# Patient Record
Sex: FEMALE | Race: BLACK OR AFRICAN AMERICAN | NOT HISPANIC OR LATINO | URBAN - METROPOLITAN AREA
[De-identification: names, ages, dates, MRNs, and addresses within clinical notes are randomized per-mention and may not be internally consistent; named-entity substitution may affect disease eponyms.]

---

## 2015-10-16 RX ORDER — AMLODIPINE BESYLATE 2.5 MG/1
1 TABLET ORAL
Qty: 0 | Refills: 0 | COMMUNITY
Start: 2015-10-16

## 2017-01-22 ENCOUNTER — EMERGENCY (EMERGENCY)
Facility: HOSPITAL | Age: 47
LOS: 1 days | Discharge: HOME | End: 2017-01-22
Attending: EMERGENCY MEDICINE
Payer: MEDICARE

## 2017-01-22 VITALS
SYSTOLIC BLOOD PRESSURE: 118 MMHG | DIASTOLIC BLOOD PRESSURE: 88 MMHG | WEIGHT: 179.9 LBS | HEART RATE: 89 BPM | HEIGHT: 69 IN | OXYGEN SATURATION: 98 % | RESPIRATION RATE: 18 BRPM | TEMPERATURE: 99 F

## 2017-01-22 DIAGNOSIS — E89.0 POSTPROCEDURAL HYPOTHYROIDISM: ICD-10-CM

## 2017-01-22 DIAGNOSIS — M32.9 SYSTEMIC LUPUS ERYTHEMATOSUS, UNSPECIFIED: ICD-10-CM

## 2017-01-22 DIAGNOSIS — K52.9 NONINFECTIVE GASTROENTERITIS AND COLITIS, UNSPECIFIED: ICD-10-CM

## 2017-01-22 DIAGNOSIS — Z91.040 LATEX ALLERGY STATUS: ICD-10-CM

## 2017-01-22 DIAGNOSIS — J06.9 ACUTE UPPER RESPIRATORY INFECTION, UNSPECIFIED: ICD-10-CM

## 2017-01-22 DIAGNOSIS — Z88.2 ALLERGY STATUS TO SULFONAMIDES: ICD-10-CM

## 2017-01-22 DIAGNOSIS — F17.210 NICOTINE DEPENDENCE, CIGARETTES, UNCOMPLICATED: ICD-10-CM

## 2017-01-22 DIAGNOSIS — Z90.710 ACQUIRED ABSENCE OF BOTH CERVIX AND UTERUS: ICD-10-CM

## 2017-01-22 DIAGNOSIS — I10 ESSENTIAL (PRIMARY) HYPERTENSION: ICD-10-CM

## 2017-01-22 DIAGNOSIS — E89.0 POSTPROCEDURAL HYPOTHYROIDISM: Chronic | ICD-10-CM

## 2017-01-22 DIAGNOSIS — Z79.01 LONG TERM (CURRENT) USE OF ANTICOAGULANTS: ICD-10-CM

## 2017-01-22 DIAGNOSIS — I48.91 UNSPECIFIED ATRIAL FIBRILLATION: ICD-10-CM

## 2017-01-22 LAB
ACETONE SERPL-MCNC: NEGATIVE — SIGNIFICANT CHANGE UP
ALBUMIN SERPL ELPH-MCNC: 4.2 G/DL — SIGNIFICANT CHANGE UP (ref 3.5–5)
ALBUMIN SERPL ELPH-MCNC: SIGNIFICANT CHANGE UP G/DL (ref 3.5–5)
ALP SERPL-CCNC: 131 U/L — HIGH (ref 40–120)
ALP SERPL-CCNC: 142 U/L — HIGH (ref 40–120)
ALT FLD-CCNC: 16 U/L DA — SIGNIFICANT CHANGE UP (ref 10–60)
ALT FLD-CCNC: SIGNIFICANT CHANGE UP U/L DA (ref 10–60)
ANION GAP SERPL CALC-SCNC: 9 MMOL/L — SIGNIFICANT CHANGE UP (ref 5–17)
AST SERPL-CCNC: 11 U/L — SIGNIFICANT CHANGE UP (ref 10–40)
AST SERPL-CCNC: SIGNIFICANT CHANGE UP U/L (ref 10–40)
BASOPHILS # BLD AUTO: 0.2 K/UL — SIGNIFICANT CHANGE UP (ref 0–0.2)
BASOPHILS NFR BLD AUTO: 1.4 % — SIGNIFICANT CHANGE UP (ref 0–2)
BILIRUB SERPL-MCNC: 0.9 MG/DL — SIGNIFICANT CHANGE UP (ref 0.2–1.2)
BILIRUB SERPL-MCNC: 1 MG/DL — SIGNIFICANT CHANGE UP (ref 0.2–1.2)
BUN SERPL-MCNC: 12 MG/DL — SIGNIFICANT CHANGE UP (ref 7–18)
BUN SERPL-MCNC: SIGNIFICANT CHANGE UP MG/DL (ref 7–18)
CALCIUM SERPL-MCNC: 9.3 MG/DL — SIGNIFICANT CHANGE UP (ref 8.4–10.5)
CALCIUM SERPL-MCNC: SIGNIFICANT CHANGE UP MG/DL (ref 8.4–10.5)
CHLORIDE SERPL-SCNC: 109 MMOL/L — HIGH (ref 96–108)
CHLORIDE SERPL-SCNC: SIGNIFICANT CHANGE UP MMOL/L (ref 96–108)
CO2 SERPL-SCNC: 22 MMOL/L — SIGNIFICANT CHANGE UP (ref 22–31)
CO2 SERPL-SCNC: SIGNIFICANT CHANGE UP MMOL/L (ref 22–31)
CREAT SERPL-MCNC: 1.16 MG/DL — SIGNIFICANT CHANGE UP (ref 0.5–1.3)
CREAT SERPL-MCNC: 1.28 MG/DL — SIGNIFICANT CHANGE UP (ref 0.5–1.3)
EOSINOPHIL # BLD AUTO: 0 K/UL — SIGNIFICANT CHANGE UP (ref 0–0.5)
EOSINOPHIL NFR BLD AUTO: 0.2 % — SIGNIFICANT CHANGE UP (ref 0–6)
GLUCOSE SERPL-MCNC: 88 MG/DL — SIGNIFICANT CHANGE UP (ref 70–99)
GLUCOSE SERPL-MCNC: 92 MG/DL — SIGNIFICANT CHANGE UP (ref 70–99)
HCT VFR BLD CALC: 51.2 % — HIGH (ref 34.5–45)
HGB BLD-MCNC: 16.4 G/DL — HIGH (ref 11.5–15.5)
LACTATE SERPL-SCNC: 0.9 MMOL/L — SIGNIFICANT CHANGE UP (ref 0.7–2)
LYMPHOCYTES # BLD AUTO: 2.3 K/UL — SIGNIFICANT CHANGE UP (ref 1–3.3)
LYMPHOCYTES # BLD AUTO: 21.3 % — SIGNIFICANT CHANGE UP (ref 13–44)
MAGNESIUM SERPL-MCNC: 1.9 MG/DL — SIGNIFICANT CHANGE UP (ref 1.8–2.4)
MCHC RBC-ENTMCNC: 28.8 PG — SIGNIFICANT CHANGE UP (ref 27–34)
MCHC RBC-ENTMCNC: 32 GM/DL — SIGNIFICANT CHANGE UP (ref 32–36)
MCV RBC AUTO: 90.1 FL — SIGNIFICANT CHANGE UP (ref 80–100)
MONOCYTES # BLD AUTO: 0.6 K/UL — SIGNIFICANT CHANGE UP (ref 0–0.9)
MONOCYTES NFR BLD AUTO: 5 % — SIGNIFICANT CHANGE UP (ref 2–14)
NEUTROPHILS # BLD AUTO: 7.9 K/UL — HIGH (ref 1.8–7.4)
NEUTROPHILS NFR BLD AUTO: 72.1 % — SIGNIFICANT CHANGE UP (ref 43–77)
PLATELET # BLD AUTO: 324 K/UL — SIGNIFICANT CHANGE UP (ref 150–400)
POTASSIUM SERPL-MCNC: 3.6 MMOL/L — SIGNIFICANT CHANGE UP (ref 3.5–5.3)
POTASSIUM SERPL-MCNC: SIGNIFICANT CHANGE UP MMOL/L (ref 3.5–5.3)
POTASSIUM SERPL-SCNC: 3.6 MMOL/L — SIGNIFICANT CHANGE UP (ref 3.5–5.3)
POTASSIUM SERPL-SCNC: SIGNIFICANT CHANGE UP MMOL/L (ref 3.5–5.3)
PROT SERPL-MCNC: 7.9 G/DL — SIGNIFICANT CHANGE UP (ref 6–8.3)
PROT SERPL-MCNC: 9.3 G/DL — HIGH (ref 6–8.3)
RBC # BLD: 5.68 M/UL — HIGH (ref 3.8–5.2)
RBC # FLD: 13.6 % — SIGNIFICANT CHANGE UP (ref 10.3–14.5)
SODIUM SERPL-SCNC: 140 MMOL/L — SIGNIFICANT CHANGE UP (ref 135–145)
SODIUM SERPL-SCNC: SIGNIFICANT CHANGE UP MMOL/L (ref 135–145)
WBC # BLD: 11 K/UL — HIGH (ref 3.8–10.5)
WBC # FLD AUTO: 11 K/UL — HIGH (ref 3.8–10.5)

## 2017-01-22 PROCEDURE — 87040 BLOOD CULTURE FOR BACTERIA: CPT

## 2017-01-22 PROCEDURE — 96374 THER/PROPH/DIAG INJ IV PUSH: CPT | Mod: 59

## 2017-01-22 PROCEDURE — 74176 CT ABD & PELVIS W/O CONTRAST: CPT

## 2017-01-22 PROCEDURE — 71045 X-RAY EXAM CHEST 1 VIEW: CPT

## 2017-01-22 PROCEDURE — 96376 TX/PRO/DX INJ SAME DRUG ADON: CPT

## 2017-01-22 PROCEDURE — 80053 COMPREHEN METABOLIC PANEL: CPT

## 2017-01-22 PROCEDURE — 74176 CT ABD & PELVIS W/O CONTRAST: CPT | Mod: 26

## 2017-01-22 PROCEDURE — 71010: CPT | Mod: 26

## 2017-01-22 PROCEDURE — 83735 ASSAY OF MAGNESIUM: CPT

## 2017-01-22 PROCEDURE — 96375 TX/PRO/DX INJ NEW DRUG ADDON: CPT

## 2017-01-22 PROCEDURE — 82009 KETONE BODYS QUAL: CPT

## 2017-01-22 PROCEDURE — 83605 ASSAY OF LACTIC ACID: CPT

## 2017-01-22 PROCEDURE — 99285 EMERGENCY DEPT VISIT HI MDM: CPT

## 2017-01-22 PROCEDURE — 99284 EMERGENCY DEPT VISIT MOD MDM: CPT | Mod: 25

## 2017-01-22 PROCEDURE — 85027 COMPLETE CBC AUTOMATED: CPT

## 2017-01-22 RX ORDER — SODIUM CHLORIDE 9 MG/ML
2000 INJECTION INTRAMUSCULAR; INTRAVENOUS; SUBCUTANEOUS ONCE
Qty: 0 | Refills: 0 | Status: COMPLETED | OUTPATIENT
Start: 2017-01-22 | End: 2017-01-22

## 2017-01-22 RX ORDER — METOCLOPRAMIDE HCL 10 MG
10 TABLET ORAL ONCE
Qty: 0 | Refills: 0 | Status: COMPLETED | OUTPATIENT
Start: 2017-01-22 | End: 2017-01-22

## 2017-01-22 RX ORDER — PANTOPRAZOLE SODIUM 20 MG/1
40 TABLET, DELAYED RELEASE ORAL ONCE
Qty: 0 | Refills: 0 | Status: COMPLETED | OUTPATIENT
Start: 2017-01-22 | End: 2017-01-22

## 2017-01-22 RX ORDER — SODIUM CHLORIDE 9 MG/ML
1000 INJECTION INTRAMUSCULAR; INTRAVENOUS; SUBCUTANEOUS
Qty: 0 | Refills: 0 | Status: DISCONTINUED | OUTPATIENT
Start: 2017-01-22 | End: 2017-01-26

## 2017-01-22 RX ORDER — ONDANSETRON 8 MG/1
1 TABLET, FILM COATED ORAL
Qty: 21 | Refills: 0 | OUTPATIENT
Start: 2017-01-22 | End: 2017-01-29

## 2017-01-22 RX ORDER — MORPHINE SULFATE 50 MG/1
4 CAPSULE, EXTENDED RELEASE ORAL ONCE
Qty: 0 | Refills: 0 | Status: DISCONTINUED | OUTPATIENT
Start: 2017-01-22 | End: 2017-01-22

## 2017-01-22 RX ADMIN — MORPHINE SULFATE 4 MILLIGRAM(S): 50 CAPSULE, EXTENDED RELEASE ORAL at 21:44

## 2017-01-22 RX ADMIN — MORPHINE SULFATE 4 MILLIGRAM(S): 50 CAPSULE, EXTENDED RELEASE ORAL at 20:47

## 2017-01-22 RX ADMIN — SODIUM CHLORIDE 2000 MILLILITER(S): 9 INJECTION INTRAMUSCULAR; INTRAVENOUS; SUBCUTANEOUS at 18:12

## 2017-01-22 RX ADMIN — PANTOPRAZOLE SODIUM 40 MILLIGRAM(S): 20 TABLET, DELAYED RELEASE ORAL at 18:10

## 2017-01-22 RX ADMIN — MORPHINE SULFATE 4 MILLIGRAM(S): 50 CAPSULE, EXTENDED RELEASE ORAL at 18:55

## 2017-01-22 RX ADMIN — Medication 104 MILLIGRAM(S): at 18:10

## 2017-01-22 NOTE — ED PROVIDER NOTE - NS ED MD SCRIBE ATTENDING SCRIBE SECTIONS
VITAL SIGNS( Pullset)/HISTORY OF PRESENT ILLNESS/REVIEW OF SYSTEMS/PHYSICAL EXAM/PAST MEDICAL/SURGICAL/SOCIAL HISTORY/DISPOSITION/HIV

## 2017-01-22 NOTE — ED PROVIDER NOTE - OBJECTIVE STATEMENT
47 y/o F pt, ambulator w/ walker, w/ PMHx of a-fib, HTN, SLE, PSHx of thyroidectomy, and partial hysterectomy, on Plavix, presents to the ED c/o cough w/ yellow phlegm x1 week. Pt reports clear nasal drainage, chills, nausea, vomiting x3-4 days, multiple episodes of diarrhea x3 days (non bloody), dysuria b/l LE pain and upper abd pain x3-4 days. Pt states she is unable to tolerate PO intake. Pt reports the abd pain is sharp. 8/10 and is constant. Pt did not take pain medication. Pt denies recent travel, spoil food consumption. Pt denies CP, SOB, hematuria, flank pain, bloody emesis, blood in stool or any other complaints. Allergies to ASA and Sulfa drugs. PMD Dr. Mathew

## 2017-01-22 NOTE — ED ADULT NURSE NOTE - OBJECTIVE STATEMENT
Patient came to the ED a/o x 3 ambulates with walker c/o cough and vomiting. Patient denies abdominal pain, abdomen is soft to touch non tender.

## 2017-01-22 NOTE — ED PROVIDER NOTE - CARE PLAN
Principal Discharge DX:	Abdominal pain  Secondary Diagnosis:	Gastroenteritis  Secondary Diagnosis:	URI (upper respiratory infection)

## 2017-01-22 NOTE — ED PROVIDER NOTE - MEDICAL DECISION MAKING DETAILS
47 y/o F pt w/ vomiting, diarrhea and coughing. Will get labs, CT abd, give IV fluids and admit. 47 y/o F pt w/ vomiting, diarrhea and coughing. Will get labs, CT abd, give IV fluids and poss admit.

## 2017-01-22 NOTE — ED PROVIDER NOTE - CHPI ED SYMPTOMS POS
NAUSEA/DIARRHEA/VOMITING/DECREASED EATING/DRINKING/chills, upper abd pain, b/l LE pain, cough w/ yellow phlegm,  nasal drainage, dysuria

## 2017-01-22 NOTE — ED PROVIDER NOTE - PMH
Atrial fibrillation    Cyclical vomiting    HTN (hypertension)    Reflex sympathetic dystrophy    SLE (systemic lupus erythematosus)

## 2017-01-23 VITALS — DIASTOLIC BLOOD PRESSURE: 93 MMHG | SYSTOLIC BLOOD PRESSURE: 125 MMHG

## 2017-01-23 RX ORDER — CIPROFLOXACIN LACTATE 400MG/40ML
1 VIAL (ML) INTRAVENOUS
Qty: 6 | Refills: 0 | OUTPATIENT
Start: 2017-01-23 | End: 2017-01-30

## 2017-01-27 LAB
CULTURE RESULTS: SIGNIFICANT CHANGE UP
CULTURE RESULTS: SIGNIFICANT CHANGE UP
SPECIMEN SOURCE: SIGNIFICANT CHANGE UP
SPECIMEN SOURCE: SIGNIFICANT CHANGE UP

## 2017-06-10 ENCOUNTER — INPATIENT (INPATIENT)
Facility: HOSPITAL | Age: 47
LOS: 3 days | Discharge: HOME | DRG: 103 | End: 2017-06-14
Attending: INTERNAL MEDICINE | Admitting: INTERNAL MEDICINE
Payer: MEDICARE

## 2017-06-10 VITALS
OXYGEN SATURATION: 100 % | SYSTOLIC BLOOD PRESSURE: 151 MMHG | TEMPERATURE: 99 F | HEART RATE: 84 BPM | DIASTOLIC BLOOD PRESSURE: 119 MMHG | HEIGHT: 69 IN | RESPIRATION RATE: 20 BRPM | WEIGHT: 179.9 LBS

## 2017-06-10 DIAGNOSIS — E89.0 POSTPROCEDURAL HYPOTHYROIDISM: Chronic | ICD-10-CM

## 2017-06-10 LAB
BASOPHILS # BLD AUTO: 0.1 K/UL — SIGNIFICANT CHANGE UP (ref 0–0.2)
BASOPHILS NFR BLD AUTO: 1.1 % — SIGNIFICANT CHANGE UP (ref 0–2)
EOSINOPHIL # BLD AUTO: 0 K/UL — SIGNIFICANT CHANGE UP (ref 0–0.5)
EOSINOPHIL NFR BLD AUTO: 0.1 % — SIGNIFICANT CHANGE UP (ref 0–6)
HCT VFR BLD CALC: 45.9 % — HIGH (ref 34.5–45)
HGB BLD-MCNC: 15.2 G/DL — SIGNIFICANT CHANGE UP (ref 11.5–15.5)
LYMPHOCYTES # BLD AUTO: 1.3 K/UL — SIGNIFICANT CHANGE UP (ref 1–3.3)
LYMPHOCYTES # BLD AUTO: 9.5 % — LOW (ref 13–44)
MCHC RBC-ENTMCNC: 29.4 PG — SIGNIFICANT CHANGE UP (ref 27–34)
MCHC RBC-ENTMCNC: 33.1 GM/DL — SIGNIFICANT CHANGE UP (ref 32–36)
MCV RBC AUTO: 88.7 FL — SIGNIFICANT CHANGE UP (ref 80–100)
MONOCYTES # BLD AUTO: 0.4 K/UL — SIGNIFICANT CHANGE UP (ref 0–0.9)
MONOCYTES NFR BLD AUTO: 2.7 % — SIGNIFICANT CHANGE UP (ref 2–14)
NEUTROPHILS # BLD AUTO: 12.2 K/UL — HIGH (ref 1.8–7.4)
NEUTROPHILS NFR BLD AUTO: 86.7 % — HIGH (ref 43–77)
PLATELET # BLD AUTO: 205 K/UL — SIGNIFICANT CHANGE UP (ref 150–400)
RBC # BLD: 5.18 M/UL — SIGNIFICANT CHANGE UP (ref 3.8–5.2)
RBC # FLD: 13.8 % — SIGNIFICANT CHANGE UP (ref 10.3–14.5)
WBC # BLD: 14 K/UL — HIGH (ref 3.8–10.5)
WBC # FLD AUTO: 14 K/UL — HIGH (ref 3.8–10.5)

## 2017-06-10 RX ORDER — SODIUM CHLORIDE 9 MG/ML
1000 INJECTION INTRAMUSCULAR; INTRAVENOUS; SUBCUTANEOUS ONCE
Qty: 0 | Refills: 0 | Status: COMPLETED | OUTPATIENT
Start: 2017-06-10 | End: 2017-06-10

## 2017-06-10 RX ORDER — ONDANSETRON 8 MG/1
4 TABLET, FILM COATED ORAL ONCE
Qty: 0 | Refills: 0 | Status: COMPLETED | OUTPATIENT
Start: 2017-06-10 | End: 2017-06-10

## 2017-06-10 RX ORDER — METOCLOPRAMIDE HCL 10 MG
10 TABLET ORAL ONCE
Qty: 0 | Refills: 0 | Status: COMPLETED | OUTPATIENT
Start: 2017-06-10 | End: 2017-06-10

## 2017-06-10 RX ORDER — HYDROMORPHONE HYDROCHLORIDE 2 MG/ML
1 INJECTION INTRAMUSCULAR; INTRAVENOUS; SUBCUTANEOUS ONCE
Qty: 0 | Refills: 0 | Status: DISCONTINUED | OUTPATIENT
Start: 2017-06-10 | End: 2017-06-10

## 2017-06-10 RX ORDER — FAMOTIDINE 10 MG/ML
20 INJECTION INTRAVENOUS ONCE
Qty: 0 | Refills: 0 | Status: COMPLETED | OUTPATIENT
Start: 2017-06-10 | End: 2017-06-10

## 2017-06-10 RX ADMIN — HYDROMORPHONE HYDROCHLORIDE 1 MILLIGRAM(S): 2 INJECTION INTRAMUSCULAR; INTRAVENOUS; SUBCUTANEOUS at 23:28

## 2017-06-10 RX ADMIN — SODIUM CHLORIDE 1000 MILLILITER(S): 9 INJECTION INTRAMUSCULAR; INTRAVENOUS; SUBCUTANEOUS at 23:28

## 2017-06-10 RX ADMIN — FAMOTIDINE 20 MILLIGRAM(S): 10 INJECTION INTRAVENOUS at 23:28

## 2017-06-10 RX ADMIN — ONDANSETRON 4 MILLIGRAM(S): 8 TABLET, FILM COATED ORAL at 23:28

## 2017-06-10 NOTE — ED PROVIDER NOTE - CONSTITUTIONAL, MLM
normal... Well appearing, well nourished, awake, alert, oriented to person, place, time/situation and in no apparent distress; pt is actively vomiting on exam.

## 2017-06-10 NOTE — ED PROVIDER NOTE - OBJECTIVE STATEMENT
47 y/o F pt with PMHx of a-fib, cyclical vomiting, HTN, presents to ED c/o cyclical vomiting and diarrhea x today. Pt reports she normally has these episodes at least once a year. Pt is actively vomiting in the ED. Pt denies fever, chills, dysuria, urinary frequency, hematuria, SOB, cough, CP, palpitations, or any other complaints. ALLERGIES: Aspirin, Sulfa drugs.

## 2017-06-10 NOTE — ED PROVIDER NOTE - NS ED MD SCRIBE ATTENDING SCRIBE SECTIONS
VITAL SIGNS( Pullset)/PAST MEDICAL/SURGICAL/SOCIAL HISTORY/HIV/HISTORY OF PRESENT ILLNESS/DISPOSITION/PHYSICAL EXAM/REVIEW OF SYSTEMS

## 2017-06-10 NOTE — ED PROVIDER NOTE - CHPI ED SYMPTOMS NEG
no chills/no fever/no dysuria, no urinary frequency, no hematuria, no SOB, no cough, no CP, no palpitations

## 2017-06-11 DIAGNOSIS — G43.A0 CYCLICAL VOMITING, IN MIGRAINE, NOT INTRACTABLE: ICD-10-CM

## 2017-06-11 DIAGNOSIS — I10 ESSENTIAL (PRIMARY) HYPERTENSION: ICD-10-CM

## 2017-06-11 DIAGNOSIS — R19.7 DIARRHEA, UNSPECIFIED: ICD-10-CM

## 2017-06-11 DIAGNOSIS — G90.50 COMPLEX REGIONAL PAIN SYNDROME I, UNSPECIFIED: ICD-10-CM

## 2017-06-11 DIAGNOSIS — I48.91 UNSPECIFIED ATRIAL FIBRILLATION: ICD-10-CM

## 2017-06-11 DIAGNOSIS — Z29.9 ENCOUNTER FOR PROPHYLACTIC MEASURES, UNSPECIFIED: ICD-10-CM

## 2017-06-11 LAB
ALBUMIN SERPL ELPH-MCNC: 4 G/DL — SIGNIFICANT CHANGE UP (ref 3.5–5)
ALBUMIN SERPL ELPH-MCNC: 4 G/DL — SIGNIFICANT CHANGE UP (ref 3.5–5)
ALP SERPL-CCNC: 150 U/L — HIGH (ref 40–120)
ALP SERPL-CCNC: 153 U/L — HIGH (ref 40–120)
ALT FLD-CCNC: 25 U/L DA — SIGNIFICANT CHANGE UP (ref 10–60)
ALT FLD-CCNC: 28 U/L DA — SIGNIFICANT CHANGE UP (ref 10–60)
ANION GAP SERPL CALC-SCNC: 10 MMOL/L — SIGNIFICANT CHANGE UP (ref 5–17)
ANION GAP SERPL CALC-SCNC: 9 MMOL/L — SIGNIFICANT CHANGE UP (ref 5–17)
APPEARANCE UR: CLEAR — SIGNIFICANT CHANGE UP
AST SERPL-CCNC: 26 U/L — SIGNIFICANT CHANGE UP (ref 10–40)
AST SERPL-CCNC: 30 U/L — SIGNIFICANT CHANGE UP (ref 10–40)
BACTERIA # UR AUTO: ABNORMAL /HPF
BASOPHILS # BLD AUTO: 0.1 K/UL — SIGNIFICANT CHANGE UP (ref 0–0.2)
BASOPHILS NFR BLD AUTO: 0.8 % — SIGNIFICANT CHANGE UP (ref 0–2)
BILIRUB SERPL-MCNC: 0.6 MG/DL — SIGNIFICANT CHANGE UP (ref 0.2–1.2)
BILIRUB SERPL-MCNC: 0.9 MG/DL — SIGNIFICANT CHANGE UP (ref 0.2–1.2)
BILIRUB UR-MCNC: NEGATIVE — SIGNIFICANT CHANGE UP
BUN SERPL-MCNC: 6 MG/DL — LOW (ref 7–18)
BUN SERPL-MCNC: 8 MG/DL — SIGNIFICANT CHANGE UP (ref 7–18)
CALCIUM SERPL-MCNC: 9.2 MG/DL — SIGNIFICANT CHANGE UP (ref 8.4–10.5)
CALCIUM SERPL-MCNC: 9.9 MG/DL — SIGNIFICANT CHANGE UP (ref 8.4–10.5)
CHLORIDE SERPL-SCNC: 105 MMOL/L — SIGNIFICANT CHANGE UP (ref 96–108)
CHLORIDE SERPL-SCNC: 106 MMOL/L — SIGNIFICANT CHANGE UP (ref 96–108)
CK MB BLD-MCNC: 0.4 % — SIGNIFICANT CHANGE UP (ref 0–3.5)
CK MB CFR SERPL CALC: 1.4 NG/ML — SIGNIFICANT CHANGE UP (ref 0–3.6)
CK SERPL-CCNC: 385 U/L — HIGH (ref 21–215)
CO2 SERPL-SCNC: 24 MMOL/L — SIGNIFICANT CHANGE UP (ref 22–31)
CO2 SERPL-SCNC: 26 MMOL/L — SIGNIFICANT CHANGE UP (ref 22–31)
COLOR SPEC: YELLOW — SIGNIFICANT CHANGE UP
CREAT SERPL-MCNC: 1.08 MG/DL — SIGNIFICANT CHANGE UP (ref 0.5–1.3)
CREAT SERPL-MCNC: 1.2 MG/DL — SIGNIFICANT CHANGE UP (ref 0.5–1.3)
DIFF PNL FLD: ABNORMAL
EOSINOPHIL # BLD AUTO: 0 K/UL — SIGNIFICANT CHANGE UP (ref 0–0.5)
EOSINOPHIL NFR BLD AUTO: 0.1 % — SIGNIFICANT CHANGE UP (ref 0–6)
EPI CELLS # UR: ABNORMAL (ref 0–10)
GLUCOSE SERPL-MCNC: 103 MG/DL — HIGH (ref 70–99)
GLUCOSE SERPL-MCNC: 122 MG/DL — HIGH (ref 70–99)
GLUCOSE UR QL: NEGATIVE — SIGNIFICANT CHANGE UP
HCT VFR BLD CALC: 46.6 % — HIGH (ref 34.5–45)
HGB BLD-MCNC: 15.8 G/DL — HIGH (ref 11.5–15.5)
KETONES UR-MCNC: NEGATIVE — SIGNIFICANT CHANGE UP
LACTATE SERPL-SCNC: 1.1 MMOL/L — SIGNIFICANT CHANGE UP (ref 0.7–2)
LEUKOCYTE ESTERASE UR-ACNC: NEGATIVE — SIGNIFICANT CHANGE UP
LIDOCAIN IGE QN: 76 U/L — SIGNIFICANT CHANGE UP (ref 73–393)
LYMPHOCYTES # BLD AUTO: 16 % — SIGNIFICANT CHANGE UP (ref 13–44)
LYMPHOCYTES # BLD AUTO: 2 K/UL — SIGNIFICANT CHANGE UP (ref 1–3.3)
MAGNESIUM SERPL-MCNC: 1.6 MG/DL — SIGNIFICANT CHANGE UP (ref 1.6–2.6)
MCHC RBC-ENTMCNC: 29.7 PG — SIGNIFICANT CHANGE UP (ref 27–34)
MCHC RBC-ENTMCNC: 33.8 GM/DL — SIGNIFICANT CHANGE UP (ref 32–36)
MCV RBC AUTO: 88.1 FL — SIGNIFICANT CHANGE UP (ref 80–100)
MONOCYTES # BLD AUTO: 0.9 K/UL — SIGNIFICANT CHANGE UP (ref 0–0.9)
MONOCYTES NFR BLD AUTO: 7.7 % — SIGNIFICANT CHANGE UP (ref 2–14)
NEUTROPHILS # BLD AUTO: 9.3 K/UL — HIGH (ref 1.8–7.4)
NEUTROPHILS NFR BLD AUTO: 75.4 % — SIGNIFICANT CHANGE UP (ref 43–77)
NITRITE UR-MCNC: NEGATIVE — SIGNIFICANT CHANGE UP
PCP SPEC-MCNC: SIGNIFICANT CHANGE UP
PH UR: 8 — SIGNIFICANT CHANGE UP (ref 5–8)
PHOSPHATE SERPL-MCNC: 2.5 MG/DL — SIGNIFICANT CHANGE UP (ref 2.5–4.5)
PLATELET # BLD AUTO: 232 K/UL — SIGNIFICANT CHANGE UP (ref 150–400)
POTASSIUM SERPL-MCNC: 3.1 MMOL/L — LOW (ref 3.5–5.3)
POTASSIUM SERPL-MCNC: 3.3 MMOL/L — LOW (ref 3.5–5.3)
POTASSIUM SERPL-SCNC: 3.1 MMOL/L — LOW (ref 3.5–5.3)
POTASSIUM SERPL-SCNC: 3.3 MMOL/L — LOW (ref 3.5–5.3)
PROT SERPL-MCNC: 8 G/DL — SIGNIFICANT CHANGE UP (ref 6–8.3)
PROT SERPL-MCNC: 8.1 G/DL — SIGNIFICANT CHANGE UP (ref 6–8.3)
PROT UR-MCNC: 30 MG/DL
RBC # BLD: 5.3 M/UL — HIGH (ref 3.8–5.2)
RBC # FLD: 13.7 % — SIGNIFICANT CHANGE UP (ref 10.3–14.5)
RBC CASTS # UR COMP ASSIST: SIGNIFICANT CHANGE UP /HPF (ref 0–2)
SODIUM SERPL-SCNC: 140 MMOL/L — SIGNIFICANT CHANGE UP (ref 135–145)
SODIUM SERPL-SCNC: 140 MMOL/L — SIGNIFICANT CHANGE UP (ref 135–145)
SP GR SPEC: 1.01 — SIGNIFICANT CHANGE UP (ref 1.01–1.02)
TROPONIN I SERPL-MCNC: <0.015 NG/ML — SIGNIFICANT CHANGE UP (ref 0–0.04)
UROBILINOGEN FLD QL: NEGATIVE — SIGNIFICANT CHANGE UP
WBC # BLD: 12.3 K/UL — HIGH (ref 3.8–10.5)
WBC # FLD AUTO: 12.3 K/UL — HIGH (ref 3.8–10.5)
WBC UR QL: SIGNIFICANT CHANGE UP /HPF (ref 0–5)

## 2017-06-11 PROCEDURE — 99285 EMERGENCY DEPT VISIT HI MDM: CPT | Mod: 25

## 2017-06-11 PROCEDURE — 71010: CPT | Mod: 26

## 2017-06-11 PROCEDURE — 74176 CT ABD & PELVIS W/O CONTRAST: CPT | Mod: 26

## 2017-06-11 RX ORDER — ACETAMINOPHEN 500 MG
650 TABLET ORAL ONCE
Qty: 0 | Refills: 0 | Status: COMPLETED | OUTPATIENT
Start: 2017-06-11 | End: 2017-06-11

## 2017-06-11 RX ORDER — MORPHINE SULFATE 50 MG/1
2 CAPSULE, EXTENDED RELEASE ORAL EVERY 8 HOURS
Qty: 0 | Refills: 0 | Status: DISCONTINUED | OUTPATIENT
Start: 2017-06-11 | End: 2017-06-13

## 2017-06-11 RX ORDER — ONDANSETRON 8 MG/1
4 TABLET, FILM COATED ORAL EVERY 6 HOURS
Qty: 0 | Refills: 0 | Status: DISCONTINUED | OUTPATIENT
Start: 2017-06-11 | End: 2017-06-14

## 2017-06-11 RX ORDER — SODIUM CHLORIDE 9 MG/ML
1000 INJECTION, SOLUTION INTRAVENOUS
Qty: 0 | Refills: 0 | Status: DISCONTINUED | OUTPATIENT
Start: 2017-06-11 | End: 2017-06-12

## 2017-06-11 RX ORDER — MORPHINE SULFATE 50 MG/1
1 CAPSULE, EXTENDED RELEASE ORAL ONCE
Qty: 0 | Refills: 0 | Status: DISCONTINUED | OUTPATIENT
Start: 2017-06-11 | End: 2017-06-11

## 2017-06-11 RX ORDER — POTASSIUM CHLORIDE 20 MEQ
10 PACKET (EA) ORAL
Qty: 0 | Refills: 0 | Status: COMPLETED | OUTPATIENT
Start: 2017-06-11 | End: 2017-06-11

## 2017-06-11 RX ORDER — POTASSIUM CHLORIDE 20 MEQ
40 PACKET (EA) ORAL ONCE
Qty: 0 | Refills: 0 | Status: COMPLETED | OUTPATIENT
Start: 2017-06-11 | End: 2017-06-11

## 2017-06-11 RX ORDER — ESCITALOPRAM OXALATE 10 MG/1
20 TABLET, FILM COATED ORAL DAILY
Qty: 0 | Refills: 0 | Status: DISCONTINUED | OUTPATIENT
Start: 2017-06-11 | End: 2017-06-14

## 2017-06-11 RX ORDER — ONDANSETRON 8 MG/1
4 TABLET, FILM COATED ORAL EVERY 8 HOURS
Qty: 0 | Refills: 0 | Status: DISCONTINUED | OUTPATIENT
Start: 2017-06-11 | End: 2017-06-11

## 2017-06-11 RX ORDER — HYDROMORPHONE HYDROCHLORIDE 2 MG/ML
1 INJECTION INTRAMUSCULAR; INTRAVENOUS; SUBCUTANEOUS ONCE
Qty: 0 | Refills: 0 | Status: DISCONTINUED | OUTPATIENT
Start: 2017-06-11 | End: 2017-06-11

## 2017-06-11 RX ORDER — POTASSIUM CHLORIDE 20 MEQ
10 PACKET (EA) ORAL ONCE
Qty: 0 | Refills: 0 | Status: COMPLETED | OUTPATIENT
Start: 2017-06-11 | End: 2017-06-11

## 2017-06-11 RX ORDER — PANTOPRAZOLE SODIUM 20 MG/1
40 TABLET, DELAYED RELEASE ORAL
Qty: 0 | Refills: 0 | Status: DISCONTINUED | OUTPATIENT
Start: 2017-06-11 | End: 2017-06-14

## 2017-06-11 RX ORDER — MORPHINE SULFATE 50 MG/1
2 CAPSULE, EXTENDED RELEASE ORAL ONCE
Qty: 0 | Refills: 0 | Status: DISCONTINUED | OUTPATIENT
Start: 2017-06-11 | End: 2017-06-11

## 2017-06-11 RX ORDER — METOPROLOL TARTRATE 50 MG
25 TABLET ORAL
Qty: 0 | Refills: 0 | Status: DISCONTINUED | OUTPATIENT
Start: 2017-06-11 | End: 2017-06-14

## 2017-06-11 RX ORDER — ENOXAPARIN SODIUM 100 MG/ML
40 INJECTION SUBCUTANEOUS DAILY
Qty: 0 | Refills: 0 | Status: DISCONTINUED | OUTPATIENT
Start: 2017-06-11 | End: 2017-06-14

## 2017-06-11 RX ORDER — CLOPIDOGREL BISULFATE 75 MG/1
75 TABLET, FILM COATED ORAL DAILY
Qty: 0 | Refills: 0 | Status: DISCONTINUED | OUTPATIENT
Start: 2017-06-11 | End: 2017-06-14

## 2017-06-11 RX ORDER — SODIUM CHLORIDE 9 MG/ML
1000 INJECTION INTRAMUSCULAR; INTRAVENOUS; SUBCUTANEOUS
Qty: 0 | Refills: 0 | Status: DISCONTINUED | OUTPATIENT
Start: 2017-06-11 | End: 2017-06-11

## 2017-06-11 RX ORDER — AMLODIPINE BESYLATE 2.5 MG/1
2.5 TABLET ORAL DAILY
Qty: 0 | Refills: 0 | Status: DISCONTINUED | OUTPATIENT
Start: 2017-06-11 | End: 2017-06-14

## 2017-06-11 RX ORDER — LOPERAMIDE HCL 2 MG
2 TABLET ORAL THREE TIMES A DAY
Qty: 0 | Refills: 0 | Status: DISCONTINUED | OUTPATIENT
Start: 2017-06-11 | End: 2017-06-14

## 2017-06-11 RX ADMIN — MORPHINE SULFATE 2 MILLIGRAM(S): 50 CAPSULE, EXTENDED RELEASE ORAL at 21:45

## 2017-06-11 RX ADMIN — HYDROMORPHONE HYDROCHLORIDE 1 MILLIGRAM(S): 2 INJECTION INTRAMUSCULAR; INTRAVENOUS; SUBCUTANEOUS at 06:12

## 2017-06-11 RX ADMIN — Medication 300 MILLIGRAM(S): at 17:44

## 2017-06-11 RX ADMIN — MORPHINE SULFATE 2 MILLIGRAM(S): 50 CAPSULE, EXTENDED RELEASE ORAL at 14:05

## 2017-06-11 RX ADMIN — Medication 25 MILLIGRAM(S): at 17:44

## 2017-06-11 RX ADMIN — CLOPIDOGREL BISULFATE 75 MILLIGRAM(S): 75 TABLET, FILM COATED ORAL at 11:43

## 2017-06-11 RX ADMIN — SODIUM CHLORIDE 100 MILLILITER(S): 9 INJECTION, SOLUTION INTRAVENOUS at 22:03

## 2017-06-11 RX ADMIN — Medication 100 MILLIEQUIVALENT(S): at 03:05

## 2017-06-11 RX ADMIN — Medication 650 MILLIGRAM(S): at 19:30

## 2017-06-11 RX ADMIN — MORPHINE SULFATE 2 MILLIGRAM(S): 50 CAPSULE, EXTENDED RELEASE ORAL at 19:08

## 2017-06-11 RX ADMIN — MORPHINE SULFATE 2 MILLIGRAM(S): 50 CAPSULE, EXTENDED RELEASE ORAL at 21:30

## 2017-06-11 RX ADMIN — Medication 650 MILLIGRAM(S): at 10:10

## 2017-06-11 RX ADMIN — SODIUM CHLORIDE 75 MILLILITER(S): 9 INJECTION INTRAMUSCULAR; INTRAVENOUS; SUBCUTANEOUS at 08:11

## 2017-06-11 RX ADMIN — Medication 100 MILLIEQUIVALENT(S): at 21:30

## 2017-06-11 RX ADMIN — ESCITALOPRAM OXALATE 20 MILLIGRAM(S): 10 TABLET, FILM COATED ORAL at 11:43

## 2017-06-11 RX ADMIN — ENOXAPARIN SODIUM 40 MILLIGRAM(S): 100 INJECTION SUBCUTANEOUS at 11:43

## 2017-06-11 RX ADMIN — Medication 100 MILLIEQUIVALENT(S): at 04:33

## 2017-06-11 RX ADMIN — Medication 2 MILLIGRAM(S): at 13:09

## 2017-06-11 RX ADMIN — ONDANSETRON 4 MILLIGRAM(S): 8 TABLET, FILM COATED ORAL at 22:00

## 2017-06-11 RX ADMIN — Medication 650 MILLIGRAM(S): at 04:33

## 2017-06-11 RX ADMIN — SODIUM CHLORIDE 100 MILLILITER(S): 9 INJECTION, SOLUTION INTRAVENOUS at 08:38

## 2017-06-11 RX ADMIN — HYDROMORPHONE HYDROCHLORIDE 1 MILLIGRAM(S): 2 INJECTION INTRAMUSCULAR; INTRAVENOUS; SUBCUTANEOUS at 01:27

## 2017-06-11 RX ADMIN — Medication 2 MILLIGRAM(S): at 21:30

## 2017-06-11 RX ADMIN — MORPHINE SULFATE 2 MILLIGRAM(S): 50 CAPSULE, EXTENDED RELEASE ORAL at 13:13

## 2017-06-11 RX ADMIN — ONDANSETRON 4 MILLIGRAM(S): 8 TABLET, FILM COATED ORAL at 11:49

## 2017-06-11 RX ADMIN — MORPHINE SULFATE 2 MILLIGRAM(S): 50 CAPSULE, EXTENDED RELEASE ORAL at 19:38

## 2017-06-11 RX ADMIN — ONDANSETRON 4 MILLIGRAM(S): 8 TABLET, FILM COATED ORAL at 06:12

## 2017-06-11 RX ADMIN — Medication 10 MILLIGRAM(S): at 00:17

## 2017-06-11 RX ADMIN — Medication 650 MILLIGRAM(S): at 09:40

## 2017-06-11 RX ADMIN — Medication 650 MILLIGRAM(S): at 19:50

## 2017-06-11 RX ADMIN — Medication 100 MILLIEQUIVALENT(S): at 04:20

## 2017-06-11 NOTE — H&P ADULT - PROBLEM SELECTOR PLAN 2
Likely non inflammatory   CT scan non conclusive for colitis   WBC of 14 , likely from vomiting   No focal tenderness (generalized due to RSD)   D/w medical attending, will hold abx for now , send stool culture and ova and parasite

## 2017-06-11 NOTE — CONSULT NOTE ADULT - ADDITIONAL PE
PATIENT EDUCATION WAS GIVEN ON THE TOPIC OF:  [   ] Smoke Cessation   [  X ] Low Fat Diet                  [   ] CHF Teaching  [   ] Alcohol Cessation   [  X ] High Fiber Diet              [   ] Cancer Prevention  [  X ] Medication Compliance     [  X ] Low Salt Diet      [  X ] Anti Reflux Measure

## 2017-06-11 NOTE — H&P ADULT - ATTENDING COMMENTS
47 y/o F pt with PMHx of a-fib, cyclical vomiting, HTN, presents to ED c/o cyclical vomiting and diarrhea x today. Pt reports she normally has these episodes at least once a year. Pt is actively vomiting in the ED. Pt denies fever, chills, dysuria, urinary frequency, hematuria, SOB, cough, CP, palpitations, or any other complaints. ALLERGIES: Aspirin, Sulfa drugs.    pt seen in bed, vitals stable, physical exam reveals lungs cta, heart s1s2, abd diffused tenderness to palp, bs+, ext difused tenderness, no edema. labs and diagnostic test result reviewed.    assessment  --  cyclic vomiting, electrolyte imbalance, diarrhea, r/o colitis, h/o reflex sympathetic dystrophy, Low grade SLE, HTN, Depression, Paroxysmal Afib    plan  --  admit to med, npo, supplement potassium, cont preadmit home meds, gi and dvt profilaxis,   cbc, bmp, mg, phos, lipids, tsh, stool c diff, ova para, cx    pain mgmt eval

## 2017-06-11 NOTE — H&P ADULT - PROBLEM SELECTOR PLAN 6
IMPROVE score 1 , but will give DVT ppx as pt is high risk due to co morbid conditions and potential will be bed bound

## 2017-06-11 NOTE — CONSULT NOTE ADULT - SUBJECTIVE AND OBJECTIVE BOX
[  ] STAT REQUEST              [  ]ROUTINE REQUEST    Patient is a 46y old  Female admitted with diarrhea and vomiting. GI consulted to evaluate. Jun 2017 08:02)      HPI:  46 y/o F lives with her sister presented with 2 days h/o abdominal cramps associated with diarrhea and vomiting. Patient denies hematemesis, hematochezia, melena, fever, chills, chest pain SOB, cough, recent traveling or antibiotic use.   PAIN MANAGEMENT:  Pain Scale:                3-4 /10  Pain Location:    Diffuse abdominal cramps    Prior Colonoscopy:    No prior colonoscopy reported    PAST MEDICAL HISTORY  HTN (hypertension)  Cyclical vomiting  Atrial fibrillation  SLE (systemic lupus erythematosus)  Reflex sympathetic dystrophy  RSD    PAST SURGICAL HISTORY  Partial thyroidectomy  Partial hysterectomy      Allergies    aspirin (Unknown)  latex (Unknown)  sulfa drugs (Unknown)         MEDICATIONS  (STANDING):  dextrose 5% + sodium chloride 0.9%. 1000milliLiter(s) IV Continuous <Continuous>  escitalopram 20milliGRAM(s) Oral daily  pregabalin 300milliGRAM(s) Oral two times a day  loperamide 2milliGRAM(s) Oral three times a day  clopidogrel Tablet 75milliGRAM(s) Oral daily  metoprolol 25milliGRAM(s) Oral two times a day  amLODIPine   Tablet 2.5milliGRAM(s) Oral daily  pantoprazole    Tablet 40milliGRAM(s) Oral before breakfast  enoxaparin Injectable 40milliGRAM(s) SubCutaneous daily    MEDICATIONS  (PRN):  ondansetron Injectable 4milliGRAM(s) IV Push every 6 hours PRN Nausea and/or Vomiting  oxyCODONE  5 mG/acetaminophen 325 mG 2Tablet(s) Oral every 6 hours PRN Moderate Pain (4 - 6)  morphine  - Injectable 2milliGRAM(s) IV Push every 8 hours PRN Severe Pain (7 - 10)      SOCIAL HISTORY  Advanced Directives:       [ X ] Full Code       [  ] DNR  Marital Status:         [  ] M      [ X ] S      [  ] D       [  ] W  Children:       [  ] Yes      [ X ] No  Occupation:        [  ] Employed       [ X ] Unemployed       [  ] Retired  Diet:       [ X ] Regular       [  ] PEG feeding          [  ] NG tube feeding  Drug Use:           [X  ] Patient denied          [  ] Yes  Alcohol:           [ X ] No             [  ] Yes (socially)         [  ] Yes (chronic)  Tobacco:           [  ] Yes           [ X ] No    FAMILY HISTORY  [ X ] Heart Disease       (father)     [  ] Diabetes             [  ] HTN             [  ] Colon Cancer             [  ] Stomach Cancer              [  ] Pancreatic Cancer    VITAL SIGNS   Vital Signs Last 24 Hrs  T(C): 37.7, Max: 38.3 (06-11 @ 04:18)  T(F): 99.9, Max: 100.9 (06-11 @ 04:18)  HR: 97 (84 - 97)  BP: 149/91 (146/89 - 155/91)  BP(mean): --  RR: 17 (17 - 20)  SpO2: 97% (96% - 100%)  Daily Height in cm: 175.26 (10 Isaac 2017 21:40)             CBC Full  -  ( 10 Isaac 2017 23:36 )  WBC Count : 14.0 K/uL  Hemoglobin : 15.2 g/dL  Hematocrit : 45.9 %  Platelet Count - Automated : 205 K/uL  Mean Cell Volume : 88.7 fl  Mean Cell Hemoglobin : 29.4 pg  Mean Cell Hemoglobin Concentration : 33.1 gm/dL  Auto Neutrophil # : 12.2 K/uL  Auto Lymphocyte # : 1.3 K/uL  Auto Monocyte # : 0.4 K/uL  Auto Eosinophil # : 0.0 K/uL  Auto Basophil # : 0.1 K/uL  Auto Neutrophil % : 86.7 %  Auto Lymphocyte % : 9.5 %  Auto Monocyte % : 2.7 %  Auto Eosinophil % : 0.1 %  Auto Basophil % : 1.1 %      06-10    140  |  106  |  8   ----------------------------<  122<H>  3.1<L>   |  24  |  1.20    Ca    9.9      10 Isaac 2017 23:36    TPro  8.1  /  Alb  4.0  /  TBili  0.6  /  DBili  x   /  AST  26  /  ALT  28  /  AlkPhos  153<H>  06-10    Lipase, Serum: 76 U/L (06-11 @ 01:28)      Urinalysis (06.11.17 @ 00:16)    Glucose Qualitative, Urine: Negative    pH Urine: 8.0    Blood, Urine: Trace    Color: Yellow    Urine Appearance: Clear    Bilirubin: Negative    Ketone - Urine: Negative    Specific Gravity: 1.015    Protein, Urine: 30 mg/dL    Urobilinogen: Negative    Nitrite: Negative    Leukocyte Esterase Concentration: Negative       ECG  Ventricular Rate 78 BPM    Atrial Rate 78 BPM    P-R Interval 150 ms    QRS Duration 94 ms     ms    QTc 424 ms    P Axis 53 degrees    R Axis -33 degrees    T Axis 0 degrees    Diagnosis Line Normal sinus rhythm  Left axis deviation  Abnormal ECG        RADIOLOGY/IMAGING                EXAM:  CT ABDOMEN AND PELVIS                            PROCEDURE DATE:  06/11/2017        INTERPRETATION:  CLINICAL HISTORY: Vomiting and abdominal pain.    TECHNIQUE: CT of the abdomen and pelvis without IV contrast. Oral   contrast was withheld as well.  Transaxial images were acquired from the lung bases through to the pubic   symphysis. Coronal and sagittal reformatted images are provided.    COMPARISON: CT of the abdomen and pelvis from 1/22/2017.    FINDINGS:  Bandlike opacities in the right lower lobe presumably due to atelectasis.   The heart is not enlarged.    Evaluation of the solid organs and vascular structures is limited without   IV contrast. Suboptimal assessment of the bowel for certain disease   processes without oral contrast.    The unenhanced appearance of the liver, spleen, pancreas and adrenal   glands is unremarkable. There is suggestion of gallbladder sludge. There   is no wall thickening or pericholecystic fluid.    There is no bowel obstruction, free air or free fluid. The colon is   underdistended which limits evaluation of the wall thickness. There is no   significant pericolonic inflammatory change. The appendix is normal.   There is no ascites.    The abdominal aorta is normal in caliber. There is no adenopathy in the   upper abdomen, retroperitoneum or pelvis.    There is anterior urinary bladder wall thickening with slightly increased   circumferential wall thickening which could be due to the degree of   underdistention. There is no pelvic softtissue mass.    Epidural pacer is again seen in the subcutaneous fat of the right lower   back with the terminating in the epidural space of the mid to lower   thoracic region. There are no acute osseous abnormalities.    IMPRESSION:  No bowel obstruction. Underdistended colon limiting evaluation of the   wall thickness. Please correlate clinically as concern for colitis.

## 2017-06-11 NOTE — CHART NOTE - NSCHARTNOTEFT_GEN_A_CORE
46 y/o F lives with her sister from home ambulates with walker, PMH RSD s/p spinal stimulator, frequent falls, SLE (no treatment ), cyclical vomiting syndrome, Afib (not on AC due to frequent falls), HTN, depression, Hemorrhoids (EGD / colonoscopy 3-4 years ago ) and partial thyroidectomy presented with c/o vomiting and diarrhea for 2 days and admitted for Non-intractable cyclical vomiting with nausea.  RRT was called for an episode of UE shakiness.   She was alert and awake but did not answer questions. When I gave her sternal rubs, she stopped shaking and push me away.   She complained abdominal pain.   On examination, She was not tachypneic or in respiratory distress. Her Abdomen is soft and non tender. 44 y/o F lives with her sister from home ambulates with walker, PMH RSD s/p spinal stimulator, frequent falls, SLE (no treatment ), cyclical vomiting syndrome, Afib (not on AC due to frequent falls), HTN, depression, Hemorrhoids (EGD / colonoscopy 3-4 years ago ) and partial thyroidectomy presented with c/o vomiting and diarrhea for 2 days and admitted for Non-intractable cyclical vomiting with nausea.  RRT was called for an episode of UE shakiness.   She was alert and awake but did not answer questions. When I gave her sternal rubs, she stopped shaking and push me away.   She complained abdominal pain.   unable to get ROS as she did not answer anything.     PHYSICAL EXAM:  GENERAL: She was not tachypneic or in respiratory distress  HEAD:  Atraumatic, Normocephalic  EYES: EOMI, PERRLA, conjunctiva and sclera clear  ENMT: No tonsillar erythema, exudates, or enlargement; Moist mucous membranes  NECK: Supple, No JVD, Normal thyroid  NERVOUS SYSTEM:  alert and awake. no nystagmus.   CHEST/LUNG: Clear to percussion bilaterally; No rales, rhonchi, wheezing, or rubs  HEART: Regular rate and rhythm; No murmurs, rubs, or gallops  ABDOMEN: Soft, Nontender, Nondistended; Bowel sounds present  EXTREMITIES:  2+ Peripheral Pulses, No clubbing, cyanosis, or edema  LYMPH: No lymphadenopathy noted  SKIN: No rashes or lesions      On examination, . Her Abdomen is soft and non tender. 44 y/o F lives with her sister from home ambulates with walker, PMH RSD s/p spinal stimulator, frequent falls, SLE (no treatment ), cyclical vomiting syndrome, Afib (not on AC due to frequent falls), HTN, depression, Hemorrhoids (EGD / colonoscopy 3-4 years ago ) and partial thyroidectomy presented with c/o vomiting and diarrhea for 2 days and admitted for Non-intractable cyclical vomiting with nausea.  RRT was called for an episode of UE shakiness.   She was alert and awake but did not answer questions. When I gave her sternal rubs, she stopped shaking and push me away.   She complained abdominal pain.   unable to get ROS as she did not answer anything.     PHYSICAL EXAM:  GENERAL: She was not tachypneic or in respiratory distress  HEAD:  Atraumatic, Normocephalic  EYES: EOMI, PERRLA, conjunctiva and sclera clear  ENMT: No tonsillar erythema, exudates, or enlargement; Moist mucous membranes  NECK: Supple, No JVD, Normal thyroid  NERVOUS SYSTEM:  alert and awake. no nystagmus.   CHEST/LUNG: Clear to percussion bilaterally; No rales, rhonchi, wheezing, or rubs  HEART: Regular rate and rhythm; No murmurs, rubs, or gallops  ABDOMEN: Soft,  NT ND Bowel sounds present.  EXTREMITIES:  2+ Peripheral Pulses, No clubbing, cyanosis, or edema  LYMPH: No lymphadenopathy noted  SKIN: No rashes or lesions                            15.8   12.3  )-----------( 232      ( 2017 19:39 )             46.6     06-11    140  |  105  |  6<L>  ----------------------------<  103<H>  3.3<L>   |  26  |  1.08    Ca    9.2      2017 19:39  Phos  2.5     -  Mg     1.6     -11    TPro  8.0  /  Alb  4.0  /  TBili  0.9  /  DBili  x   /  AST  30  /  ALT  25  /  AlkPhos  150<H>  -11      CARDIAC MARKERS ( 2017 19:39 )  <0.015 ng/mL / x     / 385 U/L / x     / 1.4 ng/mL         LIVER FUNCTIONS - ( 2017 19:39 )  Alb: 4.0 g/dL / Pro: 8.0 g/dL / ALK PHOS: 150 U/L / ALT: 25 U/L DA / AST: 30 U/L / GGT: x             Urinalysis Basic - ( 2017 00:16 )    Color: Yellow / Appearance: Clear / S.015 / pH: x  Gluc: x / Ketone: Negative  / Bili: Negative / Urobili: Negative   Blood: x / Protein: 30 mg/dL / Nitrite: Negative   Leuk Esterase: Negative / RBC: 0-2 /HPF / WBC 3-5 /HPF   Sq Epi: x / Non Sq Epi: Occasional / Bacteria: Trace /HPF      lactate1.1    A/P    4 y/o F with PMH RSD s/p spinal stimulator, frequent falls, SLE (no treatment ), cyclical vomiting syndrome, Afib (not on AC due to frequent falls), HTN, depression, Hemorrhoids (EGD / colonoscopy 3-4 years ago ) and partial thyroidectomy presented admitted for Non-intractable cyclical vomiting with nausea.  RRT was called for an episode of UE shakiness.     - most likely 2/2 anxiety or drug seeking.   - lactate is normal.   - no postictal stage.   - after she received 2 mg of morphine, her symptoms resolved.   - she had low grade fever (rectal temp of 100.7"F) and leukocytosis, sent for blood culture. UA is negative.   - c/w IVF.   - will give cipro and flagyl for possible colitis.    - replaced K+ for hypokalemia.   - consider psych evaluation and  consult.

## 2017-06-11 NOTE — CONSULT NOTE ADULT - ASSESSMENT
The etiology for abdominal pain diarrhea and vomiting in this patient can be due to:  1. Gastroenteritis  2. Colitis less likely  3. Biliary colic less likely  4. recurrent cyclical vomiting    Suggestions:    1. Check stool for culture, O&P, C.diff toxin  2. Monitor electrolytes closely  3. NPO  4. IVF hydration  4. Consider Zofran prn  6. Avoid NSAID's  7. Protonix daily  8. DVT prophylaxis

## 2017-06-11 NOTE — CONSULT NOTE ADULT - RS GEN PE MLT RESP DETAILS PC
airway patent/no rales/no wheezes/breath sounds equal/no rhonchi/good air movement/clear to auscultation bilaterally

## 2017-06-11 NOTE — H&P ADULT - HISTORY OF PRESENT ILLNESS
44 y/o F lives with her sister from home ambulates with walker, PMH RSD s/p spinal stimulator, frequent falls, SLE (no treatment ), cyclical vomiting syndrome, Afib (not on AC due to frequent falls), HTN, depression, Hemorrhoids (EGD / colonoscopy 3-4 years ago ) and partial thyroidectomy presented with c/o vomiting and diarrhea for 2 days. AS per patient she has cyclical vomiting syndrome and gets episode every year. She has been vomiting for the last 2 days , constant, >10 times a day, watery , non bloody , non bilious. Unable to eat anything due to constant vomiting. She also c/o loose watery diarrhea, >5-6 times a day with generalized abdominal pain. Reported subjective fever at home and c/o cough with yellow sputum.

## 2017-06-11 NOTE — CONSULT NOTE ADULT - NEGATIVE ENMT SYMPTOMS
no nasal discharge/no throat pain/no nose bleeds/no dysphagia/no vertigo/no gum bleeding/no nasal congestion/no dry mouth

## 2017-06-12 DIAGNOSIS — J98.11 ATELECTASIS: ICD-10-CM

## 2017-06-12 DIAGNOSIS — I27.2 OTHER SECONDARY PULMONARY HYPERTENSION: ICD-10-CM

## 2017-06-12 LAB
ANION GAP SERPL CALC-SCNC: 9 MMOL/L — SIGNIFICANT CHANGE UP (ref 5–17)
APPEARANCE UR: CLEAR — SIGNIFICANT CHANGE UP
BILIRUB UR-MCNC: NEGATIVE — SIGNIFICANT CHANGE UP
BUN SERPL-MCNC: 8 MG/DL — SIGNIFICANT CHANGE UP (ref 7–18)
CALCIUM SERPL-MCNC: 8.5 MG/DL — SIGNIFICANT CHANGE UP (ref 8.4–10.5)
CHLORIDE SERPL-SCNC: 106 MMOL/L — SIGNIFICANT CHANGE UP (ref 96–108)
CHOLEST SERPL-MCNC: 165 MG/DL — SIGNIFICANT CHANGE UP (ref 10–199)
CO2 SERPL-SCNC: 26 MMOL/L — SIGNIFICANT CHANGE UP (ref 22–31)
COLOR SPEC: YELLOW — SIGNIFICANT CHANGE UP
CREAT SERPL-MCNC: 1.25 MG/DL — SIGNIFICANT CHANGE UP (ref 0.5–1.3)
DIFF PNL FLD: ABNORMAL
GLUCOSE SERPL-MCNC: 94 MG/DL — SIGNIFICANT CHANGE UP (ref 70–99)
GLUCOSE UR QL: NEGATIVE — SIGNIFICANT CHANGE UP
HBA1C BLD-MCNC: 5.6 % — SIGNIFICANT CHANGE UP (ref 4–5.6)
HCT VFR BLD CALC: 48 % — HIGH (ref 34.5–45)
HDLC SERPL-MCNC: 43 MG/DL — SIGNIFICANT CHANGE UP (ref 40–125)
HGB BLD-MCNC: 15.8 G/DL — HIGH (ref 11.5–15.5)
KETONES UR-MCNC: ABNORMAL
LEUKOCYTE ESTERASE UR-ACNC: ABNORMAL
LIPID PNL WITH DIRECT LDL SERPL: 91 MG/DL — SIGNIFICANT CHANGE UP
MAGNESIUM SERPL-MCNC: 1.9 MG/DL — SIGNIFICANT CHANGE UP (ref 1.6–2.6)
MCHC RBC-ENTMCNC: 29.5 PG — SIGNIFICANT CHANGE UP (ref 27–34)
MCHC RBC-ENTMCNC: 32.9 GM/DL — SIGNIFICANT CHANGE UP (ref 32–36)
MCV RBC AUTO: 89.6 FL — SIGNIFICANT CHANGE UP (ref 80–100)
NITRITE UR-MCNC: POSITIVE
PH UR: 6 — SIGNIFICANT CHANGE UP (ref 5–8)
PHOSPHATE SERPL-MCNC: 3.1 MG/DL — SIGNIFICANT CHANGE UP (ref 2.5–4.5)
PLATELET # BLD AUTO: 204 K/UL — SIGNIFICANT CHANGE UP (ref 150–400)
POTASSIUM SERPL-MCNC: 3.8 MMOL/L — SIGNIFICANT CHANGE UP (ref 3.5–5.3)
POTASSIUM SERPL-SCNC: 3.8 MMOL/L — SIGNIFICANT CHANGE UP (ref 3.5–5.3)
PROLACTIN SERPL-MCNC: 11.4 NG/ML — SIGNIFICANT CHANGE UP (ref 3.4–24.1)
PROT UR-MCNC: 30 MG/DL
RBC # BLD: 5.35 M/UL — HIGH (ref 3.8–5.2)
RBC # FLD: 14.8 % — HIGH (ref 10.3–14.5)
SODIUM SERPL-SCNC: 141 MMOL/L — SIGNIFICANT CHANGE UP (ref 135–145)
SP GR SPEC: 1.01 — SIGNIFICANT CHANGE UP (ref 1.01–1.02)
TOTAL CHOLESTEROL/HDL RATIO MEASUREMENT: 3.8 RATIO — SIGNIFICANT CHANGE UP (ref 3.3–7.1)
TRIGL SERPL-MCNC: 156 MG/DL — HIGH (ref 10–149)
UROBILINOGEN FLD QL: 1
WBC # BLD: 10.2 K/UL — SIGNIFICANT CHANGE UP (ref 3.8–10.5)
WBC # FLD AUTO: 10.2 K/UL — SIGNIFICANT CHANGE UP (ref 3.8–10.5)

## 2017-06-12 RX ORDER — SODIUM CHLORIDE 9 MG/ML
1000 INJECTION, SOLUTION INTRAVENOUS
Qty: 0 | Refills: 0 | Status: DISCONTINUED | OUTPATIENT
Start: 2017-06-12 | End: 2017-06-13

## 2017-06-12 RX ORDER — CIPROFLOXACIN LACTATE 400MG/40ML
200 VIAL (ML) INTRAVENOUS ONCE
Qty: 0 | Refills: 0 | Status: COMPLETED | OUTPATIENT
Start: 2017-06-12 | End: 2017-06-12

## 2017-06-12 RX ORDER — CIPROFLOXACIN LACTATE 400MG/40ML
VIAL (ML) INTRAVENOUS
Qty: 0 | Refills: 0 | Status: DISCONTINUED | OUTPATIENT
Start: 2017-06-12 | End: 2017-06-14

## 2017-06-12 RX ORDER — METOCLOPRAMIDE HCL 10 MG
10 TABLET ORAL ONCE
Qty: 0 | Refills: 0 | Status: COMPLETED | OUTPATIENT
Start: 2017-06-12 | End: 2017-06-12

## 2017-06-12 RX ORDER — METOCLOPRAMIDE HCL 10 MG
5 TABLET ORAL ONCE
Qty: 0 | Refills: 0 | Status: COMPLETED | OUTPATIENT
Start: 2017-06-12 | End: 2017-06-12

## 2017-06-12 RX ORDER — METRONIDAZOLE 500 MG
TABLET ORAL
Qty: 0 | Refills: 0 | Status: DISCONTINUED | OUTPATIENT
Start: 2017-06-12 | End: 2017-06-14

## 2017-06-12 RX ORDER — METRONIDAZOLE 500 MG
500 TABLET ORAL EVERY 8 HOURS
Qty: 0 | Refills: 0 | Status: DISCONTINUED | OUTPATIENT
Start: 2017-06-12 | End: 2017-06-14

## 2017-06-12 RX ORDER — ONDANSETRON 8 MG/1
4 TABLET, FILM COATED ORAL ONCE
Qty: 0 | Refills: 0 | Status: COMPLETED | OUTPATIENT
Start: 2017-06-12 | End: 2017-06-12

## 2017-06-12 RX ORDER — METOPROLOL TARTRATE 50 MG
12.5 TABLET ORAL ONCE
Qty: 0 | Refills: 0 | Status: COMPLETED | OUTPATIENT
Start: 2017-06-12 | End: 2017-06-12

## 2017-06-12 RX ORDER — METRONIDAZOLE 500 MG
500 TABLET ORAL ONCE
Qty: 0 | Refills: 0 | Status: COMPLETED | OUTPATIENT
Start: 2017-06-12 | End: 2017-06-12

## 2017-06-12 RX ORDER — CIPROFLOXACIN LACTATE 400MG/40ML
200 VIAL (ML) INTRAVENOUS EVERY 12 HOURS
Qty: 0 | Refills: 0 | Status: DISCONTINUED | OUTPATIENT
Start: 2017-06-12 | End: 2017-06-14

## 2017-06-12 RX ADMIN — AMLODIPINE BESYLATE 2.5 MILLIGRAM(S): 2.5 TABLET ORAL at 05:19

## 2017-06-12 RX ADMIN — Medication 2 MILLIGRAM(S): at 13:09

## 2017-06-12 RX ADMIN — Medication 100 MILLIGRAM(S): at 05:25

## 2017-06-12 RX ADMIN — MORPHINE SULFATE 2 MILLIGRAM(S): 50 CAPSULE, EXTENDED RELEASE ORAL at 13:57

## 2017-06-12 RX ADMIN — Medication 10 MILLIGRAM(S): at 11:04

## 2017-06-12 RX ADMIN — Medication 100 MILLIGRAM(S): at 17:28

## 2017-06-12 RX ADMIN — Medication 2 MILLIGRAM(S): at 05:20

## 2017-06-12 RX ADMIN — Medication 100 MILLIGRAM(S): at 21:37

## 2017-06-12 RX ADMIN — Medication 2 MILLIGRAM(S): at 21:37

## 2017-06-12 RX ADMIN — Medication 5 MILLIGRAM(S): at 01:16

## 2017-06-12 RX ADMIN — Medication 100 MILLIGRAM(S): at 13:09

## 2017-06-12 RX ADMIN — ENOXAPARIN SODIUM 40 MILLIGRAM(S): 100 INJECTION SUBCUTANEOUS at 11:05

## 2017-06-12 RX ADMIN — Medication 100 MILLIGRAM(S): at 01:16

## 2017-06-12 RX ADMIN — Medication 300 MILLIGRAM(S): at 05:25

## 2017-06-12 RX ADMIN — PANTOPRAZOLE SODIUM 40 MILLIGRAM(S): 20 TABLET, DELAYED RELEASE ORAL at 05:26

## 2017-06-12 RX ADMIN — MORPHINE SULFATE 2 MILLIGRAM(S): 50 CAPSULE, EXTENDED RELEASE ORAL at 13:24

## 2017-06-12 RX ADMIN — Medication 300 MILLIGRAM(S): at 17:28

## 2017-06-12 RX ADMIN — Medication 25 MILLIGRAM(S): at 05:20

## 2017-06-12 RX ADMIN — Medication 25 MILLIGRAM(S): at 17:28

## 2017-06-12 RX ADMIN — ONDANSETRON 4 MILLIGRAM(S): 8 TABLET, FILM COATED ORAL at 13:09

## 2017-06-12 RX ADMIN — MORPHINE SULFATE 2 MILLIGRAM(S): 50 CAPSULE, EXTENDED RELEASE ORAL at 06:23

## 2017-06-12 RX ADMIN — ONDANSETRON 4 MILLIGRAM(S): 8 TABLET, FILM COATED ORAL at 17:28

## 2017-06-12 RX ADMIN — ESCITALOPRAM OXALATE 20 MILLIGRAM(S): 10 TABLET, FILM COATED ORAL at 11:05

## 2017-06-12 RX ADMIN — CLOPIDOGREL BISULFATE 75 MILLIGRAM(S): 75 TABLET, FILM COATED ORAL at 11:05

## 2017-06-12 RX ADMIN — SODIUM CHLORIDE 100 MILLILITER(S): 9 INJECTION, SOLUTION INTRAVENOUS at 21:03

## 2017-06-12 RX ADMIN — MORPHINE SULFATE 2 MILLIGRAM(S): 50 CAPSULE, EXTENDED RELEASE ORAL at 21:37

## 2017-06-12 RX ADMIN — ONDANSETRON 4 MILLIGRAM(S): 8 TABLET, FILM COATED ORAL at 05:09

## 2017-06-12 RX ADMIN — Medication 12.5 MILLIGRAM(S): at 01:49

## 2017-06-12 RX ADMIN — MORPHINE SULFATE 2 MILLIGRAM(S): 50 CAPSULE, EXTENDED RELEASE ORAL at 22:08

## 2017-06-12 RX ADMIN — MORPHINE SULFATE 2 MILLIGRAM(S): 50 CAPSULE, EXTENDED RELEASE ORAL at 06:08

## 2017-06-12 NOTE — CONSULT NOTE ADULT - SUBJECTIVE AND OBJECTIVE BOX
PULMONARY CONSULT NOTE      JOSE VILLALBA  MRN-[id number]    Patient is a 46y old  Female who presents with a chief complaint of vomiting and diarrhea (2017 08:02)      HISTORY OF PRESENT ILLNESS:    MEDICATIONS  (STANDING):  dextrose 5% + sodium chloride 0.9%. 1000milliLiter(s) IV Continuous <Continuous>  escitalopram 20milliGRAM(s) Oral daily  pregabalin 300milliGRAM(s) Oral two times a day  loperamide 2milliGRAM(s) Oral three times a day  clopidogrel Tablet 75milliGRAM(s) Oral daily  metoprolol 25milliGRAM(s) Oral two times a day  amLODIPine   Tablet 2.5milliGRAM(s) Oral daily  pantoprazole    Tablet 40milliGRAM(s) Oral before breakfast  enoxaparin Injectable 40milliGRAM(s) SubCutaneous daily  ciprofloxacin   IVPB  IV Intermittent   metroNIDAZOLE  IVPB  IV Intermittent   ciprofloxacin   IVPB 200milliGRAM(s) IV Intermittent every 12 hours  metroNIDAZOLE  IVPB 500milliGRAM(s) IV Intermittent every 8 hours      MEDICATIONS  (PRN):  ondansetron Injectable 4milliGRAM(s) IV Push every 6 hours PRN Nausea and/or Vomiting  oxyCODONE  5 mG/acetaminophen 325 mG 2Tablet(s) Oral every 6 hours PRN Moderate Pain (4 - 6)  morphine  - Injectable 2milliGRAM(s) IV Push every 8 hours PRN Severe Pain (7 - 10)      Allergies    aspirin (Unknown)  latex (Unknown)  sulfa drugs (Unknown)    Intolerances        PAST MEDICAL & SURGICAL HISTORY:  HTN (hypertension)  Cyclical vomiting  Atrial fibrillation  SLE (systemic lupus erythematosus)  Reflex sympathetic dystrophy  H/O partial thyroidectomy  S/P partial hysterectomy      FAMILY HISTORY:  Family history of lung cancer  Family history of cancer: lung cancer      SOCIAL HISTORY  Smoking History:     REVIEW OF SYSTEMS:    CONSTITUTIONAL:  No fevers, chills, sweats    HEENT:  Eyes:  No diplopia or blurred vision. ENT:  No earache, sore throat or runny nose.    CARDIOVASCULAR:  No pressure, squeezing, tightness, or heaviness about the chest; no palpitations.    RESPIRATORY:  Per HPI    GASTROINTESTINAL:  No abdominal pain, nausea, vomiting or diarrhea.    GENITOURINARY:  No dysuria, frequency or urgency.    NEUROLOGIC:  No paresthesias, fasciculations, seizures or weakness.    PSYCHIATRIC:  No disorder of thought or mood.    Vital Signs Last 24 Hrs  T(C): 37.4, Max: 37.4 ( @ 07:38)  T(F): 99.4, Max: 99.4 ( @ 07:38)  HR: 72 (65 - 82)  BP: 138/91 (138/91 - 154/109)  BP(mean): --  RR: 16 (16 - 18)  SpO2: 95% (95% - 100%)  I&O's Detail      PHYSICAL EXAMINATION:    GENERAL: The patient is a well-developed, well-nourished _____in no apparent distress.     HEENT: Head is normocephalic and atraumatic. Extraocular muscles are intact. Mucous membranes are moist.     NECK: Supple.     LUNGS: Clear to auscultation without wheezing, rales, or rhonchi. Respirations unlabored    HEART: Regular rate and rhythm without murmur.    ABDOMEN: Soft, nontender, and nondistended.  No hepatosplenomegaly is noted.    EXTREMITIES: Without any cyanosis, clubbing, rash, lesions or edema.    NEUROLOGIC: Grossly intact.      LABS:                        15.8   10.2  )-----------( 204      ( 2017 07:02 )             48.0     06-12    141  |  106  |  8   ----------------------------<  94  3.8   |  26  |  1.25    Ca    8.5      2017 07:02  Phos  3.1     06-12  Mg     1.9     06-12    TPro  8.0  /  Alb  4.0  /  TBili  0.9  /  DBili  x   /  AST  30  /  ALT  25  /  AlkPhos  150<H>        Urinalysis Basic - ( 2017 00:16 )    Color: Yellow / Appearance: Clear / S.015 / pH: x  Gluc: x / Ketone: Negative  / Bili: Negative / Urobili: Negative   Blood: x / Protein: 30 mg/dL / Nitrite: Negative   Leuk Esterase: Negative / RBC: 0-2 /HPF / WBC 3-5 /HPF   Sq Epi: x / Non Sq Epi: Occasional / Bacteria: Trace /HPF        CARDIAC MARKERS ( 2017 19:39 )  <0.015 ng/mL / x     / 385 U/L / x     / 1.4 ng/mL          Lactate, Blood: 1.1 mmol/L ( @ 19:39)        MICROBIOLOGY:    RADIOLOGY & ADDITIONAL STUDIES:    CXR:  Impression: The examination is limited by poor inspiratory effort.   Vascular crowding at the right lung base. No gross pulmonary   consolidation, pleural effusion or pneumothorax. If clinically indicated,   repeat PA and lateral chest x-rays may be pursued for better evaluation.    Skinfold on the right.     The trachea is midline.    The cardiac silhouette is magnified by AP technique.    Stable neurostimulator.    DEREK JIMENEZ M.D., ATTENDING RADIOLOGIST  This document has been electronically signed. 2017 10:38AM      CT ABDOMEN AND PELVIS:  FINDINGS:  Bandlike opacities in the right lower lobe presumably due to atelectasis.   The heart is not enlarged.    Evaluation of the solid organs and vascular structures is limited without   IV contrast. Suboptimal assessment of the bowel for certain disease   processes without oral contrast.    The unenhanced appearance of the liver, spleen, pancreas and adrenal   glands is unremarkable. There is suggestion of gallbladder sludge. There   is no wall thickening or pericholecystic fluid.    There is no bowel obstruction, free air or free fluid. The colon is   underdistended which limits evaluation of the wall thickness. There is no   significant pericolonic inflammatory change. The appendix is normal.   There is no ascites.    The abdominal aorta is normal in caliber. There is no adenopathy in the   upper abdomen, retroperitoneum or pelvis.    There is anterior urinary bladder wall thickening with slightly increased   circumferential wall thickening which could be due to the degree of   underdistention. There is no pelvic soft tissue mass.    Epidural pacer is again seen in the subcutaneous fat of the right lower   back with the terminating in the epidural space of the mid to lower   thoracic region. There are no acute osseous abnormalities.    IMPRESSION:  No bowel obstruction. Underdistended colon limiting evaluation of the   wall thickness. Please correlate clinically as concern for colitis.    ILDA BROWN M.D., RADIOLOGIST  This document has been electronically signed. 2017  5:12AM    EKG:      echo: PULMONARY CONSULT NOTE      JOSE VILLALBA  MRN-[id number]    Patient is a 46y old  Female who presents with a chief complaint of vomiting and diarrhea (2017 08:02)      HISTORY OF PRESENT ILLNESS:    MEDICATIONS  (STANDING):  dextrose 5% + sodium chloride 0.9%. 1000milliLiter(s) IV Continuous <Continuous>  escitalopram 20milliGRAM(s) Oral daily  pregabalin 300milliGRAM(s) Oral two times a day  loperamide 2milliGRAM(s) Oral three times a day  clopidogrel Tablet 75milliGRAM(s) Oral daily  metoprolol 25milliGRAM(s) Oral two times a day  amLODIPine   Tablet 2.5milliGRAM(s) Oral daily  pantoprazole    Tablet 40milliGRAM(s) Oral before breakfast  enoxaparin Injectable 40milliGRAM(s) SubCutaneous daily  ciprofloxacin   IVPB  IV Intermittent   metroNIDAZOLE  IVPB  IV Intermittent   ciprofloxacin   IVPB 200milliGRAM(s) IV Intermittent every 12 hours  metroNIDAZOLE  IVPB 500milliGRAM(s) IV Intermittent every 8 hours      MEDICATIONS  (PRN):  ondansetron Injectable 4milliGRAM(s) IV Push every 6 hours PRN Nausea and/or Vomiting  oxyCODONE  5 mG/acetaminophen 325 mG 2Tablet(s) Oral every 6 hours PRN Moderate Pain (4 - 6)  morphine  - Injectable 2milliGRAM(s) IV Push every 8 hours PRN Severe Pain (7 - 10)      Allergies    aspirin (Unknown)  latex (Unknown)  sulfa drugs (Unknown)    Intolerances        PAST MEDICAL & SURGICAL HISTORY:  HTN (hypertension)  Cyclical vomiting  Atrial fibrillation  SLE (systemic lupus erythematosus)  Reflex sympathetic dystrophy  H/O partial thyroidectomy  S/P partial hysterectomy      FAMILY HISTORY:  Family history of lung cancer  Family history of cancer: lung cancer      SOCIAL HISTORY  Smoking History:     REVIEW OF SYSTEMS:    CONSTITUTIONAL:  No fevers, chills, sweats    HEENT:  Eyes:  No diplopia or blurred vision. ENT:  No earache, sore throat or runny nose.    CARDIOVASCULAR:  No pressure, squeezing, tightness, or heaviness about the chest; no palpitations.    RESPIRATORY:  Per HPI    GASTROINTESTINAL:  No abdominal pain, nausea, vomiting or diarrhea.    GENITOURINARY:  No dysuria, frequency or urgency.    NEUROLOGIC:  No paresthesias, fasciculations, seizures or weakness.    PSYCHIATRIC:  No disorder of thought or mood.    Vital Signs Last 24 Hrs  T(C): 37.4, Max: 37.4 ( @ 07:38)  T(F): 99.4, Max: 99.4 ( @ 07:38)  HR: 72 (65 - 82)  BP: 138/91 (138/91 - 154/109)  BP(mean): --  RR: 16 (16 - 18)  SpO2: 95% (95% - 100%)  I&O's Detail      PHYSICAL EXAMINATION:    GENERAL: The patient is a well-developed, well-nourished _____in no apparent distress.     HEENT: Head is normocephalic and atraumatic. Extraocular muscles are intact. Mucous membranes are moist.     NECK: Supple.     LUNGS: Clear to auscultation without wheezing, rales, or rhonchi. Respirations unlabored    HEART: Regular rate and rhythm without murmur.    ABDOMEN: Soft, nontender, and nondistended.  No hepatosplenomegaly is noted.    EXTREMITIES: Without any cyanosis, clubbing, rash, lesions or edema.    NEUROLOGIC: Grossly intact.      LABS:                        15.8   10.2  )-----------( 204      ( 2017 07:02 )             48.0     06-12    141  |  106  |  8   ----------------------------<  94  3.8   |  26  |  1.25    Ca    8.5      2017 07:02  Phos  3.1     06-12  Mg     1.9     06-12    TPro  8.0  /  Alb  4.0  /  TBili  0.9  /  DBili  x   /  AST  30  /  ALT  25  /  AlkPhos  150<H>        Urinalysis Basic - ( 2017 00:16 )    Color: Yellow / Appearance: Clear / S.015 / pH: x  Gluc: x / Ketone: Negative  / Bili: Negative / Urobili: Negative   Blood: x / Protein: 30 mg/dL / Nitrite: Negative   Leuk Esterase: Negative / RBC: 0-2 /HPF / WBC 3-5 /HPF   Sq Epi: x / Non Sq Epi: Occasional / Bacteria: Trace /HPF        CARDIAC MARKERS ( 2017 19:39 )  <0.015 ng/mL / x     / 385 U/L / x     / 1.4 ng/mL          Lactate, Blood: 1.1 mmol/L ( @ 19:39)        MICROBIOLOGY:    RADIOLOGY & ADDITIONAL STUDIES:    CXR:  Impression: The examination is limited by poor inspiratory effort.   Vascular crowding at the right lung base. No gross pulmonary   consolidation, pleural effusion or pneumothorax. If clinically indicated,   repeat PA and lateral chest x-rays may be pursued for better evaluation.    Skinfold on the right.     The trachea is midline.    The cardiac silhouette is magnified by AP technique.    Stable neurostimulator.    DEREK JIMENEZ M.D., ATTENDING RADIOLOGIST  This document has been electronically signed. 2017 10:38AM      CT ABDOMEN AND PELVIS:  FINDINGS:  Bandlike opacities in the right lower lobe presumably due to atelectasis.   The heart is not enlarged.    Evaluation of the solid organs and vascular structures is limited without   IV contrast. Suboptimal assessment of the bowel for certain disease   processes without oral contrast.    The unenhanced appearance of the liver, spleen, pancreas and adrenal   glands is unremarkable. There is suggestion of gallbladder sludge. There   is no wall thickening or pericholecystic fluid.    There is no bowel obstruction, free air or free fluid. The colon is   underdistended which limits evaluation of the wall thickness. There is no   significant pericolonic inflammatory change. The appendix is normal.   There is no ascites.    The abdominal aorta is normal in caliber. There is no adenopathy in the   upper abdomen, retroperitoneum or pelvis.    There is anterior urinary bladder wall thickening with slightly increased   circumferential wall thickening which could be due to the degree of   underdistention. There is no pelvic soft tissue mass.    Epidural pacer is again seen in the subcutaneous fat of the right lower   back with the terminating in the epidural space of the mid to lower   thoracic region. There are no acute osseous abnormalities.    IMPRESSION:  No bowel obstruction. Underdistended colon limiting evaluation of the   wall thickness. Please correlate clinically as concern for colitis.    ILDA BROWN M.D., RADIOLOGIST  This document has been electronically signed. 2017  5:12AM    EKG: No recent EKG available for review    ECHO: No recent ECHO available for review PULMONARY CONSULT NOTE      JOSE VILLALBA  MRN-[id number]    Patient is a 46y old  Female who presents with a chief complaint of vomiting and diarrhea (2017 08:02). Patient is being evaluated because of atelectasis on RLL and SOB associated with some Castillo. She is a current smoker.      HISTORY OF PRESENT ILLNESS: as above    MEDICATIONS  (STANDING):  dextrose 5% + sodium chloride 0.9%. 1000milliLiter(s) IV Continuous <Continuous>  escitalopram 20milliGRAM(s) Oral daily  pregabalin 300milliGRAM(s) Oral two times a day  loperamide 2milliGRAM(s) Oral three times a day  clopidogrel Tablet 75milliGRAM(s) Oral daily  metoprolol 25milliGRAM(s) Oral two times a day  amLODIPine   Tablet 2.5milliGRAM(s) Oral daily  pantoprazole    Tablet 40milliGRAM(s) Oral before breakfast  enoxaparin Injectable 40milliGRAM(s) SubCutaneous daily  ciprofloxacin   IVPB  IV Intermittent   metroNIDAZOLE  IVPB  IV Intermittent   ciprofloxacin   IVPB 200milliGRAM(s) IV Intermittent every 12 hours  metroNIDAZOLE  IVPB 500milliGRAM(s) IV Intermittent every 8 hours      MEDICATIONS  (PRN):  ondansetron Injectable 4milliGRAM(s) IV Push every 6 hours PRN Nausea and/or Vomiting  oxyCODONE  5 mG/acetaminophen 325 mG 2Tablet(s) Oral every 6 hours PRN Moderate Pain (4 - 6)  morphine  - Injectable 2milliGRAM(s) IV Push every 8 hours PRN Severe Pain (7 - 10)      Allergies    aspirin (Unknown)  latex (Unknown)  sulfa drugs (Unknown)    Intolerances        PAST MEDICAL & SURGICAL HISTORY:  HTN (hypertension)  Cyclical vomiting  Atrial fibrillation  SLE (systemic lupus erythematosus)  Reflex sympathetic dystrophy  H/O partial thyroidectomy  S/P partial hysterectomy      FAMILY HISTORY:  Family history of lung cancer  Family history of cancer: lung cancer      SOCIAL HISTORY  Smoking History:     REVIEW OF SYSTEMS:    CONSTITUTIONAL:  No fevers, chills, sweats    HEENT:  Eyes:  No diplopia or blurred vision. ENT:  No earache, sore throat or runny nose.    CARDIOVASCULAR:  No pressure, squeezing, tightness, or heaviness about the chest; no palpitations.    RESPIRATORY: SoB and Castillo    GASTROINTESTINAL:  No abdominal pain, nausea, vomiting or diarrhea.    GENITOURINARY:  No dysuria, frequency or urgency.    NEUROLOGIC:  No paresthesias, fasciculations, seizures or weakness.    PSYCHIATRIC:  No disorder of thought or mood.    Vital Signs Last 24 Hrs  T(C): 37.4, Max: 37.4 ( @ 07:38)  T(F): 99.4, Max: 99.4 ( @ 07:38)  HR: 72 (65 - 82)  BP: 138/91 (138/91 - 154/109)  BP(mean): --  RR: 16 (16 - 18)  SpO2: 95% (95% - 100%)  I&O's Detail      PHYSICAL EXAMINATION:    GENERAL: The patient is a well-developed, well-nourished _____in no apparent distress.     HEENT: Head is normocephalic and atraumatic. Extraocular muscles are intact. Mucous membranes are moist.     NECK: Supple.     LUNGS: Clear to auscultation without wheezing, rales, or rhonchi. Respirations unlabored    HEART: Regular rate and rhythm without murmur.    ABDOMEN: Soft, nontender, and nondistended.  No hepatosplenomegaly is noted.    EXTREMITIES: Without any cyanosis, clubbing, rash, lesions or edema.    NEUROLOGIC: Grossly intact.      LABS:                        15.8   10.2  )-----------( 204      ( 2017 07:02 )             48.0     06-12    141  |  106  |  8   ----------------------------<  94  3.8   |  26  |  1.25    Ca    8.5      2017 07:02  Phos  3.1     -12  Mg     1.9     -12    TPro  8.0  /  Alb  4.0  /  TBili  0.9  /  DBili  x   /  AST  30  /  ALT  25  /  AlkPhos  150<H>  11      Urinalysis Basic - ( 2017 00:16 )    Color: Yellow / Appearance: Clear / S.015 / pH: x  Gluc: x / Ketone: Negative  / Bili: Negative / Urobili: Negative   Blood: x / Protein: 30 mg/dL / Nitrite: Negative   Leuk Esterase: Negative / RBC: 0-2 /HPF / WBC 3-5 /HPF   Sq Epi: x / Non Sq Epi: Occasional / Bacteria: Trace /HPF        CARDIAC MARKERS ( 2017 19:39 )  <0.015 ng/mL / x     / 385 U/L / x     / 1.4 ng/mL          Lactate, Blood: 1.1 mmol/L ( @ 19:39)        MICROBIOLOGY:    RADIOLOGY & ADDITIONAL STUDIES:    CXR:  Impression: The examination is limited by poor inspiratory effort.   Vascular crowding at the right lung base. No gross pulmonary   consolidation, pleural effusion or pneumothorax. If clinically indicated,   repeat PA and lateral chest x-rays may be pursued for better evaluation.    Skinfold on the right.     The trachea is midline.    The cardiac silhouette is magnified by AP technique.    Stable neurostimulator.    DEREK JIMENEZ M.D., ATTENDING RADIOLOGIST  This document has been electronically signed. 2017 10:38AM      CT ABDOMEN AND PELVIS:  FINDINGS:  Bandlike opacities in the right lower lobe presumably due to atelectasis.   The heart is not enlarged.    Evaluation of the solid organs and vascular structures is limited without   IV contrast. Suboptimal assessment of the bowel for certain disease   processes without oral contrast.    The unenhanced appearance of the liver, spleen, pancreas and adrenal   glands is unremarkable. There is suggestion of gallbladder sludge. There   is no wall thickening or pericholecystic fluid.    There is no bowel obstruction, free air or free fluid. The colon is   underdistended which limits evaluation of the wall thickness. There is no   significant pericolonic inflammatory change. The appendix is normal.   There is no ascites.    The abdominal aorta is normal in caliber. There is no adenopathy in the   upper abdomen, retroperitoneum or pelvis.    There is anterior urinary bladder wall thickening with slightly increased   circumferential wall thickening which could be due to the degree of   underdistention. There is no pelvic soft tissue mass.    Epidural pacer is again seen in the subcutaneous fat of the right lower   back with the terminating in the epidural space of the mid to lower   thoracic region. There are no acute osseous abnormalities.    IMPRESSION:  No bowel obstruction. Underdistended colon limiting evaluation of the   wall thickness. Please correlate clinically as concern for colitis.    ILDA BROWN M.D., RADIOLOGIST  This document has been electronically signed. 2017  5:12AM    EKG: No recent EKG available for review    ECHO: No recent ECHO available for review

## 2017-06-12 NOTE — PROGRESS NOTE ADULT - PROBLEM SELECTOR PLAN 6
IMPROVE score 1 , but will give DVT ppx as pt is high risk due to co morbid conditions and potential will be bed bound Will c/w percocet and morphine PRN  f/u pain management

## 2017-06-12 NOTE — PROGRESS NOTE ADULT - PROBLEM SELECTOR PLAN 1
still with nausea, diarrhea and vomiting   Likely from cyclic vomiting syndrome and THC    PRN zofran   NPO   continue with IVF

## 2017-06-12 NOTE — PROGRESS NOTE ADULT - PROBLEM SELECTOR PLAN 2
CT scan non conclusive for colitis   WBC of 14 , likely from vomiting   No focal tenderness (generalized due to RSD)   abx were started yesterday, will D/w medical attending if abx should be continued   f/u  stool culture and ova and parasite

## 2017-06-12 NOTE — PROGRESS NOTE ADULT - SUBJECTIVE AND OBJECTIVE BOX
[   ] ICU                     [   ] CCU                        [  X ] Medical Floor    Patient is comfortable but still complaints of nausea and vomiting but reports less diarrhea.. No new complaints reported, No hematemesis, hematochezia, melena, fever, chills, chest pain or SOB.    VITALS  Vital Signs Last 24 Hrs  T(C): 37.4, Max: 37.4 (06-12 @ 07:38)  T(F): 99.4, Max: 99.4 (06-12 @ 07:38)  HR: 72 (65 - 82)  BP: 138/91 (138/91 - 154/109)  BP(mean): --  RR: 16 (16 - 18)  SpO2: 95% (95% - 100%)       MEDICATIONS  (STANDING):  dextrose 5% + sodium chloride 0.9%. 1000milliLiter(s) IV Continuous <Continuous>  escitalopram 20milliGRAM(s) Oral daily  pregabalin 300milliGRAM(s) Oral two times a day  loperamide 2milliGRAM(s) Oral three times a day  clopidogrel Tablet 75milliGRAM(s) Oral daily  metoprolol 25milliGRAM(s) Oral two times a day  amLODIPine   Tablet 2.5milliGRAM(s) Oral daily  pantoprazole    Tablet 40milliGRAM(s) Oral before breakfast  enoxaparin Injectable 40milliGRAM(s) SubCutaneous daily  ciprofloxacin   IVPB  IV Intermittent   metroNIDAZOLE  IVPB  IV Intermittent   ciprofloxacin   IVPB 200milliGRAM(s) IV Intermittent every 12 hours  metroNIDAZOLE  IVPB 500milliGRAM(s) IV Intermittent every 8 hours    MEDICATIONS  (PRN):  ondansetron Injectable 4milliGRAM(s) IV Push every 6 hours PRN Nausea and/or Vomiting  oxyCODONE  5 mG/acetaminophen 325 mG 2Tablet(s) Oral every 6 hours PRN Moderate Pain (4 - 6)  morphine  - Injectable 2milliGRAM(s) IV Push every 8 hours PRN Severe Pain (7 - 10)                            15.8   10.2  )-----------( 204      ( 12 Jun 2017 07:02 )             48.0       06-12    141  |  106  |  8   ----------------------------<  94  3.8   |  26  |  1.25    Ca    8.5      12 Jun 2017 07:02  Phos  3.1     06-12  Mg     1.9     06-12    TPro  8.0  /  Alb  4.0  /  TBili  0.9  /  DBili  x   /  AST  30  /  ALT  25  /  AlkPhos  150<H>  06-11

## 2017-06-12 NOTE — PROGRESS NOTE ADULT - PROBLEM SELECTOR PLAN 4
will c/w home metoprolol   c/w home plavix   Not on anticoagulation  due to fall risk   c/w home plavix c/w amlodipine 2.5

## 2017-06-12 NOTE — PROGRESS NOTE ADULT - PROBLEM SELECTOR PLAN 5
Will c/w percocet and morphine PRN  f/u pain management will c/w home metoprolol   c/w home plavix   Not on anticoagulation  due to fall risk   c/w home plavix

## 2017-06-12 NOTE — PROGRESS NOTE ADULT - NEGATIVE ENMT SYMPTOMS
no vertigo/no nasal discharge/no throat pain/no gum bleeding/no nose bleeds/no post-nasal discharge/no dysphagia/no dry mouth

## 2017-06-12 NOTE — PROGRESS NOTE ADULT - SUBJECTIVE AND OBJECTIVE BOX
Patient is a 46y old  Female who presents with a chief complaint of vomiting and diarrhea (11 Jun 2017 08:02)    pt seen in icu [  ], reg med floor [   ], bed [  ], chair at bedside [   ], a+o x3 [  ], lethargic [  ],  nad [  ]    skelton [  ], ngt [  ], peg [  ], et tube [  ], cent line [  ], picc line [  ]        Allergies    aspirin (Unknown)  latex (Unknown)  sulfa drugs (Unknown)        Vitals    T(F): 99.4, Max: 99.4 (06-12 @ 07:38)  HR: 72 (65 - 82)  BP: 138/91 (138/91 - 154/109)  RR: 16 (16 - 18)  SpO2: 95% (95% - 100%)  Wt(kg): --  CAPILLARY BLOOD GLUCOSE  100 (12 Jun 2017 06:35)      Labs                          15.8   10.2  )-----------( 204      ( 12 Jun 2017 07:02 )             48.0       06-12    141  |  106  |  8   ----------------------------<  94  3.8   |  26  |  1.25    Ca    8.5      12 Jun 2017 07:02  Phos  3.1     06-12  Mg     1.9     06-12    TPro  8.0  /  Alb  4.0  /  TBili  0.9  /  DBili  x   /  AST  30  /  ALT  25  /  AlkPhos  150<H>  06-11      CARDIAC MARKERS ( 11 Jun 2017 19:39 )  <0.015 ng/mL / x     / 385 U/L / x     / 1.4 ng/mL            Radiology Results      Meds    MEDICATIONS  (STANDING):  dextrose 5% + sodium chloride 0.9%. 1000milliLiter(s) IV Continuous <Continuous>  escitalopram 20milliGRAM(s) Oral daily  pregabalin 300milliGRAM(s) Oral two times a day  loperamide 2milliGRAM(s) Oral three times a day  clopidogrel Tablet 75milliGRAM(s) Oral daily  metoprolol 25milliGRAM(s) Oral two times a day  amLODIPine   Tablet 2.5milliGRAM(s) Oral daily  pantoprazole    Tablet 40milliGRAM(s) Oral before breakfast  enoxaparin Injectable 40milliGRAM(s) SubCutaneous daily  ciprofloxacin   IVPB  IV Intermittent   metroNIDAZOLE  IVPB  IV Intermittent   ciprofloxacin   IVPB 200milliGRAM(s) IV Intermittent every 12 hours  metroNIDAZOLE  IVPB 500milliGRAM(s) IV Intermittent every 8 hours      MEDICATIONS  (PRN):  ondansetron Injectable 4milliGRAM(s) IV Push every 6 hours PRN Nausea and/or Vomiting  oxyCODONE  5 mG/acetaminophen 325 mG 2Tablet(s) Oral every 6 hours PRN Moderate Pain (4 - 6)  morphine  - Injectable 2milliGRAM(s) IV Push every 8 hours PRN Severe Pain (7 - 10)      Physical Exam    Neuro :  no focal deficits  Respiratory: CTA B/L  CV: RRR, S1S2, no murmurs,   Abdominal: Soft, NT, ND +BS,  Extremities: No edema, + peripheral pulses    ASSESSMENT    Non-intractable cyclical vomiting  s/p electrolyte imbalance,   diarrhea, r/o colitis,   atelectasis  h/o Depression,  HTN (hypertension)  Cyclical vomiting  Atrial fibrillation  SLE (systemic lupus erythematosus)  Reflex sympathetic dystrophy  H/O partial thyroidectomy  S/P partial hysterectomy      PLAN      f/u stool studies  gi f/u  pulm f/u  cont current meds Patient is a 46y old  Female who presents with a chief complaint of vomiting and diarrhea (11 Jun 2017 08:02)    pt seen in icu [  ], reg med floor [ x  ], bed [ x ], chair at bedside [   ], a+o x3 [  x], lethargic [  ],  nad [x  ]    Allergies    aspirin (Unknown)  latex (Unknown)  sulfa drugs (Unknown)        Vitals    T(F): 99.4, Max: 99.4 (06-12 @ 07:38)  HR: 72 (65 - 82)  BP: 138/91 (138/91 - 154/109)  RR: 16 (16 - 18)  SpO2: 95% (95% - 100%)  Wt(kg): --  CAPILLARY BLOOD GLUCOSE  100 (12 Jun 2017 06:35)      Labs                          15.8   10.2  )-----------( 204      ( 12 Jun 2017 07:02 )             48.0       06-12    141  |  106  |  8   ----------------------------<  94  3.8   |  26  |  1.25    Ca    8.5      12 Jun 2017 07:02  Phos  3.1     06-12  Mg     1.9     06-12    TPro  8.0  /  Alb  4.0  /  TBili  0.9  /  DBili  x   /  AST  30  /  ALT  25  /  AlkPhos  150<H>  06-11      CARDIAC MARKERS ( 11 Jun 2017 19:39 )  <0.015 ng/mL / x     / 385 U/L / x     / 1.4 ng/mL            Radiology Results      Meds    MEDICATIONS  (STANDING):  dextrose 5% + sodium chloride 0.9%. 1000milliLiter(s) IV Continuous <Continuous>  escitalopram 20milliGRAM(s) Oral daily  pregabalin 300milliGRAM(s) Oral two times a day  loperamide 2milliGRAM(s) Oral three times a day  clopidogrel Tablet 75milliGRAM(s) Oral daily  metoprolol 25milliGRAM(s) Oral two times a day  amLODIPine   Tablet 2.5milliGRAM(s) Oral daily  pantoprazole    Tablet 40milliGRAM(s) Oral before breakfast  enoxaparin Injectable 40milliGRAM(s) SubCutaneous daily  ciprofloxacin   IVPB  IV Intermittent   metroNIDAZOLE  IVPB  IV Intermittent   ciprofloxacin   IVPB 200milliGRAM(s) IV Intermittent every 12 hours  metroNIDAZOLE  IVPB 500milliGRAM(s) IV Intermittent every 8 hours      MEDICATIONS  (PRN):  ondansetron Injectable 4milliGRAM(s) IV Push every 6 hours PRN Nausea and/or Vomiting  oxyCODONE  5 mG/acetaminophen 325 mG 2Tablet(s) Oral every 6 hours PRN Moderate Pain (4 - 6)  morphine  - Injectable 2milliGRAM(s) IV Push every 8 hours PRN Severe Pain (7 - 10)      Physical Exam    Neuro :  no focal deficits  Respiratory: CTA B/L  CV: RRR, S1S2, no murmurs,   Abdominal: Soft, NT, ND +BS,  Extremities: No edema, + peripheral pulses    ASSESSMENT    Non-intractable cyclical vomiting  s/p electrolyte imbalance,   diarrhea, r/o colitis,   atelectasis  h/o Depression,  HTN (hypertension)  Cyclical vomiting  Atrial fibrillation  SLE (systemic lupus erythematosus)  Reflex sympathetic dystrophy  H/O partial thyroidectomy  S/P partial hysterectomy      PLAN      f/u stool studies  gi f/u  pulm f/u  cont current meds

## 2017-06-12 NOTE — PROGRESS NOTE ADULT - SUBJECTIVE AND OBJECTIVE BOX
HPI:  46 y/o F lives with her sister from home ambulates with walker, PMH RSD s/p spinal stimulator, frequent falls, SLE (no treatment ), cyclical vomiting syndrome, Afib (not on AC due to frequent falls), HTN, depression, Hemorrhoids (EGD / colonoscopy 3-4 years ago ) and partial thyroidectomy presented with c/o vomiting and diarrhea for 2 days. AS per patient she has cyclical vomiting syndrome and gets episode every year. She has been vomiting for the last 2 days , constant, >10 times a day, watery , non bloody , non bilious. Unable to eat anything due to constant vomiting. She also c/o loose watery diarrhea, >5-6 times a day with generalized abdominal pain. Reported subjective fever at home and c/o cough with yellow sputum. (2017 08:02)      Patient is a 46y old  Female who presents with a chief complaint of vomiting and diarrhea (2017 08:02)      INTERVAL HPI/OVERNIGHT EVENTS:    c/w diarrhea, Nausea, Vomiting     MEDICATIONS  (STANDING):  dextrose 5% + sodium chloride 0.9%. 1000milliLiter(s) IV Continuous <Continuous>  escitalopram 20milliGRAM(s) Oral daily  pregabalin 300milliGRAM(s) Oral two times a day  loperamide 2milliGRAM(s) Oral three times a day  clopidogrel Tablet 75milliGRAM(s) Oral daily  metoprolol 25milliGRAM(s) Oral two times a day  amLODIPine   Tablet 2.5milliGRAM(s) Oral daily  pantoprazole    Tablet 40milliGRAM(s) Oral before breakfast  enoxaparin Injectable 40milliGRAM(s) SubCutaneous daily  ciprofloxacin   IVPB  IV Intermittent   metroNIDAZOLE  IVPB  IV Intermittent   ciprofloxacin   IVPB 200milliGRAM(s) IV Intermittent every 12 hours  metroNIDAZOLE  IVPB 500milliGRAM(s) IV Intermittent every 8 hours    MEDICATIONS  (PRN):  ondansetron Injectable 4milliGRAM(s) IV Push every 6 hours PRN Nausea and/or Vomiting  oxyCODONE  5 mG/acetaminophen 325 mG 2Tablet(s) Oral every 6 hours PRN Moderate Pain (4 - 6)  morphine  - Injectable 2milliGRAM(s) IV Push every 8 hours PRN Severe Pain (7 - 10)      Allergies    aspirin (Unknown)  latex (Unknown)  sulfa drugs (Unknown)    Intolerances        Vital Signs Last 24 Hrs  T(C): 37.4, Max: 37.4 ( @ 07:38)  T(F): 99.4, Max: 99.4 ( @ 07:38)  HR: 72 (65 - 82)  BP: 138/91 (138/91 - 154/109)  BP(mean): --  RR: 16 (16 - 18)  SpO2: 95% (95% - 100%)    PHYSICAL EXAM:  GENERAL: NAD, well-groomed, well-developed  HEAD:  Atraumatic, Normocephalic  EYES: EOMI, PERRLA, conjunctiva and sclera clear  ENMT: No tonsillar erythema, exudates, or enlargement; Moist mucous membranes, Good dentition, No lesions  NECK: Supple, No JVD, Normal thyroid  NERVOUS SYSTEM:  Alert & Oriented X3, Good concentration  CHEST/LUNG: Clear to percussion bilaterally; No rales, rhonchi, wheezing, or rubs  HEART: Regular rate and rhythm; No murmurs, rubs, or gallops  ABDOMEN:  generalized tenderness, Nondistended; Bowel sounds present  EXTREMITIES: No clubbing, cyanosis, or edema, lower extremity pain   LYMPH: No lymphadenopathy noted  SKIN: No rashes or lesions    LABS:                        15.8   10.2  )-----------( 204      ( 2017 07:02 )             48.0     06-12    141  |  106  |  8   ----------------------------<  94  3.8   |  26  |  1.25    Ca    8.5      2017 07:02  Phos  3.1     -12  Mg     1.9     -12    TPro  8.0  /  Alb  4.0  /  TBili  0.9  /  DBili  x   /  AST  30  /  ALT  25  /  AlkPhos  150<H>  -11      Urinalysis Basic - ( 2017 00:16 )    Color: Yellow / Appearance: Clear / S.015 / pH: x  Gluc: x / Ketone: Negative  / Bili: Negative / Urobili: Negative   Blood: x / Protein: 30 mg/dL / Nitrite: Negative   Leuk Esterase: Negative / RBC: 0-2 /HPF / WBC 3-5 /HPF   Sq Epi: x / Non Sq Epi: Occasional / Bacteria: Trace /HPF      CAPILLARY BLOOD GLUCOSE  100 (2017 06:35)  121 (2017 00:29)  123 (2017 17:31)      RADIOLOGY & ADDITIONAL TESTS:    Imaging Personally Reviewed:  [ ] YES  [ ] NO    Consultant(s) Notes Reviewed:  [ ] YES  [ ] NO    Care Discussed with Consultants/Other Providers [ ] YES  [ ] NO    Plan of Care discussed with Attending/PGY2 [ ]YES [ ] NO

## 2017-06-13 DIAGNOSIS — E87.6 HYPOKALEMIA: ICD-10-CM

## 2017-06-13 DIAGNOSIS — M32.9 SYSTEMIC LUPUS ERYTHEMATOSUS, UNSPECIFIED: ICD-10-CM

## 2017-06-13 DIAGNOSIS — G89.4 CHRONIC PAIN SYNDROME: ICD-10-CM

## 2017-06-13 LAB
ANION GAP SERPL CALC-SCNC: 8 MMOL/L — SIGNIFICANT CHANGE UP (ref 5–17)
BUN SERPL-MCNC: 11 MG/DL — SIGNIFICANT CHANGE UP (ref 7–18)
CALCIUM SERPL-MCNC: 7.7 MG/DL — LOW (ref 8.4–10.5)
CHLORIDE SERPL-SCNC: 109 MMOL/L — HIGH (ref 96–108)
CO2 SERPL-SCNC: 26 MMOL/L — SIGNIFICANT CHANGE UP (ref 22–31)
CREAT SERPL-MCNC: 1.24 MG/DL — SIGNIFICANT CHANGE UP (ref 0.5–1.3)
GLUCOSE SERPL-MCNC: 105 MG/DL — HIGH (ref 70–99)
POTASSIUM SERPL-MCNC: 3 MMOL/L — LOW (ref 3.5–5.3)
POTASSIUM SERPL-SCNC: 3 MMOL/L — LOW (ref 3.5–5.3)
SODIUM SERPL-SCNC: 143 MMOL/L — SIGNIFICANT CHANGE UP (ref 135–145)

## 2017-06-13 RX ORDER — POTASSIUM CHLORIDE 20 MEQ
40 PACKET (EA) ORAL EVERY 4 HOURS
Qty: 0 | Refills: 0 | Status: COMPLETED | OUTPATIENT
Start: 2017-06-13 | End: 2017-06-13

## 2017-06-13 RX ORDER — MORPHINE SULFATE 50 MG/1
2 CAPSULE, EXTENDED RELEASE ORAL EVERY 4 HOURS
Qty: 0 | Refills: 0 | Status: DISCONTINUED | OUTPATIENT
Start: 2017-06-13 | End: 2017-06-13

## 2017-06-13 RX ORDER — MORPHINE SULFATE 50 MG/1
2 CAPSULE, EXTENDED RELEASE ORAL EVERY 6 HOURS
Qty: 0 | Refills: 0 | Status: DISCONTINUED | OUTPATIENT
Start: 2017-06-13 | End: 2017-06-14

## 2017-06-13 RX ADMIN — Medication 100 MILLIGRAM(S): at 18:50

## 2017-06-13 RX ADMIN — Medication 2 MILLIGRAM(S): at 13:41

## 2017-06-13 RX ADMIN — MORPHINE SULFATE 2 MILLIGRAM(S): 50 CAPSULE, EXTENDED RELEASE ORAL at 19:04

## 2017-06-13 RX ADMIN — Medication 300 MILLIGRAM(S): at 18:50

## 2017-06-13 RX ADMIN — PANTOPRAZOLE SODIUM 40 MILLIGRAM(S): 20 TABLET, DELAYED RELEASE ORAL at 05:43

## 2017-06-13 RX ADMIN — AMLODIPINE BESYLATE 2.5 MILLIGRAM(S): 2.5 TABLET ORAL at 05:43

## 2017-06-13 RX ADMIN — Medication 300 MILLIGRAM(S): at 05:51

## 2017-06-13 RX ADMIN — ONDANSETRON 4 MILLIGRAM(S): 8 TABLET, FILM COATED ORAL at 21:23

## 2017-06-13 RX ADMIN — MORPHINE SULFATE 2 MILLIGRAM(S): 50 CAPSULE, EXTENDED RELEASE ORAL at 18:49

## 2017-06-13 RX ADMIN — ENOXAPARIN SODIUM 40 MILLIGRAM(S): 100 INJECTION SUBCUTANEOUS at 12:05

## 2017-06-13 RX ADMIN — Medication 100 MILLIGRAM(S): at 21:23

## 2017-06-13 RX ADMIN — Medication 2 MILLIGRAM(S): at 05:43

## 2017-06-13 RX ADMIN — Medication 40 MILLIEQUIVALENT(S): at 13:41

## 2017-06-13 RX ADMIN — Medication 100 MILLIGRAM(S): at 05:43

## 2017-06-13 RX ADMIN — Medication 25 MILLIGRAM(S): at 05:43

## 2017-06-13 RX ADMIN — Medication 100 MILLIGRAM(S): at 13:41

## 2017-06-13 RX ADMIN — CLOPIDOGREL BISULFATE 75 MILLIGRAM(S): 75 TABLET, FILM COATED ORAL at 12:05

## 2017-06-13 RX ADMIN — MORPHINE SULFATE 2 MILLIGRAM(S): 50 CAPSULE, EXTENDED RELEASE ORAL at 12:43

## 2017-06-13 RX ADMIN — ESCITALOPRAM OXALATE 20 MILLIGRAM(S): 10 TABLET, FILM COATED ORAL at 12:05

## 2017-06-13 RX ADMIN — Medication 40 MILLIEQUIVALENT(S): at 18:50

## 2017-06-13 RX ADMIN — MORPHINE SULFATE 2 MILLIGRAM(S): 50 CAPSULE, EXTENDED RELEASE ORAL at 13:38

## 2017-06-13 RX ADMIN — Medication 40 MILLIEQUIVALENT(S): at 21:26

## 2017-06-13 NOTE — CONSULT NOTE ADULT - SUBJECTIVE AND OBJECTIVE BOX
HPI:  44 y/o F lives with her sister from home ambulates with walker, PMH RSD s/p spinal stimulator, frequent falls, SLE (no treatment ), cyclical vomiting syndrome, Afib (not on AC due to frequent falls), HTN, depression, Hemorrhoids (EGD / colonoscopy 3-4 years ago ) and partial thyroidectomy presented with c/o vomiting and diarrhea for 2 days. AS per patient she has cyclical vomiting syndrome and gets episode every year. She has been vomiting for the last 2 days , constant, >10 times a day, watery , non bloody , non bilious. Unable to eat anything due to constant vomiting. She also c/o loose watery diarrhea, >5-6 times a day with generalized abdominal pain. Reported subjective fever at home and c/o cough with yellow sputum. (2017 08:02)      17 - Pt lying in bed, NAD.  Pt c/o abdominal pain - epigastric area.  + nausea and vomiting.  Pt has chronic back pain d/t RSD.  Pt has a spinal cord stimulator which is ineffective as per pt.  Pt also has SLE.  Pt was put on amitriptyline 10mg po daily to help ease pain.  As for her chronic back pain - pt takes ms contin 60mg po 2x a day and percocet 10/325mg po 2 x a day for btp pain.  Pt is also on lyrica 300mg po 2x a day.  Pt smokes marijuana and has been told to decrease her intake because that may have been causing some of her nausea.  However, pt still uses it because she feels it helps her with the nausea and vomiting. Pt has also taken marinol in the past but has not had refills in months because it requires prior authorization.      PAIN SCORE:    5/10     SCALE USED: (1-10 VNRS)      PAST MEDICAL & SURGICAL HISTORY:  HTN (hypertension)  Cyclical vomiting  Atrial fibrillation  SLE (systemic lupus erythematosus)  Reflex sympathetic dystrophy  H/O partial thyroidectomy  S/P partial hysterectomy      FAMILY HISTORY:  Family history of lung cancer  Family history of cancer: lung cancer      SOCIAL HISTORY:  [ ] Denies Smoking, Alcohol, or Drug Use    Allergies    aspirin (Unknown)  latex (Unknown)  sulfa drugs (Unknown)    Intolerances        PAIN MEDICATIONS:  ondansetron Injectable 4milliGRAM(s) IV Push every 6 hours PRN  oxyCODONE  5 mG/acetaminophen 325 mG 2Tablet(s) Oral every 6 hours PRN  morphine  - Injectable 2milliGRAM(s) IV Push every 8 hours PRN  escitalopram 20milliGRAM(s) Oral daily  pregabalin 300milliGRAM(s) Oral two times a day    Heme:  clopidogrel Tablet 75milliGRAM(s) Oral daily  enoxaparin Injectable 40milliGRAM(s) SubCutaneous daily    Antibiotics:  ciprofloxacin   IVPB  IV Intermittent   metroNIDAZOLE  IVPB  IV Intermittent   ciprofloxacin   IVPB 200milliGRAM(s) IV Intermittent every 12 hours  metroNIDAZOLE  IVPB 500milliGRAM(s) IV Intermittent every 8 hours    Cardiovascular:  metoprolol 25milliGRAM(s) Oral two times a day  amLODIPine   Tablet 2.5milliGRAM(s) Oral daily    GI:  loperamide 2milliGRAM(s) Oral three times a day  pantoprazole    Tablet 40milliGRAM(s) Oral before breakfast    Endocrine:    All Other Medications:  dextrose 5% + sodium chloride 0.9%. 1000milliLiter(s) IV Continuous <Continuous>  potassium chloride    Tablet ER 40milliEquivalent(s) Oral every 4 hours          Vital Signs Last 24 Hrs  T(C): 36.7, Max: 37.2 ( @ 01:11)  T(F): 98.1, Max: 99 ( @ 01:11)  HR: 64 (64 - 68)  BP: 109/65 (106/75 - 130/104)  BP(mean): --  RR: 16 (16 - 18)  SpO2: 97% (97% - 98%)                       LABS:                          15.8   10.2  )-----------( 204      ( 2017 07:02 )             48.0         143  |  109<H>  |  11  ----------------------------<  105<H>  3.0<L>   |  26  |  1.24    Ca    7.7<L>      2017 10:36  Phos  3.1     -  Mg     1.9         TPro  8.0  /  Alb  4.0  /  TBili  0.9  /  DBili  x   /  AST  30  /  ALT  25  /  AlkPhos  150<H>  11      Urinalysis Basic - ( 2017 16:59 )    Color: Yellow / Appearance: Clear / S.015 / pH: x  Gluc: x / Ketone: Trace  / Bili: Negative / Urobili: 1   Blood: x / Protein: 30 mg/dL / Nitrite: Positive   Leuk Esterase: Small / RBC: 0-2 /HPF / WBC 6-10 /HPF   Sq Epi: x / Non Sq Epi: Few / Bacteria: Moderate /HPF        RADIOLOGY:    CT abdomen and pelvis:    FINDINGS:  Bandlike opacities in the right lower lobe presumably due to atelectasis.   The heart is not enlarged.    Evaluation of the solid organs and vascular structures is limited without   IV contrast. Suboptimal assessment of the bowel for certain disease   processes without oral contrast.    The unenhanced appearance of the liver, spleen, pancreas and adrenal   glands is unremarkable. There is suggestion of gallbladder sludge. There   is no wall thickening or pericholecystic fluid.    There is no bowel obstruction, free air or free fluid. The colon is   underdistended which limits evaluation of the wall thickness. There is no   significant pericolonic inflammatory change. The appendix is normal.   There is no ascites.    The abdominal aorta is normal in caliber. There is no adenopathy in the   upper abdomen, retroperitoneum or pelvis.    There is anterior urinary bladder wall thickening with slightly increased   circumferential wall thickening which could be due to the degree of   underdistention. There is no pelvic softtissue mass.    Epidural pacer is again seen in the subcutaneous fat of the right lower   back with the terminating in the epidural space of the mid to lower   thoracic region. There are no acute osseous abnormalities.    IMPRESSION:  No bowel obstruction. Underdistended colon limiting evaluation of the   wall thickness. Please correlate clinically as concern for colitis.    Drug Screen:    Methadone, Urine (03.14.15 @ 18:20)    Methadone, Urine: Negative: Interpretation of Results  The following cut off concentrations are established for these drugs:  Methadone                                           300 ng/mL  Benzodiazepines                                  200 ng/mL  Cocaine (Benzoylecgonine)Negative:             300 ng/mL  Amphetamines                                    1000 ng/mL  THC                                                         50 ng/mL  Opiates (Morphine)                               300 ng/mL  BarbituatesNegative:                  200 ng/mL  Phencyclidine                                          25 ng/mL  Urine drug screens are performed using the cut off levels listed. Results  are considered presumptive, require clinical correlation, and may be  subject to crNegative: oss  reactivity with other drugs or metabolites. If clinically indicated,  confirmatory testing may be separately ordered. The laboratory can supply  a reference of cross reacting or interfering substances. These results  are for medical use only andNegative: not for legal or employment purposes.            [X]  [place]  Reviewed and Copied to Chart

## 2017-06-13 NOTE — PROGRESS NOTE ADULT - PROBLEM SELECTOR PLAN 2
CT scan non conclusive for colitis   no fever, no white count   No focal tenderness (generalized due to RSD)   c/w IV abx  f/u  stool culture and ova and parasite most likely 2/2 vomiting and diarrhea   will replace  f/u BMP

## 2017-06-13 NOTE — PROGRESS NOTE ADULT - NEGATIVE MUSCULOSKELETAL SYMPTOMS
no neck pain/no joint swelling/no muscle cramps/no arthralgia
no back pain/no neck pain/no arthralgia

## 2017-06-13 NOTE — PROGRESS NOTE ADULT - SUBJECTIVE AND OBJECTIVE BOX
[   ] ICU              [   ] CCU              [  X ] Medical Floor    Patient is comfortable and reports feeling better. No new complaints reported. No abdominal pain, N/V, hematemesis hematochezia, melena, fever, chills, chest pain, SOB or diarrhea.    VITALS  Vital Signs Last 24 Hrs  T(C): 36.7, Max: 37.2 (06-13 @ 01:11)  T(F): 98.1, Max: 99 (06-13 @ 01:11)  HR: 64 (64 - 68)  BP: 109/65 (106/75 - 130/104)  BP(mean): --  RR: 16 (16 - 18)  SpO2: 97% (97% - 98%)     MEDICATIONS  (STANDING):  escitalopram 20milliGRAM(s) Oral daily  pregabalin 300milliGRAM(s) Oral two times a day  loperamide 2milliGRAM(s) Oral three times a day  clopidogrel Tablet 75milliGRAM(s) Oral daily  metoprolol 25milliGRAM(s) Oral two times a day  amLODIPine   Tablet 2.5milliGRAM(s) Oral daily  pantoprazole    Tablet 40milliGRAM(s) Oral before breakfast  enoxaparin Injectable 40milliGRAM(s) SubCutaneous daily  ciprofloxacin   IVPB  IV Intermittent   metroNIDAZOLE  IVPB  IV Intermittent   ciprofloxacin   IVPB 200milliGRAM(s) IV Intermittent every 12 hours  metroNIDAZOLE  IVPB 500milliGRAM(s) IV Intermittent every 8 hours  potassium chloride    Tablet ER 40milliEquivalent(s) Oral every 4 hours    MEDICATIONS  (PRN):  ondansetron Injectable 4milliGRAM(s) IV Push every 6 hours PRN Nausea and/or Vomiting  oxyCODONE  5 mG/acetaminophen 325 mG 2Tablet(s) Oral every 6 hours PRN Moderate Pain (4 - 6)  morphine  - Injectable 2milliGRAM(s) IV Push every 6 hours PRN Severe Pain (7 - 10)                            15.8   10.2  )-----------( 204      ( 12 Jun 2017 07:02 )             48.0       06-13    143  |  109<H>  |  11  ----------------------------<  105<H>  3.0<L>   |  26  |  1.24    Ca    7.7<L>      13 Jun 2017 10:36  Phos  3.1     06-12  Mg     1.9     06-12    TPro  8.0  /  Alb  4.0  /  TBili  0.9  /  DBili  x   /  AST  30  /  ALT  25  /  AlkPhos  150<H>  06-11

## 2017-06-13 NOTE — PROGRESS NOTE ADULT - NEUROLOGICAL DETAILS
responds to pain/alert and oriented x 3/responds to verbal commands
alert and oriented x 3/responds to pain/responds to verbal commands

## 2017-06-13 NOTE — PROGRESS NOTE ADULT - PROBLEM SELECTOR PLAN 7
IMPROVE score 1 , but will give DVT ppx as pt is high risk due to co morbid conditions and potential will be bed bound
IMPROVE score 1 , but will give DVT ppx as pt is high risk due to co morbid conditions and potential will be bed bound

## 2017-06-13 NOTE — CONSULT NOTE ADULT - GASTROINTESTINAL DETAILS
soft
bowel sounds normal/nontender/soft/no distention/no guarding/no organomegaly/no rigidity/no rebound tenderness

## 2017-06-13 NOTE — PROGRESS NOTE ADULT - SUBJECTIVE AND OBJECTIVE BOX
Patient is a 46y old  Female who presents with a chief complaint of vomiting and diarrhea (2017 08:02)      INTERVAL HPI/OVERNIGHT EVENTS:      VITAL SIGNS:  T(F): 98.4  HR: 64  BP: 106/75  RR: 17  SpO2: 98%  Wt(kg): --  I&O's Detail          REVIEW OF SYSTEMS:    CONSTITUTIONAL:  No fevers, chills, sweats    HEENT:  Eyes:  No diplopia or blurred vision. ENT:  No earache, sore throat or runny nose.    CARDIOVASCULAR:  No pressure, squeezing, tightness, or heaviness about the chest; no palpitations.    RESPIRATORY:  Per HPI    GASTROINTESTINAL:  No abdominal pain, nausea, vomiting or diarrhea.    GENITOURINARY:  No dysuria, frequency or urgency.    NEUROLOGIC:  No paresthesias, fasciculations, seizures or weakness.    PSYCHIATRIC:  No disorder of thought or mood.      PHYSICAL EXAM:    Constitutional: Well developed and nourished  Eyes:Perrla  ENMT: normal  Neck:supple  Respiratory: good air entry  Cardiovascular: S1 S2 regular  Gastrointestinal: Soft, Non tender  Extremities: No edema  Vascular:normal  Neurological:Awake, alert,Ox3  Musculoskeletal:Normal      MEDICATIONS  (STANDING):  escitalopram 20milliGRAM(s) Oral daily  pregabalin 300milliGRAM(s) Oral two times a day  loperamide 2milliGRAM(s) Oral three times a day  clopidogrel Tablet 75milliGRAM(s) Oral daily  metoprolol 25milliGRAM(s) Oral two times a day  amLODIPine   Tablet 2.5milliGRAM(s) Oral daily  pantoprazole    Tablet 40milliGRAM(s) Oral before breakfast  enoxaparin Injectable 40milliGRAM(s) SubCutaneous daily  ciprofloxacin   IVPB  IV Intermittent   metroNIDAZOLE  IVPB  IV Intermittent   ciprofloxacin   IVPB 200milliGRAM(s) IV Intermittent every 12 hours  metroNIDAZOLE  IVPB 500milliGRAM(s) IV Intermittent every 8 hours  dextrose 5% + sodium chloride 0.9%. 1000milliLiter(s) IV Continuous <Continuous>    MEDICATIONS  (PRN):  ondansetron Injectable 4milliGRAM(s) IV Push every 6 hours PRN Nausea and/or Vomiting  oxyCODONE  5 mG/acetaminophen 325 mG 2Tablet(s) Oral every 6 hours PRN Moderate Pain (4 - 6)  morphine  - Injectable 2milliGRAM(s) IV Push every 8 hours PRN Severe Pain (7 - 10)      Allergies    aspirin (Unknown)  latex (Unknown)  sulfa drugs (Unknown)    Intolerances        LABS:                        15.8   10.2  )-----------( 204      ( 2017 07:02 )             48.0     06-12    141  |  106  |  8   ----------------------------<  94  3.8   |  26  |  1.25    Ca    8.5      2017 07:02  Phos  3.1     06-12  Mg     1.9     -    TPro  8.0  /  Alb  4.0  /  TBili  0.9  /  DBili  x   /  AST  30  /  ALT  25  /  AlkPhos  150<H>        Urinalysis Basic - ( 2017 16:59 )    Color: Yellow / Appearance: Clear / S.015 / pH: x  Gluc: x / Ketone: Trace  / Bili: Negative / Urobili: 1   Blood: x / Protein: 30 mg/dL / Nitrite: Positive   Leuk Esterase: Small / RBC: 0-2 /HPF / WBC 6-10 /HPF   Sq Epi: x / Non Sq Epi: Few / Bacteria: Moderate /HPF        CARDIAC MARKERS ( 2017 19:39 )  <0.015 ng/mL / x     / 385 U/L / x     / 1.4 ng/mL      CAPILLARY BLOOD GLUCOSE        RADIOLOGY & ADDITIONAL TESTS:    CXR:  Impression: The examination is limited by poor inspiratory effort.   Vascular crowding at the right lung base. No gross pulmonary   consolidation, pleural effusion or pneumothorax. If clinically indicated,   repeat PA and lateral chest x-rays may be pursued for better evaluation.    Skinfold on the right.     The trachea is midline.    The cardiac silhouette is magnified by AP technique.    Stable neurostimulator.      DEREK JIMENEZ M.D., ATTENDING RADIOLOGIST  This document has been electronically signed. 2017 10:38AM      CT ABDOMEN:  FINDINGS:  Bandlike opacities in the right lower lobe presumably due to atelectasis.   The heart is not enlarged.    Evaluation of the solid organs and vascular structures is limited without   IV contrast. Suboptimal assessment of the bowel for certain disease   processes without oral contrast.    The unenhanced appearance of the liver, spleen, pancreas and adrenal   glands is unremarkable. There is suggestion of gallbladder sludge. There   is no wall thickening or pericholecystic fluid.    There is no bowel obstruction, free air or free fluid. The colon is   underdistended which limits evaluation of the wall thickness. There is no   significant pericolonic inflammatory change. The appendix is normal.   There is no ascites.    The abdominal aorta is normal in caliber. There is no adenopathy in the   upper abdomen, retroperitoneum or pelvis.    There is anterior urinary bladder wall thickening with slightly increased   circumferential wall thickening which could be due to the degree of   underdistention. There is no pelvic softtissue mass.    Epidural pacer is again seen in the subcutaneous fat of the right lower   back with the terminating in the epidural space of the mid to lower   thoracic region. There are no acute osseous abnormalities.    IMPRESSION:  No bowel obstruction. Underdistended colon limiting evaluation of the   wall thickness. Please correlate clinically as concern for colitis.    ILDA BROWN M.D., RADIOLOGIST  This document has been electronically signed. 2017  5:12AM      EKG:  Ventricular Rate 85 BPM    Atrial Rate 85 BPM    P-R Interval 158 ms    QRS Duration 100 ms     ms    QTc 464 ms    P Axis 53 degrees    R Axis -30 degrees    T Axis 1 degrees    Diagnosis Line Normal sinus rhythm  Biatrial enlargement  Left axis deviation  Incomplete right bundle branch block  Left ventricular hypertrophy  Abnormal ECG  Confirmed by TAMMIE MOON, JOSE ([id number]) on 2017 11:00:11    ECHO: No available for review. Patient is a 46y old  Female who presents with a chief complaint of vomiting and diarrhea (2017 08:02). Feels better. No cough or sob.      INTERVAL HPI/OVERNIGHT EVENTS:      VITAL SIGNS:  T(F): 98.4  HR: 64  BP: 106/75  RR: 17  SpO2: 98%  Wt(kg): --  I&O's Detail          REVIEW OF SYSTEMS:    CONSTITUTIONAL:  No fevers, chills, sweats    HEENT:  Eyes:  No diplopia or blurred vision. ENT:  No earache, sore throat or runny nose.    CARDIOVASCULAR:  No pressure, squeezing, tightness, or heaviness about the chest; no palpitations.    RESPIRATORY:  Per HPI    GASTROINTESTINAL:  No abdominal pain, nausea, vomiting or diarrhea.    GENITOURINARY:  No dysuria, frequency or urgency.    NEUROLOGIC:  No paresthesias, fasciculations, seizures or weakness.    PSYCHIATRIC:  No disorder of thought or mood.      PHYSICAL EXAM:    Constitutional: Well developed and nourished  Eyes:Perrla  ENMT: normal  Neck:supple  Respiratory: good air entry  Cardiovascular: S1 S2 regular  Gastrointestinal: Soft, Non tender  Extremities: No edema  Vascular:normal  Neurological:Awake, alert,Ox3  Musculoskeletal:Normal      MEDICATIONS  (STANDING):  escitalopram 20milliGRAM(s) Oral daily  pregabalin 300milliGRAM(s) Oral two times a day  loperamide 2milliGRAM(s) Oral three times a day  clopidogrel Tablet 75milliGRAM(s) Oral daily  metoprolol 25milliGRAM(s) Oral two times a day  amLODIPine   Tablet 2.5milliGRAM(s) Oral daily  pantoprazole    Tablet 40milliGRAM(s) Oral before breakfast  enoxaparin Injectable 40milliGRAM(s) SubCutaneous daily  ciprofloxacin   IVPB  IV Intermittent   metroNIDAZOLE  IVPB  IV Intermittent   ciprofloxacin   IVPB 200milliGRAM(s) IV Intermittent every 12 hours  metroNIDAZOLE  IVPB 500milliGRAM(s) IV Intermittent every 8 hours  dextrose 5% + sodium chloride 0.9%. 1000milliLiter(s) IV Continuous <Continuous>    MEDICATIONS  (PRN):  ondansetron Injectable 4milliGRAM(s) IV Push every 6 hours PRN Nausea and/or Vomiting  oxyCODONE  5 mG/acetaminophen 325 mG 2Tablet(s) Oral every 6 hours PRN Moderate Pain (4 - 6)  morphine  - Injectable 2milliGRAM(s) IV Push every 8 hours PRN Severe Pain (7 - 10)      Allergies    aspirin (Unknown)  latex (Unknown)  sulfa drugs (Unknown)    Intolerances        LABS:                        15.8   10.2  )-----------( 204      ( 2017 07:02 )             48.0     06-12    141  |  106  |  8   ----------------------------<  94  3.8   |  26  |  1.25    Ca    8.5      2017 07:02  Phos  3.1     06-12  Mg     1.9     06-12    TPro  8.0  /  Alb  4.0  /  TBili  0.9  /  DBili  x   /  AST  30  /  ALT  25  /  AlkPhos  150<H>  06-11      Urinalysis Basic - ( 2017 16:59 )    Color: Yellow / Appearance: Clear / S.015 / pH: x  Gluc: x / Ketone: Trace  / Bili: Negative / Urobili: 1   Blood: x / Protein: 30 mg/dL / Nitrite: Positive   Leuk Esterase: Small / RBC: 0-2 /HPF / WBC 6-10 /HPF   Sq Epi: x / Non Sq Epi: Few / Bacteria: Moderate /HPF        CARDIAC MARKERS ( 2017 19:39 )  <0.015 ng/mL / x     / 385 U/L / x     / 1.4 ng/mL      CAPILLARY BLOOD GLUCOSE        RADIOLOGY & ADDITIONAL TESTS:    CXR:  Impression: The examination is limited by poor inspiratory effort.   Vascular crowding at the right lung base. No gross pulmonary   consolidation, pleural effusion or pneumothorax. If clinically indicated,   repeat PA and lateral chest x-rays may be pursued for better evaluation.    Skinfold on the right.     The trachea is midline.    The cardiac silhouette is magnified by AP technique.    Stable neurostimulator.      DEREK JIMENEZ M.D., ATTENDING RADIOLOGIST  This document has been electronically signed. 2017 10:38AM      CT ABDOMEN:  FINDINGS:  Bandlike opacities in the right lower lobe presumably due to atelectasis.   The heart is not enlarged.    Evaluation of the solid organs and vascular structures is limited without   IV contrast. Suboptimal assessment of the bowel for certain disease   processes without oral contrast.    The unenhanced appearance of the liver, spleen, pancreas and adrenal   glands is unremarkable. There is suggestion of gallbladder sludge. There   is no wall thickening or pericholecystic fluid.    There is no bowel obstruction, free air or free fluid. The colon is   underdistended which limits evaluation of the wall thickness. There is no   significant pericolonic inflammatory change. The appendix is normal.   There is no ascites.    The abdominal aorta is normal in caliber. There is no adenopathy in the   upper abdomen, retroperitoneum or pelvis.    There is anterior urinary bladder wall thickening with slightly increased   circumferential wall thickening which could be due to the degree of   underdistention. There is no pelvic softtissue mass.    Epidural pacer is again seen in the subcutaneous fat of the right lower   back with the terminating in the epidural space of the mid to lower   thoracic region. There are no acute osseous abnormalities.    IMPRESSION:  No bowel obstruction. Underdistended colon limiting evaluation of the   wall thickness. Please correlate clinically as concern for colitis.    ILDA BROWN M.D., RADIOLOGIST  This document has been electronically signed. 2017  5:12AM      EKG:  Ventricular Rate 85 BPM    Atrial Rate 85 BPM    P-R Interval 158 ms    QRS Duration 100 ms     ms    QTc 464 ms    P Axis 53 degrees    R Axis -30 degrees    T Axis 1 degrees    Diagnosis Line Normal sinus rhythm  Biatrial enlargement  Left axis deviation  Incomplete right bundle branch block  Left ventricular hypertrophy  Abnormal ECG  Confirmed by TAMMIE MOON, JOSE ([id number]) on 2017 11:00:11    ECHO: No available for review.

## 2017-06-13 NOTE — PROGRESS NOTE ADULT - CONSTITUTIONAL DETAILS
no distress/well-nourished/well-developed/well-groomed
no distress/well-nourished/well-groomed/well-developed

## 2017-06-13 NOTE — PROGRESS NOTE ADULT - SUBJECTIVE AND OBJECTIVE BOX
Patient is a 46y old  Female who presents with a chief complaint of vomiting and diarrhea (2017 08:02)      INTERVAL HPI/OVERNIGHT EVENTS:    no acute vents     MEDICATIONS  (STANDING):  escitalopram 20milliGRAM(s) Oral daily  pregabalin 300milliGRAM(s) Oral two times a day  loperamide 2milliGRAM(s) Oral three times a day  clopidogrel Tablet 75milliGRAM(s) Oral daily  metoprolol 25milliGRAM(s) Oral two times a day  amLODIPine   Tablet 2.5milliGRAM(s) Oral daily  pantoprazole    Tablet 40milliGRAM(s) Oral before breakfast  enoxaparin Injectable 40milliGRAM(s) SubCutaneous daily  ciprofloxacin   IVPB  IV Intermittent   metroNIDAZOLE  IVPB  IV Intermittent   ciprofloxacin   IVPB 200milliGRAM(s) IV Intermittent every 12 hours  metroNIDAZOLE  IVPB 500milliGRAM(s) IV Intermittent every 8 hours  dextrose 5% + sodium chloride 0.9%. 1000milliLiter(s) IV Continuous <Continuous>    MEDICATIONS  (PRN):  ondansetron Injectable 4milliGRAM(s) IV Push every 6 hours PRN Nausea and/or Vomiting  oxyCODONE  5 mG/acetaminophen 325 mG 2Tablet(s) Oral every 6 hours PRN Moderate Pain (4 - 6)  morphine  - Injectable 2milliGRAM(s) IV Push every 8 hours PRN Severe Pain (7 - 10)      Allergies    aspirin (Unknown)  latex (Unknown)  sulfa drugs (Unknown)    Intolerances        Vital Signs Last 24 Hrs  T(C): 36.9, Max: 37.3 (06-12 @ 15:10)  T(F): 98.4, Max: 99.1 (06-12 @ 15:10)  HR: 64 (64 - 71)  BP: 106/75 (106/75 - 134/92)  BP(mean): --  RR: 17 (17 - 18)  SpO2: 98% (97% - 98%)    PHYSICAL EXAM:  GENERAL: NAD, well-groomed, well-developed  HEAD:  Atraumatic, Normocephalic  EYES: EOMI, PERRLA, conjunctiva and sclera clear  ENMT: No tonsillar erythema, exudates, or enlargement; Moist mucous membranes, Good dentition, No lesions  NECK: Supple, No JVD, Normal thyroid  NERVOUS SYSTEM:  Alert & Oriented X3, Good concentration  CHEST/LUNG: Clear to percussion bilaterally; No rales, rhonchi, wheezing, or rubs  HEART: Regular rate and rhythm; No murmurs, rubs, or gallops  ABDOMEN: Soft, Nontender, Nondistended; Bowel sounds present  EXTREMITIES: No clubbing, cyanosis, or edema  LYMPH: No lymphadenopathy noted  SKIN: No rashes or lesions    LABS:                        15.8   10.2  )-----------( 204      ( 2017 07:02 )             48.0     06-13    143  |  109<H>  |  11  ----------------------------<  105<H>  3.0<L>   |  26  |  1.24    Ca    7.7<L>      2017 10:36  Phos  3.1     06-12  Mg     1.9         TPro  8.0  /  Alb  4.0  /  TBili  0.9  /  DBili  x   /  AST  30  /  ALT  25  /  AlkPhos  150<H>  11      Urinalysis Basic - ( 2017 16:59 )    Color: Yellow / Appearance: Clear / S.015 / pH: x  Gluc: x / Ketone: Trace  / Bili: Negative / Urobili: 1   Blood: x / Protein: 30 mg/dL / Nitrite: Positive   Leuk Esterase: Small / RBC: 0-2 /HPF / WBC 6-10 /HPF   Sq Epi: x / Non Sq Epi: Few / Bacteria: Moderate /HPF      CAPILLARY BLOOD GLUCOSE      RADIOLOGY & ADDITIONAL TESTS:    Imaging Personally Reviewed:  [ ] YES  [ ] NO    Consultant(s) Notes Reviewed:  [ ] YES  [ ] NO    Care Discussed with Consultants/Other Providers [ ] YES  [ ] NO    Plan of Care discussed with Attending/PGY2 [ ]YES [ ] NO

## 2017-06-13 NOTE — PROGRESS NOTE ADULT - ASSESSMENT
44 y/o F lives with her sister from home ambulates with walker, PMH RSD s/p spinal stimulator, frequent falls, SLE (no treatment ), cyclical vomiting syndrome, Afib (not on AC due to frequent falls), HTN, depression, Hemorrhoids (EGD / colonoscopy 3-4 years ago ) and partial thyroidectomy presented with c/o vomiting and diarrhea for 2 days. AS per patient she has cyclical vomiting syndrome and gets episode every year. She has been vomiting for the last 2 days , constant, >10 times a day, watery , non bloody , non bilious. Unable to eat anything due to constant vomiting. She also c/o loose watery diarrhea, >5-6 times a day with generalized abdominal pain. Reported subjective fever at home and c/o cough with yellow sputum.
46 y/o F lives with her sister from home ambulates with walker, PMH RSD s/p spinal stimulator, frequent falls, SLE (no treatment ), cyclical vomiting syndrome, Afib (not on AC due to frequent falls), HTN, depression, Hemorrhoids (EGD / colonoscopy 3-4 years ago ) and partial thyroidectomy presented with c/o vomiting and diarrhea for 2 days. AS per patient she has cyclical vomiting syndrome and gets episode every year. She has been vomiting for the last 2 days , constant, >10 times a day, watery , non bloody , non bilious. Unable to eat anything due to constant vomiting. She also c/o loose watery diarrhea, >5-6 times a day with generalized abdominal pain. Reported subjective fever at home and c/o cough with yellow sputum.
1. Abdominal pain  (improving)  2. Gastroenteritis  3. Cyclic vomiting    Suggestion:    1. Advance diet as tolerated  2. Monitor electrolytes  3. Avoid NSAID's  4. Continue current meds  5. DVT prophylaxis
1. Gastroenteritis  2. ?Gastroparesis  3. Cyclic vomiting    Suggestions:    1. Monitor electrolytes  2. IVF hydration  3. NPO  4. Zofran prn  5. Follow up stool stody  6. Protonix daily  7. Avoid NSAID's  8. DVT prophylaxis

## 2017-06-13 NOTE — PROGRESS NOTE ADULT - PROBLEM SELECTOR PLAN 3
c/w amlodipine 2.5 CT scan non conclusive for colitis   no fever, no white count   No focal tenderness (generalized due to RSD)   c/w IV abx  f/u  stool culture and ova and parasite

## 2017-06-13 NOTE — PROGRESS NOTE ADULT - RS GEN PE MLT RESP DETAILS PC
clear to auscultation bilaterally/airway patent/no rales/breath sounds equal/no rhonchi
good air movement/no rales/clear to auscultation bilaterally/no wheezes/breath sounds equal/no rhonchi/airway patent

## 2017-06-13 NOTE — PROGRESS NOTE ADULT - GASTROINTESTINAL DETAILS
no rigidity/soft/no rebound tenderness/no guarding/no distention/nontender
nontender/bowel sounds normal/no guarding/no rigidity/no distention/soft

## 2017-06-13 NOTE — PROGRESS NOTE ADULT - SUBJECTIVE AND OBJECTIVE BOX
Patient is a 46y old  Female who presents with a chief complaint of vomiting and diarrhea (11 Jun 2017 08:02)    pt seen in icu [  ], reg med floor [   ], bed [  ], chair at bedside [   ], a+o x3 [  ], lethargic [  ],  nad [  ]    skelton [  ], ngt [  ], peg [  ], et tube [  ], cent line [  ], picc line [  ]        Allergies    aspirin (Unknown)  latex (Unknown)  sulfa drugs (Unknown)        Vitals    T(F): 98.1, Max: 99 (06-13 @ 01:11)  HR: 64 (64 - 68)  BP: 109/65 (106/75 - 130/104)  RR: 16 (16 - 18)  SpO2: 97% (97% - 98%)  Wt(kg): --  CAPILLARY BLOOD GLUCOSE  100 (12 Jun 2017 06:35)      Labs                          15.8   10.2  )-----------( 204      ( 12 Jun 2017 07:02 )             48.0       06-13    143  |  109<H>  |  11  ----------------------------<  105<H>  3.0<L>   |  26  |  1.24    Ca    7.7<L>      13 Jun 2017 10:36  Phos  3.1     06-12  Mg     1.9     06-12    TPro  8.0  /  Alb  4.0  /  TBili  0.9  /  DBili  x   /  AST  30  /  ALT  25  /  AlkPhos  150<H>  06-11      CARDIAC MARKERS ( 11 Jun 2017 19:39 )  <0.015 ng/mL / x     / 385 U/L / x     / 1.4 ng/mL        .Blood Blood  06-12 @ 01:19   No growth to date.  --  --          Radiology Results      Meds    MEDICATIONS  (STANDING):  escitalopram 20milliGRAM(s) Oral daily  pregabalin 300milliGRAM(s) Oral two times a day  loperamide 2milliGRAM(s) Oral three times a day  clopidogrel Tablet 75milliGRAM(s) Oral daily  metoprolol 25milliGRAM(s) Oral two times a day  amLODIPine   Tablet 2.5milliGRAM(s) Oral daily  pantoprazole    Tablet 40milliGRAM(s) Oral before breakfast  enoxaparin Injectable 40milliGRAM(s) SubCutaneous daily  ciprofloxacin   IVPB  IV Intermittent   metroNIDAZOLE  IVPB  IV Intermittent   ciprofloxacin   IVPB 200milliGRAM(s) IV Intermittent every 12 hours  metroNIDAZOLE  IVPB 500milliGRAM(s) IV Intermittent every 8 hours  dextrose 5% + sodium chloride 0.9%. 1000milliLiter(s) IV Continuous <Continuous>  potassium chloride    Tablet ER 40milliEquivalent(s) Oral every 4 hours      MEDICATIONS  (PRN):  ondansetron Injectable 4milliGRAM(s) IV Push every 6 hours PRN Nausea and/or Vomiting  oxyCODONE  5 mG/acetaminophen 325 mG 2Tablet(s) Oral every 6 hours PRN Moderate Pain (4 - 6)  morphine  - Injectable 2milliGRAM(s) IV Push every 8 hours PRN Severe Pain (7 - 10)      Physical Exam    Neuro :  no focal deficits  Respiratory: CTA B/L  CV: RRR, S1S2, no murmurs,   Abdominal: Soft, NT, ND +BS,  Extremities: No edema, + peripheral pulses    ASSESSMENT    Non-intractable cyclical vomiting  s/p electrolyte imbalance,   diarrhea, r/o colitis,   atelectasis  uti  h/o Depression,  HTN (hypertension)  Cyclical vomiting  Atrial fibrillation  SLE (systemic lupus erythematosus)  Reflex sympathetic dystrophy  H/O partial thyroidectomy  S/P partial hysterectomy      PLAN    cont cipro and flagyl  f/u stool studies  f/u ucx  gi f/u  pulm f/u  start clears and adv as tolerared  supplement potassium   cont current meds Patient is a 46y old  Female who presents with a chief complaint of vomiting and diarrhea (11 Jun 2017 08:02)    pt seen in icu [  ], reg med floor [ x  ], bed [ x ], chair at bedside [   ], a+o x3 [ x ], lethargic [  ],  nad [ x ]      Allergies    aspirin (Unknown)  latex (Unknown)  sulfa drugs (Unknown)        Vitals    T(F): 98.1, Max: 99 (06-13 @ 01:11)  HR: 64 (64 - 68)  BP: 109/65 (106/75 - 130/104)  RR: 16 (16 - 18)  SpO2: 97% (97% - 98%)  Wt(kg): --  CAPILLARY BLOOD GLUCOSE  100 (12 Jun 2017 06:35)      Labs                          15.8   10.2  )-----------( 204      ( 12 Jun 2017 07:02 )             48.0       06-13    143  |  109<H>  |  11  ----------------------------<  105<H>  3.0<L>   |  26  |  1.24    Ca    7.7<L>      13 Jun 2017 10:36  Phos  3.1     06-12  Mg     1.9     06-12    TPro  8.0  /  Alb  4.0  /  TBili  0.9  /  DBili  x   /  AST  30  /  ALT  25  /  AlkPhos  150<H>  06-11      CARDIAC MARKERS ( 11 Jun 2017 19:39 )  <0.015 ng/mL / x     / 385 U/L / x     / 1.4 ng/mL        .Blood Blood  06-12 @ 01:19   No growth to date.  --  --          Radiology Results      Meds    MEDICATIONS  (STANDING):  escitalopram 20milliGRAM(s) Oral daily  pregabalin 300milliGRAM(s) Oral two times a day  loperamide 2milliGRAM(s) Oral three times a day  clopidogrel Tablet 75milliGRAM(s) Oral daily  metoprolol 25milliGRAM(s) Oral two times a day  amLODIPine   Tablet 2.5milliGRAM(s) Oral daily  pantoprazole    Tablet 40milliGRAM(s) Oral before breakfast  enoxaparin Injectable 40milliGRAM(s) SubCutaneous daily  ciprofloxacin   IVPB  IV Intermittent   metroNIDAZOLE  IVPB  IV Intermittent   ciprofloxacin   IVPB 200milliGRAM(s) IV Intermittent every 12 hours  metroNIDAZOLE  IVPB 500milliGRAM(s) IV Intermittent every 8 hours  dextrose 5% + sodium chloride 0.9%. 1000milliLiter(s) IV Continuous <Continuous>  potassium chloride    Tablet ER 40milliEquivalent(s) Oral every 4 hours      MEDICATIONS  (PRN):  ondansetron Injectable 4milliGRAM(s) IV Push every 6 hours PRN Nausea and/or Vomiting  oxyCODONE  5 mG/acetaminophen 325 mG 2Tablet(s) Oral every 6 hours PRN Moderate Pain (4 - 6)  morphine  - Injectable 2milliGRAM(s) IV Push every 8 hours PRN Severe Pain (7 - 10)      Physical Exam    Neuro :  no focal deficits  Respiratory: CTA B/L  CV: RRR, S1S2, no murmurs,   Abdominal: Soft, NT, ND +BS,  Extremities: No edema, + peripheral pulses    ASSESSMENT    Non-intractable cyclical vomiting  s/p electrolyte imbalance,   diarrhea, r/o colitis,   atelectasis  uti  h/o Depression,  HTN (hypertension)  Cyclical vomiting  Atrial fibrillation  SLE (systemic lupus erythematosus)  Reflex sympathetic dystrophy  H/O partial thyroidectomy  S/P partial hysterectomy      PLAN    cont cipro and flagyl  f/u stool studies  f/u ucx  gi f/u  pulm f/u  start clears and adv as tolerared  supplement potassium   cont current meds Patient is a 46y old  Female who presents with a chief complaint of vomiting and diarrhea (11 Jun 2017 08:02)    pt seen in icu [  ], reg med floor [ x  ], bed [ x ], chair at bedside [   ], a+o x3 [ x ], lethargic [  ],  nad [ x ]    no further co/o n/v/d abd pain      Allergies    aspirin (Unknown)  latex (Unknown)  sulfa drugs (Unknown)        Vitals    T(F): 98.1, Max: 99 (06-13 @ 01:11)  HR: 64 (64 - 68)  BP: 109/65 (106/75 - 130/104)  RR: 16 (16 - 18)  SpO2: 97% (97% - 98%)  Wt(kg): --  CAPILLARY BLOOD GLUCOSE  100 (12 Jun 2017 06:35)      Labs                          15.8   10.2  )-----------( 204      ( 12 Jun 2017 07:02 )             48.0       06-13    143  |  109<H>  |  11  ----------------------------<  105<H>  3.0<L>   |  26  |  1.24    Ca    7.7<L>      13 Jun 2017 10:36  Phos  3.1     06-12  Mg     1.9     06-12    TPro  8.0  /  Alb  4.0  /  TBili  0.9  /  DBili  x   /  AST  30  /  ALT  25  /  AlkPhos  150<H>  06-11      CARDIAC MARKERS ( 11 Jun 2017 19:39 )  <0.015 ng/mL / x     / 385 U/L / x     / 1.4 ng/mL        .Blood Blood  06-12 @ 01:19   No growth to date.  --  --          Radiology Results      Meds    MEDICATIONS  (STANDING):  escitalopram 20milliGRAM(s) Oral daily  pregabalin 300milliGRAM(s) Oral two times a day  loperamide 2milliGRAM(s) Oral three times a day  clopidogrel Tablet 75milliGRAM(s) Oral daily  metoprolol 25milliGRAM(s) Oral two times a day  amLODIPine   Tablet 2.5milliGRAM(s) Oral daily  pantoprazole    Tablet 40milliGRAM(s) Oral before breakfast  enoxaparin Injectable 40milliGRAM(s) SubCutaneous daily  ciprofloxacin   IVPB  IV Intermittent   metroNIDAZOLE  IVPB  IV Intermittent   ciprofloxacin   IVPB 200milliGRAM(s) IV Intermittent every 12 hours  metroNIDAZOLE  IVPB 500milliGRAM(s) IV Intermittent every 8 hours  dextrose 5% + sodium chloride 0.9%. 1000milliLiter(s) IV Continuous <Continuous>  potassium chloride    Tablet ER 40milliEquivalent(s) Oral every 4 hours      MEDICATIONS  (PRN):  ondansetron Injectable 4milliGRAM(s) IV Push every 6 hours PRN Nausea and/or Vomiting  oxyCODONE  5 mG/acetaminophen 325 mG 2Tablet(s) Oral every 6 hours PRN Moderate Pain (4 - 6)  morphine  - Injectable 2milliGRAM(s) IV Push every 8 hours PRN Severe Pain (7 - 10)      Physical Exam    Neuro :  no focal deficits  Respiratory: CTA B/L  CV: RRR, S1S2, no murmurs,   Abdominal: Soft, NT, ND +BS,  Extremities: No edema, + peripheral pulses    ASSESSMENT    Non-intractable cyclical vomiting  s/p electrolyte imbalance,   diarrhea, r/o colitis,   atelectasis  uti  h/o Depression,  HTN (hypertension)  Cyclical vomiting  Atrial fibrillation  SLE (systemic lupus erythematosus)  Reflex sympathetic dystrophy  H/O partial thyroidectomy  S/P partial hysterectomy      PLAN    cont cipro and flagyl  f/u stool studies  f/u ucx  gi f/u  pulm f/u  start clears and adv as tolerared  supplement potassium   cont current meds  d/c plan for am if stable and tolerating diet

## 2017-06-13 NOTE — CONSULT NOTE ADULT - PROBLEM SELECTOR RECOMMENDATION 9
- Pt takes ms contin 60mg po 2x a day.  will do morphine 2mg ivp q6 prn here for now  - continue with percocet po prn  - continue lyrica 300mg po 2x a day  - no stool softeners if pt has loose stool
Bronchodilators  Pfts as outpatient  Incentive spirometry

## 2017-06-13 NOTE — PROGRESS NOTE ADULT - PROBLEM SELECTOR PLAN 1
no diarrhea, N/V has improved  Likely from cyclic vomiting syndrome and THC    PRN zofran   started on clears, advance as tolerated   continue with IVF no diarrhea, N/V has improved  Likely from cyclic vomiting syndrome and THC    PRN zofran   started on clear, advance as tolerated   continue with IVF

## 2017-06-14 VITALS
DIASTOLIC BLOOD PRESSURE: 68 MMHG | SYSTOLIC BLOOD PRESSURE: 108 MMHG | RESPIRATION RATE: 16 BRPM | TEMPERATURE: 98 F | HEART RATE: 66 BPM | OXYGEN SATURATION: 97 %

## 2017-06-14 DIAGNOSIS — N39.0 URINARY TRACT INFECTION, SITE NOT SPECIFIED: ICD-10-CM

## 2017-06-14 LAB
ANION GAP SERPL CALC-SCNC: 7 MMOL/L — SIGNIFICANT CHANGE UP (ref 5–17)
BUN SERPL-MCNC: 9 MG/DL — SIGNIFICANT CHANGE UP (ref 7–18)
CALCIUM SERPL-MCNC: 8 MG/DL — LOW (ref 8.4–10.5)
CHLORIDE SERPL-SCNC: 112 MMOL/L — HIGH (ref 96–108)
CO2 SERPL-SCNC: 25 MMOL/L — SIGNIFICANT CHANGE UP (ref 22–31)
CREAT SERPL-MCNC: 1.12 MG/DL — SIGNIFICANT CHANGE UP (ref 0.5–1.3)
GLUCOSE SERPL-MCNC: 122 MG/DL — HIGH (ref 70–99)
POTASSIUM SERPL-MCNC: 3.5 MMOL/L — SIGNIFICANT CHANGE UP (ref 3.5–5.3)
POTASSIUM SERPL-SCNC: 3.5 MMOL/L — SIGNIFICANT CHANGE UP (ref 3.5–5.3)
SODIUM SERPL-SCNC: 144 MMOL/L — SIGNIFICANT CHANGE UP (ref 135–145)

## 2017-06-14 PROCEDURE — 83735 ASSAY OF MAGNESIUM: CPT

## 2017-06-14 PROCEDURE — 82550 ASSAY OF CK (CPK): CPT

## 2017-06-14 PROCEDURE — 84100 ASSAY OF PHOSPHORUS: CPT

## 2017-06-14 PROCEDURE — 81001 URINALYSIS AUTO W/SCOPE: CPT

## 2017-06-14 PROCEDURE — 82553 CREATINE MB FRACTION: CPT

## 2017-06-14 PROCEDURE — 85027 COMPLETE CBC AUTOMATED: CPT

## 2017-06-14 PROCEDURE — 87040 BLOOD CULTURE FOR BACTERIA: CPT

## 2017-06-14 PROCEDURE — 80048 BASIC METABOLIC PNL TOTAL CA: CPT

## 2017-06-14 PROCEDURE — 84484 ASSAY OF TROPONIN QUANT: CPT

## 2017-06-14 PROCEDURE — 80061 LIPID PANEL: CPT

## 2017-06-14 PROCEDURE — 80053 COMPREHEN METABOLIC PANEL: CPT

## 2017-06-14 PROCEDURE — 83036 HEMOGLOBIN GLYCOSYLATED A1C: CPT

## 2017-06-14 PROCEDURE — 74176 CT ABD & PELVIS W/O CONTRAST: CPT

## 2017-06-14 PROCEDURE — 99285 EMERGENCY DEPT VISIT HI MDM: CPT | Mod: 25

## 2017-06-14 PROCEDURE — 80307 DRUG TEST PRSMV CHEM ANLYZR: CPT

## 2017-06-14 PROCEDURE — 83690 ASSAY OF LIPASE: CPT

## 2017-06-14 PROCEDURE — 71045 X-RAY EXAM CHEST 1 VIEW: CPT

## 2017-06-14 PROCEDURE — 83605 ASSAY OF LACTIC ACID: CPT

## 2017-06-14 PROCEDURE — 93005 ELECTROCARDIOGRAM TRACING: CPT

## 2017-06-14 PROCEDURE — 99223 1ST HOSP IP/OBS HIGH 75: CPT

## 2017-06-14 PROCEDURE — 84146 ASSAY OF PROLACTIN: CPT

## 2017-06-14 RX ORDER — CIPROFLOXACIN LACTATE 400MG/40ML
1 VIAL (ML) INTRAVENOUS
Qty: 3 | Refills: 0 | OUTPATIENT
Start: 2017-06-14 | End: 2017-06-17

## 2017-06-14 RX ADMIN — PANTOPRAZOLE SODIUM 40 MILLIGRAM(S): 20 TABLET, DELAYED RELEASE ORAL at 06:49

## 2017-06-14 RX ADMIN — Medication 100 MILLIGRAM(S): at 06:48

## 2017-06-14 RX ADMIN — MORPHINE SULFATE 2 MILLIGRAM(S): 50 CAPSULE, EXTENDED RELEASE ORAL at 11:00

## 2017-06-14 RX ADMIN — MORPHINE SULFATE 2 MILLIGRAM(S): 50 CAPSULE, EXTENDED RELEASE ORAL at 02:49

## 2017-06-14 RX ADMIN — Medication 300 MILLIGRAM(S): at 06:48

## 2017-06-14 RX ADMIN — ESCITALOPRAM OXALATE 20 MILLIGRAM(S): 10 TABLET, FILM COATED ORAL at 12:25

## 2017-06-14 RX ADMIN — CLOPIDOGREL BISULFATE 75 MILLIGRAM(S): 75 TABLET, FILM COATED ORAL at 12:25

## 2017-06-14 RX ADMIN — MORPHINE SULFATE 2 MILLIGRAM(S): 50 CAPSULE, EXTENDED RELEASE ORAL at 09:42

## 2017-06-14 RX ADMIN — MORPHINE SULFATE 2 MILLIGRAM(S): 50 CAPSULE, EXTENDED RELEASE ORAL at 03:02

## 2017-06-14 NOTE — PROGRESS NOTE ADULT - SUBJECTIVE AND OBJECTIVE BOX
Patient is a 46y old  Female who presents with a chief complaint of vomiting and diarrhea (2017 08:19)  Awake, alert, comfortable in NAD.    INTERVAL HPI/OVERNIGHT EVENTS:      VITAL SIGNS:  T(F): 98.2  HR: 66  BP: 108/68  RR: 16  SpO2: 97%  Wt(kg): --  I&O's Detail          REVIEW OF SYSTEMS:    CONSTITUTIONAL:  No fevers, chills, sweats    HEENT:  Eyes:  No diplopia or blurred vision. ENT:  No earache, sore throat or runny nose.    CARDIOVASCULAR:  No pressure, squeezing, tightness, or heaviness about the chest; no palpitations.    RESPIRATORY:  Per HPI    GASTROINTESTINAL:  No abdominal pain, nausea, vomiting or diarrhea.    GENITOURINARY:  No dysuria, frequency or urgency.    NEUROLOGIC:  No paresthesias, fasciculations, seizures or weakness.    PSYCHIATRIC:  No disorder of thought or mood.      PHYSICAL EXAM:    Constitutional: Well developed and nourished  Eyes:Perrla  ENMT: normal  Neck:supple  Respiratory: good air entry  Cardiovascular: S1 S2 regular  Gastrointestinal: Soft, Non tender  Extremities: No edema  Vascular:normal  Neurological:Awake, alert,Ox3  Musculoskeletal:Normal      MEDICATIONS  (STANDING):  escitalopram 20milliGRAM(s) Oral daily  pregabalin 300milliGRAM(s) Oral two times a day  loperamide 2milliGRAM(s) Oral three times a day  clopidogrel Tablet 75milliGRAM(s) Oral daily  metoprolol 25milliGRAM(s) Oral two times a day  amLODIPine   Tablet 2.5milliGRAM(s) Oral daily  pantoprazole    Tablet 40milliGRAM(s) Oral before breakfast  enoxaparin Injectable 40milliGRAM(s) SubCutaneous daily  ciprofloxacin   IVPB  IV Intermittent   metroNIDAZOLE  IVPB  IV Intermittent   ciprofloxacin   IVPB 200milliGRAM(s) IV Intermittent every 12 hours  metroNIDAZOLE  IVPB 500milliGRAM(s) IV Intermittent every 8 hours    MEDICATIONS  (PRN):  ondansetron Injectable 4milliGRAM(s) IV Push every 6 hours PRN Nausea and/or Vomiting  oxyCODONE  5 mG/acetaminophen 325 mG 2Tablet(s) Oral every 6 hours PRN Moderate Pain (4 - 6)  morphine  - Injectable 2milliGRAM(s) IV Push every 6 hours PRN Severe Pain (7 - 10)      Allergies    aspirin (Unknown)  latex (Unknown)  sulfa drugs (Unknown)    Intolerances        LABS:        144  |  112<H>  |  9   ----------------------------<  122<H>  3.5   |  25  |  1.12    Ca    8.0<L>      2017 06:26        Urinalysis Basic - ( 2017 16:59 )    Color: Yellow / Appearance: Clear / S.015 / pH: x  Gluc: x / Ketone: Trace  / Bili: Negative / Urobili: 1   Blood: x / Protein: 30 mg/dL / Nitrite: Positive   Leuk Esterase: Small / RBC: 0-2 /HPF / WBC 6-10 /HPF   Sq Epi: x / Non Sq Epi: Few / Bacteria: Moderate /HPF            CAPILLARY BLOOD GLUCOSE  89 (2017 08:49)  98 (2017 18:07)        RADIOLOGY & ADDITIONAL TESTS:    CXR: No recent CXR available for review    CT ABDOMEN/PELVIS:  FINDINGS:  Bandlike opacities in the right lower lobe presumably due to atelectasis.   The heart is not enlarged.    Evaluation of the solid organs and vascular structures is limited without   IV contrast. Suboptimal assessment of the bowel for certain disease   processes without oral contrast.    The unenhanced appearance of the liver, spleen, pancreas and adrenal   glands is unremarkable. There is suggestion of gallbladder sludge. There   is no wall thickening or pericholecystic fluid.    There is no bowel obstruction, free air or free fluid. The colon is   underdistended which limits evaluation of the wall thickness. There is no   significant pericolonic inflammatory change. The appendix is normal.   There is no ascites.    The abdominal aorta is normal in caliber. There is no adenopathy in the   upper abdomen, retroperitoneum or pelvis.    There is anterior urinary bladder wall thickening with slightly increased   circumferential wall thickening which could be due to the degree of   underdistention. There is no pelvic softtissue mass.    Epidural pacer is again seen in the subcutaneous fat of the right lower   back with the terminating in the epidural space of the mid to lower   thoracic region. There are no acute osseous abnormalities.    IMPRESSION:  No bowel obstruction. Underdistended colon limiting evaluation of the   wall thickness. Please correlate clinically as concern for colitis.    ILDA BROWN M.D., RADIOLOGIST  This document has been electronically signed. 2017  5:12AM      EKG:  Ventricular Rate 85 BPM    Atrial Rate 85 BPM    P-R Interval 158 ms    QRS Duration 100 ms     ms    QTc 464 ms    P Axis 53 degrees    R Axis -30 degrees    T Axis 1 degrees    Diagnosis Line Normal sinus rhythm  Biatrial enlargement  Left axis deviation  Incomplete right bundle branch block  Left ventricular hypertrophy  Abnormal ECG    Confirmed by TAMMIE MOON, JOSE ([id number]) on 2017 11:00:11    ECHO: No recent echo available for review Patient is a 46y old  Female who presents with a chief complaint of vomiting and diarrhea (2017 08:19)  Awake, alert, comfortable in NAD. denies sob or cough.    INTERVAL HPI/OVERNIGHT EVENTS:      VITAL SIGNS:  T(F): 98.2  HR: 66  BP: 108/68  RR: 16  SpO2: 97%  Wt(kg): --  I&O's Detail          REVIEW OF SYSTEMS:    CONSTITUTIONAL:  No fevers, chills, sweats    HEENT:  Eyes:  No diplopia or blurred vision. ENT:  No earache, sore throat or runny nose.    CARDIOVASCULAR:  No pressure, squeezing, tightness, or heaviness about the chest; no palpitations.    RESPIRATORY:  Per HPI    GASTROINTESTINAL:  No abdominal pain, nausea, vomiting or diarrhea.    GENITOURINARY:  No dysuria, frequency or urgency.    NEUROLOGIC:  No paresthesias, fasciculations, seizures or weakness.    PSYCHIATRIC:  No disorder of thought or mood.      PHYSICAL EXAM:    Constitutional: Well developed and nourished  Eyes:Perrla  ENMT: normal  Neck:supple  Respiratory: Rales on bases  Cardiovascular: S1 S2 regular  Gastrointestinal: Soft, Non tender  Extremities: No edema  Vascular:normal  Neurological:Awake, alert,Ox3  Musculoskeletal:Normal      MEDICATIONS  (STANDING):  escitalopram 20milliGRAM(s) Oral daily  pregabalin 300milliGRAM(s) Oral two times a day  loperamide 2milliGRAM(s) Oral three times a day  clopidogrel Tablet 75milliGRAM(s) Oral daily  metoprolol 25milliGRAM(s) Oral two times a day  amLODIPine   Tablet 2.5milliGRAM(s) Oral daily  pantoprazole    Tablet 40milliGRAM(s) Oral before breakfast  enoxaparin Injectable 40milliGRAM(s) SubCutaneous daily  ciprofloxacin   IVPB  IV Intermittent   metroNIDAZOLE  IVPB  IV Intermittent   ciprofloxacin   IVPB 200milliGRAM(s) IV Intermittent every 12 hours  metroNIDAZOLE  IVPB 500milliGRAM(s) IV Intermittent every 8 hours    MEDICATIONS  (PRN):  ondansetron Injectable 4milliGRAM(s) IV Push every 6 hours PRN Nausea and/or Vomiting  oxyCODONE  5 mG/acetaminophen 325 mG 2Tablet(s) Oral every 6 hours PRN Moderate Pain (4 - 6)  morphine  - Injectable 2milliGRAM(s) IV Push every 6 hours PRN Severe Pain (7 - 10)      Allergies    aspirin (Unknown)  latex (Unknown)  sulfa drugs (Unknown)    Intolerances        LABS:        144  |  112<H>  |  9   ----------------------------<  122<H>  3.5   |  25  |  1.12    Ca    8.0<L>      2017 06:26        Urinalysis Basic - ( 2017 16:59 )    Color: Yellow / Appearance: Clear / S.015 / pH: x  Gluc: x / Ketone: Trace  / Bili: Negative / Urobili: 1   Blood: x / Protein: 30 mg/dL / Nitrite: Positive   Leuk Esterase: Small / RBC: 0-2 /HPF / WBC 6-10 /HPF   Sq Epi: x / Non Sq Epi: Few / Bacteria: Moderate /HPF            CAPILLARY BLOOD GLUCOSE  89 (2017 08:49)  98 (2017 18:07)        RADIOLOGY & ADDITIONAL TESTS:    CXR: No recent CXR available for review    CT ABDOMEN/PELVIS:  FINDINGS:  Bandlike opacities in the right lower lobe presumably due to atelectasis.   The heart is not enlarged.    Evaluation of the solid organs and vascular structures is limited without   IV contrast. Suboptimal assessment of the bowel for certain disease   processes without oral contrast.    The unenhanced appearance of the liver, spleen, pancreas and adrenal   glands is unremarkable. There is suggestion of gallbladder sludge. There   is no wall thickening or pericholecystic fluid.    There is no bowel obstruction, free air or free fluid. The colon is   underdistended which limits evaluation of the wall thickness. There is no   significant pericolonic inflammatory change. The appendix is normal.   There is no ascites.    The abdominal aorta is normal in caliber. There is no adenopathy in the   upper abdomen, retroperitoneum or pelvis.    There is anterior urinary bladder wall thickening with slightly increased   circumferential wall thickening which could be due to the degree of   underdistention. There is no pelvic softtissue mass.    Epidural pacer is again seen in the subcutaneous fat of the right lower   back with the terminating in the epidural space of the mid to lower   thoracic region. There are no acute osseous abnormalities.    IMPRESSION:  No bowel obstruction. Underdistended colon limiting evaluation of the   wall thickness. Please correlate clinically as concern for colitis.    ILDA BROWN M.D., RADIOLOGIST  This document has been electronically signed. 2017  5:12AM      EKG:  Ventricular Rate 85 BPM    Atrial Rate 85 BPM    P-R Interval 158 ms    QRS Duration 100 ms     ms    QTc 464 ms    P Axis 53 degrees    R Axis -30 degrees    T Axis 1 degrees    Diagnosis Line Normal sinus rhythm  Biatrial enlargement  Left axis deviation  Incomplete right bundle branch block  Left ventricular hypertrophy  Abnormal ECG    Confirmed by TAMMIE MOON, JOSE ([id number]) on 2017 11:00:11    ECHO: No recent echo available for review

## 2017-06-14 NOTE — DISCHARGE NOTE ADULT - SECONDARY DIAGNOSIS.
Diarrhea HTN (hypertension) Reflex sympathetic dystrophy Atrial fibrillation UTI (urinary tract infection)

## 2017-06-14 NOTE — DISCHARGE NOTE ADULT - CARE PLAN
Principal Discharge DX:	Cyclical vomiting  Goal:	resolution  Instructions for follow-up, activity and diet:	Your were having severe nausea and vomiting most likely secondary to your chronic syndrome. Your electrolytes were replaced. You are now tolerating diet. continue with Zofran PRN  Secondary Diagnosis:	Diarrhea  Goal:	resolutions  Instructions for follow-up, activity and diet:	Your diarrhea has improved. WE encourage proper hydration. Most likely secondary to gastroenteritis. Continue with ......  Secondary Diagnosis:	HTN (hypertension)  Goal:	maintain BP<140/90  Instructions for follow-up, activity and diet:	continue with norvasc and metoprolol  Secondary Diagnosis:	Reflex sympathetic dystrophy  Goal:	decrease pain and improve quailty of life  Instructions for follow-up, activity and diet:	continue with current pain medications  Secondary Diagnosis:	Atrial fibrillation  Goal:	control rate  Instructions for follow-up, activity and diet:	continue with metoprolol, no anticoagulation due to fall risk  Secondary Diagnosis:	UTI (urinary tract infection)  Goal:	resolution  Instructions for follow-up, activity and diet:	You were found to have urinary track infection. Continue with ....... Principal Discharge DX:	Cyclical vomiting  Goal:	resolution  Instructions for follow-up, activity and diet:	Your were having severe nausea and vomiting most likely secondary to your chronic syndrome. Your electrolytes were replaced. You are now tolerating diet. continue with Zofran PRN  Secondary Diagnosis:	Diarrhea  Goal:	resolutions  Instructions for follow-up, activity and diet:	Your diarrhea has improved. Most likely secondary to gastroenteritis. We encourage proper hydration.  Secondary Diagnosis:	HTN (hypertension)  Goal:	maintain BP<140/90  Instructions for follow-up, activity and diet:	continue with Norvasc and metoprolol  Secondary Diagnosis:	Reflex sympathetic dystrophy  Goal:	decrease pain and improve quality of life  Instructions for follow-up, activity and diet:	continue with current pain medications  Secondary Diagnosis:	Atrial fibrillation  Goal:	control rate  Instructions for follow-up, activity and diet:	continue with metoprolol, no anticoagulation due to fall risk  Secondary Diagnosis:	UTI (urinary tract infection)  Goal:	resolution  Instructions for follow-up, activity and diet:	You were found to have urinary track infection. Continue with Cipro x 3 days

## 2017-06-14 NOTE — PROGRESS NOTE ADULT - RESPIRATORY
Breath Sounds equal & clear to percussion & auscultation, no accessory muscle use
detailed exam
detailed exam

## 2017-06-14 NOTE — DISCHARGE NOTE ADULT - PLAN OF CARE
resolution Your were having severe nausea and vomiting most likely secondary to your chronic syndrome. Your electrolytes were replaced. You are now tolerating diet. continue with Zofran PRN resolutions Your diarrhea has improved. WE encourage proper hydration. Most likely secondary to gastroenteritis. Continue with ...... maintain BP<140/90 continue with norvasc and metoprolol decrease pain and improve quailty of life continue with current pain medications control rate continue with metoprolol, no anticoagulation due to fall risk You were found to have urinary track infection. Continue with ....... Your diarrhea has improved. Most likely secondary to gastroenteritis. We encourage proper hydration. continue with Norvasc and metoprolol decrease pain and improve quality of life You were found to have urinary track infection. Continue with Cipro x 3 days

## 2017-06-14 NOTE — PROGRESS NOTE ADULT - PROBLEM SELECTOR PROBLEM 1
Atelectasis
Atelectasis
Non-intractable cyclical vomiting with nausea
Non-intractable cyclical vomiting with nausea
Chronic pain syndrome

## 2017-06-14 NOTE — PROGRESS NOTE ADULT - SUBJECTIVE AND OBJECTIVE BOX
Chief Complaint: abdominal pain    HPI:   46y Female with lupus, RSD, cyclical vomiting is sitting up in bed, NAD.  Pt states that abdominal pain is better.  No nausea or vomiting today.  Pt is for discharge home today.        PAIN SCORE:   1/10      SCALE USED: (1-10 VNRS)    Allergies    aspirin (Unknown)  latex (Unknown)  sulfa drugs (Unknown)    Intolerances      MEDICATIONS  (STANDING):  escitalopram 20milliGRAM(s) Oral daily  pregabalin 300milliGRAM(s) Oral two times a day  loperamide 2milliGRAM(s) Oral three times a day  clopidogrel Tablet 75milliGRAM(s) Oral daily  metoprolol 25milliGRAM(s) Oral two times a day  amLODIPine   Tablet 2.5milliGRAM(s) Oral daily  pantoprazole    Tablet 40milliGRAM(s) Oral before breakfast  enoxaparin Injectable 40milliGRAM(s) SubCutaneous daily  ciprofloxacin   IVPB  IV Intermittent   metroNIDAZOLE  IVPB  IV Intermittent   ciprofloxacin   IVPB 200milliGRAM(s) IV Intermittent every 12 hours  metroNIDAZOLE  IVPB 500milliGRAM(s) IV Intermittent every 8 hours    MEDICATIONS  (PRN):  ondansetron Injectable 4milliGRAM(s) IV Push every 6 hours PRN Nausea and/or Vomiting  oxyCODONE  5 mG/acetaminophen 325 mG 2Tablet(s) Oral every 6 hours PRN Moderate Pain (4 - 6)  morphine  - Injectable 2milliGRAM(s) IV Push every 6 hours PRN Severe Pain (7 - 10)      PHYSICAL EXAM:    Vital Signs Last 24 Hrs  T(C): 36.8, Max: 37 ( @ 23:53)  T(F): 98.2, Max: 98.6 ( @ 23:53)  HR: 66 (63 - 68)  BP: 108/68 (97/52 - 109/65)  BP(mean): --  RR: 16 (16 - 16)  SpO2: 97% (96% - 97%)             LABS:          144  |  112<H>  |  9   ----------------------------<  122<H>  3.5   |  25  |  1.12    Ca    8.0<L>      2017 06:26        Urinalysis Basic - ( 2017 16:59 )    Color: Yellow / Appearance: Clear / S.015 / pH: x  Gluc: x / Ketone: Trace  / Bili: Negative / Urobili: 1   Blood: x / Protein: 30 mg/dL / Nitrite: Positive   Leuk Esterase: Small / RBC: 0-2 /HPF / WBC 6-10 /HPF   Sq Epi: x / Non Sq Epi: Few / Bacteria: Moderate /HPF        Drug Screen:        RADIOLOGY:

## 2017-06-14 NOTE — DISCHARGE NOTE ADULT - HOSPITAL COURSE
44 y/o F lives with her sister from home ambulates with walker, PMH RSD s/p spinal stimulator, frequent falls, SLE (no treatment ), cyclical vomiting syndrome, Afib (not on AC due to frequent falls), HTN, depression, Hemorrhoids (EGD / colonoscopy 3-4 years ago ) and partial thyroidectomy presented with c/o vomiting and diarrhea for 2 days. As per patient she has cyclical vomiting syndrome and gets episode every year. She has been vomiting for the last 2 days , constant, >10 times a day, watery , non bloody , non bilious. Unable to eat anything due to constant vomiting. She also c/o loose watery diarrhea, >5-6 times a day with generalized abdominal pain. Pt admitted for hydration and further work up     Problem/Plan: Non-intractable cyclical vomiting with nausea.  -Likely from cyclic vomiting syndrome  -Pt was placed NPO for 2 days until symptoms improved   -s/p IV fluids and electrolyte replacement   -started on diet and tolerated   PRN Zofran          Problem: Diarrhea  - most Likely enteritis vs colitis   CT scan non conclusive for colitis   WBC of 14 , likely reactive   No focal tenderness  s/p IV abx     Problem: HTN (hypertension)   -will c/w home amlodipine 2.5      Problem: Atrial fibrillation.   -will c/w home metoprolol   Not on anticoagulation    c/w home plavix.     Problem:: Reflex sympathetic dystrophy   -c/w  percocet and MS contin for pain management     Problem/Plan- 6: UTI  -c/w Cipro 500 x 3 days

## 2017-06-14 NOTE — DISCHARGE NOTE ADULT - MEDICATION SUMMARY - MEDICATIONS TO TAKE
I will START or STAY ON the medications listed below when I get home from the hospital:    acetaminophen-oxyCODONE 325 mg-5 mg oral tablet  -- 1 tab(s) by mouth every 6 hours, As needed, Severe Pain (7 - 10) MDD:4 tabs  -- Indication: For Reflex sympathetic dystrophy    morphine 60 mg/12 hours oral tablet, extended release  -- 1 tab(s) by mouth every 8 hours MDD:3 tabs  -- Indication: For Reflex sympathetic dystrophy    pregabalin 300 mg oral capsule  -- 1 cap(s) by mouth 2 times a day  -- Indication: For Reflex sympathetic dystrophy    escitalopram 20 mg oral tablet  -- 1 tab(s) by mouth once a day  -- Indication: For Depression    ondansetron 4 mg oral tablet  -- 1 tab(s) by mouth every 8 hours PRN n/v  -- Indication: For Nausea/vomiting    clopidogrel 75 mg oral tablet  -- 1 tab(s) by mouth once a day  -- Indication: For Needed for prophylaxis    metoprolol tartrate 25 mg oral tablet  -- 1 tab(s) by mouth 2 times a day  -- Indication: For Hypertension/afib    amLODIPine 2.5 mg oral tablet  -- 1 tab(s) by mouth once a day  -- Indication: For Hypertension     Protonix 40 mg oral delayed release tablet  -- 1 tab(s) by mouth once a day  -- Indication: For Needed for prophylaxis    nicotine 21 mg/24 hr transdermal film, extended release  -- transdermal once a day  -- Indication: For Smoking cessation I will START or STAY ON the medications listed below when I get home from the hospital:    acetaminophen-oxyCODONE 325 mg-5 mg oral tablet  -- 1 tab(s) by mouth every 6 hours, As needed, Severe Pain (7 - 10) MDD:4 tabs  -- Indication: For Reflex sympathetic dystrophy    morphine 60 mg/12 hours oral tablet, extended release  -- 1 tab(s) by mouth every 8 hours MDD:3 tabs  -- Indication: For Reflex sympathetic dystrophy    pregabalin 300 mg oral capsule  -- 1 cap(s) by mouth 2 times a day  -- Indication: For Reflex sympathetic dystrophy    escitalopram 20 mg oral tablet  -- 1 tab(s) by mouth once a day  -- Indication: For Depression    ondansetron 4 mg oral tablet  -- 1 tab(s) by mouth every 8 hours PRN n/v  -- Indication: For Nausea/vomiting    clopidogrel 75 mg oral tablet  -- 1 tab(s) by mouth once a day  -- Indication: For Needed for prophylaxis    metoprolol tartrate 25 mg oral tablet  -- 1 tab(s) by mouth 2 times a day  -- Indication: For Hypertension/afib    amLODIPine 2.5 mg oral tablet  -- 1 tab(s) by mouth once a day  -- Indication: For Hypertension     Protonix 40 mg oral delayed release tablet  -- 1 tab(s) by mouth once a day  -- Indication: For Needed for prophylaxis    ciprofloxacin 500 mg oral tablet  -- 1 tab(s) by mouth once a day  -- Avoid prolonged or excessive exposure to direct and/or artificial sunlight while taking this medication.  Check with your doctor before becoming pregnant.  Do not take dairy products, antacids, or iron preparations within one hour of this medication.  Finish all this medication unless otherwise directed by prescriber.  Medication should be taken with plenty of water.    -- Indication: For UTI (urinary tract infection)    nicotine 21 mg/24 hr transdermal film, extended release  -- transdermal once a day  -- Indication: For Smoking cessation

## 2017-06-14 NOTE — DISCHARGE NOTE ADULT - MEDICATION SUMMARY - MEDICATIONS TO STOP TAKING
I will STOP taking the medications listed below when I get home from the hospital:    loperamide 2 mg oral capsule  -- 1 cap(s) by mouth 3 times a day    Levaquin 750 mg oral tablet  -- 1 tab(s) by mouth once a day  -- Avoid prolonged or excessive exposure to direct and/or artificial sunlight while taking this medication.  Do not take dairy products, antacids, or iron preparations within one hour of this medication.  Finish all this medication unless otherwise directed by prescriber.  May cause drowsiness or dizziness.  Medication should be taken with plenty of water.    Levaquin 500 mg oral tablet  -- 1 tab(s) by mouth once a day  -- Avoid prolonged or excessive exposure to direct and/or artificial sunlight while taking this medication.  Do not take dairy products, antacids, or iron preparations within one hour of this medication.  Finish all this medication unless otherwise directed by prescriber.  May cause drowsiness or dizziness.  Medication should be taken with plenty of water.

## 2017-06-14 NOTE — PROGRESS NOTE ADULT - NEGATIVE GASTROINTESTINAL SYMPTOMS
no nausea/no vomiting
no vomiting/no abdominal pain/no jaundice/no melena/no nausea/no hematochezia/no diarrhea
no jaundice/no abdominal pain/no melena/no hematochezia

## 2017-06-14 NOTE — PROGRESS NOTE ADULT - PROBLEM SELECTOR PLAN 1
-  pt can resume ms contin 60mg po 2x a day   - continue with amitriptyline 10mg po q hs  - percocet po prn  - ? marinol on discharge.  pt need prior authorization for this medication.  Pt can speak to her GI or internist regarding this.

## 2017-06-14 NOTE — DISCHARGE NOTE ADULT - PATIENT PORTAL LINK FT
“You can access the Follow[employer] Patient Portal, offered by [facility], by registering with the following website: [email or web address]”

## 2017-06-17 LAB
CULTURE RESULTS: SIGNIFICANT CHANGE UP
SPECIMEN SOURCE: SIGNIFICANT CHANGE UP

## 2017-06-18 LAB
CULTURE RESULTS: SIGNIFICANT CHANGE UP
SPECIMEN SOURCE: SIGNIFICANT CHANGE UP

## 2017-06-19 DIAGNOSIS — N39.0 URINARY TRACT INFECTION, SITE NOT SPECIFIED: ICD-10-CM

## 2017-06-19 DIAGNOSIS — Z88.2 ALLERGY STATUS TO SULFONAMIDES: ICD-10-CM

## 2017-06-19 DIAGNOSIS — F17.210 NICOTINE DEPENDENCE, CIGARETTES, UNCOMPLICATED: ICD-10-CM

## 2017-06-19 DIAGNOSIS — R29.6 REPEATED FALLS: ICD-10-CM

## 2017-06-19 DIAGNOSIS — M79.604 PAIN IN RIGHT LEG: ICD-10-CM

## 2017-06-19 DIAGNOSIS — G43.A0 CYCLICAL VOMITING, IN MIGRAINE, NOT INTRACTABLE: ICD-10-CM

## 2017-06-19 DIAGNOSIS — G89.4 CHRONIC PAIN SYNDROME: ICD-10-CM

## 2017-06-19 DIAGNOSIS — Z88.6 ALLERGY STATUS TO ANALGESIC AGENT: ICD-10-CM

## 2017-06-19 DIAGNOSIS — I27.2 OTHER SECONDARY PULMONARY HYPERTENSION: ICD-10-CM

## 2017-06-19 DIAGNOSIS — F12.988 CANNABIS USE, UNSPECIFIED WITH OTHER CANNABIS-INDUCED DISORDER: ICD-10-CM

## 2017-06-19 DIAGNOSIS — Z76.5 MALINGERER [CONSCIOUS SIMULATION]: ICD-10-CM

## 2017-06-19 DIAGNOSIS — K52.9 NONINFECTIVE GASTROENTERITIS AND COLITIS, UNSPECIFIED: ICD-10-CM

## 2017-06-19 DIAGNOSIS — I48.0 PAROXYSMAL ATRIAL FIBRILLATION: ICD-10-CM

## 2017-06-19 DIAGNOSIS — M79.605 PAIN IN LEFT LEG: ICD-10-CM

## 2017-06-19 DIAGNOSIS — E87.6 HYPOKALEMIA: ICD-10-CM

## 2017-06-19 DIAGNOSIS — I10 ESSENTIAL (PRIMARY) HYPERTENSION: ICD-10-CM

## 2017-06-19 DIAGNOSIS — Z91.040 LATEX ALLERGY STATUS: ICD-10-CM

## 2017-06-19 DIAGNOSIS — M32.9 SYSTEMIC LUPUS ERYTHEMATOSUS, UNSPECIFIED: ICD-10-CM

## 2017-06-19 DIAGNOSIS — Z96.89 PRESENCE OF OTHER SPECIFIED FUNCTIONAL IMPLANTS: ICD-10-CM

## 2017-06-19 DIAGNOSIS — J98.11 ATELECTASIS: ICD-10-CM

## 2017-06-19 DIAGNOSIS — G90.50 COMPLEX REGIONAL PAIN SYNDROME I, UNSPECIFIED: ICD-10-CM

## 2017-06-19 DIAGNOSIS — F41.9 ANXIETY DISORDER, UNSPECIFIED: ICD-10-CM

## 2017-09-11 ENCOUNTER — INPATIENT (INPATIENT)
Facility: HOSPITAL | Age: 47
LOS: 9 days | Discharge: HOME HEALTH | DRG: 392 | End: 2017-09-21
Attending: INTERNAL MEDICINE | Admitting: INTERNAL MEDICINE
Payer: MEDICARE

## 2017-09-11 VITALS
RESPIRATION RATE: 18 BRPM | OXYGEN SATURATION: 100 % | DIASTOLIC BLOOD PRESSURE: 99 MMHG | HEIGHT: 69 IN | HEART RATE: 79 BPM | WEIGHT: 179.9 LBS | TEMPERATURE: 98 F | SYSTOLIC BLOOD PRESSURE: 160 MMHG

## 2017-09-11 DIAGNOSIS — G90.50 COMPLEX REGIONAL PAIN SYNDROME I, UNSPECIFIED: ICD-10-CM

## 2017-09-11 DIAGNOSIS — K51.00 ULCERATIVE (CHRONIC) PANCOLITIS WITHOUT COMPLICATIONS: ICD-10-CM

## 2017-09-11 DIAGNOSIS — I10 ESSENTIAL (PRIMARY) HYPERTENSION: ICD-10-CM

## 2017-09-11 DIAGNOSIS — I48.91 UNSPECIFIED ATRIAL FIBRILLATION: ICD-10-CM

## 2017-09-11 DIAGNOSIS — E89.0 POSTPROCEDURAL HYPOTHYROIDISM: Chronic | ICD-10-CM

## 2017-09-11 DIAGNOSIS — M32.9 SYSTEMIC LUPUS ERYTHEMATOSUS, UNSPECIFIED: ICD-10-CM

## 2017-09-11 DIAGNOSIS — Z29.9 ENCOUNTER FOR PROPHYLACTIC MEASURES, UNSPECIFIED: ICD-10-CM

## 2017-09-11 DIAGNOSIS — G43.A0 CYCLICAL VOMITING, IN MIGRAINE, NOT INTRACTABLE: ICD-10-CM

## 2017-09-11 LAB
ALBUMIN SERPL ELPH-MCNC: 4.2 G/DL — SIGNIFICANT CHANGE UP (ref 3.5–5)
ALP SERPL-CCNC: 165 U/L — HIGH (ref 40–120)
ALT FLD-CCNC: 28 U/L DA — SIGNIFICANT CHANGE UP (ref 10–60)
ANION GAP SERPL CALC-SCNC: 10 MMOL/L — SIGNIFICANT CHANGE UP (ref 5–17)
APPEARANCE UR: CLEAR — SIGNIFICANT CHANGE UP
APTT BLD: 38.5 SEC — HIGH (ref 27.5–37.4)
AST SERPL-CCNC: 23 U/L — SIGNIFICANT CHANGE UP (ref 10–40)
BACTERIA # UR AUTO: ABNORMAL /HPF
BASOPHILS # BLD AUTO: 0.1 K/UL — SIGNIFICANT CHANGE UP (ref 0–0.2)
BASOPHILS NFR BLD AUTO: 0.7 % — SIGNIFICANT CHANGE UP (ref 0–2)
BILIRUB SERPL-MCNC: 0.5 MG/DL — SIGNIFICANT CHANGE UP (ref 0.2–1.2)
BILIRUB UR-MCNC: NEGATIVE — SIGNIFICANT CHANGE UP
BUN SERPL-MCNC: 10 MG/DL — SIGNIFICANT CHANGE UP (ref 7–18)
CALCIUM SERPL-MCNC: 9.7 MG/DL — SIGNIFICANT CHANGE UP (ref 8.4–10.5)
CHLORIDE SERPL-SCNC: 106 MMOL/L — SIGNIFICANT CHANGE UP (ref 96–108)
CO2 SERPL-SCNC: 23 MMOL/L — SIGNIFICANT CHANGE UP (ref 22–31)
COLOR SPEC: YELLOW — SIGNIFICANT CHANGE UP
CREAT SERPL-MCNC: 1.22 MG/DL — SIGNIFICANT CHANGE UP (ref 0.5–1.3)
DIFF PNL FLD: ABNORMAL
EOSINOPHIL # BLD AUTO: 0 K/UL — SIGNIFICANT CHANGE UP (ref 0–0.5)
EOSINOPHIL NFR BLD AUTO: 0.1 % — SIGNIFICANT CHANGE UP (ref 0–6)
EPI CELLS # UR: SIGNIFICANT CHANGE UP
GLUCOSE SERPL-MCNC: 120 MG/DL — HIGH (ref 70–99)
GLUCOSE UR QL: NEGATIVE — SIGNIFICANT CHANGE UP
HCG SERPL-ACNC: <1 MIU/ML — SIGNIFICANT CHANGE UP
HCT VFR BLD CALC: 52.6 % — HIGH (ref 34.5–45)
HGB BLD-MCNC: 17.1 G/DL — HIGH (ref 11.5–15.5)
INR BLD: 1.02 RATIO — SIGNIFICANT CHANGE UP (ref 0.88–1.16)
KETONES UR-MCNC: ABNORMAL
LACTATE SERPL-SCNC: 2.5 MMOL/L — HIGH (ref 0.7–2)
LEUKOCYTE ESTERASE UR-ACNC: NEGATIVE — SIGNIFICANT CHANGE UP
LIDOCAIN IGE QN: 127 U/L — SIGNIFICANT CHANGE UP (ref 73–393)
LYMPHOCYTES # BLD AUTO: 1.4 K/UL — SIGNIFICANT CHANGE UP (ref 1–3.3)
LYMPHOCYTES # BLD AUTO: 12.8 % — LOW (ref 13–44)
MCHC RBC-ENTMCNC: 28.7 PG — SIGNIFICANT CHANGE UP (ref 27–34)
MCHC RBC-ENTMCNC: 32.5 GM/DL — SIGNIFICANT CHANGE UP (ref 32–36)
MCV RBC AUTO: 88.4 FL — SIGNIFICANT CHANGE UP (ref 80–100)
MONOCYTES # BLD AUTO: 0.4 K/UL — SIGNIFICANT CHANGE UP (ref 0–0.9)
MONOCYTES NFR BLD AUTO: 3.6 % — SIGNIFICANT CHANGE UP (ref 2–14)
NEUTROPHILS # BLD AUTO: 9.2 K/UL — HIGH (ref 1.8–7.4)
NEUTROPHILS NFR BLD AUTO: 82.8 % — HIGH (ref 43–77)
NITRITE UR-MCNC: NEGATIVE — SIGNIFICANT CHANGE UP
PH UR: 7 — SIGNIFICANT CHANGE UP (ref 5–8)
PLATELET # BLD AUTO: 299 K/UL — SIGNIFICANT CHANGE UP (ref 150–400)
POTASSIUM SERPL-MCNC: 3.4 MMOL/L — LOW (ref 3.5–5.3)
POTASSIUM SERPL-SCNC: 3.4 MMOL/L — LOW (ref 3.5–5.3)
PROT SERPL-MCNC: 8.6 G/DL — HIGH (ref 6–8.3)
PROT UR-MCNC: 100
PROTHROM AB SERPL-ACNC: 11.1 SEC — SIGNIFICANT CHANGE UP (ref 9.8–12.7)
RBC # BLD: 5.95 M/UL — HIGH (ref 3.8–5.2)
RBC # FLD: 13.2 % — SIGNIFICANT CHANGE UP (ref 10.3–14.5)
RBC CASTS # UR COMP ASSIST: ABNORMAL /HPF (ref 0–2)
SODIUM SERPL-SCNC: 139 MMOL/L — SIGNIFICANT CHANGE UP (ref 135–145)
SP GR SPEC: 1.01 — SIGNIFICANT CHANGE UP (ref 1.01–1.02)
UROBILINOGEN FLD QL: NEGATIVE — SIGNIFICANT CHANGE UP
WBC # BLD: 11.1 K/UL — HIGH (ref 3.8–10.5)
WBC # FLD AUTO: 11.1 K/UL — HIGH (ref 3.8–10.5)
WBC UR QL: SIGNIFICANT CHANGE UP /HPF (ref 0–5)

## 2017-09-11 PROCEDURE — 74177 CT ABD & PELVIS W/CONTRAST: CPT | Mod: 26

## 2017-09-11 PROCEDURE — 99285 EMERGENCY DEPT VISIT HI MDM: CPT

## 2017-09-11 RX ORDER — MORPHINE SULFATE 50 MG/1
15 CAPSULE, EXTENDED RELEASE ORAL DAILY
Qty: 0 | Refills: 0 | Status: DISCONTINUED | OUTPATIENT
Start: 2017-09-11 | End: 2017-09-12

## 2017-09-11 RX ORDER — SODIUM CHLORIDE 9 MG/ML
1000 INJECTION, SOLUTION INTRAVENOUS
Qty: 0 | Refills: 0 | Status: DISCONTINUED | OUTPATIENT
Start: 2017-09-11 | End: 2017-09-12

## 2017-09-11 RX ORDER — METOCLOPRAMIDE HCL 10 MG
10 TABLET ORAL ONCE
Qty: 0 | Refills: 0 | Status: COMPLETED | OUTPATIENT
Start: 2017-09-11 | End: 2017-09-11

## 2017-09-11 RX ORDER — CIPROFLOXACIN LACTATE 400MG/40ML
400 VIAL (ML) INTRAVENOUS ONCE
Qty: 0 | Refills: 0 | Status: COMPLETED | OUTPATIENT
Start: 2017-09-11 | End: 2017-09-11

## 2017-09-11 RX ORDER — AMLODIPINE BESYLATE 2.5 MG/1
2.5 TABLET ORAL DAILY
Qty: 0 | Refills: 0 | Status: DISCONTINUED | OUTPATIENT
Start: 2017-09-11 | End: 2017-09-21

## 2017-09-11 RX ORDER — ONDANSETRON 8 MG/1
4 TABLET, FILM COATED ORAL ONCE
Qty: 0 | Refills: 0 | Status: COMPLETED | OUTPATIENT
Start: 2017-09-11 | End: 2017-09-11

## 2017-09-11 RX ORDER — POTASSIUM CHLORIDE 20 MEQ
10 PACKET (EA) ORAL
Qty: 0 | Refills: 0 | Status: COMPLETED | OUTPATIENT
Start: 2017-09-11 | End: 2017-09-12

## 2017-09-11 RX ORDER — ACETAMINOPHEN 500 MG
650 TABLET ORAL EVERY 6 HOURS
Qty: 0 | Refills: 0 | Status: DISCONTINUED | OUTPATIENT
Start: 2017-09-11 | End: 2017-09-21

## 2017-09-11 RX ORDER — ESCITALOPRAM OXALATE 10 MG/1
20 TABLET, FILM COATED ORAL DAILY
Qty: 0 | Refills: 0 | Status: DISCONTINUED | OUTPATIENT
Start: 2017-09-11 | End: 2017-09-21

## 2017-09-11 RX ORDER — ONDANSETRON 8 MG/1
4 TABLET, FILM COATED ORAL EVERY 6 HOURS
Qty: 0 | Refills: 0 | Status: DISCONTINUED | OUTPATIENT
Start: 2017-09-11 | End: 2017-09-21

## 2017-09-11 RX ORDER — CLOPIDOGREL BISULFATE 75 MG/1
75 TABLET, FILM COATED ORAL DAILY
Qty: 0 | Refills: 0 | Status: DISCONTINUED | OUTPATIENT
Start: 2017-09-11 | End: 2017-09-21

## 2017-09-11 RX ORDER — OXYCODONE AND ACETAMINOPHEN 5; 325 MG/1; MG/1
1 TABLET ORAL EVERY 6 HOURS
Qty: 0 | Refills: 0 | Status: DISCONTINUED | OUTPATIENT
Start: 2017-09-11 | End: 2017-09-12

## 2017-09-11 RX ORDER — METRONIDAZOLE 500 MG
500 TABLET ORAL EVERY 8 HOURS
Qty: 0 | Refills: 0 | Status: DISCONTINUED | OUTPATIENT
Start: 2017-09-11 | End: 2017-09-21

## 2017-09-11 RX ORDER — PANTOPRAZOLE SODIUM 20 MG/1
40 TABLET, DELAYED RELEASE ORAL
Qty: 0 | Refills: 0 | Status: DISCONTINUED | OUTPATIENT
Start: 2017-09-11 | End: 2017-09-21

## 2017-09-11 RX ORDER — METOPROLOL TARTRATE 50 MG
25 TABLET ORAL
Qty: 0 | Refills: 0 | Status: DISCONTINUED | OUTPATIENT
Start: 2017-09-11 | End: 2017-09-21

## 2017-09-11 RX ORDER — SODIUM CHLORIDE 9 MG/ML
2000 INJECTION INTRAMUSCULAR; INTRAVENOUS; SUBCUTANEOUS ONCE
Qty: 0 | Refills: 0 | Status: COMPLETED | OUTPATIENT
Start: 2017-09-11 | End: 2017-09-11

## 2017-09-11 RX ORDER — MORPHINE SULFATE 50 MG/1
60 CAPSULE, EXTENDED RELEASE ORAL EVERY 8 HOURS
Qty: 0 | Refills: 0 | Status: DISCONTINUED | OUTPATIENT
Start: 2017-09-11 | End: 2017-09-12

## 2017-09-11 RX ORDER — ACETAMINOPHEN 500 MG
1000 TABLET ORAL ONCE
Qty: 0 | Refills: 0 | Status: DISCONTINUED | OUTPATIENT
Start: 2017-09-11 | End: 2017-09-12

## 2017-09-11 RX ORDER — ONDANSETRON 8 MG/1
4 TABLET, FILM COATED ORAL ONCE
Qty: 0 | Refills: 0 | Status: DISCONTINUED | OUTPATIENT
Start: 2017-09-11 | End: 2017-09-11

## 2017-09-11 RX ORDER — ENOXAPARIN SODIUM 100 MG/ML
40 INJECTION SUBCUTANEOUS DAILY
Qty: 0 | Refills: 0 | Status: DISCONTINUED | OUTPATIENT
Start: 2017-09-11 | End: 2017-09-21

## 2017-09-11 RX ORDER — MORPHINE SULFATE 50 MG/1
4 CAPSULE, EXTENDED RELEASE ORAL ONCE
Qty: 0 | Refills: 0 | Status: DISCONTINUED | OUTPATIENT
Start: 2017-09-11 | End: 2017-09-11

## 2017-09-11 RX ORDER — CIPROFLOXACIN LACTATE 400MG/40ML
400 VIAL (ML) INTRAVENOUS EVERY 12 HOURS
Qty: 0 | Refills: 0 | Status: DISCONTINUED | OUTPATIENT
Start: 2017-09-11 | End: 2017-09-21

## 2017-09-11 RX ORDER — NICOTINE POLACRILEX 2 MG
1 GUM BUCCAL DAILY
Qty: 0 | Refills: 0 | Status: DISCONTINUED | OUTPATIENT
Start: 2017-09-11 | End: 2017-09-11

## 2017-09-11 RX ORDER — METRONIDAZOLE 500 MG
500 TABLET ORAL ONCE
Qty: 0 | Refills: 0 | Status: COMPLETED | OUTPATIENT
Start: 2017-09-11 | End: 2017-09-11

## 2017-09-11 RX ORDER — NICOTINE POLACRILEX 2 MG
1 GUM BUCCAL DAILY
Qty: 0 | Refills: 0 | Status: DISCONTINUED | OUTPATIENT
Start: 2017-09-11 | End: 2017-09-21

## 2017-09-11 RX ADMIN — SODIUM CHLORIDE 2000 MILLILITER(S): 9 INJECTION INTRAMUSCULAR; INTRAVENOUS; SUBCUTANEOUS at 17:56

## 2017-09-11 RX ADMIN — Medication 100 MILLIGRAM(S): at 21:23

## 2017-09-11 RX ADMIN — ONDANSETRON 4 MILLIGRAM(S): 8 TABLET, FILM COATED ORAL at 23:27

## 2017-09-11 RX ADMIN — MORPHINE SULFATE 4 MILLIGRAM(S): 50 CAPSULE, EXTENDED RELEASE ORAL at 19:36

## 2017-09-11 RX ADMIN — ONDANSETRON 4 MILLIGRAM(S): 8 TABLET, FILM COATED ORAL at 19:07

## 2017-09-11 RX ADMIN — Medication 200 MILLIGRAM(S): at 21:19

## 2017-09-11 RX ADMIN — Medication 10 MILLIGRAM(S): at 17:56

## 2017-09-11 NOTE — H&P ADULT - HISTORY OF PRESENT ILLNESS
Patient is a 46 y/o F lives with her sister from home ambulates with walker, current everyday smoker,  PMH Reflex symphatetic dystrophy s/p spinal stimulator, frequent falls, SLE (no treatment at this time), cyclical vomiting syndrome, Afib (not on AC due to frequent falls), HTN, depression, Hemorrhoids (EGD / colonoscopy 3-4 years ago, is due for a next one soon ) and partial thyroidectomy presented with c/o vomiting and diarrhea for 2 days a/w abdominal pain. As per patient she has cyclical vomiting syndrome and gets episode every year, last admission in June 2017. She has been vomiting for the last 2 days , constant, >10 times a day, watery , non bloody , non bilious. Unable to eat anything due to constant vomiting. She also c/o loose watery diarrhea, >5-6 times a day, NBNB, with generalized abdominal pain, in the middle of her abdomen, non radiating, worse with changing positions, improvement with MS contin and morphine, unrelated to food, describes the quality as sharp ache and intermittent.  Patient found to have a left shift on CBC, Hb 17.1, likely hemoconcentrated, ( Baseline Hb of 15-16), BMP showing K of 3.4, likely related to persistent vomiting/diarrhea, normal AG, Bicarb is WNL, initial lactate is 2.5, patient got 2 L NS in the ED. CT A/P showing evidence of pancolitis given cipro/Flagyl in the ED and morphine, Zofran, Tylenol for symptom management  Patient is admitted for pancolitis and cyclical vomiting syndrome Patient is a 44 y/o F lives with her sister from home ambulates with walker, current everyday smoker,  PMH Reflex symphatetic dystrophy s/p spinal stimulator, frequent falls, SLE (no treatment at this time), cyclical vomiting syndrome, Afib (not on AC due to frequent falls), HTN, depression, Hemorrhoids (EGD / colonoscopy 3-4 years ago, is due for a next one soon ) and partial thyroidectomy presented with c/o vomiting and diarrhea for 2 days a/w abdominal pain. As per patient she has cyclical vomiting syndrome and gets episode every year, last admission in June 2017. She has been vomiting for the last 2 days , constant, >10 times a day, watery , non bloody , non bilious. Unable to eat anything due to constant vomiting. She also c/o loose watery diarrhea, >5-6 times a day, NBNB, with generalized abdominal pain, in the middle of her abdomen, non radiating, worse with changing positions, improvement with MS contin and morphine, unrelated to food, describes the quality as sharp ache and intermittent, 7/10. Also has subjective fever with chills since yesterday. ROS negative except above, denies any recent travel, consumption of spoilt food, no one else is sick around her, except for herself    FHx of DM and HTN in both parents, SHx of partial thyroidectomy and appendectomy, current everyday smoker, no alcohol abuse, Allergies as mentioned in allergy summary Patient is a 46 y/o F lives with her sister from home ambulates with walker, current everyday smoker,  PMH Reflex symphatetic dystrophy s/p spinal stimulator, frequent falls, SLE (no treatment at this time), cyclical vomiting syndrome, Afib (not on AC due to frequent falls), HTN, depression, Hemorrhoids (EGD / colonoscopy 3-4 years ago, is due for a next one soon ) and partial thyroidectomy presented with c/o vomiting and diarrhea for 2 days a/w abdominal pain. As per patient she has cyclical vomiting syndrome and gets episode every year, last admission in June 2017. She has been vomiting for the last 2 days , constant, >10 times a day, watery , non bloody , non bilious. Unable to eat anything due to constant vomiting. She also c/o loose watery diarrhea, >5-6 times a day, NBNB, with generalized abdominal pain, in the middle of her abdomen, non radiating, worse with changing positions, improvement with MS contin and morphine, unrelated to food, describes the quality as sharp ache and intermittent, 7/10. Also has subjective fever with chills since yesterday. ROS negative except above, denies any recent travel, consumption of spoilt food, no one else is sick around her, except for herself    FHx of DM and HTN in both parents, SHx of partial thyroidectomy and appendectomy, current everyday smoker, no alcohol abuse, Allergies as mentioned in allergy summary  GOC: Full code Patient is a 47 y/o F lives with her sister from home ambulates with walker, current everyday smoker,  PMH Reflex symphatetic dystrophy s/p spinal stimulator, frequent falls, SLE (no treatment at this time), cyclical vomiting syndrome, Afib (not on AC due to frequent falls), HTN, depression, Hemorrhoids (EGD / colonoscopy 3-4 years ago, is due for a next one soon ) and partial thyroidectomy presented with c/o vomiting and diarrhea for 2 days a/w abdominal pain. As per patient she has cyclical vomiting syndrome and gets episode every year, last admission in June 2017. She has been vomiting for the last 2 days , constant, >10 times a day, watery , non bloody , non bilious. Unable to eat anything due to constant vomiting. She also c/o loose watery diarrhea, >5-6 times a day, NBNB, with generalized abdominal pain, in the middle of her abdomen, non radiating, worse with changing positions, improvement with MS contin and morphine, unrelated to food, describes the quality as sharp ache and intermittent, 7/10. Also has subjective fever with chills since yesterday. ROS negative except above, denies any recent travel, consumption of spoilt food, no one else is sick around her, except for herself    FHx of DM and HTN in both parents, SHx of partial thyroidectomy and appendectomy, current everyday smoker, no alcohol abuse, Allergies as mentioned in allergy summary  GOC: Full code

## 2017-09-11 NOTE — H&P ADULT - PROBLEM SELECTOR PLAN 7
IMPROVE score:1  patient minimally ambulatory, lovenox sc IMPROVE score:1  patient minimally ambulatory, lovenox sc  Plan d/w Dr Goldsmith

## 2017-09-11 NOTE — H&P ADULT - PROBLEM SELECTOR PLAN 3
Patient on multiple pain meds at home including morphine PO, MS Contin and Percocet at home  Has 7/10 abdominal pain, back pain and b/l LE pain, tender to light touch  Will c/w home dose of pain meds for now

## 2017-09-11 NOTE — H&P ADULT - NEUROLOGICAL DETAILS
responds to pain/sensation intact/alert and oriented x 3/deep reflexes intact/responds to verbal commands

## 2017-09-11 NOTE — H&P ADULT - NEGATIVE ENMT SYMPTOMS
no abnormal taste sensation/no vertigo/no sinus symptoms/no nasal discharge/no post-nasal discharge/no gum bleeding/no throat pain/no nasal congestion/no nose bleeds/no recurrent cold sores/no dry mouth/no dysphagia/no tinnitus/no ear pain/no nasal obstruction/no hearing difficulty

## 2017-09-11 NOTE — H&P ADULT - ASSESSMENT
Patient is a 46 y/o F lives with her sister from home ambulates with walker, current everyday smoker,  PMH Reflex symphatetic dystrophy s/p spinal stimulator, frequent falls, SLE (no treatment at this time), cyclical vomiting syndrome, Afib (not on AC due to frequent falls), HTN, depression, Hemorrhoids (EGD / colonoscopy 3-4 years ago, is due for a next one soon ) and partial thyroidectomy presented with c/o vomiting and diarrhea for 2 days a/w abdominal pain.    Patient found to have a left shift on CBC, Hb 17.1, likely hemoconcentrated, ( Baseline Hb of 15-16), BMP showing K of 3.4, likely related to persistent vomiting/diarrhea, normal AG, Bicarb is WNL, initial lactate is 2.5, patient got 2 L NS in the ED. CT A/P showing evidence of pancolitis given cipro/Flagyl in the ED and morphine, Zofran, Tylenol for symptom management  Patient is admitted for pancolitis and cyclical vomiting syndrome Patient is a 47 y/o F lives with her sister from home ambulates with walker, current everyday smoker,  PMH Reflex symphatetic dystrophy s/p spinal stimulator, frequent falls, SLE (no treatment at this time), cyclical vomiting syndrome, Afib (not on AC due to frequent falls), HTN, depression, Hemorrhoids (EGD / colonoscopy 3-4 years ago, is due for a next one soon ) and partial thyroidectomy presented with c/o vomiting and diarrhea for 2 days a/w abdominal pain.    Patient found to have a left shift on CBC, Hb 17.1, likely hemoconcentrated, ( Baseline Hb of 15-16), BMP showing K of 3.4, likely related to persistent vomiting/diarrhea, normal AG, Bicarb is WNL, initial lactate is 2.5, patient got 2 L NS in the ED. CT A/P showing evidence of pancolitis given cipro/Flagyl in the ED and morphine, Zofran, Tylenol for symptom management  Patient is admitted for pancolitis and cyclical vomiting syndrome

## 2017-09-11 NOTE — H&P ADULT - NEGATIVE OPHTHALMOLOGIC SYMPTOMS
no lacrimation R/no blurred vision L/no discharge L/no pain R/no irritation L/no loss of vision R/no scleral injection L/no photophobia/no loss of vision L/no scleral injection R/no blurred vision R/no discharge R/no lacrimation L/no diplopia/no pain L/no irritation R

## 2017-09-11 NOTE — ED PROVIDER NOTE - NS ED ROS FT
Constitutional: No fever or chills  Eyes: No visual changes  HEENT: No throat pain  CV: No chest pain  Resp: No SOB no cough  GI: + abd pain,+  nausea and vomiting  : No dysuria  MSK: No musculoskeletal pain  Skin: No rash  Neuro: No headache

## 2017-09-11 NOTE — H&P ADULT - RS GEN PE MLT RESP DETAILS PC
respirations non-labored/good air movement/clear to auscultation bilaterally/airway patent/breath sounds equal

## 2017-09-11 NOTE — ED PROVIDER NOTE - OBJECTIVE STATEMENT
PMH RSD s/p spinal stimulator, frequent falls, SLE (no treatment ), cyclical vomiting syndrome, Afib (not on AC due to frequent falls), HTN, depression, Hemorrhoids (EGD / colonoscopy 3-4 years ago ) and partial thyroidectomy presented with c/o vomiting and diarrhea for 2 days. Pt seen in hospital for same issue about 2 months ago treated with rehydrate and d/c home. N/V started today. multiple episodes nbnb, watery vomitus. multiple episodes. cannot tolerate PO. + diarrhea. not passing gas from below. Has had hysterectomy in past. Pt also with severe diffuse pain. Said that this feels the same as previous episodes. now pain diffuse constant non-radiating.

## 2017-09-11 NOTE — H&P ADULT - NEGATIVE GENERAL GENITOURINARY SYMPTOMS
no gas in urine/no renal colic/no flank pain R/normal urinary frequency/no nocturia/no hematuria/no urine discoloration/normal libido/no dysuria/no flank pain L/no incontinence/no urinary hesitancy/no bladder infections

## 2017-09-11 NOTE — H&P ADULT - PROBLEM SELECTOR PLAN 1
Patient with N/V/D/abdominal pain, fever, pan colitis on CT A/P, left shift on CBC  qSOFA:0, Lactate elevated to 2.5, given 2 L NS in the ED  recheck lactate, c/w IVF, bowel rest  cipro/flagyl  Check BCx Patient with N/V/D/abdominal pain, fever, pan colitis on CT A/P, left shift on CBC  qSOFA:0, Lactate elevated to 2.5, given 2 L NS in the ED  recheck lactate, c/w IVF, bowel rest  cipro/flagyl  Check BCx  f/up stool studies, will send Cdiff Patient with N/V/D/abdominal pain, fever, pan colitis on CT A/P, left shift on CBC  qSOFA:0, Lactate elevated to 2.5, given 2 L NS in the ED  recheck lactate, c/w IVF, bowel rest  cipro/flagyl  Check BCx  f/up stool studies, will send Cdiff  GI Dr Wylie  ID Dr Parada

## 2017-09-11 NOTE — H&P ADULT - NEGATIVE CARDIOVASCULAR SYMPTOMS
no chest pain/no dyspnea on exertion/no orthopnea/no peripheral edema/no palpitations/no claudication/no paroxysmal nocturnal dyspnea

## 2017-09-11 NOTE — ED PROVIDER NOTE - MEDICAL DECISION MAKING DETAILS
PMH RSD s/p spinal stimulator, frequent falls, SLE (no treatment ), cyclical vomiting syndrome, Afib (not on AC due to frequent falls), HTN, depression, Hemorrhoids (EGD / colonoscopy 3-4 years ago ) and partial thyroidectomy presented with c/o vomiting and diarrhea for 2 days. Pt seen in hospital for same issue about 2 months ago treated with rehydrate and d/c home. N/V started today. multiple episodes nbnb, watery vomitus. multiple episodes. cannot tolerate PO. + diarrhea. not passing gas from below. Has had hysterectomy in past. Pt also with severe diffuse pain. Said that this feels the same as previous episodes. now pain diffuse constant non-radiating. Likely reoccurrence of cyclical vomiting syndrome; however given diffuse pain vomiting and hx of abdominal surgery will r/o SBO with CT. Will obtain labs ekg symptom control and reassess for need for admission. Trent Sage M.D., Attending Physician

## 2017-09-11 NOTE — ED PROVIDER NOTE - PHYSICAL EXAMINATION
Constitutional: moderate distress AAOx3  Eyes: PERRLA EOMI  Head: Normocephalic atraumatic  Mouth: dry mucous membranes  Cardiac: regular rate   Resp: Lungs CTAB  GI: Abd soft diffuse ttp no rebound or guarding no cvat  Neuro: CN2-12 intact  Skin: No rashes

## 2017-09-12 ENCOUNTER — TRANSCRIPTION ENCOUNTER (OUTPATIENT)
Age: 47
End: 2017-09-12

## 2017-09-12 DIAGNOSIS — R10.9 UNSPECIFIED ABDOMINAL PAIN: ICD-10-CM

## 2017-09-12 DIAGNOSIS — F17.200 NICOTINE DEPENDENCE, UNSPECIFIED, UNCOMPLICATED: ICD-10-CM

## 2017-09-12 DIAGNOSIS — R29.6 REPEATED FALLS: ICD-10-CM

## 2017-09-12 DIAGNOSIS — M54.9 DORSALGIA, UNSPECIFIED: ICD-10-CM

## 2017-09-12 LAB
24R-OH-CALCIDIOL SERPL-MCNC: 20 NG/ML — LOW (ref 30–100)
ANION GAP SERPL CALC-SCNC: 10 MMOL/L — SIGNIFICANT CHANGE UP (ref 5–17)
BUN SERPL-MCNC: 6 MG/DL — LOW (ref 7–18)
CALCIUM SERPL-MCNC: 9.2 MG/DL — SIGNIFICANT CHANGE UP (ref 8.4–10.5)
CHLORIDE SERPL-SCNC: 105 MMOL/L — SIGNIFICANT CHANGE UP (ref 96–108)
CHOLEST SERPL-MCNC: 199 MG/DL — SIGNIFICANT CHANGE UP (ref 10–199)
CO2 SERPL-SCNC: 25 MMOL/L — SIGNIFICANT CHANGE UP (ref 22–31)
CREAT SERPL-MCNC: 1.29 MG/DL — SIGNIFICANT CHANGE UP (ref 0.5–1.3)
CULTURE RESULTS: SIGNIFICANT CHANGE UP
FOLATE SERPL-MCNC: 13.7 NG/ML — SIGNIFICANT CHANGE UP (ref 4.8–24.2)
GGT SERPL-CCNC: 35 U/L — SIGNIFICANT CHANGE UP (ref 8–40)
GLUCOSE SERPL-MCNC: 117 MG/DL — HIGH (ref 70–99)
HBA1C BLD-MCNC: 5.7 % — HIGH (ref 4–5.6)
HCT VFR BLD CALC: 47.3 % — HIGH (ref 34.5–45)
HDLC SERPL-MCNC: 59 MG/DL — SIGNIFICANT CHANGE UP (ref 40–125)
HGB BLD-MCNC: 15.5 G/DL — SIGNIFICANT CHANGE UP (ref 11.5–15.5)
LACTATE SERPL-SCNC: 2 MMOL/L — SIGNIFICANT CHANGE UP (ref 0.7–2)
LACTATE SERPL-SCNC: 2.1 MMOL/L — HIGH (ref 0.7–2)
LIPID PNL WITH DIRECT LDL SERPL: 123 MG/DL — SIGNIFICANT CHANGE UP
MAGNESIUM SERPL-MCNC: 1.6 MG/DL — SIGNIFICANT CHANGE UP (ref 1.6–2.6)
MCHC RBC-ENTMCNC: 29.6 PG — SIGNIFICANT CHANGE UP (ref 27–34)
MCHC RBC-ENTMCNC: 32.7 GM/DL — SIGNIFICANT CHANGE UP (ref 32–36)
MCV RBC AUTO: 90.6 FL — SIGNIFICANT CHANGE UP (ref 80–100)
PHOSPHATE SERPL-MCNC: 2.7 MG/DL — SIGNIFICANT CHANGE UP (ref 2.5–4.5)
PLATELET # BLD AUTO: 286 K/UL — SIGNIFICANT CHANGE UP (ref 150–400)
POTASSIUM SERPL-MCNC: 3.5 MMOL/L — SIGNIFICANT CHANGE UP (ref 3.5–5.3)
POTASSIUM SERPL-SCNC: 3.5 MMOL/L — SIGNIFICANT CHANGE UP (ref 3.5–5.3)
RBC # BLD: 5.22 M/UL — HIGH (ref 3.8–5.2)
RBC # FLD: 13.3 % — SIGNIFICANT CHANGE UP (ref 10.3–14.5)
SODIUM SERPL-SCNC: 140 MMOL/L — SIGNIFICANT CHANGE UP (ref 135–145)
SPECIMEN SOURCE: SIGNIFICANT CHANGE UP
TOTAL CHOLESTEROL/HDL RATIO MEASUREMENT: 3.4 RATIO — SIGNIFICANT CHANGE UP (ref 3.3–7.1)
TRIGL SERPL-MCNC: 84 MG/DL — SIGNIFICANT CHANGE UP (ref 10–149)
TSH SERPL-MCNC: 0.56 UU/ML — SIGNIFICANT CHANGE UP (ref 0.34–4.82)
VIT B12 SERPL-MCNC: 815 PG/ML — SIGNIFICANT CHANGE UP (ref 243–894)
WBC # BLD: 13.6 K/UL — HIGH (ref 3.8–10.5)
WBC # FLD AUTO: 13.6 K/UL — HIGH (ref 3.8–10.5)

## 2017-09-12 PROCEDURE — 99223 1ST HOSP IP/OBS HIGH 75: CPT

## 2017-09-12 RX ORDER — MORPHINE SULFATE 50 MG/1
60 CAPSULE, EXTENDED RELEASE ORAL EVERY 12 HOURS
Qty: 0 | Refills: 0 | Status: DISCONTINUED | OUTPATIENT
Start: 2017-09-12 | End: 2017-09-19

## 2017-09-12 RX ORDER — SODIUM CHLORIDE 9 MG/ML
1000 INJECTION, SOLUTION INTRAVENOUS
Qty: 0 | Refills: 0 | Status: DISCONTINUED | OUTPATIENT
Start: 2017-09-12 | End: 2017-09-14

## 2017-09-12 RX ORDER — INFLUENZA VIRUS VACCINE 15; 15; 15; 15 UG/.5ML; UG/.5ML; UG/.5ML; UG/.5ML
0.5 SUSPENSION INTRAMUSCULAR ONCE
Qty: 0 | Refills: 0 | Status: COMPLETED | OUTPATIENT
Start: 2017-09-12 | End: 2017-09-21

## 2017-09-12 RX ORDER — SODIUM CHLORIDE 9 MG/ML
1000 INJECTION INTRAMUSCULAR; INTRAVENOUS; SUBCUTANEOUS ONCE
Qty: 0 | Refills: 0 | Status: DISCONTINUED | OUTPATIENT
Start: 2017-09-12 | End: 2017-09-21

## 2017-09-12 RX ORDER — MORPHINE SULFATE 50 MG/1
60 CAPSULE, EXTENDED RELEASE ORAL EVERY 12 HOURS
Qty: 0 | Refills: 0 | Status: DISCONTINUED | OUTPATIENT
Start: 2017-09-12 | End: 2017-09-12

## 2017-09-12 RX ORDER — ZALEPLON 10 MG
5 CAPSULE ORAL AT BEDTIME
Qty: 0 | Refills: 0 | Status: DISCONTINUED | OUTPATIENT
Start: 2017-09-12 | End: 2017-09-19

## 2017-09-12 RX ORDER — OXYCODONE AND ACETAMINOPHEN 5; 325 MG/1; MG/1
2 TABLET ORAL EVERY 4 HOURS
Qty: 0 | Refills: 0 | Status: DISCONTINUED | OUTPATIENT
Start: 2017-09-12 | End: 2017-09-17

## 2017-09-12 RX ORDER — DRONABINOL 2.5 MG
2.5 CAPSULE ORAL DAILY
Qty: 0 | Refills: 0 | Status: DISCONTINUED | OUTPATIENT
Start: 2017-09-12 | End: 2017-09-16

## 2017-09-12 RX ADMIN — OXYCODONE AND ACETAMINOPHEN 2 TABLET(S): 5; 325 TABLET ORAL at 20:28

## 2017-09-12 RX ADMIN — MORPHINE SULFATE 4 MILLIGRAM(S): 50 CAPSULE, EXTENDED RELEASE ORAL at 01:42

## 2017-09-12 RX ADMIN — Medication 100 MILLIGRAM(S): at 06:08

## 2017-09-12 RX ADMIN — SODIUM CHLORIDE 125 MILLILITER(S): 9 INJECTION, SOLUTION INTRAVENOUS at 14:25

## 2017-09-12 RX ADMIN — Medication 650 MILLIGRAM(S): at 08:18

## 2017-09-12 RX ADMIN — Medication 100 MILLIEQUIVALENT(S): at 03:47

## 2017-09-12 RX ADMIN — Medication 200 MILLIGRAM(S): at 06:08

## 2017-09-12 RX ADMIN — Medication 300 MILLIGRAM(S): at 06:53

## 2017-09-12 RX ADMIN — OXYCODONE AND ACETAMINOPHEN 2 TABLET(S): 5; 325 TABLET ORAL at 21:15

## 2017-09-12 RX ADMIN — ESCITALOPRAM OXALATE 20 MILLIGRAM(S): 10 TABLET, FILM COATED ORAL at 18:04

## 2017-09-12 RX ADMIN — MORPHINE SULFATE 60 MILLIGRAM(S): 50 CAPSULE, EXTENDED RELEASE ORAL at 18:18

## 2017-09-12 RX ADMIN — Medication 25 MILLIGRAM(S): at 06:07

## 2017-09-12 RX ADMIN — ONDANSETRON 4 MILLIGRAM(S): 8 TABLET, FILM COATED ORAL at 20:28

## 2017-09-12 RX ADMIN — Medication 100 MILLIGRAM(S): at 14:26

## 2017-09-12 RX ADMIN — Medication 1 PATCH: at 18:03

## 2017-09-12 RX ADMIN — Medication 100 MILLIEQUIVALENT(S): at 01:47

## 2017-09-12 RX ADMIN — MORPHINE SULFATE 60 MILLIGRAM(S): 50 CAPSULE, EXTENDED RELEASE ORAL at 17:48

## 2017-09-12 RX ADMIN — Medication 100 MILLIEQUIVALENT(S): at 06:06

## 2017-09-12 RX ADMIN — SODIUM CHLORIDE 100 MILLILITER(S): 9 INJECTION, SOLUTION INTRAVENOUS at 01:35

## 2017-09-12 RX ADMIN — CLOPIDOGREL BISULFATE 75 MILLIGRAM(S): 75 TABLET, FILM COATED ORAL at 12:26

## 2017-09-12 RX ADMIN — Medication 200 MILLIGRAM(S): at 18:04

## 2017-09-12 RX ADMIN — AMLODIPINE BESYLATE 2.5 MILLIGRAM(S): 2.5 TABLET ORAL at 06:07

## 2017-09-12 RX ADMIN — Medication 100 MILLIGRAM(S): at 21:36

## 2017-09-12 RX ADMIN — Medication 25 MILLIGRAM(S): at 18:04

## 2017-09-12 RX ADMIN — MORPHINE SULFATE 15 MILLIGRAM(S): 50 CAPSULE, EXTENDED RELEASE ORAL at 12:25

## 2017-09-12 RX ADMIN — MORPHINE SULFATE 15 MILLIGRAM(S): 50 CAPSULE, EXTENDED RELEASE ORAL at 12:50

## 2017-09-12 RX ADMIN — PANTOPRAZOLE SODIUM 40 MILLIGRAM(S): 20 TABLET, DELAYED RELEASE ORAL at 06:07

## 2017-09-12 RX ADMIN — Medication 300 MILLIGRAM(S): at 18:04

## 2017-09-12 NOTE — CONSULT NOTE ADULT - NEGATIVE ENMT SYMPTOMS
no gum bleeding/no dry mouth/no throat pain/no dysphagia/no hearing difficulty/no nose bleeds/no nasal discharge/no ear pain

## 2017-09-12 NOTE — PROGRESS NOTE ADULT - PROBLEM SELECTOR PLAN 2
Patient reports history of cyclic vomiting syndrome  Continue with zofran PRN Patient reports history of cyclic vomiting syndrome  Continue with Zofran PRN

## 2017-09-12 NOTE — CONSULT NOTE ADULT - ASSESSMENT
46 year old female with abdominal pain, Nausea, vomiting and diarrhea associated with ct-Scan findings. The etiology can be due to:  1. Fecal impaction  2. Colitis    Suggestions:    1. Monitor electrolytes  2. Tap water enema x 2 half hour apart then Mg citrate one Bottle (8oz) half hour after second enema  3. Protonix daily  4. Avoid NSAID's  5. Check stool for culture, O&P and C.diff toxin    6. DVT prophylaxis

## 2017-09-12 NOTE — CONSULT NOTE ADULT - NEGATIVE RESPIRATORY AND THORAX SYMPTOMS
no dyspnea/no wheezing/no cough
no dyspnea/no hemoptysis/no pleuritic chest pain/no wheezing/no cough

## 2017-09-12 NOTE — PROGRESS NOTE ADULT - PROBLEM SELECTOR PLAN 7
Patient normally ambulates with walker and has history of frequent falls  Fall precautions  Follow up PT evaluation

## 2017-09-12 NOTE — DISCHARGE NOTE ADULT - PATIENT PORTAL LINK FT
“You can access the Follow[facility] Patient Portal, offered by [facility], by registering with the following website: [email or web address]”

## 2017-09-12 NOTE — CONSULT NOTE ADULT - SUBJECTIVE AND OBJECTIVE BOX
HPI:  Patient is a 46 y/o F lives with her sister from home ambulates with walker, current everyday smoker,  PMH Reflex symphatetic dystrophy s/p spinal stimulator, frequent falls, SLE (no treatment at this time), cyclical vomiting syndrome, Afib (not on AC due to frequent falls), HTN, depression, Hemorrhoids (EGD / colonoscopy 3-4 years ago, is due for a next one soon ) and partial thyroidectomy presented with c/o vomiting and diarrhea for 2 days a/w abdominal pain. As per patient she has cyclical vomiting syndrome and gets episode every year, last admission in 2017. She has been vomiting for the last 2 days , constant, >10 times a day, watery , non bloody , non bilious. Unable to eat anything due to constant vomiting. She also c/o loose watery diarrhea, >5-6 times a day, NBNB, with generalized abdominal pain, in the middle of her abdomen, non radiating, worse with changing positions, improvement with MS contin and morphine, unrelated to food, describes the quality as sharp ache and intermittent, 7/10. Also has subjective fever with chills since yesterday. ROS negative except above, denies any recent travel, consumption of spoilt food, no one else is sick around her, except for herself    FHx of DM and HTN in both parents, SHx of partial thyroidectomy and appendectomy, current everyday smoker, no alcohol abuse, Allergies as mentioned in allergy summary  GOC: Full code (11 Sep 2017 23:37)    17 - Pt was ambulating around room today with walker.  + complaints of nausea/ vomiting and abdominal pain.  pt is well known to me.  Pt is admitted to the hospital several times a year for cyclical vomiting syndrome and intractable abdominal pain.  Pt has history of chronic back pain and is ms contin ER, percocet. and uses THC recreationally.  s/p failed spinal cord stimulator but has not had it removed.          PAIN SCORE:   5/10      SCALE USED: (1-10 VNRS)      PAST MEDICAL & SURGICAL HISTORY:  HTN (hypertension)  Cyclical vomiting  Atrial fibrillation  SLE (systemic lupus erythematosus)  Reflex sympathetic dystrophy        SOCIAL HISTORY:  [ ] Denies Smoking, Alcohol, or Drug Use    Allergies    aspirin (Unknown)  latex (Unknown)  sulfa drugs (Unknown)    Intolerances        PAIN MEDICATIONS:  acetaminophen  IVPB. 1000 milliGRAM(s) IV Intermittent once  ondansetron Injectable 4 milliGRAM(s) IV Push every 6 hours PRN  oxyCODONE    5 mG/acetaminophen 325 mG 1 Tablet(s) Oral every 6 hours PRN  morphine  IR 15 milliGRAM(s) Oral daily  pregabalin 300 milliGRAM(s) Oral two times a day  escitalopram 20 milliGRAM(s) Oral daily  acetaminophen   Tablet 650 milliGRAM(s) Oral every 6 hours PRN  morphine ER Tablet 60 milliGRAM(s) Oral every 12 hours    Heme:  clopidogrel Tablet 75 milliGRAM(s) Oral daily  enoxaparin Injectable 40 milliGRAM(s) SubCutaneous daily    Antibiotics:  ciprofloxacin   IVPB 400 milliGRAM(s) IV Intermittent every 12 hours  metroNIDAZOLE  IVPB 500 milliGRAM(s) IV Intermittent every 8 hours    Cardiovascular:  metoprolol 25 milliGRAM(s) Oral two times a day  amLODIPine   Tablet 2.5 milliGRAM(s) Oral daily    GI:  pantoprazole    Tablet 40 milliGRAM(s) Oral before breakfast    Endocrine:    All Other Medications:  nicotine - 21 mG/24Hr(s) Patch 1 patch Transdermal daily  influenza   Vaccine 0.5 milliLiter(s) IntraMuscular once  sodium chloride 0.9% Bolus 1000 milliLiter(s) IV Bolus once  lactated ringers. 1000 milliLiter(s) IV Continuous <Continuous>          Vital Signs Last 24 Hrs  T(C): 37 (12 Sep 2017 14:40), Max: 38.1 (12 Sep 2017 07:37)  T(F): 98.6 (12 Sep 2017 14:40), Max: 100.6 (12 Sep 2017 09:36)  HR: 82 (12 Sep 2017 14:40) (63 - 89)  BP: 157/103 (12 Sep 2017 14:41) (126/75 - 160/99)  BP(mean): --  RR: 16 (12 Sep 2017 14:40) (16 - 18)  SpO2: 96% (12 Sep 2017 14:40) (96% - 100%)                       LABS:                          15.5   13.6  )-----------( 286      ( 12 Sep 2017 08:23 )             47.3     09-12    140  |  105  |  6<L>  ----------------------------<  117<H>  3.5   |  25  |  1.29    Ca    9.2      12 Sep 2017 08:23  Phos  2.7       Mg     1.6         TPro  8.6<H>  /  Alb  4.2  /  TBili  0.5  /  DBili  x   /  AST  23  /  ALT  28  /  AlkPhos  165<H>      PT/INR - ( 11 Sep 2017 17:47 )   PT: 11.1 sec;   INR: 1.02 ratio         PTT - ( 11 Sep 2017 17:47 )  PTT:38.5 sec  Urinalysis Basic - ( 11 Sep 2017 17:16 )    Color: Yellow / Appearance: Clear / S.010 / pH: x  Gluc: x / Ketone: Small  / Bili: Negative / Urobili: Negative   Blood: x / Protein: 100 / Nitrite: Negative   Leuk Esterase: Negative / RBC: 2-5 /HPF / WBC 3-5 /HPF   Sq Epi: x / Non Sq Epi: Few / Bacteria: Moderate /HPF        RADIOLOGY:    Drug Screen:            [ ]  [place]  Reviewed and Copied to Chart

## 2017-09-12 NOTE — PROGRESS NOTE ADULT - PROBLEM SELECTOR PLAN 6
Stable; patient currently takes no treatment  F/U anti-ds DNA Stable; patient currently takes no medications  F/U anti-ds DNA

## 2017-09-12 NOTE — DISCHARGE NOTE ADULT - HOSPITAL COURSE
A 47 yo F with PMHx of A Fib (no AC d/t frequent falls), HTN, Reflex sympathetic dystrophy (s/p spinal stimulator), Frequency falls (ambulates with walker), SLE (no current treatment), Cyclic vomiting syndrome, MDD, Hemorrhoids (last EGD/colonoscopy 4 years ago)     Admitted for pancolitis and cyclical vomiting syndrome    Background: came to ED c/o severe vomiting and diarrhea (>10x/day) x 2 days a/w abdominal pain. Pt reports she gets cyclic vomiting syndrome episode 1x/year, last admission 6/2017. Vomit is NBNB, diarrhea is NB. Abdominal pain is 7/10, generalized, non-radiating, unrelated to food intake, and improves with MS contin/morphine. Denied any recent travel, sick contacts, or consumption of new food.   Social: current daily smoker  ED: vitals;  WBC (11 with left shift), K 3.4, Alk Phos (165) Hgb (17.1), Lactate 2.5 (2.1 s/p 2L bolus) UA positive for small amount ketones     1) Pancolitis  CT Abdomen/Pelvis revealed pan-colitis which was treated with IVF and bowel rest and IV antibiotics    2)  Cyclic Vomiting  Patient reports history of cyclic vomiting syndrome  Contiune with zofran PRN    3) Reflex symphatic dystrophy  Patient takes multiple analgesic medications at home including morphine, MS contin, and percocet and has spinal neurostimulator in place  Continue with home doses of pain medications  F/U chronic pain recommendation    4) Hypertension  Continue with home doses of metoprolol and norvasc within parameters  Continue to monitor BP    5) Atrial fibrilation  EKG in ED revealed NSR   Continue with metoprolol and plavix; No AC given history of frequent falls    6) SLE  Stable; patient currently takes no treatment  F/U anti-ds DNA    7) Need for prophylactic measure  -Improve VTE score = 1  -Lovenox for DVT chemoprophylaxis 46F PMHx of A Fib (no AC d/t frequent falls), HTN, Reflex sympathetic dystrophy (s/p spinal stimulator), Frequency falls (ambulates with walker), SLE (no current treatment), Cyclic vomiting syndrome, MDD, Hemorrhoids (last EGD/colonoscopy 4 years ago) came to ED c/o severe vomiting and diarrhea (>10x/day) x 2 days a/w abdominal pain. Pt reports she gets cyclic vomiting syndrome episode 1x/year, last admission 6/2017. Vomit is NBNB, diarrhea is NB. Abdominal pain is 7/10, generalized, non-radiating, unrelated to food intake, and improves with MS contin/morphine. Denied any recent travel, sick contacts, or consumption of new food.   In ED, patient's vitals were stable; however, blood work revealed WBC (11 with left shift), K 3.4, Alk Phos (165) Hgb (17.1), Lactate 2.5 (2.1 s/p 2L bolus) UA positive for small amount ketones. Patient underwent CT of her abdomen and pelvis and testing revealed the presence of pancolitis. Patient was started on ciprofloxacin and metronidazole and was admitted for further management.  On the floors, patient was evaluated by ID who recommended performing stool cultures for ova, parasites, in addition to C dif given patient's persistent diarrheal symptoms. Testing revealed (STOOL ANALYSIS).  Additionally, GI was consulted given patient's persistent history of vomiting. Dr. Wylie recommended (GI RECOMMENDED).  Because the patient had a persistent history of complex regional pain syndrome, our pain specialist evaluated patient and recommended continuing patient on regimen of ms contin 60mg q12, percocet 2 tabs q4 PRN, lyrica 300 BID.    Given patient's improved clinical status and current hemodynamic stability, decision was made to discharge.  Please refer to patient's complete medical chart with documents for a full hospital course, for this is only a brief summary. 46F PMHx of A Fib (no AC d/t frequent falls), HTN, Reflex sympathetic dystrophy (s/p spinal stimulator), Frequency falls (ambulates with walker), SLE (no current treatment), Cyclic vomiting syndrome, MDD, Hemorrhoids (last EGD/colonoscopy 4 years ago) came to ED c/o severe vomiting and diarrhea (>10x/day) x 2 days a/w abdominal pain. Pt reports she gets cyclic vomiting syndrome episode 1x/year, last admission 6/2017. Vomit is NBNB, diarrhea is NB. Abdominal pain is 7/10, generalized, non-radiating, unrelated to food intake, and improves with MS contin/morphine. Denied any recent travel, sick contacts, or consumption of new food.   In ED, patient's vitals were stable; however, blood work revealed WBC (11 with left shift), K 3.4, Alk Phos (165) Hgb (17.1), Lactate 2.5 (2.1 s/p 2L bolus) UA positive for small amount ketones. Patient underwent CT of her abdomen and pelvis and testing revealed the presence of pancolitis. Patient was started on ciprofloxacin and metronidazole and was admitted for further management.  On the floors, patient was evaluated by ID who recommended performing stool cultures for ova, parasites, in addition to C dif given patient's persistent diarrheal symptoms. Testing was unable to be performed since stool was not collected; however, her symptoms and diarrhea and vomiting resolved.   GI was consulted given patient's persistent history of vomiting. Dr. Wylie recommended giving the patient an enema and MgCitrate for presumed fecal impaction.   Because the patient had a persistent history of complex regional pain syndrome, our pain specialist evaluated patient and recommended continuing patient on regimen of ms contin 60mg q12, percocet 2 tabs q4 PRN, lyrica 300 BID.    Given patient's improved clinical status and current hemodynamic stability, decision was made to discharge.  Please refer to patient's complete medical chart with documents for a full hospital course, for this is only a brief summary. 46F PMHx of A Fib (no AC d/t frequent falls), HTN, Reflex sympathetic dystrophy (s/p spinal stimulator), Frequency falls (ambulates with walker), SLE (no current treatment), Cyclic vomiting syndrome, MDD, Hemorrhoids (last EGD/colonoscopy 4 years ago) came to ED c/o severe vomiting and diarrhea (>10x/day) x 2 days a/w abdominal pain. Pt reports she gets cyclic vomiting syndrome episode 1x/year, last admission 6/2017. Vomit is NBNB, diarrhea is NB. Abdominal pain is 7/10, generalized, non-radiating, unrelated to food intake, and improves with MS contin/morphine. Denied any recent travel, sick contacts, or consumption of new food.   In ED, patient's vitals were stable; however, blood work revealed WBC (11 with left shift), K 3.4, Alk Phos (165) Hgb (17.1), Lactate 2.5 (2.1 s/p 2L bolus) UA positive for small amount ketones. Patient underwent CT of her abdomen and pelvis and testing revealed the presence of pancolitis. Patient was started on ciprofloxacin and metronidazole and was admitted for further management.  On the floors, patient was evaluated by ID who recommended performing stool cultures for ova, parasites, in addition to C dif given patient's persistent diarrheal symptoms. Testing was unable to be performed since stool was not collected; however, her symptoms and diarrhea and vomiting resolved.   GI was consulted given patient's persistent history of vomiting. Dr. Wylie recommended giving the patient an enema and MgCitrate for presumed fecal impaction.   Because the patient had a persistent history of complex regional pain syndrome, our pain specialist evaluated patient and recommended continuing patient on regimen of ms contin 60mg q12, percocet 2 tabs q4 PRN, lyrica 300 BID.  Patient's symptoms of nausea and vomiting subsided; however, she still endorsed complaints of diarrhea.  Infectious disease recommends for patient to be discharged with ciprofloxacin and flagyl, for which she will complete until 9/24.   Given patient's improved clinical status and current hemodynamic stability, decision was made to discharge.  Please refer to patient's complete medical chart with documents for a full hospital course, for this is only a brief summary. 46F PMHx of A Fib (no AC d/t frequent falls), HTN, Reflex sympathetic dystrophy (s/p spinal stimulator), Frequency falls (ambulates with walker), SLE (no current treatment), Cyclic vomiting syndrome, MDD, Hemorrhoids (last EGD/colonoscopy 4 years ago) came to ED c/o severe vomiting and diarrhea (>10x/day) x 2 days a/w abdominal pain. Pt reports she gets cyclic vomiting syndrome episode 1x/year, last admission 6/2017. Vomit is NBNB, diarrhea is NB. Abdominal pain is 7/10, generalized, non-radiating, unrelated to food intake, and improves with MS contin/morphine. Denied any recent travel, sick contacts, or consumption of new food.   In ED, patient's vitals were stable; however, blood work revealed WBC (11 with left shift), K 3.4, Alk Phos (165) Hgb (17.1), Lactate 2.5 (2.1 s/p 2L bolus) UA positive for small amount ketones. Patient underwent CT of her abdomen and pelvis and testing revealed the presence of pancolitis. Patient was started on ciprofloxacin and metronidazole and was admitted for further management.  On the floors, patient was evaluated by ID who recommended performing stool cultures for ova, parasites, in addition to C dif given patient's persistent diarrheal symptoms. Testing was unable to be performed since stool was not collected; however, her symptoms and diarrhea and vomiting resolved.   GI was consulted given patient's persistent history of vomiting. Dr. Wylie recommended giving the patient an enema and MgCitrate for presumed fecal impaction. Patient continued to have abdominal pain so GI recommended performing EGD and colonoscopy. Testing revealed (EGD AND COLONOSCOY)   Because the patient had a persistent history of complex regional pain syndrome, our pain specialist evaluated patient and recommended continuing patient on regimen of ms contin 60mg q12, percocet 2 tabs q4 PRN, lyrica 300 BID.  Patient's symptoms of nausea and vomiting subsided; however, she still endorsed complaints of diarrhea.  Infectious disease recommends for patient to be discharged with ciprofloxacin and flagyl, for which she will complete until 9/24.   Given patient's improved clinical status and current hemodynamic stability, decision was made to discharge.  Please refer to patient's complete medical chart with documents for a full hospital course, for this is only a brief summary. 46F PMHx of A Fib (no AC d/t frequent falls), HTN, Reflex sympathetic dystrophy (s/p spinal stimulator), Frequency falls (ambulates with walker), SLE (no current treatment), Cyclic vomiting syndrome, MDD, Hemorrhoids (last EGD/colonoscopy 4 years ago) came to ED c/o severe vomiting and diarrhea (>10x/day) x 2 days a/w abdominal pain. Pt reports she gets cyclic vomiting syndrome episode 1x/year, last admission 6/2017. Vomit is NBNB, diarrhea is NB. Abdominal pain is 7/10, generalized, non-radiating, unrelated to food intake, and improves with MS contin/morphine. Denied any recent travel, sick contacts, or consumption of new food.   In ED, patient's vitals were stable; however, blood work revealed WBC (11 with left shift), K 3.4, Alk Phos (165) Hgb (17.1), Lactate 2.5 (2.1 s/p 2L bolus) UA positive for small amount ketones. Patient underwent CT of her abdomen and pelvis and testing revealed the presence of pancolitis. Patient was started on ciprofloxacin and metronidazole and was admitted for further management.  On the floors, patient was evaluated by ID who recommended performing stool cultures for ova, parasites, in addition to C dif given patient's persistent diarrheal symptoms. Testing was unable to be performed since stool was not collected; however, her symptoms and diarrhea and vomiting resolved.   GI was consulted given patient's persistent history of vomiting. Dr. Wylie recommended giving the patient an enema and MgCitrate for presumed fecal impaction. Patient continued to have abdominal pain so GI recommended performing EGD and colonoscopy. Testing revealed (EGD AND COLONOSCOY)   Because the patient had a persistent history of complex regional pain syndrome, our pain specialist evaluated patient and recommended continuing patient on regimen of ms contin 60mg q12, percocet 2 tabs q4 PRN, lyrica 300 BID.  Patient's symptoms of nausea and vomiting subsided; however, she still endorsed complaints of diarrhea. C dif PCR was sent; however, results were negative.  Infectious disease recommends for patient to be discharged with ciprofloxacin and flagyl, for which she will complete until 9/24.   Given patient's improved clinical status and current hemodynamic stability, decision was made to discharge.  Please refer to patient's complete medical chart with documents for a full hospital course, for this is only a brief summary. 46F PMHx of A Fib (no AC d/t frequent falls), HTN, Reflex sympathetic dystrophy (s/p spinal stimulator), Frequency falls (ambulates with walker), SLE (no current treatment), Cyclic vomiting syndrome, MDD, Hemorrhoids (last EGD/colonoscopy 4 years ago) came to ED c/o severe vomiting and diarrhea (>10x/day) x 2 days a/w abdominal pain. Pt reports she gets cyclic vomiting syndrome episode 1x/year, last admission 6/2017. Vomit is NBNB, diarrhea is NB. Abdominal pain is 7/10, generalized, non-radiating, unrelated to food intake, and improves with MS contin/morphine. Denied any recent travel, sick contacts, or consumption of new food.   In ED, patient's vitals were stable; however, blood work revealed WBC (11 with left shift), K 3.4, Alk Phos (165) Hgb (17.1), Lactate 2.5 (2.1 s/p 2L bolus) UA positive for small amount ketones. Patient underwent CT of her abdomen and pelvis and testing revealed the presence of pancolitis. Patient was started on ciprofloxacin and metronidazole and was admitted for further management.  On the floors, patient was evaluated by ID who recommended performing stool cultures for ova, parasites, in addition to C dif given patient's persistent diarrheal symptoms. Testing was unable to be performed since stool was not collected; however, her symptoms and diarrhea and vomiting resolved.   GI was consulted given patient's persistent history of vomiting. Dr. Wylie recommended giving the patient an enema and MgCitrate for presumed fecal impaction. Patient continued to have abdominal pain so GI recommended performing EGD and colonoscopy. Testing revealed a hiatal hernia in addition to esophagitis, gastritis, and an ulcer; Colonoscopy revealed diverticulosis in addition to polyps. Biopsies were taken and patient was instructed to follow up as an outpatient with gastroenterologist.    Because the patient had a persistent history of complex regional pain syndrome, our pain specialist evaluated patient and recommended continuing patient on regimen of ms contin 60mg q12, percocet 2 tabs q4 PRN, lyrica 300 BID. Patient reported that she would likely follow up with outpatient provider and consider "natural alternatives" to her pain medications.  Patient's symptoms of nausea and vomiting subsided; however, she still endorsed complaints of diarrhea. C dif PCR was sent; however, results were negative.  Infectious disease recommends for patient to be discharged with ciprofloxacin and flagyl, for which she will complete until 9/24.   Given patient's improved clinical status and current hemodynamic stability, decision was made to discharge.  Please refer to patient's complete medical chart with documents for a full hospital course, for this is only a brief summary.

## 2017-09-12 NOTE — DISCHARGE NOTE ADULT - CARE PROVIDER_API CALL
Stevan Doyle), Internal Medicine  [address] [place] [address]  Phone: [phone]  Fax: [phone]

## 2017-09-12 NOTE — DISCHARGE NOTE ADULT - SECONDARY DIAGNOSIS.
Reflex sympathetic dystrophy SLE (systemic lupus erythematosus) HTN (hypertension) Smoker Atrial fibrillation Frequent falls

## 2017-09-12 NOTE — CONSULT NOTE ADULT - SUBJECTIVE AND OBJECTIVE BOX
Patient is a 44 y/o F lives with her sister from home ambulates with walker, current everyday smoker,  PMH Reflex symphatetic dystrophy s/p spinal stimulator, frequent falls, SLE (no treatment at this time), cyclical vomiting syndrome, Afib (not on AC due to frequent falls), HTN, depression, Hemorrhoids (EGD / colonoscopy 3-4 years ago, is due for a next one soon ) and partial thyroidectomy presented with c/o vomiting and diarrhea for 2 days a/w abdominal pain. As per patient she has cyclical vomiting syndrome and gets episode every year, last admission in 2017. She has been vomiting for the last 2 days , constant, >10 times a day, watery , non bloody , non bilious. Unable to eat anything due to constant vomiting. She also c/o loose watery diarrhea, >5-6 times a day, NBNB, with generalized abdominal pain, in the middle of her abdomen, non radiating, worse with changing positions, improvement with MS contin and morphine, unrelated to food, describes the quality as sharp ache and intermittent, 7/10. Also has subjective fever with chills since yesterday. ROS negative except above, denies any recent travel, consumption of spoilt food, no one else is sick around her, except for herself    FHx of DM and HTN in both parents, SHx of partial thyroidectomy and appendectomy, current everyday smoker, no alcohol abuse, Allergies as mentioned in allergy summary      INTERVAL HPI/OVERNIGHT EVENTS:  T(C): 38.1 (17 @ 09:36), Max: 38.1 (17 @ 07:37)  HR: 86 (17 @ 07:37) (63 - 89)  BP: 139/74 (17 @ 07:37) (126/75 - 160/99)  RR: 16 (17 @ 07:37) (16 - 18)  SpO2: 99% (17 @ 07:37) (97% - 100%)  Wt(kg): --  I&O's Summary      PAST MEDICAL & SURGICAL HISTORY:  HTN (hypertension)  Cyclical vomiting  Atrial fibrillation  SLE (systemic lupus erythematosus)  Reflex sympathetic dystrophy  H/O partial thyroidectomy  S/P partial hysterectomy      SOCIAL HISTORY  Alcohol:  Tobacco:  Illicit substance use:      FAMILY HISTORY:      LABS:                        15.5   13.6  )-----------( 286      ( 12 Sep 2017 08:23 )             47.3     12    140  |  105  |  6<L>  ----------------------------<  117<H>  3.5   |  25  |  1.29    Ca    9.2      12 Sep 2017 08:23  Phos  2.7       Mg     1.6         TPro  8.6<H>  /  Alb  4.2  /  TBili  0.5  /  DBili  x   /  AST  23  /  ALT  28  /  AlkPhos  165<H>      PT/INR - ( 11 Sep 2017 17:47 )   PT: 11.1 sec;   INR: 1.02 ratio         PTT - ( 11 Sep 2017 17:47 )  PTT:38.5 sec  Urinalysis Basic - ( 11 Sep 2017 17:16 )    Color: Yellow / Appearance: Clear / S.010 / pH: x  Gluc: x / Ketone: Small  / Bili: Negative / Urobili: Negative   Blood: x / Protein: 100 / Nitrite: Negative   Leuk Esterase: Negative / RBC: 2-5 /HPF / WBC 3-5 /HPF   Sq Epi: x / Non Sq Epi: Few / Bacteria: Moderate /HPF      CAPILLARY BLOOD GLUCOSE            Urinalysis Basic - ( 11 Sep 2017 17:16 )    Color: Yellow / Appearance: Clear / S.010 / pH: x  Gluc: x / Ketone: Small  / Bili: Negative / Urobili: Negative   Blood: x / Protein: 100 / Nitrite: Negative   Leuk Esterase: Negative / RBC: 2-5 /HPF / WBC 3-5 /HPF   Sq Epi: x / Non Sq Epi: Few / Bacteria: Moderate /HPF        MEDICATIONS  (STANDING):  acetaminophen  IVPB. 1000 milliGRAM(s) IV Intermittent once  morphine  IR 15 milliGRAM(s) Oral daily  pregabalin 300 milliGRAM(s) Oral two times a day  escitalopram 20 milliGRAM(s) Oral daily  clopidogrel Tablet 75 milliGRAM(s) Oral daily  metoprolol 25 milliGRAM(s) Oral two times a day  amLODIPine   Tablet 2.5 milliGRAM(s) Oral daily  pantoprazole    Tablet 40 milliGRAM(s) Oral before breakfast  nicotine - 21 mG/24Hr(s) Patch 1 patch Transdermal daily  enoxaparin Injectable 40 milliGRAM(s) SubCutaneous daily  ciprofloxacin   IVPB 400 milliGRAM(s) IV Intermittent every 12 hours  metroNIDAZOLE  IVPB 500 milliGRAM(s) IV Intermittent every 8 hours  influenza   Vaccine 0.5 milliLiter(s) IntraMuscular once  sodium chloride 0.9% Bolus 1000 milliLiter(s) IV Bolus once  lactated ringers. 1000 milliLiter(s) (125 mL/Hr) IV Continuous <Continuous>    MEDICATIONS  (PRN):  ondansetron Injectable 4 milliGRAM(s) IV Push every 6 hours PRN Nausea and/or Vomiting  oxyCODONE    5 mG/acetaminophen 325 mG 1 Tablet(s) Oral every 6 hours PRN Moderate Pain (4 - 6)  acetaminophen   Tablet 650 milliGRAM(s) Oral every 6 hours PRN For Temp greater than 38 C (100.4 F)  morphine ER Tablet 60 milliGRAM(s) Oral every 12 hours PRN Severe pain 7-10      REVIEW OF SYSTEMS:  CONSTITUTIONAL: No fever, weight loss, or fatigue  EYES: No eye pain, visual disturbances, or discharge  ENMT:  No difficulty hearing, tinnitus, vertigo; No sinus or throat pain  NECK: No pain or stiffness  RESPIRATORY: No cough, wheezing, chills or hemoptysis; No shortness of breath  CARDIOVASCULAR: No chest pain, palpitations, dizziness, or leg swelling  GASTROINTESTINAL: No abdominal or epigastric pain. No nausea, vomiting, or hematemesis; No diarrhea or constipation. No melena or hematochezia.  GENITOURINARY: No dysuria, frequency, hematuria, or incontinence  NEUROLOGICAL: No headaches, memory loss, loss of strength, numbness, or tremors  SKIN: No itching, burning, rashes, or lesions   LYMPH NODES: No enlarged glands  ENDOCRINE: No heat or cold intolerance; No hair loss  MUSCULOSKELETAL: pain and tenderness on LE  PSYCHIATRIC: No depression, anxiety, mood swings, or difficulty sleeping  HEME/LYMPH: No easy bruising, or bleeding gums  ALLERY AND IMMUNOLOGIC: No hives or eczema    PHYSICAL EXAM:  GENERAL: NAD, well-groomed, well-developed  HEAD:  Atraumatic, Normocephalic  EYES: EOMI, PERRLA, conjunctiva and sclera clear  ENMT: No tonsillar erythema, exudates, or enlargement; Moist mucous membranes, Good dentition, No lesions  NECK: Supple, No JVD, Normal thyroid  NERVOUS SYSTEM:  Alert & Oriented X3, Good concentration; Motor Strength 5/5 B/L upper and lower extremities; DTRs 2+ intact and symmetric  CHEST/LUNG: Clear to percussion bilaterally; No rales, rhonchi, wheezing, or rubs  HEART: Regular rate and rhythm; No murmurs, rubs, or gallops  ABDOMEN: Soft, Nontender, Nondistended; Bowel sounds present  EXTREMITIES:  tenderness to light touch on LE  LYMPH: No lymphadenopathy noted  SKIN: No rashes or lesions    RADIOLOGY & ADDITIONAL TESTS:  < from: CT Abdomen and Pelvis w/ Oral Cont and w/ IV Cont (17 @ 20:11) >  IMPRESSION:  No evidence for mechanical bowel obstruction. Oral contrast   material is not identified within the colonic lumen; however, mild   pancolonic wall thickening is suggested, with mild infiltration of the   paracolic fat. Correlate clinically. No free intraperineal air or fluid   identified.    < end of copied text >    Imaging Personally Reviewed:  [ ] YES  [ ] NO    Consultant(s) Notes Reviewed:  [ ] YES  [ ] NO        Care Discussed with Consultants/Other Providers [ ] YES  [ ] NO Patient is a 46 y/o F lives with her sister from home ambulates with walker, current everyday smoker,  PMH Reflex symphatetic dystrophy s/p spinal stimulator, frequent falls, SLE (no treatment at this time), cyclical vomiting syndrome, Afib (not on AC due to frequent falls), HTN, depression, Hemorrhoids (EGD / colonoscopy 3-4 years ago, is due for a next one soon ) and partial thyroidectomy presented with c/o vomiting and diarrhea for 2 days a/w abdominal pain. As per patient she has cyclical vomiting syndrome and gets episode every year, last admission in 2017. She has been vomiting for the last 2 days , constant, >10 times a day, watery , non bloody , non bilious. Unable to eat anything due to constant vomiting. She also c/o loose watery diarrhea, >5-6 times a day, NBNB, with generalized abdominal pain, in the middle of her abdomen, non radiating, worse with changing positions, improvement with MS contin and morphine, unrelated to food, describes the quality as sharp ache and intermittent, 7/10. Also has subjective fever with chills since yesterday. ROS negative except above, denies any recent travel, consumption of spoilt food, no one else is sick around her, except for herself    FHx of DM and HTN in both parents, SHx of partial thyroidectomy and appendectomy, current everyday smoker, no alcohol abuse, Allergies as mentioned in allergy summary      INTERVAL HPI/OVERNIGHT EVENTS:  T(C): 38.1 (17 @ 09:36), Max: 38.1 (17 @ 07:37)  HR: 86 (17 @ 07:37) (63 - 89)  BP: 139/74 (17 @ 07:37) (126/75 - 160/99)  RR: 16 (17 @ 07:37) (16 - 18)  SpO2: 99% (17 @ 07:37) (97% - 100%)  Wt(kg): --  I&O's Summary      PAST MEDICAL & SURGICAL HISTORY:  HTN (hypertension)  Cyclical vomiting  Atrial fibrillation  SLE (systemic lupus erythematosus)  Reflex sympathetic dystrophy  H/O partial thyroidectomy  S/P partial hysterectomy      SOCIAL HISTORY  Alcohol:  Tobacco:  Illicit substance use:      FAMILY HISTORY:      LABS:                        15.5   13.6  )-----------( 286      ( 12 Sep 2017 08:23 )             47.3     12    140  |  105  |  6<L>  ----------------------------<  117<H>  3.5   |  25  |  1.29    Ca    9.2      12 Sep 2017 08:23  Phos  2.7       Mg     1.6         TPro  8.6<H>  /  Alb  4.2  /  TBili  0.5  /  DBili  x   /  AST  23  /  ALT  28  /  AlkPhos  165<H>      PT/INR - ( 11 Sep 2017 17:47 )   PT: 11.1 sec;   INR: 1.02 ratio         PTT - ( 11 Sep 2017 17:47 )  PTT:38.5 sec  Urinalysis Basic - ( 11 Sep 2017 17:16 )    Color: Yellow / Appearance: Clear / S.010 / pH: x  Gluc: x / Ketone: Small  / Bili: Negative / Urobili: Negative   Blood: x / Protein: 100 / Nitrite: Negative   Leuk Esterase: Negative / RBC: 2-5 /HPF / WBC 3-5 /HPF   Sq Epi: x / Non Sq Epi: Few / Bacteria: Moderate /HPF      CAPILLARY BLOOD GLUCOSE            Urinalysis Basic - ( 11 Sep 2017 17:16 )    Color: Yellow / Appearance: Clear / S.010 / pH: x  Gluc: x / Ketone: Small  / Bili: Negative / Urobili: Negative   Blood: x / Protein: 100 / Nitrite: Negative   Leuk Esterase: Negative / RBC: 2-5 /HPF / WBC 3-5 /HPF   Sq Epi: x / Non Sq Epi: Few / Bacteria: Moderate /HPF        MEDICATIONS  (STANDING):  acetaminophen  IVPB. 1000 milliGRAM(s) IV Intermittent once  morphine  IR 15 milliGRAM(s) Oral daily  pregabalin 300 milliGRAM(s) Oral two times a day  escitalopram 20 milliGRAM(s) Oral daily  clopidogrel Tablet 75 milliGRAM(s) Oral daily  metoprolol 25 milliGRAM(s) Oral two times a day  amLODIPine   Tablet 2.5 milliGRAM(s) Oral daily  pantoprazole    Tablet 40 milliGRAM(s) Oral before breakfast  nicotine - 21 mG/24Hr(s) Patch 1 patch Transdermal daily  enoxaparin Injectable 40 milliGRAM(s) SubCutaneous daily  ciprofloxacin   IVPB 400 milliGRAM(s) IV Intermittent every 12 hours  metroNIDAZOLE  IVPB 500 milliGRAM(s) IV Intermittent every 8 hours  influenza   Vaccine 0.5 milliLiter(s) IntraMuscular once  sodium chloride 0.9% Bolus 1000 milliLiter(s) IV Bolus once  lactated ringers. 1000 milliLiter(s) (125 mL/Hr) IV Continuous <Continuous>    MEDICATIONS  (PRN):  ondansetron Injectable 4 milliGRAM(s) IV Push every 6 hours PRN Nausea and/or Vomiting  oxyCODONE    5 mG/acetaminophen 325 mG 1 Tablet(s) Oral every 6 hours PRN Moderate Pain (4 - 6)  acetaminophen   Tablet 650 milliGRAM(s) Oral every 6 hours PRN For Temp greater than 38 C (100.4 F)  morphine ER Tablet 60 milliGRAM(s) Oral every 12 hours PRN Severe pain 7-10      REVIEW OF SYSTEMS:  CONSTITUTIONAL: No fever, weight loss, or fatigue  EYES: No eye pain, visual disturbances, or discharge  ENMT:  No difficulty hearing, tinnitus, vertigo; No sinus or throat pain  NECK: No pain or stiffness  RESPIRATORY: No cough, wheezing, chills or hemoptysis; No shortness of breath  CARDIOVASCULAR: No chest pain, palpitations, dizziness, or leg swelling  GASTROINTESTINAL: + abdominal  pain. + nausea, vomiting but No hematemesis; No diarrhea or constipation. No melena or hematochezia.  GENITOURINARY: No dysuria, frequency, hematuria, or incontinence  NEUROLOGICAL: No headaches, memory loss, loss of strength, numbness, or tremors  SKIN: No itching, burning, rashes, or lesions   LYMPH NODES: No enlarged glands  ENDOCRINE: No heat or cold intolerance; No hair loss  MUSCULOSKELETAL: pain and tenderness on LE  PSYCHIATRIC: No depression, anxiety, mood swings, or difficulty sleeping  HEME/LYMPH: No easy bruising, or bleeding gums  ALLERY AND IMMUNOLOGIC: No hives or eczema    PHYSICAL EXAM:  GENERAL: NAD, well-groomed, well-developed  HEAD:  Atraumatic, Normocephalic  EYES: EOMI, PERRLA, conjunctiva and sclera clear  ENMT: No tonsillar erythema, exudates, or enlargement; Moist mucous membranes, Good dentition, No lesions  NECK: Supple, No JVD, Normal thyroid  NERVOUS SYSTEM:  Alert & Oriented X3, Good concentration; Motor Strength 5/5 B/L upper and lower extremities; DTRs 2+ intact and symmetric  CHEST/LUNG: Clear to percussion bilaterally; No rales, rhonchi, wheezing, or rubs  HEART: Regular rate and rhythm; No murmurs, rubs, or gallops  ABDOMEN: Soft, diffusely tender  EXTREMITIES:  tenderness to light touch on LE  LYMPH: No lymphadenopathy noted  SKIN: No rashes or lesions    RADIOLOGY & ADDITIONAL TESTS:  < from: CT Abdomen and Pelvis w/ Oral Cont and w/ IV Cont (17 @ 20:11) >  IMPRESSION:  No evidence for mechanical bowel obstruction. Oral contrast   material is not identified within the colonic lumen; however, mild   pancolonic wall thickening is suggested, with mild infiltration of the   paracolic fat. Correlate clinically. No free intraperineal air or fluid   identified.    < end of copied text >    Imaging Personally Reviewed:  [ ] YES  [ ] NO    Consultant(s) Notes Reviewed:  [ ] YES  [ ] NO        Care Discussed with Consultants/Other Providers [ ] YES  [ ] NO

## 2017-09-12 NOTE — CONSULT NOTE ADULT - SUBJECTIVE AND OBJECTIVE BOX
[  ] STAT REQUEST              [ X ]ROUTINE REQUEST    Patient is a 46y old  Female who presents with a chief complaint of abdominal pain, N/V. GI consulted to evaluate.    HPI:  Patient is a 45 year old female with multiple medical problem including cyclical vomiting syndrome presented with 2 days h/o vomiting and diarrhea. associated with abdominal cramps. Patient denies hematemesis, hematochezia, melena, hematuria, hemoptysis, dysuria, chest pain, SOB, cough, recent antibiotic use or traveling.          PAIN MANAGEMENT:  Pain Scale:                3-4 /10  Pain Location:  Abdominal cramps    Prior Colonoscopy:  3 years ago    PAST MEDICAL HISTORY  HTN (hypertension)  Cyclical vomiting  Atrial fibrillation  SLE (systemic lupus erythematosus)  Reflex sympathetic dystrophy  Afib  Depression  Hemorrhoids      PAST SURGICAL HISTORY  Partial thyroidectomy  Partial hysterectomy      Allergies    aspirin (Unknown)  latex (Unknown)  sulfa drugs (Unknown)         MEDICATIONS  (STANDING):  pregabalin 300 milliGRAM(s) Oral two times a day  escitalopram 20 milliGRAM(s) Oral daily  clopidogrel Tablet 75 milliGRAM(s) Oral daily  metoprolol 25 milliGRAM(s) Oral two times a day  amLODIPine   Tablet 2.5 milliGRAM(s) Oral daily  pantoprazole    Tablet 40 milliGRAM(s) Oral before breakfast  nicotine - 21 mG/24Hr(s) Patch 1 patch Transdermal daily  enoxaparin Injectable 40 milliGRAM(s) SubCutaneous daily  ciprofloxacin   IVPB 400 milliGRAM(s) IV Intermittent every 12 hours  metroNIDAZOLE  IVPB 500 milliGRAM(s) IV Intermittent every 8 hours  influenza   Vaccine 0.5 milliLiter(s) IntraMuscular once  sodium chloride 0.9% Bolus 1000 milliLiter(s) IV Bolus once  lactated ringers. 1000 milliLiter(s) (125 mL/Hr) IV Continuous <Continuous>  morphine ER Tablet 60 milliGRAM(s) Oral every 12 hours  dronabinol 2.5 milliGRAM(s) Oral daily    MEDICATIONS  (PRN):  ondansetron Injectable 4 milliGRAM(s) IV Push every 6 hours PRN Nausea and/or Vomiting  acetaminophen   Tablet 650 milliGRAM(s) Oral every 6 hours PRN For Temp greater than 38 C (100.4 F)  oxyCODONE    5 mG/acetaminophen 325 mG 2 Tablet(s) Oral every 4 hours PRN Severe Pain (7 - 10)      SOCIAL HISTORY  Advanced Directives:       [ X ] Full Code       [  ] DNR  Marital Status:         [  ] M      [X  ] S      [  ] D       [  ] W  Children:       [ X ] Yes      [  ] No  Occupation:        [  ] Employed       [ X ] Unemployed       [  ] Retired  Diet:       [ X ] Regular       [  ] PEG feeding          [  ] NG tube feeding  Drug Use:           [ X ] Patient denied          [  ] Yes  Alcohol:           [ X ] No             [  ] Yes (socially)         [  ] Yes (chronic)  Tobacco:           [  ] Yes           [ X ] No        FAMILY HISTORY  [ X ] Heart Disease (father)           [  ] Diabetes             [  ] HTN             [  ] Colon Cancer             [  ] Stomach Cancer              [  ] Pancreatic Cancer        VITAL SIGNS   Vital Signs Last 24 Hrs  T(C): 37 (12 Sep 2017 14:40), Max: 38.1 (12 Sep 2017 07:37)  T(F): 98.6 (12 Sep 2017 14:40), Max: 100.6 (12 Sep 2017 09:36)  HR: 82 (12 Sep 2017 14:40) (63 - 86)  BP: 157/103 (12 Sep 2017 14:41) (126/75 - 157/103)  BP(mean): --  RR: 16 (12 Sep 2017 14:40) (16 - 18)  SpO2: 96% (12 Sep 2017 14:40) (96% - 99%)        CBC Full  -  ( 12 Sep 2017 08:23 )  WBC Count : 13.6 K/uL  Hemoglobin : 15.5 g/dL  Hematocrit : 47.3 %  Platelet Count - Automated : 286 K/uL  Mean Cell Volume : 90.6 fl  Mean Cell Hemoglobin : 29.6 pg  Mean Cell Hemoglobin Concentration : 32.7 gm/dL          140  |  105  |  6<L>  ----------------------------<  117<H>  3.5   |  25  |  1.29    Ca    9.2      12 Sep 2017 08:23  Phos  2.7     09-12  Mg     1.6     09-12    TPro  8.6<H>  /  Alb  4.2  /  TBili  0.5  /  DBili  x   /  AST  23  /  ALT  28  /  AlkPhos  165<H>  09-11  PT/INR - ( 11 Sep 2017 17:47 )   PT: 11.1 sec;   INR: 1.02 ratio      PTT - ( 11 Sep 2017 17:47 )  PTT:38.5 sec      Urinalysis + Microscopic Examination (09.11.17 @ 17:16)    Urobilinogen: Negative    Glucose Qualitative, Urine: Negative    Protein, Urine: 100    Color: Yellow    Ketone - Urine: Small    Urine Appearance: Clear    pH Urine: 7.0    Leukocyte Esterase Concentration: Negative    Bilirubin: Negative    Nitrite: Negative    Specific Gravity: 1.010    Blood, Urine: Trace    Red Blood Cell - Urine: 2-5 /HPF    White Blood Cell - Urine: 3-5 /HPF    Epithelial Cells: Few    Bacteria: Moderate /HPF      RADIOLOGY/IMAGING                EXAM:  CT ABDOMEN AND PELVIS OC IC                            PROCEDURE DATE:  09/11/2017          INTERPRETATION:  CLINICAL HISTORY:  Diffuse abdominal pain; evaluate for   small bowel obstruction.    Multiple axial images of the abdomen and pelvis were obtained from the   lung bases through pubic symphysis with the administration of oral   contrast. 90 cc of Omnipaque 350 was administered intravenously without   complication. Reformatted coronal and sagittal images are submitted.    COMPARISON: June 11, 2017    FINDINGS:    The liver is normal in size. A less than 5 mm hypodense focus is   identified within the posterior segment of the right hepatic lobe, too   small to characterize. There is no evidence for intrahepatic or   extrahepaticbiliary dilatation. No gallstones, gallbladder wall   thickening or pericholecystic fluid identified.    The spleen and adrenal glands are unremarkable. No space-occupying   lesions of the pancreatic parenchyma identified. Mild pancreatic ductal   prominence is noted.    There is no evidence for bilateral hydronephrosis or renal calculi. No   space-occupying lesions of the right kidney are noted. A 5 mm hypodense   focus is identified within the mid to lower pole aspect of the left   kidney, too small to characterize. The abdominal aorta is normal in   course and caliber. No abnormally enlarged retroperitoneal or pelvic   lymphadenopathy is noted.    Fecal material is scattered throughout the colon. There is no evidence   for mechanical bowelobstruction. There is no evidence for acute   appendicitis. There is no evidence for free intraperitoneal air or fluid.   Oral contrast material is not identified within the colonic lumen;   however, mild pancolonic wall thickening is suggested, withmild   infiltration of the paracolic fat.    The uterus is not visualized. The urinary bladder appears unremarkable.     Imaging of the lung bases and osseous structures appears unremarkable.   Note is made of an indwelling neurostimulator device.    IMPRESSION:  No evidence for mechanical bowel obstruction. Oral contrast   material is not identified within the colonic lumen; however, mild   pancolonic wall thickening is suggested, with mild infiltration of the   paracolic fat. Correlate clinically. No free intraperineal air or fluid   identified.                PRIMITIVO REDDY   This document has been electronically signed. Sep 11 2017  8:23PM

## 2017-09-12 NOTE — CONSULT NOTE ADULT - SUBJECTIVE AND OBJECTIVE BOX
HPI:  Patient is a 46 y/o F lives with her sister from home ambulates with walker, current everyday smoker,  PMH Reflex symphatetic dystrophy s/p spinal stimulator, frequent falls, SLE (no treatment at this time), cyclical vomiting syndrome, Afib (not on AC due to frequent falls), HTN, depression, Hemorrhoids (EGD / colonoscopy 3-4 years ago, is due for a next one soon ) and partial thyroidectomy presented with c/o vomiting and diarrhea for 2 days a/w abdominal pain. As per patient she has cyclical vomiting syndrome and gets episode every year, last admission in 2017. She has been vomiting for the last 2 days , constant, >10 times a day, watery , non bloody , non bilious. Unable to eat anything due to constant vomiting. She also c/o loose watery diarrhea, >5-6 times a day, NBNB, with generalized abdominal pain, in the middle of her abdomen, non radiating, worse with changing positions, improvement with MS contin and morphine, unrelated to food, describes the quality as sharp ache and intermittent, 7/10. Also has subjective fever with chills since yesterday. ROS negative except above, denies any recent travel, consumption of spoilt food, no one else is sick around her, except for herself    FHx of DM and HTN in both parents, SHx of partial thyroidectomy and appendectomy, current everyday smoker, no alcohol abuse, Allergies as mentioned in allergy summary  GOC: Full code (11 Sep 2017 23:37)      PAST MEDICAL & SURGICAL HISTORY:  HTN (hypertension)  Cyclical vomiting  Atrial fibrillation  SLE (systemic lupus erythematosus)  Reflex sympathetic dystrophy  H/O partial thyroidectomy  S/P partial hysterectomy      aspirin (Unknown)  latex (Unknown)  sulfa drugs (Unknown)      acetaminophen  IVPB. 1000 milliGRAM(s) IV Intermittent once  ondansetron Injectable 4 milliGRAM(s) IV Push every 6 hours PRN  oxyCODONE    5 mG/acetaminophen 325 mG 1 Tablet(s) Oral every 6 hours PRN  morphine  IR 15 milliGRAM(s) Oral daily  pregabalin 300 milliGRAM(s) Oral two times a day  escitalopram 20 milliGRAM(s) Oral daily  clopidogrel Tablet 75 milliGRAM(s) Oral daily  metoprolol 25 milliGRAM(s) Oral two times a day  amLODIPine   Tablet 2.5 milliGRAM(s) Oral daily  pantoprazole    Tablet 40 milliGRAM(s) Oral before breakfast  nicotine - 21 mG/24Hr(s) Patch 1 patch Transdermal daily  enoxaparin Injectable 40 milliGRAM(s) SubCutaneous daily  acetaminophen   Tablet 650 milliGRAM(s) Oral every 6 hours PRN  ciprofloxacin   IVPB 400 milliGRAM(s) IV Intermittent every 12 hours  metroNIDAZOLE  IVPB 500 milliGRAM(s) IV Intermittent every 8 hours  morphine ER Tablet 60 milliGRAM(s) Oral every 12 hours PRN  influenza   Vaccine 0.5 milliLiter(s) IntraMuscular once  sodium chloride 0.9% Bolus 1000 milliLiter(s) IV Bolus once  lactated ringers. 1000 milliLiter(s) IV Continuous <Continuous>      Social Hx:    FAMILY HISTORY:        ROS  [  ] UNABLE TO ELICIT    General:  [  ] None  [  ] Fever  [  ] Chills  [ x ] Malaise    Skin:  [ x ] None [  ] Rash  [  ] Wound  [  ] Ulcer    HEENT:  [ x ] None  [  ] Sore Throat  [  ] Nasal congestion/ runny nose  [  ] Photophobia [  ] Neck pain      Chest:  [ x ] None   [  ] SOB  [  ] Cough  [  ] None    Cardiovascular:   [ x ] None  [  ] CP  [  ] Palpitation    Gastrointestinal:  [  ] None  [ x ] Abd pain    [ x ] Nausea    [ x ] Vomiting   [  ] Diarrhea	     Genitourinary:  [ x ] None [  ] Polyuria   [  ] Urgency  [  ] Frequency  [  ] Dysuria    [  ]  Hematuria       Musculoskeletal:  [  ] None [  ] Back Pain	[  ] Body aches  [  ] Joint pain    Neurological: [  ] None [  ]Dizziness  [  ]Visual Disturbance  [  ]Headaches   [ x ] Weakness      PHYSICAL EXAM:    Vital Signs Last 24 Hrs  T(C): 38.1 (12 Sep 2017 09:36), Max: 38.1 (12 Sep 2017 07:37)  T(F): 100.6 (12 Sep 2017 09:36), Max: 100.6 (12 Sep 2017 09:36)  HR: 86 (12 Sep 2017 07:37) (63 - 89)  BP: 139/74 (12 Sep 2017 07:37) (126/75 - 160/99)  BP(mean): --  RR: 16 (12 Sep 2017 07:37) (16 - 18)  SpO2: 99% (12 Sep 2017 07:37) (97% - 100%)    Constitutional:    HEENT: [ x ] Wnl  [  ] Pharyngeal congestion    Neck:  [ x ] Supple  [  ]Lymphadenopathy  [  ] No JVD   [  ] JVD  [  ] Masses   [  ] WNL    CHEST/Respiratory:  [ x ]Clear to auscultation  [  ] Rales   [  ] Rhonchi   [  ] Wheezing     [  ] Chest Tenderness      Cardiovascular:  [ x ] Reg S1 S2   [  ] Irreg S1 S2   [ x ]No Murmur  [  ] +ve Murmurs  [  ]Systolic [  ]Diastolic      Abdomen:  [ x ] Soft  [  ] No tendrerness  [  ] diffuse and generalized Tenderness  [  ] Organomegaly  [  ] ABD Distention  [  ] Rigidity                       [ x ] No Regidity                       [ x ] No Rebound Tenderness  [ x ] No Guarding Rigidity  [  ] Rebound Tenderness[  ] Guarding Rigidity                          [x  ]  +ve Bowel Sounds  [  ] Decreased Bowel Sounds    [  ] Absent Bowel Sounds                            Extremities: [ x ] No edema [  ] Edema  [  ] Clubbing   [  ] Cyanosis                         [ x ] No Tender Calf muscles  [  ] Tender Calf muscles                        [ x ] Palpable peripheral pulses    Neurological: [ x ] Awake  [ x ] Alert  [ x ] Oriented  x  3                           [  ] Confused  [  ] Drowzy  [  ] repond to painful stimuli  [  ] Unresponsive    Skin:  [ x ] Intact [  ] Redness [  ] Thrombophlebitis  [  ] Rashes  [  ] Dry  [  ] Ulcers    Ortho:  [  ] Joint Swelling  [  ] Joint erythema [  ] Joint tenderness                [  ] Increased temp. to touch  [ x ] WNL       LABS/DIAGNOSTIC TESTS                          15.5   13.6  )-----------( 286      ( 12 Sep 2017 08:23 )             47.3     WBC Count: 13.6 K/uL ( @ 08:23)  WBC Count: 11.1 K/uL ( @ 17:47)          140  |  105  |  6<L>  ----------------------------<  117<H>  3.5   |  25  |  1.29    Ca    9.2      12 Sep 2017 08:23  Phos  2.7       Mg     1.6         TPro  8.6<H>  /  Alb  4.2  /  TBili  0.5  /  DBili  x   /  AST  23  /  ALT  28  /  AlkPhos  165<H>        Urinalysis Basic - ( 11 Sep 2017 17:16 )    Color: Yellow / Appearance: Clear / S.010 / pH: x  Gluc: x / Ketone: Small  / Bili: Negative / Urobili: Negative   Blood: x / Protein: 100 / Nitrite: Negative   Leuk Esterase: Negative / RBC: 2-5 /HPF / WBC 3-5 /HPF   Sq Epi: x / Non Sq Epi: Few / Bacteria: Moderate /HPF        LIVER FUNCTIONS - ( 11 Sep 2017 17:47 )  Alb: 4.2 g/dL / Pro: 8.6 g/dL / ALK PHOS: 165 U/L / ALT: 28 U/L DA / AST: 23 U/L / GGT: x             PT/INR - ( 11 Sep 2017 17:47 )   PT: 11.1 sec;   INR: 1.02 ratio         PTT - ( 11 Sep 2017 17:47 )  PTT:38.5 sec    LACTATE:Lactate, Blood: 2.0 mmol/L ( @ 08:23)  Lactate, Blood: 2.1 mmol/L ( @ 00:13)  Lactate, Blood: 2.5 mmol/L ( @ 17:47)        CULTURES:   Pending.    RADIOLOGY    CXR:      EXAM:  CT ABDOMEN AND PELVIS OC IC                            PROCEDURE DATE:  2017          INTERPRETATION:  CLINICAL HISTORY:  Diffuse abdominal pain; evaluate for   small bowel obstruction.    Multiple axial images of the abdomen and pelvis were obtained from the   lung bases through pubic symphysis with the administration of oral   contrast. 90 cc of Omnipaque 350 was administered intravenously without   complication. Reformatted coronal and sagittal images are submitted.    COMPARISON: 2017    FINDINGS:    The liver is normal in size. A less than 5 mm hypodense focus is   identified within the posterior segment of the right hepatic lobe, too   small to characterize. There is no evidence for intrahepatic or   extrahepaticbiliary dilatation. No gallstones, gallbladder wall   thickening or pericholecystic fluid identified.    The spleen and adrenal glands are unremarkable. No space-occupying   lesions of the pancreatic parenchyma identified. Mild pancreatic ductal   prominence is noted.    There is no evidence for bilateral hydronephrosis or renal calculi. No   space-occupying lesions of the right kidney are noted. A 5 mm hypodense   focus is identified within the mid to lower pole aspect of the left   kidney, too small to characterize. The abdominal aorta is normal in   course and caliber. No abnormally enlarged retroperitoneal or pelvic   lymphadenopathy is noted.    Fecal material is scattered throughout the colon. There is no evidence   for mechanical bowelobstruction. There is no evidence for acute   appendicitis. There is no evidence for free intraperitoneal air or fluid.   Oral contrast material is not identified within the colonic lumen;   however, mild pancolonic wall thickening is suggested, withmild   infiltration of the paracolic fat.    The uterus is not visualized. The urinary bladder appears unremarkable.     Imaging of the lung bases and osseous structures appears unremarkable.   Note is made of an indwelling neurostimulator device.    IMPRESSION:  No evidence for mechanical bowel obstruction. Oral contrast   material is not identified within the colonic lumen; however, mild   pancolonic wall thickening is suggested, with mild infiltration of the   paracolic fat. Correlate clinically. No free intraperineal air or fluid   identified.

## 2017-09-12 NOTE — PROGRESS NOTE ADULT - PROBLEM SELECTOR PLAN 1
Patient c/o N/V/D with fever x 2 days  CT Abdomen/Pelvis revealed pancolitis  CBC revealed mild leukocytosis with left sheft  Lactate 2.5 - 2.1 s/p 2L NS bolus  Continue with IVF and bowel rest  Continue with ciprofloxacin and flagyl  Follow up blood cultures Patient c/o N/V/D with fever x 2 days  CT Abdomen/Pelvis revealed pancolitis  CBC revealed mild leukocytosis with left shift  Lactate 2.5 - 2.1 - 2.0 s/p 2L NS bolus  Continue with IVF and bowel rest  Continue with ciprofloxacin and flagyl  Continue to monitor BMP for electrolyte disturbances   Follow up blood cultures and C Dif PCR Patient c/o N/V/D with fever x 2 days  CT Abdomen/Pelvis revealed pancolitis  CBC revealed mild leukocytosis with left shift  Continue with IVF and bowel rest  Continue with ciprofloxacin and flagyl  Continue to monitor BMP for electrolyte disturbances   Follow up blood cultures, stool cultures, and C Dif PCR  ID Dr Siddiqui  GI Dr Wylie; F/U recommendation

## 2017-09-12 NOTE — PROGRESS NOTE ADULT - PROBLEM SELECTOR PLAN 8
Patient is current every day smoker  Counseled about smoking cessation; patient deferred information and is not ready to quit  Nicotine patch offered

## 2017-09-12 NOTE — CONSULT NOTE ADULT - PROBLEM SELECTOR RECOMMENDATION 9
- pt is npo except meds  - is n.v is controlled - continue pain meds - ms contin 60mg po q 12 hours  - d/c msir  - percocet 2 tabs po q 4 hours prn  - no stool softeners due to diarrhea  - continue lyrica 300mg po 2x a day

## 2017-09-12 NOTE — PROGRESS NOTE ADULT - SUBJECTIVE AND OBJECTIVE BOX
PGY 1 Note discussed with supervising resident and primary attending    Patient is a 46y old  Female who presents with a chief complaint of abdominal pain, N/V (11 Sep 2017 23:37)      INTERVAL HPI/OVERNIGHT EVENTS: offers no new complaints; current symptoms resolving    MEDICATIONS  (STANDING):  acetaminophen  IVPB. 1000 milliGRAM(s) IV Intermittent once  potassium chloride  10 mEq/100 mL IVPB 10 milliEquivalent(s) IV Intermittent every 1 hour  dextrose 5% + sodium chloride 0.9%. 1000 milliLiter(s) (100 mL/Hr) IV Continuous <Continuous>  morphine  IR 15 milliGRAM(s) Oral daily  pregabalin 300 milliGRAM(s) Oral two times a day  escitalopram 20 milliGRAM(s) Oral daily  clopidogrel Tablet 75 milliGRAM(s) Oral daily  metoprolol 25 milliGRAM(s) Oral two times a day  amLODIPine   Tablet 2.5 milliGRAM(s) Oral daily  pantoprazole    Tablet 40 milliGRAM(s) Oral before breakfast  nicotine - 21 mG/24Hr(s) Patch 1 patch Transdermal daily  enoxaparin Injectable 40 milliGRAM(s) SubCutaneous daily  ciprofloxacin   IVPB 400 milliGRAM(s) IV Intermittent every 12 hours  metroNIDAZOLE  IVPB 500 milliGRAM(s) IV Intermittent every 8 hours  influenza   Vaccine 0.5 milliLiter(s) IntraMuscular once  sodium chloride 0.9% Bolus 1000 milliLiter(s) IV Bolus once    MEDICATIONS  (PRN):  ondansetron Injectable 4 milliGRAM(s) IV Push every 6 hours PRN Nausea and/or Vomiting  oxyCODONE    5 mG/acetaminophen 325 mG 1 Tablet(s) Oral every 6 hours PRN Moderate Pain (4 - 6)  acetaminophen   Tablet 650 milliGRAM(s) Oral every 6 hours PRN For Temp greater than 38 C (100.4 F)  morphine ER Tablet 60 milliGRAM(s) Oral every 12 hours PRN Severe pain 7-10      __________________________________________________  REVIEW OF SYSTEMS:  CONSTITUTIONAL: No fever  NECK: No pain or stiffness  RESPIRATORY: No cough; No shortness of breath  CARDIOVASCULAR: No chest pain, no palpitations  GASTROINTESTINAL: No pain. No constipation, nausea or vomiting; No diarrhea   NEUROLOGICAL: No headache or numbness, no tremors  MUSCULOSKELETAL: No joint pain, no muscle pain  GENITOURINARY: no dysuria, no frequency, no hesitancy  PSYCHIATRY: no depression , no anxiety  ALL OTHER  ROS negative        Vital Signs Last 24 Hrs  T(C): 37.5 (12 Sep 2017 00:32), Max: 37.5 (12 Sep 2017 00:32)  T(F): 99.5 (12 Sep 2017 00:32), Max: 99.5 (12 Sep 2017 00:32)  HR: 76 (12 Sep 2017 00:32) (63 - 89)  BP: 126/75 (12 Sep 2017 00:32) (126/75 - 160/99)  BP(mean): --  RR: 18 (12 Sep 2017 00:32) (17 - 18)  SpO2: 99% (12 Sep 2017 00:32) (97% - 100%)    ________________________________________________  PHYSICAL EXAM:  GENERAL: NAD, lying in bed  HEENT: Normocephalic;  conjunctivae and sclerae clear; moist mucous membranes  NECK : supple, trachea midline, no JVD  CHEST/LUNG: Clear to auscultation bilaterally with good air entry   HEART: S1 S2,  regular rate and rhythm,; no murmurs, gallops or rubs  ABDOMEN: Soft, Nontender, Nondistended; Bowel sounds present  EXTREMITIES: no cyanosis; no edema; no calf tenderness  SKIN: warm and dry; no rash  NERVOUS SYSTEM:  AAOx3; no new focal deficits    _________________________________________________  LABS:                        17.1   11.1  )-----------( 299      ( 11 Sep 2017 17:47 )             52.6     -11    139  |  106  |  10  ----------------------------<  120<H>  3.4<L>   |  23  |  1.22    Ca    9.7      11 Sep 2017 17:47    TPro  8.6<H>  /  Alb  4.2  /  TBili  0.5  /  DBili  x   /  AST  23  /  ALT  28  /  AlkPhos  165<H>      PT/INR - ( 11 Sep 2017 17:47 )   PT: 11.1 sec;   INR: 1.02 ratio         PTT - ( 11 Sep 2017 17:47 )  PTT:38.5 sec  Urinalysis Basic - ( 11 Sep 2017 17:16 )    Color: Yellow / Appearance: Clear / S.010 / pH: x  Gluc: x / Ketone: Small  / Bili: Negative / Urobili: Negative   Blood: x / Protein: 100 / Nitrite: Negative   Leuk Esterase: Negative / RBC: 2-5 /HPF / WBC 3-5 /HPF   Sq Epi: x / Non Sq Epi: Few / Bacteria: Moderate /HPF      CAPILLARY BLOOD GLUCOSE          RADIOLOGY & ADDITIONAL TESTS:    Imaging Personally Reviewed:  YES/NO    Consultant(s) Notes Reviewed:   YES/ NO    Care Discussed with Consultants:     Plan of care was discussed with patient and/or primary care giver; all questions and concerns were addressed and care was aligned with patient's wishes. PGY 1 Note discussed with supervising resident and primary attending    Patient is a 46y old  Female who presents with a chief complaint of abdominal pain, N/V (11 Sep 2017 23:37)      INTERVAL HPI/OVERNIGHT EVENTS: offers no new complaints; current symptoms resolving    MEDICATIONS  (STANDING):  acetaminophen  IVPB. 1000 milliGRAM(s) IV Intermittent once  potassium chloride  10 mEq/100 mL IVPB 10 milliEquivalent(s) IV Intermittent every 1 hour  dextrose 5% + sodium chloride 0.9%. 1000 milliLiter(s) (100 mL/Hr) IV Continuous <Continuous>  morphine  IR 15 milliGRAM(s) Oral daily  pregabalin 300 milliGRAM(s) Oral two times a day  escitalopram 20 milliGRAM(s) Oral daily  clopidogrel Tablet 75 milliGRAM(s) Oral daily  metoprolol 25 milliGRAM(s) Oral two times a day  amLODIPine   Tablet 2.5 milliGRAM(s) Oral daily  pantoprazole    Tablet 40 milliGRAM(s) Oral before breakfast  nicotine - 21 mG/24Hr(s) Patch 1 patch Transdermal daily  enoxaparin Injectable 40 milliGRAM(s) SubCutaneous daily  ciprofloxacin   IVPB 400 milliGRAM(s) IV Intermittent every 12 hours  metroNIDAZOLE  IVPB 500 milliGRAM(s) IV Intermittent every 8 hours  influenza   Vaccine 0.5 milliLiter(s) IntraMuscular once  sodium chloride 0.9% Bolus 1000 milliLiter(s) IV Bolus once    MEDICATIONS  (PRN):  ondansetron Injectable 4 milliGRAM(s) IV Push every 6 hours PRN Nausea and/or Vomiting  oxyCODONE    5 mG/acetaminophen 325 mG 1 Tablet(s) Oral every 6 hours PRN Moderate Pain (4 - 6)  acetaminophen   Tablet 650 milliGRAM(s) Oral every 6 hours PRN For Temp greater than 38 C (100.4 F)  morphine ER Tablet 60 milliGRAM(s) Oral every 12 hours PRN Severe pain 7-10      __________________________________________________  REVIEW OF SYSTEMS:  CONSTITUTIONAL: No fever  RESPIRATORY: No cough; No shortness of breath  CARDIOVASCULAR: No chest pain, no palpitations  GASTROINTESTINAL: Reports diffuse abdominal pain, diarrhea, and nausea; however, no vomiting.  NEUROLOGICAL: No headache or numbness, no tremors  MUSCULOSKELETAL: reports no joint pain, no muscle pain  ALL OTHER  ROS negative        Vital Signs Last 24 Hrs  T(C): 37.5 (12 Sep 2017 00:32), Max: 37.5 (12 Sep 2017 00:32)  T(F): 99.5 (12 Sep 2017 00:32), Max: 99.5 (12 Sep 2017 00:32)  HR: 76 (12 Sep 2017 00:32) (63 - 89)  BP: 126/75 (12 Sep 2017 00:32) (126/75 - 160/99)  BP(mean): --  RR: 18 (12 Sep 2017 00:32) (17 - 18)  SpO2: 99% (12 Sep 2017 00:32) (97% - 100%)    ________________________________________________  PHYSICAL EXAM:  GENERAL: mild distress due to pain, lying in bed  HEENT: Normocephalic; dry mucous membranes  NECK : supple, trachea midline, no JVD  CHEST/LUNG: Clear to auscultation bilaterally with good air entry   HEART: S1 S2,  regular rate and rhythm,; no murmurs, gallops or rubs  ABDOMEN: soft, tender to light palpation, particularly over LUQ with guarding. No rebound tenderness. Bowel sounds decreased  EXTREMITIES: no cyanosis; no edema; no calf tenderness  NERVOUS SYSTEM:  AAOx3; no new focal deficits    _________________________________________________  LABS:                        17.1   11.1  )-----------( 299      ( 11 Sep 2017 17:47 )             52.6     09-11    139  |  106  |  10  ----------------------------<  120<H>  3.4<L>   |  23  |  1.22    Ca    9.7      11 Sep 2017 17:47    TPro  8.6<H>  /  Alb  4.2  /  TBili  0.5  /  DBili  x   /  AST  23  /  ALT  28  /  AlkPhos  165<H>  09-11    PT/INR - ( 11 Sep 2017 17:47 )   PT: 11.1 sec;   INR: 1.02 ratio         PTT - ( 11 Sep 2017 17:47 )  PTT:38.5 sec  Urinalysis Basic - ( 11 Sep 2017 17:16 )    Color: Yellow / Appearance: Clear / S.010 / pH: x  Gluc: x / Ketone: Small  / Bili: Negative / Urobili: Negative   Blood: x / Protein: 100 / Nitrite: Negative   Leuk Esterase: Negative / RBC: 2-5 /HPF / WBC 3-5 /HPF   Sq Epi: x / Non Sq Epi: Few / Bacteria: Moderate /HPF      CAPILLARY BLOOD GLUCOSE          RADIOLOGY & ADDITIONAL TESTS:    Imaging Personally Reviewed:  YES/NO    Consultant(s) Notes Reviewed:   YES/ NO    Care Discussed with Consultants:     Plan of care was discussed with patient and/or primary care giver; all questions and concerns were addressed and care was aligned with patient's wishes.

## 2017-09-12 NOTE — CONSULT NOTE ADULT - NEGATIVE CARDIOVASCULAR SYMPTOMS
no palpitations/no dyspnea on exertion/no chest pain
no dyspnea on exertion/no peripheral edema/no chest pain/no claudication/no palpitations/no orthopnea

## 2017-09-12 NOTE — CONSULT NOTE ADULT - ASSESSMENT
Patient is a 44 y/o F lives with her sister from home ambulates with walker, current everyday smoker,  PMH Reflex symphatetic dystrophy s/p spinal stimulator, frequent falls, SLE (no treatment at this time), cyclical vomiting syndrome, Afib (not on AC due to frequent falls), HTN, depression, Hemorrhoids (EGD / colonoscopy 3-4 years ago, is due for a next one soon ) and partial thyroidectomy presented with c/o vomiting and diarrhea for 2 days a/w abdominal pain.    Patient found to have a left shift on CBC, Hb 17.1, likely hemoconcentrated, ( Baseline Hb of 15-16), BMP showing K of 3.4, likely related to persistent vomiting/diarrhea, normal AG, Bicarb is WNL, initial lactate is 2.5, patient got 2 L NS in the ED. CT A/P showing evidence of pancolitis given cipro/Flagyl in the ED and morphine, Zofran, Tylenol for symptom management  Patient is admitted for pancolitis and cyclical vomiting syndrome

## 2017-09-12 NOTE — CONSULT NOTE ADULT - PROBLEM SELECTOR RECOMMENDATION 9
1- UA & CS.  2- Blood culture.  3- GI evaluation and management.  4- Continue Cipro and Flagyl.  5- Fluid and electrolytes management.  6- CBC and BMP follow up. 1- UA & CS.  2- Blood culture.  3- GI evaluation and management.  4- Continue Cipro and Flagyl.  5- Fluid and electrolytes management.  6- CBC and BMP follow up.  7- stool for ova and parasites.  8- Stool for culture.  9- stool for CDT.  10- CBC & BMP follow up.

## 2017-09-12 NOTE — DISCHARGE NOTE ADULT - PLAN OF CARE
control vomiting and prevent additional episodes Your are admitted due to exacerbation of your underlying cyclic vomiting. your blood work did not reveal any electrolyte imbalances but you had a mildly elevated wbc and we decided to perform a CT of your abdomen which revealed pancolitis. Given you history of vomiting and diarrhea we decided to start you on antibiotics for pancolitis and performed testing for C. difficile continue with current regimen of pain medications as prescribed and follow up with outpatient pain management for further recommendations you are not currently on any medications for SLE. Follow up with outpatient Physician  Your Anti dsDNA was ( ) continue with current antihypertensive regimen - amlodipine and metoprolol  Follow up with primary care provider within one month You were counseled on smoking cessation. Please contact us or primary care physician should you need help with smoking cessation in the future. You are currently not taking any medications for this because of your history of falls.  Please follow up with you primary care physician for further recommendations Continue to use your walker and consider outpatient physical therapy Your are admitted due to exacerbation of your underlying cyclic vomiting. your blood work did not reveal any electrolyte imbalances but you had a mildly elevated wbc and we decided to perform a CT of your abdomen which revealed pancolitis. Given you history of vomiting and diarrhea we decided to start you on antibiotics for pancolitis and performed testing for C. difficile. Our infectious disease specialist recommends that you continue with your current doses of ciprofloxacin and flagyl until 9/24.   Follow up with outpatient gastroenterologist for additional recommendations with regards to continued monitoring. you are not currently on any medications for SLE. Follow up with outpatient Physician  Your Anti dsDNA was < 12 (normal value) Your are admitted due to exacerbation of your underlying cyclic vomiting. your blood work did not reveal any electrolyte imbalances but you had a mildly elevated wbc and we decided to perform a CT of your abdomen which revealed pancolitis. Given you history of vomiting and diarrhea we decided to start you on antibiotics for pancolitis and performed testing for C. difficile.  Due to continued abdominal pain, our gastroenterologist recommended that you undergo an EGD and Colonoscopy to evaluate for potential etiology of your intractable pain. Test results revealed (EGD AND COLONOSCOPY)  Our infectious disease specialist recommends that you continue with your current doses of ciprofloxacin and flagyl until 9/24.   Follow up with outpatient gastroenterologist for additional recommendations with regards to continued monitoring. Your are admitted due to exacerbation of your underlying cyclic vomiting. your blood work did not reveal any electrolyte imbalances but you had a mildly elevated wbc and we decided to perform a CT of your abdomen which revealed pancolitis. Given you history of vomiting and diarrhea we decided to start you on antibiotics for pancolitis and performed testing for C. difficile (which was negative).  Due to continued abdominal pain, our gastroenterologist recommended that you undergo an EGD and Colonoscopy to evaluate for potential etiology of your intractable pain. Test results revealed (EGD AND COLONOSCOPY)  Our infectious disease specialist recommends that you continue with your current doses of ciprofloxacin and flagyl until 9/24.   Follow up with outpatient gastroenterologist for additional recommendations with regards to continued monitoring. Your are admitted due to exacerbation of your underlying cyclic vomiting. your blood work did not reveal any electrolyte imbalances but you had a mildly elevated wbc and we decided to perform a CT of your abdomen which revealed pancolitis. Given you history of vomiting and diarrhea we decided to start you on antibiotics for pancolitis and performed testing for C. difficile (which was negative).  Due to continued abdominal pain, our gastroenterologist recommended that you undergo an EGD and Colonoscopy to evaluate for potential etiology of your intractable pain. Test results revealed esophagitis, gastritis, a hiatal hernia, in addition to an ulcer. Your colonoscopy revealed diverticulosis and polyps.   Our infectious disease specialist recommends that you continue with your current doses of ciprofloxacin and flagyl until 9/24.   Follow up with outpatient gastroenterologist for additional recommendations with regards to continued monitoring.

## 2017-09-12 NOTE — CONSULT NOTE ADULT - ASSESSMENT
Patient is a 44 y/o F lives with her sister from home ambulates with walker, current everyday smoker,  PMH Reflex symphatetic dystrophy s/p spinal stimulator, frequent falls, SLE (no treatment at this time), cyclical vomiting syndrome, Afib (not on AC due to frequent falls), HTN, depression, Hemorrhoids (EGD / colonoscopy 3-4 years ago, is due for a next one soon ) and partial thyroidectomy presented with c/o vomiting and diarrhea for 2 days a/w abdominal pain.  Patient was admitted for colitis ans was started on IV Cipro and Flagyl.

## 2017-09-12 NOTE — PROGRESS NOTE ADULT - PROBLEM SELECTOR PLAN 3
Patient takes multiple analgesic medications at home including morphine, MS contin, and percocet and has spinal neurostimulator in place  Continue with home doses of pain medications  F/U chronic pain recommendation Patient takes multiple analgesic medications at home including morphine, MS contin, and percocet and has spinal neurostimulator in place  Continue with home doses of pain medications  F/U pain recommendation Patient takes multiple analgesic medications at home including morphine, MS contin, and percocet and has spinal neurostimulator in place  Continue with home doses of pain medications  Pain management recommends ms contin 60mg q12, percocet 2 tabs q4 PRN, lyrica 300 BID

## 2017-09-12 NOTE — DISCHARGE NOTE ADULT - MEDICATION SUMMARY - MEDICATIONS TO TAKE
I will START or STAY ON the medications listed below when I get home from the hospital:    Flagyl 500 mg oral tablet  -- 1 tab(s) by mouth 3 times a day  -- Indication: For Pancolitis    acetaminophen-oxyCODONE 325 mg-5 mg oral tablet  -- 1 tab(s) by mouth every 6 hours, As needed, Severe Pain (7 - 10) MDD:4 tabs  -- Indication: For Reflex sympathetic dystrophy    morphine 60 mg/12 hours oral tablet, extended release  -- 1 tab(s) by mouth every 8 hours MDD:3 tabs  -- Indication: For Reflex sympathetic dystrophy    oxyCODONE-acetaminophen 5 mg-325 mg oral tablet  -- 1 tab(s) by mouth every 4 hours, As needed, Severe Pain (7 - 10)  -- Indication: For Reflex sympathetic dystrophy    morphine 15 mg oral tablet  -- 1 tab(s) by mouth once a day in the evening  -- Indication: For Reflex sympathetic dystrophy    pregabalin 300 mg oral capsule  -- 1 cap(s) by mouth 2 times a day  -- Indication: For Reflex sympathetic dystrophy    escitalopram 20 mg oral tablet  -- 1 tab(s) by mouth once a day  -- Indication: For Depression    ondansetron 4 mg oral tablet  -- 1 tab(s) by mouth every 8 hours PRN n/v  -- Indication: For Cyclical vomiting    dronabinol 5 mg oral capsule  -- 1 cap(s) by mouth every 12 hours  -- Indication: For Reflex sympathetic dystrophy    Marinol 5 mg oral capsule  -- 1 cap(s) by mouth every 12 hours  -- Indication: For Reflex sympathetic dystrophy    clopidogrel 75 mg oral tablet  -- 1 tab(s) by mouth once a day  -- Indication: For Prophylactic measure    metoprolol tartrate 25 mg oral tablet  -- 1 tab(s) by mouth 2 times a day  -- Indication: For HTN (hypertension)    amLODIPine 2.5 mg oral tablet  -- 1 tab(s) by mouth once a day  -- Indication: For HTN (hypertension)    senna oral tablet  -- 2 tab(s) by mouth once a day (at bedtime)  -- Indication: For COnstipation    Protonix 40 mg oral delayed release tablet  -- 1 tab(s) by mouth once a day  -- Indication: For Pancolitis    ciprofloxacin 500 mg oral tablet  -- 1 tab(s) by mouth every 12 hours  -- Indication: For Pancolitis    Drisdol 50,000 intl units (1.25 mg) oral capsule  -- 1 cap(s) by mouth once a week  -- Indication: For Vitamin D deficiency

## 2017-09-12 NOTE — PROGRESS NOTE ADULT - PROBLEM SELECTOR PLAN 4
Continue with home doses of metoprolol and norvasc within parameters  Continue to monitor BP Continue with home doses of metoprolol and Norvasc within parameters  Continue to monitor BP

## 2017-09-12 NOTE — CONSULT NOTE ADULT - RS GEN PE MLT RESP DETAILS PC
no rhonchi/no wheezes/no rales
airway patent/clear to auscultation bilaterally/breath sounds equal/good air movement/no rhonchi/respirations non-labored/no wheezes/no rales

## 2017-09-12 NOTE — CONSULT NOTE ADULT - NEUROLOGICAL DETAILS
alert and oriented x 3/responds to pain/responds to verbal commands
cranial nerves intact/responds to verbal commands/alert and oriented x 3/responds to pain

## 2017-09-12 NOTE — DISCHARGE NOTE ADULT - CARE PLAN
Principal Discharge DX:	Cyclical vomiting  Goal:	control vomiting and prevent additional episodes  Instructions for follow-up, activity and diet:	Your are admitted due to exacerbation of your underlying cyclic vomiting. your blood work did not reveal any electrolyte imbalances but you had a mildly elevated wbc and we decided to perform a CT of your abdomen which revealed pancolitis. Given you history of vomiting and diarrhea we decided to start you on antibiotics for pancolitis and performed testing for C. difficile  Secondary Diagnosis:	Reflex sympathetic dystrophy  Instructions for follow-up, activity and diet:	continue with current regimen of pain medications as prescribed and follow up with outpatient pain management for further recommendations  Secondary Diagnosis:	SLE (systemic lupus erythematosus)  Instructions for follow-up, activity and diet:	you are not currently on any medications for SLE. Follow up with outpatient Physician  Your Anti dsDNA was ( )  Secondary Diagnosis:	HTN (hypertension)  Instructions for follow-up, activity and diet:	continue with current antihypertensive regimen - amlodipine and metoprolol  Follow up with primary care provider within one month  Secondary Diagnosis:	Smoker  Instructions for follow-up, activity and diet:	You were counseled on smoking cessation. Please contact us or primary care physician should you need help with smoking cessation in the future.  Secondary Diagnosis:	Atrial fibrillation  Instructions for follow-up, activity and diet:	You are currently not taking any medications for this because of your history of falls.  Please follow up with you primary care physician for further recommendations  Secondary Diagnosis:	Frequent falls  Instructions for follow-up, activity and diet:	Continue to use your walker and consider outpatient physical therapy Principal Discharge DX:	Cyclical vomiting  Goal:	control vomiting and prevent additional episodes  Instructions for follow-up, activity and diet:	Your are admitted due to exacerbation of your underlying cyclic vomiting. your blood work did not reveal any electrolyte imbalances but you had a mildly elevated wbc and we decided to perform a CT of your abdomen which revealed pancolitis. Given you history of vomiting and diarrhea we decided to start you on antibiotics for pancolitis and performed testing for C. difficile. Our infectious disease specialist recommends that you continue with your current doses of ciprofloxacin and flagyl until 9/24.   Follow up with outpatient gastroenterologist for additional recommendations with regards to continued monitoring.  Secondary Diagnosis:	Reflex sympathetic dystrophy  Instructions for follow-up, activity and diet:	continue with current regimen of pain medications as prescribed and follow up with outpatient pain management for further recommendations  Secondary Diagnosis:	SLE (systemic lupus erythematosus)  Instructions for follow-up, activity and diet:	you are not currently on any medications for SLE. Follow up with outpatient Physician  Your Anti dsDNA was < 12 (normal value)  Secondary Diagnosis:	HTN (hypertension)  Instructions for follow-up, activity and diet:	continue with current antihypertensive regimen - amlodipine and metoprolol  Follow up with primary care provider within one month  Secondary Diagnosis:	Smoker  Instructions for follow-up, activity and diet:	You were counseled on smoking cessation. Please contact us or primary care physician should you need help with smoking cessation in the future.  Secondary Diagnosis:	Atrial fibrillation  Instructions for follow-up, activity and diet:	You are currently not taking any medications for this because of your history of falls.  Please follow up with you primary care physician for further recommendations  Secondary Diagnosis:	Frequent falls  Instructions for follow-up, activity and diet:	Continue to use your walker and consider outpatient physical therapy Principal Discharge DX:	Cyclical vomiting  Goal:	control vomiting and prevent additional episodes  Instructions for follow-up, activity and diet:	Your are admitted due to exacerbation of your underlying cyclic vomiting. your blood work did not reveal any electrolyte imbalances but you had a mildly elevated wbc and we decided to perform a CT of your abdomen which revealed pancolitis. Given you history of vomiting and diarrhea we decided to start you on antibiotics for pancolitis and performed testing for C. difficile.  Due to continued abdominal pain, our gastroenterologist recommended that you undergo an EGD and Colonoscopy to evaluate for potential etiology of your intractable pain. Test results revealed (EGD AND COLONOSCOPY)  Our infectious disease specialist recommends that you continue with your current doses of ciprofloxacin and flagyl until 9/24.   Follow up with outpatient gastroenterologist for additional recommendations with regards to continued monitoring.  Secondary Diagnosis:	Reflex sympathetic dystrophy  Instructions for follow-up, activity and diet:	continue with current regimen of pain medications as prescribed and follow up with outpatient pain management for further recommendations  Secondary Diagnosis:	SLE (systemic lupus erythematosus)  Instructions for follow-up, activity and diet:	you are not currently on any medications for SLE. Follow up with outpatient Physician  Your Anti dsDNA was < 12 (normal value)  Secondary Diagnosis:	HTN (hypertension)  Instructions for follow-up, activity and diet:	continue with current antihypertensive regimen - amlodipine and metoprolol  Follow up with primary care provider within one month  Secondary Diagnosis:	Smoker  Instructions for follow-up, activity and diet:	You were counseled on smoking cessation. Please contact us or primary care physician should you need help with smoking cessation in the future.  Secondary Diagnosis:	Atrial fibrillation  Instructions for follow-up, activity and diet:	You are currently not taking any medications for this because of your history of falls.  Please follow up with you primary care physician for further recommendations  Secondary Diagnosis:	Frequent falls  Instructions for follow-up, activity and diet:	Continue to use your walker and consider outpatient physical therapy Principal Discharge DX:	Cyclical vomiting  Goal:	control vomiting and prevent additional episodes  Instructions for follow-up, activity and diet:	Your are admitted due to exacerbation of your underlying cyclic vomiting. your blood work did not reveal any electrolyte imbalances but you had a mildly elevated wbc and we decided to perform a CT of your abdomen which revealed pancolitis. Given you history of vomiting and diarrhea we decided to start you on antibiotics for pancolitis and performed testing for C. difficile (which was negative).  Due to continued abdominal pain, our gastroenterologist recommended that you undergo an EGD and Colonoscopy to evaluate for potential etiology of your intractable pain. Test results revealed (EGD AND COLONOSCOPY)  Our infectious disease specialist recommends that you continue with your current doses of ciprofloxacin and flagyl until 9/24.   Follow up with outpatient gastroenterologist for additional recommendations with regards to continued monitoring.  Secondary Diagnosis:	Reflex sympathetic dystrophy  Instructions for follow-up, activity and diet:	continue with current regimen of pain medications as prescribed and follow up with outpatient pain management for further recommendations  Secondary Diagnosis:	SLE (systemic lupus erythematosus)  Instructions for follow-up, activity and diet:	you are not currently on any medications for SLE. Follow up with outpatient Physician  Your Anti dsDNA was < 12 (normal value)  Secondary Diagnosis:	HTN (hypertension)  Instructions for follow-up, activity and diet:	continue with current antihypertensive regimen - amlodipine and metoprolol  Follow up with primary care provider within one month  Secondary Diagnosis:	Smoker  Instructions for follow-up, activity and diet:	You were counseled on smoking cessation. Please contact us or primary care physician should you need help with smoking cessation in the future.  Secondary Diagnosis:	Atrial fibrillation  Instructions for follow-up, activity and diet:	You are currently not taking any medications for this because of your history of falls.  Please follow up with you primary care physician for further recommendations  Secondary Diagnosis:	Frequent falls  Instructions for follow-up, activity and diet:	Continue to use your walker and consider outpatient physical therapy Principal Discharge DX:	Cyclical vomiting  Goal:	control vomiting and prevent additional episodes  Instructions for follow-up, activity and diet:	Your are admitted due to exacerbation of your underlying cyclic vomiting. your blood work did not reveal any electrolyte imbalances but you had a mildly elevated wbc and we decided to perform a CT of your abdomen which revealed pancolitis. Given you history of vomiting and diarrhea we decided to start you on antibiotics for pancolitis and performed testing for C. difficile (which was negative).  Due to continued abdominal pain, our gastroenterologist recommended that you undergo an EGD and Colonoscopy to evaluate for potential etiology of your intractable pain. Test results revealed esophagitis, gastritis, a hiatal hernia, in addition to an ulcer. Your colonoscopy revealed diverticulosis and polyps.   Our infectious disease specialist recommends that you continue with your current doses of ciprofloxacin and flagyl until 9/24.   Follow up with outpatient gastroenterologist for additional recommendations with regards to continued monitoring.  Secondary Diagnosis:	Reflex sympathetic dystrophy  Instructions for follow-up, activity and diet:	continue with current regimen of pain medications as prescribed and follow up with outpatient pain management for further recommendations  Secondary Diagnosis:	SLE (systemic lupus erythematosus)  Instructions for follow-up, activity and diet:	you are not currently on any medications for SLE. Follow up with outpatient Physician  Your Anti dsDNA was < 12 (normal value)  Secondary Diagnosis:	HTN (hypertension)  Instructions for follow-up, activity and diet:	continue with current antihypertensive regimen - amlodipine and metoprolol  Follow up with primary care provider within one month  Secondary Diagnosis:	Smoker  Instructions for follow-up, activity and diet:	You were counseled on smoking cessation. Please contact us or primary care physician should you need help with smoking cessation in the future.  Secondary Diagnosis:	Atrial fibrillation  Instructions for follow-up, activity and diet:	You are currently not taking any medications for this because of your history of falls.  Please follow up with you primary care physician for further recommendations  Secondary Diagnosis:	Frequent falls  Instructions for follow-up, activity and diet:	Continue to use your walker and consider outpatient physical therapy

## 2017-09-13 LAB
ANION GAP SERPL CALC-SCNC: 8 MMOL/L — SIGNIFICANT CHANGE UP (ref 5–17)
BUN SERPL-MCNC: 9 MG/DL — SIGNIFICANT CHANGE UP (ref 7–18)
CALCIUM SERPL-MCNC: 8.6 MG/DL — SIGNIFICANT CHANGE UP (ref 8.4–10.5)
CHLORIDE SERPL-SCNC: 107 MMOL/L — SIGNIFICANT CHANGE UP (ref 96–108)
CO2 SERPL-SCNC: 27 MMOL/L — SIGNIFICANT CHANGE UP (ref 22–31)
CREAT SERPL-MCNC: 1.35 MG/DL — HIGH (ref 0.5–1.3)
DSDNA AB SER-ACNC: <12 IU/ML — SIGNIFICANT CHANGE UP
GLUCOSE SERPL-MCNC: 97 MG/DL — SIGNIFICANT CHANGE UP (ref 70–99)
HCT VFR BLD CALC: 40.3 % — SIGNIFICANT CHANGE UP (ref 34.5–45)
HGB BLD-MCNC: 13.5 G/DL — SIGNIFICANT CHANGE UP (ref 11.5–15.5)
MAGNESIUM SERPL-MCNC: 1.7 MG/DL — SIGNIFICANT CHANGE UP (ref 1.6–2.6)
MCHC RBC-ENTMCNC: 30.3 PG — SIGNIFICANT CHANGE UP (ref 27–34)
MCHC RBC-ENTMCNC: 33.6 GM/DL — SIGNIFICANT CHANGE UP (ref 32–36)
MCV RBC AUTO: 90.2 FL — SIGNIFICANT CHANGE UP (ref 80–100)
PHOSPHATE SERPL-MCNC: 3 MG/DL — SIGNIFICANT CHANGE UP (ref 2.5–4.5)
PLATELET # BLD AUTO: 245 K/UL — SIGNIFICANT CHANGE UP (ref 150–400)
POTASSIUM SERPL-MCNC: 3.5 MMOL/L — SIGNIFICANT CHANGE UP (ref 3.5–5.3)
POTASSIUM SERPL-SCNC: 3.5 MMOL/L — SIGNIFICANT CHANGE UP (ref 3.5–5.3)
RBC # BLD: 4.46 M/UL — SIGNIFICANT CHANGE UP (ref 3.8–5.2)
RBC # FLD: 13.1 % — SIGNIFICANT CHANGE UP (ref 10.3–14.5)
SODIUM SERPL-SCNC: 142 MMOL/L — SIGNIFICANT CHANGE UP (ref 135–145)
WBC # BLD: 11 K/UL — HIGH (ref 3.8–10.5)
WBC # FLD AUTO: 11 K/UL — HIGH (ref 3.8–10.5)

## 2017-09-13 PROCEDURE — 99233 SBSQ HOSP IP/OBS HIGH 50: CPT

## 2017-09-13 RX ORDER — MULTIVIT WITH MIN/MFOLATE/K2 340-15/3 G
300 POWDER (GRAM) ORAL ONCE
Qty: 0 | Refills: 0 | Status: COMPLETED | OUTPATIENT
Start: 2017-09-13 | End: 2017-09-13

## 2017-09-13 RX ORDER — METOCLOPRAMIDE HCL 10 MG
5 TABLET ORAL
Qty: 0 | Refills: 0 | Status: DISCONTINUED | OUTPATIENT
Start: 2017-09-13 | End: 2017-09-18

## 2017-09-13 RX ADMIN — OXYCODONE AND ACETAMINOPHEN 2 TABLET(S): 5; 325 TABLET ORAL at 13:45

## 2017-09-13 RX ADMIN — OXYCODONE AND ACETAMINOPHEN 2 TABLET(S): 5; 325 TABLET ORAL at 22:31

## 2017-09-13 RX ADMIN — OXYCODONE AND ACETAMINOPHEN 2 TABLET(S): 5; 325 TABLET ORAL at 01:28

## 2017-09-13 RX ADMIN — AMLODIPINE BESYLATE 2.5 MILLIGRAM(S): 2.5 TABLET ORAL at 05:17

## 2017-09-13 RX ADMIN — Medication 100 MILLIGRAM(S): at 05:18

## 2017-09-13 RX ADMIN — Medication 100 MILLIGRAM(S): at 22:30

## 2017-09-13 RX ADMIN — Medication 200 MILLIGRAM(S): at 19:27

## 2017-09-13 RX ADMIN — Medication 5 MILLIGRAM(S): at 00:04

## 2017-09-13 RX ADMIN — Medication 2.5 MILLIGRAM(S): at 17:47

## 2017-09-13 RX ADMIN — CLOPIDOGREL BISULFATE 75 MILLIGRAM(S): 75 TABLET, FILM COATED ORAL at 12:52

## 2017-09-13 RX ADMIN — Medication 300 MILLIGRAM(S): at 05:17

## 2017-09-13 RX ADMIN — Medication 5 MILLIGRAM(S): at 17:48

## 2017-09-13 RX ADMIN — ESCITALOPRAM OXALATE 20 MILLIGRAM(S): 10 TABLET, FILM COATED ORAL at 13:53

## 2017-09-13 RX ADMIN — OXYCODONE AND ACETAMINOPHEN 2 TABLET(S): 5; 325 TABLET ORAL at 07:00

## 2017-09-13 RX ADMIN — Medication 300 MILLILITER(S): at 12:07

## 2017-09-13 RX ADMIN — OXYCODONE AND ACETAMINOPHEN 2 TABLET(S): 5; 325 TABLET ORAL at 06:12

## 2017-09-13 RX ADMIN — MORPHINE SULFATE 60 MILLIGRAM(S): 50 CAPSULE, EXTENDED RELEASE ORAL at 05:17

## 2017-09-13 RX ADMIN — OXYCODONE AND ACETAMINOPHEN 2 TABLET(S): 5; 325 TABLET ORAL at 02:15

## 2017-09-13 RX ADMIN — MORPHINE SULFATE 60 MILLIGRAM(S): 50 CAPSULE, EXTENDED RELEASE ORAL at 06:00

## 2017-09-13 RX ADMIN — Medication 200 MILLIGRAM(S): at 05:18

## 2017-09-13 RX ADMIN — Medication 100 MILLIGRAM(S): at 13:06

## 2017-09-13 RX ADMIN — MORPHINE SULFATE 60 MILLIGRAM(S): 50 CAPSULE, EXTENDED RELEASE ORAL at 18:30

## 2017-09-13 RX ADMIN — MORPHINE SULFATE 60 MILLIGRAM(S): 50 CAPSULE, EXTENDED RELEASE ORAL at 18:02

## 2017-09-13 RX ADMIN — Medication 25 MILLIGRAM(S): at 05:17

## 2017-09-13 RX ADMIN — PANTOPRAZOLE SODIUM 40 MILLIGRAM(S): 20 TABLET, DELAYED RELEASE ORAL at 05:17

## 2017-09-13 RX ADMIN — Medication 1 ENEMA: at 13:54

## 2017-09-13 RX ADMIN — Medication 1 ENEMA: at 10:19

## 2017-09-13 RX ADMIN — Medication 300 MILLIGRAM(S): at 18:02

## 2017-09-13 RX ADMIN — OXYCODONE AND ACETAMINOPHEN 2 TABLET(S): 5; 325 TABLET ORAL at 13:16

## 2017-09-13 RX ADMIN — Medication 1 PATCH: at 12:00

## 2017-09-13 RX ADMIN — Medication 1 PATCH: at 12:52

## 2017-09-13 NOTE — PROGRESS NOTE ADULT - SUBJECTIVE AND OBJECTIVE BOX
[   ] ICU               [   ] CCU            [ X  ] Medical Floor    Patient is comfortable. No new complaints reported, No abdominal pain, N/V, hematemesis, hematochezia, melena, fever, chills, chest pain, SOB, cough or diarrhea reported.    VITALS  Vital Signs Last 24 Hrs  T(C): 36.9 (13 Sep 2017 15:00), Max: 37.6 (12 Sep 2017 21:50)  T(F): 98.5 (13 Sep 2017 15:00), Max: 99.7 (12 Sep 2017 21:50)  HR: 66 (13 Sep 2017 15:00) (65 - 81)  BP: 107/67 (13 Sep 2017 15:00) (107/67 - 123/89)   RR: 16 (13 Sep 2017 15:00) (16 - 17)  SpO2: 98% (13 Sep 2017 15:00) (94% - 98%)       MEDICATIONS  (STANDING):  pregabalin 300 milliGRAM(s) Oral two times a day  escitalopram 20 milliGRAM(s) Oral daily  clopidogrel Tablet 75 milliGRAM(s) Oral daily  metoprolol 25 milliGRAM(s) Oral two times a day  amLODIPine   Tablet 2.5 milliGRAM(s) Oral daily  pantoprazole    Tablet 40 milliGRAM(s) Oral before breakfast  nicotine - 21 mG/24Hr(s) Patch 1 patch Transdermal daily  enoxaparin Injectable 40 milliGRAM(s) SubCutaneous daily  ciprofloxacin   IVPB 400 milliGRAM(s) IV Intermittent every 12 hours  metroNIDAZOLE  IVPB 500 milliGRAM(s) IV Intermittent every 8 hours  influenza   Vaccine 0.5 milliLiter(s) IntraMuscular once  sodium chloride 0.9% Bolus 1000 milliLiter(s) IV Bolus once  lactated ringers. 1000 milliLiter(s) (125 mL/Hr) IV Continuous <Continuous>  morphine ER Tablet 60 milliGRAM(s) Oral every 12 hours  dronabinol 2.5 milliGRAM(s) Oral daily  metoclopramide 5 milliGRAM(s) Oral two times a day    MEDICATIONS  (PRN):  ondansetron Injectable 4 milliGRAM(s) IV Push every 6 hours PRN Nausea and/or Vomiting  acetaminophen   Tablet 650 milliGRAM(s) Oral every 6 hours PRN For Temp greater than 38 C (100.4 F)  oxyCODONE    5 mG/acetaminophen 325 mG 2 Tablet(s) Oral every 4 hours PRN Severe Pain (7 - 10)  zaleplon 5 milliGRAM(s) Oral at bedtime PRN Insomnia                            13.5   11.0  )-----------( 245      ( 13 Sep 2017 07:58 )             40.3       09-13    142  |  107  |  9   ----------------------------<  97  3.5   |  27  |  1.35<H>    Ca    8.6      13 Sep 2017 08:18  Phos  3.0     09-13  Mg     1.7     09-13    TPro  8.6<H>  /  Alb  4.2  /  TBili  0.5  /  DBili  x   /  AST  23  /  ALT  28  /  AlkPhos  165<H>  09-11      PT/INR - ( 11 Sep 2017 17:47 )   PT: 11.1 sec;   INR: 1.02 ratio         PTT - ( 11 Sep 2017 17:47 )  PTT:38.5 sec

## 2017-09-13 NOTE — PROGRESS NOTE ADULT - SUBJECTIVE AND OBJECTIVE BOX
pt seen in icu [  ], reg med floor [   ], bed [  ], chair at bedside [   ]  Patient is a 44 y/o F lives with her sister from home ambulates with walker, current everyday smoker,  PMH Reflex symphatetic dystrophy s/p spinal stimulator, frequent falls, SLE (no treatment at this time), cyclical vomiting syndrome, Afib (not on AC due to frequent falls), HTN, depression, Hemorrhoids (EGD / colonoscopy 3-4 years ago, is due for a next one soon ) and partial thyroidectomy presented with c/o vomiting and diarrhea for 2 days a/w abdominal pain. As per patient she has cyclical vomiting syndrome and gets episode every year, last admission in June 2017. She has been vomiting for the last 2 days , constant, >10 times a day, watery , non bloody , non bilious. Unable to eat anything due to constant vomiting. She also c/o loose watery diarrhea, >5-6 times a day, NBNB, with generalized abdominal pain, in the middle of her abdomen, non radiating, worse with changing positions, improvement with MS contin and morphine, unrelated to food, describes the quality as sharp ache and intermittent, 7/10. Also has subjective fever with chills since yesterday. ROS negative except above, denies any recent travel, consumption of spoilt food, no one else is sick around her, except for herself  REVIEW OF SYSTEMS:    CONSTITUTIONAL: No weakness, fevers or chills  EYES/ENT: No visual changes;  No vertigo or throat pain   NECK: No pain or stiffness  RESPIRATORY: No cough, wheezing, hemoptysis; No shortness of breath  CARDIOVASCULAR: No chest pain or palpitations  GASTROINTESTINAL: + abdominal and epigastric pain. No nausea, vomiting, or hematemesis; No diarrhea or constipation. No melena or hematochezia.  GENITOURINARY: No dysuria, frequency or hematuria  NEUROLOGICAL: No numbness or weakness  SKIN: No itching, burning, rashes, or lesions   All other review of systems is negative unless indicated above.    Physical Exam    General: WN/WD NAD  Neurology: A&Ox3, nonfocal, SCOTT x 4  Respiratory: CTA B/L  CV: RRR, S1S2, no murmurs, rubs or gallops  Abdominal: Soft, diffuse abd pain  Extremities: No edema, + peripheral pulses      Allergies  aspirin (Unknown)  latex (Unknown)  sulfa drugs (Unknown)      Health Issues  Ulcerative chronic pancolitis without complications  HTN (hypertension)  Cyclical vomiting  Atrial fibrillation  SLE (systemic lupus erythematosus)  Reflex sympathetic dystrophy  H/O partial thyroidectomy  S/P partial hysterectomy      Vitals  T(F): 98.3 (09-13-17 @ 05:01), Max: 99.7 (09-12-17 @ 21:50)  HR: 65 (09-13-17 @ 05:01) (65 - 82)  BP: 121/82 (09-13-17 @ 05:01) (121/82 - 157/103)  RR: 16 (09-13-17 @ 05:01) (16 - 17)  SpO2: 98% (09-13-17 @ 05:01) (94% - 98%)  Wt(kg): --  CAPILLARY BLOOD GLUCOSE          Labs                          13.5   11.0  )-----------( 245      ( 13 Sep 2017 07:58 )             40.3       09-13    142  |  107  |  9   ----------------------------<  97  3.5   |  27  |  1.35<H>    Ca    8.6      13 Sep 2017 08:18  Phos  3.0     09-13  Mg     1.7     09-13    TPro  8.6<H>  /  Alb  4.2  /  TBili  0.5  /  DBili  x   /  AST  23  /  ALT  28  /  AlkPhos  165<H>  09-11            Radiology Results      Meds    MEDICATIONS  (STANDING):  pregabalin 300 milliGRAM(s) Oral two times a day  escitalopram 20 milliGRAM(s) Oral daily  clopidogrel Tablet 75 milliGRAM(s) Oral daily  metoprolol 25 milliGRAM(s) Oral two times a day  amLODIPine   Tablet 2.5 milliGRAM(s) Oral daily  pantoprazole    Tablet 40 milliGRAM(s) Oral before breakfast  nicotine - 21 mG/24Hr(s) Patch 1 patch Transdermal daily  enoxaparin Injectable 40 milliGRAM(s) SubCutaneous daily  ciprofloxacin   IVPB 400 milliGRAM(s) IV Intermittent every 12 hours  metroNIDAZOLE  IVPB 500 milliGRAM(s) IV Intermittent every 8 hours  influenza   Vaccine 0.5 milliLiter(s) IntraMuscular once  sodium chloride 0.9% Bolus 1000 milliLiter(s) IV Bolus once  lactated ringers. 1000 milliLiter(s) (125 mL/Hr) IV Continuous <Continuous>  morphine ER Tablet 60 milliGRAM(s) Oral every 12 hours  dronabinol 2.5 milliGRAM(s) Oral daily  sodium biphosphate Rectal Enema 1 Enema Rectal every 1 hour  metoclopramide 5 milliGRAM(s) Oral two times a day      MEDICATIONS  (PRN):  ondansetron Injectable 4 milliGRAM(s) IV Push every 6 hours PRN Nausea and/or Vomiting  acetaminophen   Tablet 650 milliGRAM(s) Oral every 6 hours PRN For Temp greater than 38 C (100.4 F)  oxyCODONE    5 mG/acetaminophen 325 mG 2 Tablet(s) Oral every 4 hours PRN Severe Pain (7 - 10)  zaleplon 5 milliGRAM(s) Oral at bedtime PRN Insomnia

## 2017-09-13 NOTE — PROGRESS NOTE ADULT - PROBLEM SELECTOR PLAN 2
Patient reports history of cyclic vomiting syndrome  Continue with Zofran PRN  Continue to monitor BMP and serum electrolytes

## 2017-09-13 NOTE — PROGRESS NOTE ADULT - SUBJECTIVE AND OBJECTIVE BOX
PGY 1 Note discussed with supervising resident and primary attending    Patient is a 46y old  Female who presents with a chief complaint of abdominal pain, N/V (12 Sep 2017 07:17)      INTERVAL HPI/OVERNIGHT EVENTS: offers no new complaints; current symptoms resolving    MEDICATIONS  (STANDING):  pregabalin 300 milliGRAM(s) Oral two times a day  escitalopram 20 milliGRAM(s) Oral daily  clopidogrel Tablet 75 milliGRAM(s) Oral daily  metoprolol 25 milliGRAM(s) Oral two times a day  amLODIPine   Tablet 2.5 milliGRAM(s) Oral daily  pantoprazole    Tablet 40 milliGRAM(s) Oral before breakfast  nicotine - 21 mG/24Hr(s) Patch 1 patch Transdermal daily  enoxaparin Injectable 40 milliGRAM(s) SubCutaneous daily  ciprofloxacin   IVPB 400 milliGRAM(s) IV Intermittent every 12 hours  metroNIDAZOLE  IVPB 500 milliGRAM(s) IV Intermittent every 8 hours  influenza   Vaccine 0.5 milliLiter(s) IntraMuscular once  sodium chloride 0.9% Bolus 1000 milliLiter(s) IV Bolus once  lactated ringers. 1000 milliLiter(s) (125 mL/Hr) IV Continuous <Continuous>  morphine ER Tablet 60 milliGRAM(s) Oral every 12 hours  dronabinol 2.5 milliGRAM(s) Oral daily    MEDICATIONS  (PRN):  ondansetron Injectable 4 milliGRAM(s) IV Push every 6 hours PRN Nausea and/or Vomiting  acetaminophen   Tablet 650 milliGRAM(s) Oral every 6 hours PRN For Temp greater than 38 C (100.4 F)  oxyCODONE    5 mG/acetaminophen 325 mG 2 Tablet(s) Oral every 4 hours PRN Severe Pain (7 - 10)  zaleplon 5 milliGRAM(s) Oral at bedtime PRN Insomnia      __________________________________________________  REVIEW OF SYSTEMS:  CONSTITUTIONAL: No fever  NECK: No pain or stiffness  RESPIRATORY: No cough; No shortness of breath  CARDIOVASCULAR: No chest pain, no palpitations  GASTROINTESTINAL: No pain. No constipation, nausea or vomiting; No diarrhea   NEUROLOGICAL: No headache or numbness, no tremors  MUSCULOSKELETAL: No joint pain, no muscle pain  GENITOURINARY: no dysuria, no frequency, no hesitancy  PSYCHIATRY: no depression , no anxiety  ALL OTHER  ROS negative        Vital Signs Last 24 Hrs  T(C): 37.6 (12 Sep 2017 21:50), Max: 38.1 (12 Sep 2017 07:37)  T(F): 99.7 (12 Sep 2017 21:50), Max: 100.6 (12 Sep 2017 09:36)  HR: 81 (12 Sep 2017 21:50) (78 - 86)  BP: 123/89 (12 Sep 2017 21:50) (123/89 - 157/103)  BP(mean): --  RR: 17 (12 Sep 2017 21:50) (16 - 17)  SpO2: 94% (12 Sep 2017 21:50) (94% - 99%)    ________________________________________________  PHYSICAL EXAM:  GENERAL: NAD, lying in bed  HEENT: Normocephalic;  conjunctivae and sclerae clear; moist mucous membranes  NECK : supple, trachea midline, no JVD  CHEST/LUNG: Clear to auscultation bilaterally with good air entry   HEART: S1 S2,  regular rate and rhythm,; no murmurs, gallops or rubs  ABDOMEN: Soft, Nontender, Nondistended; Bowel sounds present  EXTREMITIES: no cyanosis; no edema; no calf tenderness  SKIN: warm and dry; no rash  NERVOUS SYSTEM:  AAOx3; no new focal deficits    _________________________________________________  LABS:                        15.5   13.6  )-----------( 286      ( 12 Sep 2017 08:23 )             47.3     09-12    140  |  105  |  6<L>  ----------------------------<  117<H>  3.5   |  25  |  1.29    Ca    9.2      12 Sep 2017 08:23  Phos  2.7     -12  Mg     1.6     09-12    TPro  8.6<H>  /  Alb  4.2  /  TBili  0.5  /  DBili  x   /  AST  23  /  ALT  28  /  AlkPhos  165<H>      PT/INR - ( 11 Sep 2017 17:47 )   PT: 11.1 sec;   INR: 1.02 ratio         PTT - ( 11 Sep 2017 17:47 )  PTT:38.5 sec  Urinalysis Basic - ( 11 Sep 2017 17:16 )    Color: Yellow / Appearance: Clear / S.010 / pH: x  Gluc: x / Ketone: Small  / Bili: Negative / Urobili: Negative   Blood: x / Protein: 100 / Nitrite: Negative   Leuk Esterase: Negative / RBC: 2-5 /HPF / WBC 3-5 /HPF   Sq Epi: x / Non Sq Epi: Few / Bacteria: Moderate /HPF      CAPILLARY BLOOD GLUCOSE          RADIOLOGY & ADDITIONAL TESTS:    Imaging Personally Reviewed:  YES/NO    Consultant(s) Notes Reviewed:   YES/ NO    Care Discussed with Consultants:     Plan of care was discussed with patient and/or primary care giver; all questions and concerns were addressed and care was aligned with patient's wishes. PGY 1 Note discussed with supervising resident and primary attending    Patient is a 46y old  Female who presents with a chief complaint of abdominal pain, N/V (12 Sep 2017 07:17)      INTERVAL HPI/OVERNIGHT EVENTS: offers no new complaints; current symptoms resolving    MEDICATIONS  (STANDING):  pregabalin 300 milliGRAM(s) Oral two times a day  escitalopram 20 milliGRAM(s) Oral daily  clopidogrel Tablet 75 milliGRAM(s) Oral daily  metoprolol 25 milliGRAM(s) Oral two times a day  amLODIPine   Tablet 2.5 milliGRAM(s) Oral daily  pantoprazole    Tablet 40 milliGRAM(s) Oral before breakfast  nicotine - 21 mG/24Hr(s) Patch 1 patch Transdermal daily  enoxaparin Injectable 40 milliGRAM(s) SubCutaneous daily  ciprofloxacin   IVPB 400 milliGRAM(s) IV Intermittent every 12 hours  metroNIDAZOLE  IVPB 500 milliGRAM(s) IV Intermittent every 8 hours  influenza   Vaccine 0.5 milliLiter(s) IntraMuscular once  sodium chloride 0.9% Bolus 1000 milliLiter(s) IV Bolus once  lactated ringers. 1000 milliLiter(s) (125 mL/Hr) IV Continuous <Continuous>  morphine ER Tablet 60 milliGRAM(s) Oral every 12 hours  dronabinol 2.5 milliGRAM(s) Oral daily    MEDICATIONS  (PRN):  ondansetron Injectable 4 milliGRAM(s) IV Push every 6 hours PRN Nausea and/or Vomiting  acetaminophen   Tablet 650 milliGRAM(s) Oral every 6 hours PRN For Temp greater than 38 C (100.4 F)  oxyCODONE    5 mG/acetaminophen 325 mG 2 Tablet(s) Oral every 4 hours PRN Severe Pain (7 - 10)  zaleplon 5 milliGRAM(s) Oral at bedtime PRN Insomnia      __________________________________________________  REVIEW OF SYSTEMS:  CONSTITUTIONAL: No fever  RESPIRATORY: No cough; No shortness of breath  CARDIOVASCULAR: No chest pain, no palpitations  GASTROINTESTINAL: reports abdominal pain, but improving. Complains of no more diarrhea and vomiting, but does report nausea   NEUROLOGICAL: No headache or numbness, no tremors  MUSCULOSKELETAL: No joint pain, no muscle pain  ALL OTHER  ROS negative        Vital Signs Last 24 Hrs  T(C): 37.6 (12 Sep 2017 21:50), Max: 38.1 (12 Sep 2017 07:37)  T(F): 99.7 (12 Sep 2017 21:50), Max: 100.6 (12 Sep 2017 09:36)  HR: 81 (12 Sep 2017 21:50) (78 - 86)  BP: 123/89 (12 Sep 2017 21:50) (123/89 - 157/103)  BP(mean): --  RR: 17 (12 Sep 2017 21:50) (16 - 17)  SpO2: 94% (12 Sep 2017 21:50) (94% - 99%)    ________________________________________________  PHYSICAL EXAM:  GENERAL: NAD, lying in bed  HEENT: Normocephalic;  conjunctivae and sclerae clear; moist mucous membranes  CHEST/LUNG: Clear to auscultation bilaterally with good air entry   HEART: S1 S2,  regular rate and rhythm,; no murmurs, gallops or rubs  ABDOMEN: Soft, Non-distended, Diffusely tender to deep palpation without guarding or rebound, Nondistended; Bowel sounds present  NERVOUS SYSTEM:  AAOx3; no new focal deficits    _________________________________________________  LABS:                        15.5   13.6  )-----------( 286      ( 12 Sep 2017 08:23 )             47.3     09-12    140  |  105  |  6<L>  ----------------------------<  117<H>  3.5   |  25  |  1.29    Ca    9.2      12 Sep 2017 08:23  Phos  2.7     09-12  Mg     1.6     09-12    TPro  8.6<H>  /  Alb  4.2  /  TBili  0.5  /  DBili  x   /  AST  23  /  ALT  28  /  AlkPhos  165<H>  09-11    PT/INR - ( 11 Sep 2017 17:47 )   PT: 11.1 sec;   INR: 1.02 ratio         PTT - ( 11 Sep 2017 17:47 )  PTT:38.5 sec  Urinalysis Basic - ( 11 Sep 2017 17:16 )    Color: Yellow / Appearance: Clear / S.010 / pH: x  Gluc: x / Ketone: Small  / Bili: Negative / Urobili: Negative   Blood: x / Protein: 100 / Nitrite: Negative   Leuk Esterase: Negative / RBC: 2-5 /HPF / WBC 3-5 /HPF   Sq Epi: x / Non Sq Epi: Few / Bacteria: Moderate /HPF      CAPILLARY BLOOD GLUCOSE          RADIOLOGY & ADDITIONAL TESTS:    Imaging Personally Reviewed:  YES/NO    Consultant(s) Notes Reviewed:   YES/ NO    Care Discussed with Consultants:     Plan of care was discussed with patient and/or primary care giver; all questions and concerns were addressed and care was aligned with patient's wishes.

## 2017-09-13 NOTE — PROGRESS NOTE ADULT - SUBJECTIVE AND OBJECTIVE BOX
Chief Complaint: chronic back and bilateral leg pain    HPI:   46y Female lying in bed, NAD.  Pt complaining of abdominal pain and nausea.  Vomiting is better.  Abdominal pain is still present.  Pt states that she has diarrhea from being impacted.  +history of chronic pain, rsd.  Pt takes THC, ms contin, percocet, lyrica and oxycodone at home.        PAIN SCORE:   5/10      SCALE USED: (1-10 VNRS)    Allergies    aspirin (Unknown)  latex (Unknown)  sulfa drugs (Unknown)    Intolerances      MEDICATIONS  (STANDING):  pregabalin 300 milliGRAM(s) Oral two times a day  escitalopram 20 milliGRAM(s) Oral daily  clopidogrel Tablet 75 milliGRAM(s) Oral daily  metoprolol 25 milliGRAM(s) Oral two times a day  amLODIPine   Tablet 2.5 milliGRAM(s) Oral daily  pantoprazole    Tablet 40 milliGRAM(s) Oral before breakfast  nicotine - 21 mG/24Hr(s) Patch 1 patch Transdermal daily  enoxaparin Injectable 40 milliGRAM(s) SubCutaneous daily  ciprofloxacin   IVPB 400 milliGRAM(s) IV Intermittent every 12 hours  metroNIDAZOLE  IVPB 500 milliGRAM(s) IV Intermittent every 8 hours  influenza   Vaccine 0.5 milliLiter(s) IntraMuscular once  sodium chloride 0.9% Bolus 1000 milliLiter(s) IV Bolus once  lactated ringers. 1000 milliLiter(s) (125 mL/Hr) IV Continuous <Continuous>  morphine ER Tablet 60 milliGRAM(s) Oral every 12 hours  dronabinol 2.5 milliGRAM(s) Oral daily  sodium biphosphate Rectal Enema 1 Enema Rectal every 1 hour  metoclopramide 5 milliGRAM(s) Oral two times a day    MEDICATIONS  (PRN):  ondansetron Injectable 4 milliGRAM(s) IV Push every 6 hours PRN Nausea and/or Vomiting  acetaminophen   Tablet 650 milliGRAM(s) Oral every 6 hours PRN For Temp greater than 38 C (100.4 F)  oxyCODONE    5 mG/acetaminophen 325 mG 2 Tablet(s) Oral every 4 hours PRN Severe Pain (7 - 10)  zaleplon 5 milliGRAM(s) Oral at bedtime PRN Insomnia      PHYSICAL EXAM:    Vital Signs Last 24 Hrs  T(C): 36.8 (13 Sep 2017 05:01), Max: 37.6 (12 Sep 2017 21:50)  T(F): 98.3 (13 Sep 2017 05:01), Max: 99.7 (12 Sep 2017 21:50)  HR: 65 (13 Sep 2017 05:01) (65 - 82)  BP: 121/82 (13 Sep 2017 05:01) (121/82 - 157/103)  BP(mean): --  RR: 16 (13 Sep 2017 05:01) (16 - 17)  SpO2: 98% (13 Sep 2017 05:01) (94% - 98%)             LABS:                          13.5   11.0  )-----------( 245      ( 13 Sep 2017 07:58 )             40.3     -13    142  |  107  |  9   ----------------------------<  97  3.5   |  27  |  1.35<H>    Ca    8.6      13 Sep 2017 08:18  Phos  3.0     -  Mg     1.7         TPro  8.6<H>  /  Alb  4.2  /  TBili  0.5  /  DBili  x   /  AST  23  /  ALT  28  /  AlkPhos  165<H>  0911    PT/INR - ( 11 Sep 2017 17:47 )   PT: 11.1 sec;   INR: 1.02 ratio         PTT - ( 11 Sep 2017 17:47 )  PTT:38.5 sec  Urinalysis Basic - ( 11 Sep 2017 17:16 )    Color: Yellow / Appearance: Clear / S.010 / pH: x  Gluc: x / Ketone: Small  / Bili: Negative / Urobili: Negative   Blood: x / Protein: 100 / Nitrite: Negative   Leuk Esterase: Negative / RBC: 2-5 /HPF / WBC 3-5 /HPF   Sq Epi: x / Non Sq Epi: Few / Bacteria: Moderate /HPF        Drug Screen:        RADIOLOGY:

## 2017-09-13 NOTE — PROGRESS NOTE ADULT - PROBLEM SELECTOR PLAN 3
Patient takes multiple analgesic medications at home including morphine, MS contin, and percocet and has spinal neurostimulator in place  Pain management recommends ms contin 60mg q12, percocet 2 tabs q4 PRN, lyrica 300 BID

## 2017-09-13 NOTE — PROGRESS NOTE ADULT - PROBLEM SELECTOR PLAN 1
- no nsaids due to ckd  - ms contin 60mg po 2x a day  - percocet po prn  - marinol 2.5mg po daily - can increase to 5mg if needed  - lyrica 300mg po q 12

## 2017-09-13 NOTE — PROGRESS NOTE ADULT - SUBJECTIVE AND OBJECTIVE BOX
Meds:  ciprofloxacin   IVPB 400 milliGRAM(s) IV Intermittent every 12 hours  metroNIDAZOLE  IVPB 500 milliGRAM(s) IV Intermittent every 8 hours    Allergies:  Allergies    aspirin (Unknown)  latex (Unknown)  sulfa drugs (Unknown)    Intolerances    ROS  [  ] UNABLE TO ELICIT    General:  [  ] None  [  ] Fever  [  ] Chills  [ x ] Malaise    Skin:  [ x ] None [  ] Rash  [  ] Wound  [  ] Ulcer    HEENT:  [ x ] None  [  ] Sore Throat  [  ] Nasal congestion/ runny nose  [  ] Photophobia [  ] Neck pain      Chest:  [ x ] None   [  ] SOB  [  ] Cough  [  ] None    Cardiovascular:   [ x ] None  [  ] CP  [  ] Palpitation    Gastrointestinal:  [  ] None  [ x ] Abd pain    [ x ] Nausea    [ x ] Vomiting   [  ] Diarrhea	     Genitourinary:  [ x ] None [  ] Polyuria   [  ] Urgency  [  ] Frequency  [  ] Dysuria    [  ]  Hematuria       Musculoskeletal:  [  ] None [  ] Back Pain	[  ] Body aches  [  ] Joint pain    Neurological: [  ] None [  ]Dizziness  [  ]Visual Disturbance  [  ]Headaches   [ x ] Weakness          PHYSICAL EXAM:    Vital Signs Last 24 Hrs  T(C): 36.8 (13 Sep 2017 05:01), Max: 37.6 (12 Sep 2017 21:50)  T(F): 98.3 (13 Sep 2017 05:01), Max: 99.7 (12 Sep 2017 21:50)  HR: 65 (13 Sep 2017 05:01) (65 - 82)  BP: 121/82 (13 Sep 2017 05:01) (121/82 - 157/103)  BP(mean): --  RR: 16 (13 Sep 2017 05:01) (16 - 17)  SpO2: 98% (13 Sep 2017 05:01) (94% - 98%)      Constitutional:    HEENT: [ x ] Wnl  [  ] Pharyngeal congestion    Neck:  [ x ] Supple  [  ]Lymphadenopathy  [  ] No JVD   [  ] JVD  [  ] Masses   [  ] WNL    CHEST/Respiratory:  [ x ]Clear to auscultation  [  ] Rales   [  ] Rhonchi   [  ] Wheezing     [  ] Chest Tenderness      Cardiovascular:  [ x ] Reg S1 S2   [  ] Irreg S1 S2   [ x ]No Murmur  [  ] +ve Murmurs  [  ]Systolic [  ]Diastolic      Abdomen:  [ x ] Soft  [  ] No tendrerness  [  ] diffuse and generalized Tenderness  [  ] Organomegaly  [  ] ABD Distention  [  ] Rigidity                       [ x ] No Regidity                       [ x ] No Rebound Tenderness  [ x ] No Guarding Rigidity  [  ] Rebound Tenderness[  ] Guarding Rigidity                          [x  ]  +ve Bowel Sounds  [  ] Decreased Bowel Sounds    [  ] Absent Bowel Sounds                            Extremities: [ x ] No edema [  ] Edema  [  ] Clubbing   [  ] Cyanosis                         [ x ] No Tender Calf muscles  [  ] Tender Calf muscles                        [ x ] Palpable peripheral pulses    Neurological: [ x ] Awake  [ x ] Alert  [ x ] Oriented  x  3                           [  ] Confused  [  ] Drowzy  [  ] repond to painful stimuli  [  ] Unresponsive    Skin:  [ x ] Intact [  ] Redness [  ] Thrombophlebitis  [  ] Rashes  [  ] Dry  [  ] Ulcers    Ortho:  [  ] Joint Swelling  [  ] Joint erythema [  ] Joint tenderness                [  ] Increased temp. to touch  [ x ] WNL         LABS/DIAGNOSTIC TESTS                          13.5   11.0  )-----------( 245      ( 13 Sep 2017 07:58 )             40.3         09-13    142  |  107  |  9   ----------------------------<  97  3.5   |  27  |  1.35<H>    Ca    8.6      13 Sep 2017 08:18  Phos  3.0     09-13  Mg     1.7     09-13    TPro  8.6<H>  /  Alb  4.2  /  TBili  0.5  /  DBili  x   /  AST  23  /  ALT  28  /  AlkPhos  165<H>  09-11      LIVER FUNCTIONS - ( 12 Sep 2017 09:41 )  Alb: x     / Pro: x     / ALK PHOS: x     / ALT: x     / AST: x     / GGT: 35 U/L           CULTURES:   Culture - Blood (09.12.17 @ 09:53)    Specimen Source: .Blood Blood-Peripheral    Culture Results:   No growth to date.    Culture - Blood (09.12.17 @ 09:53)    Specimen Source: .Blood Blood-Peripheral    Culture Results:   No growth to date.    Culture - Urine (09.12.17 @ 00:22)    Specimen Source: .Urine Clean Catch (Midstream)    Culture Results:   10,000 - 49,000 CFU/mL Coag negative Staphylococcus not Staph  saprophyticus        CXR:      EXAM:  CT ABDOMEN AND PELVIS OC IC                            PROCEDURE DATE:  09/11/2017          INTERPRETATION:  CLINICAL HISTORY:  Diffuse abdominal pain; evaluate for   small bowel obstruction.    Multiple axial images of the abdomen and pelvis were obtained from the   lung bases through pubic symphysis with the administration of oral   contrast. 90 cc of Omnipaque 350 was administered intravenously without   complication. Reformatted coronal and sagittal images are submitted.    COMPARISON: June 11, 2017    FINDINGS:    The liver is normal in size. A less than 5 mm hypodense focus is   identified within the posterior segment of the right hepatic lobe, too   small to characterize. There is no evidence for intrahepatic or   extrahepaticbiliary dilatation. No gallstones, gallbladder wall   thickening or pericholecystic fluid identified.    The spleen and adrenal glands are unremarkable. No space-occupying   lesions of the pancreatic parenchyma identified. Mild pancreatic ductal   prominence is noted.    There is no evidence for bilateral hydronephrosis or renal calculi. No   space-occupying lesions of the right kidney are noted. A 5 mm hypodense   focus is identified within the mid to lower pole aspect of the left   kidney, too small to characterize. The abdominal aorta is normal in   course and caliber. No abnormally enlarged retroperitoneal or pelvic   lymphadenopathy is noted.    Fecal material is scattered throughout the colon. There is no evidence   for mechanical bowelobstruction. There is no evidence for acute   appendicitis. There is no evidence for free intraperitoneal air or fluid.   Oral contrast material is not identified within the colonic lumen;   however, mild pancolonic wall thickening is suggested, withmild   infiltration of the paracolic fat.    The uterus is not visualized. The urinary bladder appears unremarkable.     Imaging of the lung bases and osseous structures appears unremarkable.   Note is made of an indwelling neurostimulator device.    IMPRESSION:  No evidence for mechanical bowel obstruction. Oral contrast   material is not identified within the colonic lumen; however, mild   pancolonic wall thickening is suggested, with mild infiltration of the   paracolic fat. Correlate clinically. No free intraperineal air or fluid   identified.      Assessment and Recommendation:   · Assessment		  Patient is a 46 y/o F lives with her sister from home ambulates with walker, current everyday smoker,  PMH Reflex symphatetic dystrophy s/p spinal stimulator, frequent falls, SLE (no treatment at this time), cyclical vomiting syndrome, Afib (not on AC due to frequent falls), HTN, depression, Hemorrhoids (EGD / colonoscopy 3-4 years ago, is due for a next one soon ) and partial thyroidectomy presented with c/o vomiting and diarrhea for 2 days a/w abdominal pain.  Patient was admitted for colitis ans was started on IV Cipro and Flagyl.  Patient feels better.    Problem/Recommendation - 1:  Problem: Pancolitis.   Recommendation:   1- UA & CS suggest for contamination.  2- Follow Blood culture to final report.  3- GI evaluation and management.  4- Continue Cipro and Flagyl.  5- Fluid and electrolytes management.  6- CBC and BMP follow up.  7- stool for ova and parasites.  8- Stool for culture.  9- stool for CDT.  10- CBC & BMP follow up. 1- UA & CS.  2- Blood culture.  3- GI evaluation and management.  4- Continue Cipro and Flagyl.  5- Fluid and electrolytes management.  6- CBC and BMP follow up.      Problem/Recommendation - 2:  ·  Problem: HTN (hypertension).    Recommendation:   1- Monitor Blood pressure closely.  2- Blood pressure control.  3- BP. meds as per cardiology and primary care team.         Discussed with medical resident

## 2017-09-13 NOTE — PROGRESS NOTE ADULT - PROBLEM SELECTOR PLAN 1
Patient c/o N/V/D with fever x 2 days  CT Abdomen/Pelvis revealed pancolitis  CBC revealed mild leukocytosis with left shift  Continue with IVF and bowel rest  Continue with ciprofloxacin and flagyl  Continue to monitor BMP for electrolyte disturbances   Follow up blood cultures, stool cultures, and C Dif PCR  ID Dr Artur Wylie Patient c/o N/V/D with fever x 2 days  CT Abdomen/Pelvis revealed pancolitis  CBC revealed mild leukocytosis with left shift  Continue with IVF; will advance diet to clears  Continue with ciprofloxacin and flagyl  Continue to monitor BMP for electrolyte disturbances   Urine cultures revealed staph aureus, likely secondary to contamination  Follow up stool cultures, and C Dif PCR  Will give fleet enema x 2 followed by MgCitrate for suspected impaction  ID Dr Artur Wylie Patient c/o N/V/D with fever x 2 days; vomiting and diarrhea currently resolved  CT Abdomen/Pelvis revealed pancolitis  CBC revealed mild leukocytosis with left shift  Continue with IVF; will advance diet to clears  Continue with ciprofloxacin and flagyl  Continue to monitor BMP for electrolyte disturbances   Urine cultures revealed staph aureus, likely secondary to contamination  Follow up stool cultures, and C Dif PCR  Will give fleet enema x 2 followed by MgCitrate for suspected impaction  ID Dr Artur Wylie

## 2017-09-14 LAB
ANION GAP SERPL CALC-SCNC: 7 MMOL/L — SIGNIFICANT CHANGE UP (ref 5–17)
BUN SERPL-MCNC: 8 MG/DL — SIGNIFICANT CHANGE UP (ref 7–18)
CALCIUM SERPL-MCNC: 8.6 MG/DL — SIGNIFICANT CHANGE UP (ref 8.4–10.5)
CHLORIDE SERPL-SCNC: 106 MMOL/L — SIGNIFICANT CHANGE UP (ref 96–108)
CO2 SERPL-SCNC: 28 MMOL/L — SIGNIFICANT CHANGE UP (ref 22–31)
CREAT SERPL-MCNC: 1.24 MG/DL — SIGNIFICANT CHANGE UP (ref 0.5–1.3)
GLUCOSE SERPL-MCNC: 95 MG/DL — SIGNIFICANT CHANGE UP (ref 70–99)
HCT VFR BLD CALC: 41 % — SIGNIFICANT CHANGE UP (ref 34.5–45)
HGB BLD-MCNC: 13.3 G/DL — SIGNIFICANT CHANGE UP (ref 11.5–15.5)
MAGNESIUM SERPL-MCNC: 2 MG/DL — SIGNIFICANT CHANGE UP (ref 1.6–2.6)
MCHC RBC-ENTMCNC: 29.6 PG — SIGNIFICANT CHANGE UP (ref 27–34)
MCHC RBC-ENTMCNC: 32.6 GM/DL — SIGNIFICANT CHANGE UP (ref 32–36)
MCV RBC AUTO: 90.8 FL — SIGNIFICANT CHANGE UP (ref 80–100)
PHOSPHATE SERPL-MCNC: 4.2 MG/DL — SIGNIFICANT CHANGE UP (ref 2.5–4.5)
PLATELET # BLD AUTO: 228 K/UL — SIGNIFICANT CHANGE UP (ref 150–400)
POTASSIUM SERPL-MCNC: 3.2 MMOL/L — LOW (ref 3.5–5.3)
POTASSIUM SERPL-SCNC: 3.2 MMOL/L — LOW (ref 3.5–5.3)
RBC # BLD: 4.51 M/UL — SIGNIFICANT CHANGE UP (ref 3.8–5.2)
RBC # FLD: 13 % — SIGNIFICANT CHANGE UP (ref 10.3–14.5)
SODIUM SERPL-SCNC: 141 MMOL/L — SIGNIFICANT CHANGE UP (ref 135–145)
WBC # BLD: 7.6 K/UL — SIGNIFICANT CHANGE UP (ref 3.8–10.5)
WBC # FLD AUTO: 7.6 K/UL — SIGNIFICANT CHANGE UP (ref 3.8–10.5)

## 2017-09-14 RX ORDER — SODIUM CHLORIDE 9 MG/ML
1000 INJECTION, SOLUTION INTRAVENOUS
Qty: 0 | Refills: 0 | Status: DISCONTINUED | OUTPATIENT
Start: 2017-09-14 | End: 2017-09-21

## 2017-09-14 RX ORDER — POTASSIUM CHLORIDE 20 MEQ
40 PACKET (EA) ORAL ONCE
Qty: 0 | Refills: 0 | Status: COMPLETED | OUTPATIENT
Start: 2017-09-14 | End: 2017-09-14

## 2017-09-14 RX ORDER — ERGOCALCIFEROL 1.25 MG/1
50000 CAPSULE ORAL
Qty: 0 | Refills: 0 | Status: DISCONTINUED | OUTPATIENT
Start: 2017-09-14 | End: 2017-09-21

## 2017-09-14 RX ADMIN — Medication 200 MILLIGRAM(S): at 05:30

## 2017-09-14 RX ADMIN — ESCITALOPRAM OXALATE 20 MILLIGRAM(S): 10 TABLET, FILM COATED ORAL at 12:28

## 2017-09-14 RX ADMIN — Medication 40 MILLIEQUIVALENT(S): at 12:28

## 2017-09-14 RX ADMIN — SODIUM CHLORIDE 125 MILLILITER(S): 9 INJECTION, SOLUTION INTRAVENOUS at 18:39

## 2017-09-14 RX ADMIN — ENOXAPARIN SODIUM 40 MILLIGRAM(S): 100 INJECTION SUBCUTANEOUS at 12:27

## 2017-09-14 RX ADMIN — Medication 2.5 MILLIGRAM(S): at 16:16

## 2017-09-14 RX ADMIN — Medication 100 MILLIGRAM(S): at 07:18

## 2017-09-14 RX ADMIN — Medication 300 MILLIGRAM(S): at 18:38

## 2017-09-14 RX ADMIN — OXYCODONE AND ACETAMINOPHEN 2 TABLET(S): 5; 325 TABLET ORAL at 20:51

## 2017-09-14 RX ADMIN — CLOPIDOGREL BISULFATE 75 MILLIGRAM(S): 75 TABLET, FILM COATED ORAL at 12:28

## 2017-09-14 RX ADMIN — Medication 100 MILLIGRAM(S): at 15:35

## 2017-09-14 RX ADMIN — OXYCODONE AND ACETAMINOPHEN 2 TABLET(S): 5; 325 TABLET ORAL at 03:39

## 2017-09-14 RX ADMIN — Medication 5 MILLIGRAM(S): at 18:40

## 2017-09-14 RX ADMIN — OXYCODONE AND ACETAMINOPHEN 2 TABLET(S): 5; 325 TABLET ORAL at 22:00

## 2017-09-14 RX ADMIN — MORPHINE SULFATE 60 MILLIGRAM(S): 50 CAPSULE, EXTENDED RELEASE ORAL at 19:32

## 2017-09-14 RX ADMIN — ERGOCALCIFEROL 50000 UNIT(S): 1.25 CAPSULE ORAL at 15:35

## 2017-09-14 RX ADMIN — PANTOPRAZOLE SODIUM 40 MILLIGRAM(S): 20 TABLET, DELAYED RELEASE ORAL at 05:31

## 2017-09-14 RX ADMIN — Medication 1 PATCH: at 13:40

## 2017-09-14 RX ADMIN — OXYCODONE AND ACETAMINOPHEN 2 TABLET(S): 5; 325 TABLET ORAL at 16:19

## 2017-09-14 RX ADMIN — Medication 5 MILLIGRAM(S): at 05:31

## 2017-09-14 RX ADMIN — Medication 200 MILLIGRAM(S): at 18:38

## 2017-09-14 RX ADMIN — OXYCODONE AND ACETAMINOPHEN 2 TABLET(S): 5; 325 TABLET ORAL at 14:55

## 2017-09-14 RX ADMIN — MORPHINE SULFATE 60 MILLIGRAM(S): 50 CAPSULE, EXTENDED RELEASE ORAL at 18:45

## 2017-09-14 RX ADMIN — Medication 1 PATCH: at 12:28

## 2017-09-14 RX ADMIN — Medication 100 MILLIGRAM(S): at 23:00

## 2017-09-14 RX ADMIN — OXYCODONE AND ACETAMINOPHEN 2 TABLET(S): 5; 325 TABLET ORAL at 00:00

## 2017-09-14 RX ADMIN — MORPHINE SULFATE 60 MILLIGRAM(S): 50 CAPSULE, EXTENDED RELEASE ORAL at 06:45

## 2017-09-14 RX ADMIN — Medication 300 MILLIGRAM(S): at 05:31

## 2017-09-14 RX ADMIN — OXYCODONE AND ACETAMINOPHEN 2 TABLET(S): 5; 325 TABLET ORAL at 02:59

## 2017-09-14 RX ADMIN — MORPHINE SULFATE 60 MILLIGRAM(S): 50 CAPSULE, EXTENDED RELEASE ORAL at 05:31

## 2017-09-14 NOTE — PROGRESS NOTE ADULT - PROBLEM SELECTOR PLAN 6
Stable; patient currently takes no medications  F/U anti-ds DNA Stable; patient currently takes no medications  Anti-dsDNA < 12

## 2017-09-14 NOTE — DIETITIAN INITIAL EVALUATION ADULT. - PROBLEM SELECTOR PLAN 1
Patient with N/V/D/abdominal pain, fever, pan colitis on CT A/P, left shift on CBC  qSOFA:0, Lactate elevated to 2.5, given 2 L NS in the ED  recheck lactate, c/w IVF, bowel rest  cipro/flagyl  Check BCx  f/up stool studies, will send Cdiff  GI Dr Wylie  ID Dr Parada

## 2017-09-14 NOTE — PROGRESS NOTE ADULT - SUBJECTIVE AND OBJECTIVE BOX
PGY 1 Note discussed with supervising resident and primary attending    Patient is a 46y old  Female who presents with a chief complaint of abdominal pain, N/V (12 Sep 2017 07:17)      INTERVAL HPI/OVERNIGHT EVENTS: offers no new complaints; current symptoms resolving    MEDICATIONS  (STANDING):  pregabalin 300 milliGRAM(s) Oral two times a day  escitalopram 20 milliGRAM(s) Oral daily  clopidogrel Tablet 75 milliGRAM(s) Oral daily  metoprolol 25 milliGRAM(s) Oral two times a day  amLODIPine   Tablet 2.5 milliGRAM(s) Oral daily  pantoprazole    Tablet 40 milliGRAM(s) Oral before breakfast  nicotine - 21 mG/24Hr(s) Patch 1 patch Transdermal daily  enoxaparin Injectable 40 milliGRAM(s) SubCutaneous daily  ciprofloxacin   IVPB 400 milliGRAM(s) IV Intermittent every 12 hours  metroNIDAZOLE  IVPB 500 milliGRAM(s) IV Intermittent every 8 hours  influenza   Vaccine 0.5 milliLiter(s) IntraMuscular once  sodium chloride 0.9% Bolus 1000 milliLiter(s) IV Bolus once  lactated ringers. 1000 milliLiter(s) (125 mL/Hr) IV Continuous <Continuous>  morphine ER Tablet 60 milliGRAM(s) Oral every 12 hours  dronabinol 2.5 milliGRAM(s) Oral daily  metoclopramide 5 milliGRAM(s) Oral two times a day    MEDICATIONS  (PRN):  ondansetron Injectable 4 milliGRAM(s) IV Push every 6 hours PRN Nausea and/or Vomiting  acetaminophen   Tablet 650 milliGRAM(s) Oral every 6 hours PRN For Temp greater than 38 C (100.4 F)  oxyCODONE    5 mG/acetaminophen 325 mG 2 Tablet(s) Oral every 4 hours PRN Severe Pain (7 - 10)  zaleplon 5 milliGRAM(s) Oral at bedtime PRN Insomnia      __________________________________________________  REVIEW OF SYSTEMS:  CONSTITUTIONAL: No fever  NECK: No pain or stiffness  RESPIRATORY: No cough; No shortness of breath  CARDIOVASCULAR: No chest pain, no palpitations  GASTROINTESTINAL: No pain. No constipation, nausea or vomiting; No diarrhea   NEUROLOGICAL: No headache or numbness, no tremors  MUSCULOSKELETAL: No joint pain, no muscle pain  GENITOURINARY: no dysuria, no frequency, no hesitancy  PSYCHIATRY: no depression , no anxiety  ALL OTHER  ROS negative        Vital Signs Last 24 Hrs  T(C): 36.8 (14 Sep 2017 05:30), Max: 36.9 (13 Sep 2017 15:00)  T(F): 98.3 (14 Sep 2017 05:30), Max: 98.5 (13 Sep 2017 15:00)  HR: 60 (14 Sep 2017 05:30) (60 - 74)  BP: 103/51 (14 Sep 2017 05:30) (103/51 - 120/85)  BP(mean): --  RR: 16 (14 Sep 2017 05:30) (16 - 16)  SpO2: 100% (14 Sep 2017 05:30) (97% - 100%)    ________________________________________________  PHYSICAL EXAM:  GENERAL: NAD, lying in bed  HEENT: Normocephalic;  conjunctivae and sclerae clear; moist mucous membranes  NECK : supple, trachea midline, no JVD  CHEST/LUNG: Clear to auscultation bilaterally with good air entry   HEART: S1 S2,  regular rate and rhythm,; no murmurs, gallops or rubs  ABDOMEN: Soft, Nontender, Nondistended; Bowel sounds present  EXTREMITIES: no cyanosis; no edema; no calf tenderness  SKIN: warm and dry; no rash  NERVOUS SYSTEM:  AAOx3; no new focal deficits    _________________________________________________  LABS:                        13.5   11.0  )-----------( 245      ( 13 Sep 2017 07:58 )             40.3     09-13    142  |  107  |  9   ----------------------------<  97  3.5   |  27  |  1.35<H>    Ca    8.6      13 Sep 2017 08:18  Phos  3.0     09-13  Mg     1.7     09-13          CAPILLARY BLOOD GLUCOSE          RADIOLOGY & ADDITIONAL TESTS:    Imaging Personally Reviewed:  YES/NO    Consultant(s) Notes Reviewed:   YES/ NO    Care Discussed with Consultants:     Plan of care was discussed with patient and/or primary care giver; all questions and concerns were addressed and care was aligned with patient's wishes. PGY 1 Note discussed with supervising resident and primary attending    Patient is a 46y old  Female who presents with a chief complaint of abdominal pain, N/V (12 Sep 2017 07:17)      INTERVAL HPI/OVERNIGHT EVENTS: reports reports several episodes of diarrhea overnight, but improved nausea and abdominal pain. She also is complaining of left-sided neck pain    MEDICATIONS  (STANDING):  pregabalin 300 milliGRAM(s) Oral two times a day  escitalopram 20 milliGRAM(s) Oral daily  clopidogrel Tablet 75 milliGRAM(s) Oral daily  metoprolol 25 milliGRAM(s) Oral two times a day  amLODIPine   Tablet 2.5 milliGRAM(s) Oral daily  pantoprazole    Tablet 40 milliGRAM(s) Oral before breakfast  nicotine - 21 mG/24Hr(s) Patch 1 patch Transdermal daily  enoxaparin Injectable 40 milliGRAM(s) SubCutaneous daily  ciprofloxacin   IVPB 400 milliGRAM(s) IV Intermittent every 12 hours  metroNIDAZOLE  IVPB 500 milliGRAM(s) IV Intermittent every 8 hours  influenza   Vaccine 0.5 milliLiter(s) IntraMuscular once  sodium chloride 0.9% Bolus 1000 milliLiter(s) IV Bolus once  lactated ringers. 1000 milliLiter(s) (125 mL/Hr) IV Continuous <Continuous>  morphine ER Tablet 60 milliGRAM(s) Oral every 12 hours  dronabinol 2.5 milliGRAM(s) Oral daily  metoclopramide 5 milliGRAM(s) Oral two times a day    MEDICATIONS  (PRN):  ondansetron Injectable 4 milliGRAM(s) IV Push every 6 hours PRN Nausea and/or Vomiting  acetaminophen   Tablet 650 milliGRAM(s) Oral every 6 hours PRN For Temp greater than 38 C (100.4 F)  oxyCODONE    5 mG/acetaminophen 325 mG 2 Tablet(s) Oral every 4 hours PRN Severe Pain (7 - 10)  zaleplon 5 milliGRAM(s) Oral at bedtime PRN Insomnia      __________________________________________________  REVIEW OF SYSTEMS:  CONSTITUTIONAL: No fever  NECK: complaining of left-sided neck pain and difficulty with swallowing   RESPIRATORY: No cough; No shortness of breath  CARDIOVASCULAR: No chest pain, no palpitations  GASTROINTESTINAL: reports abdominal pain and diarrhea, but no more nausea or vomiting. pain.   MUSCULOSKELETAL: No joint pain, no muscle pain  ALL OTHER  ROS negative        Vital Signs Last 24 Hrs  T(C): 36.8 (14 Sep 2017 05:30), Max: 36.9 (13 Sep 2017 15:00)  T(F): 98.3 (14 Sep 2017 05:30), Max: 98.5 (13 Sep 2017 15:00)  HR: 60 (14 Sep 2017 05:30) (60 - 74)  BP: 103/51 (14 Sep 2017 05:30) (103/51 - 120/85)  BP(mean): --  RR: 16 (14 Sep 2017 05:30) (16 - 16)  SpO2: 100% (14 Sep 2017 05:30) (97% - 100%)    ________________________________________________  PHYSICAL EXAM:  GENERAL: NAD, lying in bed  NECK : supple, palpable left-sided thyroid goiter, non-tender to palpation. Trachea midline, no JVD  CHEST/LUNG: Clear to auscultation bilaterally with good air entry   HEART: S1 S2,  regular rate and rhythm,; no murmurs, gallops or rubs  ABDOMEN: Soft, Nondistended, Diffusely tender to palpation without guarding or rebound; Bowel sounds increased  EXTREMITIES: no cyanosis; no edema; no calf tenderness  NERVOUS SYSTEM:  AAOx3; no new focal deficits    _________________________________________________  LABS:                        13.5   11.0  )-----------( 245      ( 13 Sep 2017 07:58 )             40.3     09-13    142  |  107  |  9   ----------------------------<  97  3.5   |  27  |  1.35<H>    Ca    8.6      13 Sep 2017 08:18  Phos  3.0     09-13  Mg     1.7     09-13          CAPILLARY BLOOD GLUCOSE          RADIOLOGY & ADDITIONAL TESTS:    Imaging Personally Reviewed:  YES/NO    Consultant(s) Notes Reviewed:   YES/ NO    Care Discussed with Consultants:     Plan of care was discussed with patient and/or primary care giver; all questions and concerns were addressed and care was aligned with patient's wishes.

## 2017-09-14 NOTE — PROGRESS NOTE ADULT - PROBLEM SELECTOR PLAN 2
Patient reports history of cyclic vomiting syndrome  Continue with Zofran PRN  Continue to monitor BMP and serum electrolytes Patient reports history of cyclic vomiting syndrome  Continue with Zofran and Reglan PRN  Continue to monitor BMP and serum electrolytes

## 2017-09-14 NOTE — DIETITIAN INITIAL EVALUATION ADULT. - NUTRITION INTERVENTION
Bioactive Substance Management/Meals and Snack/Collaboration and Referral of Nutrition Care/Feeding Assistance

## 2017-09-14 NOTE — PROGRESS NOTE ADULT - SUBJECTIVE AND OBJECTIVE BOX
pt seen in icu [  ], reg med floor [x ], bed [ x ], chair at bedside [   ]    Patient is a 44 y/o F lives with her sister from home ambulates with walker, current everyday smoker,  PMH Reflex symphatetic dystrophy s/p spinal stimulator, frequent falls, SLE (no treatment at this time), cyclical vomiting syndrome, Afib (not on AC due to frequent falls), HTN, depression, Hemorrhoids (EGD / colonoscopy 3-4 years ago, is due for a next one soon ) and partial thyroidectomy presented with c/o vomiting and diarrhea for 2 days a/w abdominal pain. As per patient she has cyclical vomiting syndrome and gets episode every year, last admission in June 2017. She has been vomiting for the last 2 days , constant, >10 times a day, watery , non bloody , non bilious. Unable to eat anything due to constant vomiting. She also c/o loose watery diarrhea, >5-6 times a day, NBNB, with generalized abdominal pain, in the middle of her abdomen, non radiating, worse with changing positions, improvement with MS contin and morphine, unrelated to food, describes the quality as sharp ache and intermittent, 7/10. Also has subjective fever with chills since yesterday. ROS negative except above, denies any recent travel, consumption of spoilt food, no one else is sick around her, except for herself    Awake alert resting comfortably in bed in  NAD    REVIEW OF SYSTEMS:    CONSTITUTIONAL: No weakness, fevers or chills  EYES/ENT: No visual changes;  No vertigo or throat pain   NECK: No pain or stiffness  RESPIRATORY: No cough, wheezing, hemoptysis; No shortness of breath  CARDIOVASCULAR: No chest pain or palpitations  GASTROINTESTINAL: + abdominal and epigastric pain. No nausea, vomiting, or hematemesis; No diarrhea or constipation. No melena or hematochezia.  GENITOURINARY: No dysuria, frequency or hematuria  NEUROLOGICAL: No numbness or weakness  SKIN: No itching, burning, rashes, or lesions   All other review of systems is negative unless indicated above.    Physical Exam    General: WN/WD NAD  Neurology: A&Ox3, nonfocal, SCOTT x 4  Respiratory: CTA B/L  CV: RRR, S1S2, no murmurs, rubs or gallops  Abdominal: Soft, diffuse abd pain  Extremities: No edema, + peripheral pulses      Allergies  aspirin (Unknown)  latex (Unknown)  sulfa drugs (Unknown)      Health Issues  Ulcerative chronic pancolitis without complications  HTN (hypertension)  Cyclical vomiting  Atrial fibrillation  SLE (systemic lupus erythematosus)  Reflex sympathetic dystrophy  H/O partial thyroidectomy  S/P partial hysterectomy      Vitals  T(F): 98.3 (09-14-17 @ 05:30), Max: 98.5 (09-13-17 @ 15:00)  HR: 60 (09-14-17 @ 05:30) (60 - 74)  BP: 103/51 (09-14-17 @ 05:30) (103/51 - 120/85)  RR: 16 (09-14-17 @ 05:30) (16 - 16)  SpO2: 100% (09-14-17 @ 05:30) (97% - 100%)  Wt(kg): --  CAPILLARY BLOOD GLUCOSE          Labs                          13.3   7.6   )-----------( 228      ( 14 Sep 2017 06:26 )             41.0       09-14    141  |  106  |  8   ----------------------------<  95  3.2<L>   |  28  |  1.24    Ca    8.6      14 Sep 2017 06:26  Phos  4.2     09-14  Mg     2.0     09-14              Radiology Results      Meds    MEDICATIONS  (STANDING):  pregabalin 300 milliGRAM(s) Oral two times a day  escitalopram 20 milliGRAM(s) Oral daily  clopidogrel Tablet 75 milliGRAM(s) Oral daily  metoprolol 25 milliGRAM(s) Oral two times a day  amLODIPine   Tablet 2.5 milliGRAM(s) Oral daily  pantoprazole    Tablet 40 milliGRAM(s) Oral before breakfast  nicotine - 21 mG/24Hr(s) Patch 1 patch Transdermal daily  enoxaparin Injectable 40 milliGRAM(s) SubCutaneous daily  ciprofloxacin   IVPB 400 milliGRAM(s) IV Intermittent every 12 hours  metroNIDAZOLE  IVPB 500 milliGRAM(s) IV Intermittent every 8 hours  influenza   Vaccine 0.5 milliLiter(s) IntraMuscular once  sodium chloride 0.9% Bolus 1000 milliLiter(s) IV Bolus once  lactated ringers. 1000 milliLiter(s) (125 mL/Hr) IV Continuous <Continuous>  morphine ER Tablet 60 milliGRAM(s) Oral every 12 hours  dronabinol 2.5 milliGRAM(s) Oral daily  metoclopramide 5 milliGRAM(s) Oral two times a day  potassium chloride   Powder 40 milliEquivalent(s) Oral once  ergocalciferol 58076 Unit(s) Oral <User Schedule>      MEDICATIONS  (PRN):  ondansetron Injectable 4 milliGRAM(s) IV Push every 6 hours PRN Nausea and/or Vomiting  acetaminophen   Tablet 650 milliGRAM(s) Oral every 6 hours PRN For Temp greater than 38 C (100.4 F)  oxyCODONE    5 mG/acetaminophen 325 mG 2 Tablet(s) Oral every 4 hours PRN Severe Pain (7 - 10)  zaleplon 5 milliGRAM(s) Oral at bedtime PRN Insomnia

## 2017-09-14 NOTE — PROGRESS NOTE ADULT - SUBJECTIVE AND OBJECTIVE BOX
Meds:  ciprofloxacin   IVPB 400 milliGRAM(s) IV Intermittent every 12 hours  metroNIDAZOLE  IVPB 500 milliGRAM(s) IV Intermittent every 8 hours    Allergies:  Allergies    aspirin (Unknown)  latex (Unknown)  sulfa drugs (Unknown)    Intolerances    ROS  [  ] UNABLE TO ELICIT    General:  [  ] None  [  ] Fever  [  ] Chills  [ x ] Malaise    Skin:  [ x ] None [  ] Rash  [  ] Wound  [  ] Ulcer    HEENT:  [ x ] None  [  ] Sore Throat  [  ] Nasal congestion/ runny nose  [  ] Photophobia [  ] Neck pain      Chest:  [ x ] None   [  ] SOB  [  ] Cough  [  ] None    Cardiovascular:   [ x ] None  [  ] CP  [  ] Palpitation    Gastrointestinal:  [  ] None  [ x ] Abd pain    [ x ] Nausea    [ x ] Vomiting   [  ] Diarrhea	     Genitourinary:  [ x ] None [  ] Polyuria   [  ] Urgency  [  ] Frequency  [  ] Dysuria    [  ]  Hematuria       Musculoskeletal:  [  ] None [  ] Back Pain	[  ] Body aches  [  ] Joint pain    Neurological: [  ] None [  ]Dizziness  [  ]Visual Disturbance  [  ]Headaches   [ x ] Weakness          PHYSICAL EXAM:    Vital Signs Last 24 Hrs  T(C): 36.8 (14 Sep 2017 05:30), Max: 36.9 (13 Sep 2017 15:00)  T(F): 98.3 (14 Sep 2017 05:30), Max: 98.5 (13 Sep 2017 15:00)  HR: 60 (14 Sep 2017 05:30) (60 - 74)  BP: 103/51 (14 Sep 2017 05:30) (103/51 - 120/85)  BP(mean): --  RR: 16 (14 Sep 2017 05:30) (16 - 16)  SpO2: 100% (14 Sep 2017 05:30) (97% - 100%)      Constitutional:    HEENT: [ x ] Wnl  [  ] Pharyngeal congestion    Neck:  [ x ] Supple  [  ]Lymphadenopathy  [  ] No JVD   [  ] JVD  [  ] Masses   [  ] WNL    CHEST/Respiratory:  [ x ]Clear to auscultation  [  ] Rales   [  ] Rhonchi   [  ] Wheezing     [  ] Chest Tenderness      Cardiovascular:  [ x ] Reg S1 S2   [  ] Irreg S1 S2   [ x ]No Murmur  [  ] +ve Murmurs  [  ]Systolic [  ]Diastolic      Abdomen:  [ x ] Soft  [  ] No tendrerness  [ x ] Improving diffuse and generalized Tenderness  [  ] Organomegaly  [  ] ABD Distention  [  ] Rigidity                       [ x ] No Regidity                       [ x ] No Rebound Tenderness  [ x ] No Guarding Rigidity  [  ] Rebound Tenderness[  ] Guarding Rigidity                          [x  ]  +ve Bowel Sounds  [  ] Decreased Bowel Sounds    [  ] Absent Bowel Sounds                            Extremities: [ x ] No edema [  ] Edema  [  ] Clubbing   [  ] Cyanosis                         [ x ] No Tender Calf muscles  [  ] Tender Calf muscles                        [ x ] Palpable peripheral pulses    Neurological: [ x ] Awake  [ x ] Alert  [ x ] Oriented  x  3                           [  ] Confused  [  ] Drowzy  [  ] repond to painful stimuli  [  ] Unresponsive    Skin:  [ x ] Intact [  ] Redness [  ] Thrombophlebitis  [  ] Rashes  [  ] Dry  [  ] Ulcers    Ortho:  [  ] Joint Swelling  [  ] Joint erythema [  ] Joint tenderness                [  ] Increased temp. to touch  [ x ] WNL         LABS/DIAGNOSTIC TESTS                                         13.3   7.6   )-----------( 228      ( 14 Sep 2017 06:26 )             41.0   09-14    141  |  106  |  8   ----------------------------<  95  3.2<L>   |  28  |  1.24    Ca    8.6      14 Sep 2017 06:26  Phos  4.2     09-14  Mg     2.0     09-14          CULTURES:   Culture - Blood (09.12.17 @ 09:53)    Specimen Source: .Blood Blood-Peripheral    Culture Results:   No growth to date.    Culture - Blood (09.12.17 @ 09:53)    Specimen Source: .Blood Blood-Peripheral    Culture Results:   No growth to date.    Culture - Urine (09.12.17 @ 00:22)    Specimen Source: .Urine Clean Catch (Midstream)    Culture Results:   10,000 - 49,000 CFU/mL Coag negative Staphylococcus not Staph  saprophyticus        CXR:      EXAM:  CT ABDOMEN AND PELVIS OC IC                            PROCEDURE DATE:  09/11/2017          INTERPRETATION:  CLINICAL HISTORY:  Diffuse abdominal pain; evaluate for   small bowel obstruction.    Multiple axial images of the abdomen and pelvis were obtained from the   lung bases through pubic symphysis with the administration of oral   contrast. 90 cc of Omnipaque 350 was administered intravenously without   complication. Reformatted coronal and sagittal images are submitted.    COMPARISON: June 11, 2017    FINDINGS:    The liver is normal in size. A less than 5 mm hypodense focus is   identified within the posterior segment of the right hepatic lobe, too   small to characterize. There is no evidence for intrahepatic or   extrahepaticbiliary dilatation. No gallstones, gallbladder wall   thickening or pericholecystic fluid identified.    The spleen and adrenal glands are unremarkable. No space-occupying   lesions of the pancreatic parenchyma identified. Mild pancreatic ductal   prominence is noted.    There is no evidence for bilateral hydronephrosis or renal calculi. No   space-occupying lesions of the right kidney are noted. A 5 mm hypodense   focus is identified within the mid to lower pole aspect of the left   kidney, too small to characterize. The abdominal aorta is normal in   course and caliber. No abnormally enlarged retroperitoneal or pelvic   lymphadenopathy is noted.    Fecal material is scattered throughout the colon. There is no evidence   for mechanical bowelobstruction. There is no evidence for acute   appendicitis. There is no evidence for free intraperitoneal air or fluid.   Oral contrast material is not identified within the colonic lumen;   however, mild pancolonic wall thickening is suggested, withmild   infiltration of the paracolic fat.    The uterus is not visualized. The urinary bladder appears unremarkable.     Imaging of the lung bases and osseous structures appears unremarkable.   Note is made of an indwelling neurostimulator device.    IMPRESSION:  No evidence for mechanical bowel obstruction. Oral contrast   material is not identified within the colonic lumen; however, mild   pancolonic wall thickening is suggested, with mild infiltration of the   paracolic fat. Correlate clinically. No free intraperineal air or fluid   identified.      Assessment and Recommendation:   · Assessment		  Patient is a 44 y/o F lives with her sister from home ambulates with walker, current everyday smoker,  PMH Reflex symphatetic dystrophy s/p spinal stimulator, frequent falls, SLE (no treatment at this time), cyclical vomiting syndrome, Afib (not on AC due to frequent falls), HTN, depression, Hemorrhoids (EGD / colonoscopy 3-4 years ago, is due for a next one soon ) and partial thyroidectomy presented with c/o vomiting and diarrhea for 2 days a/w abdominal pain.  Patient was admitted for colitis ans was started on IV Cipro and Flagyl.  Patient feels better, and WBC is now normal.    Problem/Recommendation - 1:  Problem: Pancolitis.   Recommendation:   1- UA & CS suggest for contamination.  2- Follow Blood culture to final report.  3- GI evaluation and management.  4- Continue Cipro and Flagyl, and change it to PO upon discharge till 9/24/17.  5- Fluid and electrolytes management.  6- CBC and BMP follow up.  7- stool for ova and parasites.  8- Stool for culture.  9- stool for CDT.  10- CBC & BMP follow up. 1- UA & CS.  2- Blood culture.  3- GI evaluation and management.  4- Continue Cipro and Flagyl.  5- Fluid and electrolytes management.  6- CBC and BMP follow up.      Problem/Recommendation - 2:  ·  Problem: HTN (hypertension).    Recommendation:   1- Monitor Blood pressure closely.  2- Blood pressure control.  3- BP. meds as per cardiology and primary care team.         Discussed with medical resident

## 2017-09-14 NOTE — PROGRESS NOTE ADULT - PROBLEM SELECTOR PLAN 1
Patient c/o N/V/D with fever x 2 days; vomiting and diarrhea currently resolved  CT Abdomen/Pelvis revealed pancolitis  CBC revealed mild leukocytosis with left shift  Continue with IVF; will advance diet to clears  Continue with ciprofloxacin and flagyl  Continue to monitor BMP for electrolyte disturbances   Urine cultures revealed staph aureus, likely secondary to contamination  Follow up stool cultures, and C Dif PCR  ID Dr Artur Wylie Patient c/o N/V/D with fever x 2 days; vomiting currently resolved  CT Abdomen/Pelvis revealed pancolitis  CBC revealed mild leukocytosis with left shift; currently normal  Continue with IVF; will advance diet to soft  Continue with ciprofloxacin and flagyl  Continue to monitor BMP for electrolyte disturbances   Urine cultures revealed staph aureus, likely secondary to contamination  Blood cultures preliminary negative  Follow up stool cultures, and C Dif PCR  ID Dr Atrur Wylie Patient c/o N/V/D with fever x 2 days; vomiting currently resolved  CT Abdomen/Pelvis revealed pancolitis  CBC revealed mild leukocytosis with left shift; currently normal  Continue with IVF; will advance diet to soft  Continue with ciprofloxacin and flagyl  Continue to monitor BMP for electrolyte disturbances   Urine cultures revealed staph aureus, likely secondary to contamination  Blood cultures preliminary negative  Follow up stool cultures  ID Dr Artur Wylie

## 2017-09-14 NOTE — DIETITIAN INITIAL EVALUATION ADULT. - MD RECOMMEND
Advance diet and/or consider nutritional supplements as medically feasible; Bacid, Banana Flakes as medically feasible

## 2017-09-15 DIAGNOSIS — E01.0 IODINE-DEFICIENCY RELATED DIFFUSE (ENDEMIC) GOITER: ICD-10-CM

## 2017-09-15 LAB
ANION GAP SERPL CALC-SCNC: 5 MMOL/L — SIGNIFICANT CHANGE UP (ref 5–17)
BUN SERPL-MCNC: 10 MG/DL — SIGNIFICANT CHANGE UP (ref 7–18)
CALCIUM SERPL-MCNC: 8.4 MG/DL — SIGNIFICANT CHANGE UP (ref 8.4–10.5)
CHLORIDE SERPL-SCNC: 104 MMOL/L — SIGNIFICANT CHANGE UP (ref 96–108)
CO2 SERPL-SCNC: 30 MMOL/L — SIGNIFICANT CHANGE UP (ref 22–31)
CREAT SERPL-MCNC: 1.17 MG/DL — SIGNIFICANT CHANGE UP (ref 0.5–1.3)
GLUCOSE SERPL-MCNC: 97 MG/DL — SIGNIFICANT CHANGE UP (ref 70–99)
HCT VFR BLD CALC: 39.5 % — SIGNIFICANT CHANGE UP (ref 34.5–45)
HGB BLD-MCNC: 13 G/DL — SIGNIFICANT CHANGE UP (ref 11.5–15.5)
MAGNESIUM SERPL-MCNC: 2.2 MG/DL — SIGNIFICANT CHANGE UP (ref 1.6–2.6)
MCHC RBC-ENTMCNC: 29.3 PG — SIGNIFICANT CHANGE UP (ref 27–34)
MCHC RBC-ENTMCNC: 33 GM/DL — SIGNIFICANT CHANGE UP (ref 32–36)
MCV RBC AUTO: 89 FL — SIGNIFICANT CHANGE UP (ref 80–100)
PHOSPHATE SERPL-MCNC: 3.4 MG/DL — SIGNIFICANT CHANGE UP (ref 2.5–4.5)
PLATELET # BLD AUTO: 246 K/UL — SIGNIFICANT CHANGE UP (ref 150–400)
POTASSIUM SERPL-MCNC: 3.9 MMOL/L — SIGNIFICANT CHANGE UP (ref 3.5–5.3)
POTASSIUM SERPL-SCNC: 3.9 MMOL/L — SIGNIFICANT CHANGE UP (ref 3.5–5.3)
RBC # BLD: 4.44 M/UL — SIGNIFICANT CHANGE UP (ref 3.8–5.2)
RBC # FLD: 12.8 % — SIGNIFICANT CHANGE UP (ref 10.3–14.5)
SODIUM SERPL-SCNC: 139 MMOL/L — SIGNIFICANT CHANGE UP (ref 135–145)
WBC # BLD: 6.5 K/UL — SIGNIFICANT CHANGE UP (ref 3.8–10.5)
WBC # FLD AUTO: 6.5 K/UL — SIGNIFICANT CHANGE UP (ref 3.8–10.5)

## 2017-09-15 PROCEDURE — 76536 US EXAM OF HEAD AND NECK: CPT | Mod: 26

## 2017-09-15 RX ORDER — SENNA PLUS 8.6 MG/1
2 TABLET ORAL AT BEDTIME
Qty: 0 | Refills: 0 | Status: DISCONTINUED | OUTPATIENT
Start: 2017-09-15 | End: 2017-09-18

## 2017-09-15 RX ORDER — DOCUSATE SODIUM 100 MG
100 CAPSULE ORAL
Qty: 0 | Refills: 0 | Status: DISCONTINUED | OUTPATIENT
Start: 2017-09-15 | End: 2017-09-18

## 2017-09-15 RX ADMIN — Medication 100 MILLIGRAM(S): at 07:36

## 2017-09-15 RX ADMIN — Medication 300 MILLIGRAM(S): at 17:33

## 2017-09-15 RX ADMIN — OXYCODONE AND ACETAMINOPHEN 2 TABLET(S): 5; 325 TABLET ORAL at 12:18

## 2017-09-15 RX ADMIN — ESCITALOPRAM OXALATE 20 MILLIGRAM(S): 10 TABLET, FILM COATED ORAL at 12:20

## 2017-09-15 RX ADMIN — Medication 200 MILLIGRAM(S): at 17:32

## 2017-09-15 RX ADMIN — Medication 5 MILLIGRAM(S): at 05:23

## 2017-09-15 RX ADMIN — Medication 100 MILLIGRAM(S): at 17:33

## 2017-09-15 RX ADMIN — MORPHINE SULFATE 60 MILLIGRAM(S): 50 CAPSULE, EXTENDED RELEASE ORAL at 06:00

## 2017-09-15 RX ADMIN — Medication 300 MILLIGRAM(S): at 05:22

## 2017-09-15 RX ADMIN — PANTOPRAZOLE SODIUM 40 MILLIGRAM(S): 20 TABLET, DELAYED RELEASE ORAL at 05:23

## 2017-09-15 RX ADMIN — MORPHINE SULFATE 60 MILLIGRAM(S): 50 CAPSULE, EXTENDED RELEASE ORAL at 05:23

## 2017-09-15 RX ADMIN — Medication 1 PATCH: at 13:40

## 2017-09-15 RX ADMIN — Medication 200 MILLIGRAM(S): at 05:22

## 2017-09-15 RX ADMIN — Medication 5 MILLIGRAM(S): at 17:33

## 2017-09-15 RX ADMIN — OXYCODONE AND ACETAMINOPHEN 2 TABLET(S): 5; 325 TABLET ORAL at 20:38

## 2017-09-15 RX ADMIN — Medication 1 PATCH: at 12:20

## 2017-09-15 RX ADMIN — MORPHINE SULFATE 60 MILLIGRAM(S): 50 CAPSULE, EXTENDED RELEASE ORAL at 17:33

## 2017-09-15 RX ADMIN — OXYCODONE AND ACETAMINOPHEN 2 TABLET(S): 5; 325 TABLET ORAL at 13:20

## 2017-09-15 RX ADMIN — Medication 2.5 MILLIGRAM(S): at 13:36

## 2017-09-15 RX ADMIN — SENNA PLUS 2 TABLET(S): 8.6 TABLET ORAL at 21:15

## 2017-09-15 RX ADMIN — MORPHINE SULFATE 60 MILLIGRAM(S): 50 CAPSULE, EXTENDED RELEASE ORAL at 18:03

## 2017-09-15 RX ADMIN — Medication 100 MILLIGRAM(S): at 17:07

## 2017-09-15 RX ADMIN — OXYCODONE AND ACETAMINOPHEN 2 TABLET(S): 5; 325 TABLET ORAL at 20:08

## 2017-09-15 RX ADMIN — CLOPIDOGREL BISULFATE 75 MILLIGRAM(S): 75 TABLET, FILM COATED ORAL at 12:20

## 2017-09-15 RX ADMIN — ENOXAPARIN SODIUM 40 MILLIGRAM(S): 100 INJECTION SUBCUTANEOUS at 13:37

## 2017-09-15 NOTE — PROGRESS NOTE ADULT - SUBJECTIVE AND OBJECTIVE BOX
PGY 1 Note discussed with supervising resident and primary attending    Patient is a 46y old  Female who presents with a chief complaint of abdominal pain, N/V (12 Sep 2017 07:17)      INTERVAL HPI/OVERNIGHT EVENTS: offers no new complaints; current symptoms resolving    MEDICATIONS  (STANDING):  pregabalin 300 milliGRAM(s) Oral two times a day  escitalopram 20 milliGRAM(s) Oral daily  clopidogrel Tablet 75 milliGRAM(s) Oral daily  metoprolol 25 milliGRAM(s) Oral two times a day  amLODIPine   Tablet 2.5 milliGRAM(s) Oral daily  pantoprazole    Tablet 40 milliGRAM(s) Oral before breakfast  nicotine - 21 mG/24Hr(s) Patch 1 patch Transdermal daily  enoxaparin Injectable 40 milliGRAM(s) SubCutaneous daily  ciprofloxacin   IVPB 400 milliGRAM(s) IV Intermittent every 12 hours  metroNIDAZOLE  IVPB 500 milliGRAM(s) IV Intermittent every 8 hours  influenza   Vaccine 0.5 milliLiter(s) IntraMuscular once  sodium chloride 0.9% Bolus 1000 milliLiter(s) IV Bolus once  morphine ER Tablet 60 milliGRAM(s) Oral every 12 hours  dronabinol 2.5 milliGRAM(s) Oral daily  metoclopramide 5 milliGRAM(s) Oral two times a day  ergocalciferol 20531 Unit(s) Oral <User Schedule>  lactated ringers. 1000 milliLiter(s) (125 mL/Hr) IV Continuous <Continuous>    MEDICATIONS  (PRN):  ondansetron Injectable 4 milliGRAM(s) IV Push every 6 hours PRN Nausea and/or Vomiting  acetaminophen   Tablet 650 milliGRAM(s) Oral every 6 hours PRN For Temp greater than 38 C (100.4 F)  oxyCODONE    5 mG/acetaminophen 325 mG 2 Tablet(s) Oral every 4 hours PRN Severe Pain (7 - 10)  zaleplon 5 milliGRAM(s) Oral at bedtime PRN Insomnia      __________________________________________________  REVIEW OF SYSTEMS:  CONSTITUTIONAL: No fever  NECK: No pain or stiffness  RESPIRATORY: No cough; No shortness of breath  CARDIOVASCULAR: No chest pain, no palpitations  GASTROINTESTINAL: No pain. No constipation, nausea or vomiting; No diarrhea   NEUROLOGICAL: No headache or numbness, no tremors  MUSCULOSKELETAL: No joint pain, no muscle pain  GENITOURINARY: no dysuria, no frequency, no hesitancy  PSYCHIATRY: no depression , no anxiety  ALL OTHER  ROS negative        Vital Signs Last 24 Hrs  T(C): 36.7 (15 Sep 2017 04:59), Max: 36.8 (14 Sep 2017 05:30)  T(F): 98 (15 Sep 2017 04:59), Max: 98.3 (14 Sep 2017 05:30)  HR: 60 (15 Sep 2017 04:59) (60 - 71)  BP: 99/57 (15 Sep 2017 04:59) (99/57 - 104/66)  BP(mean): --  RR: 16 (15 Sep 2017 04:59) (16 - 18)  SpO2: 97% (15 Sep 2017 04:59) (96% - 100%)    ________________________________________________  PHYSICAL EXAM:  GENERAL: NAD, lying in bed  HEENT: Normocephalic;  conjunctivae and sclerae clear; moist mucous membranes  NECK : supple, trachea midline, no JVD  CHEST/LUNG: Clear to auscultation bilaterally with good air entry   HEART: S1 S2,  regular rate and rhythm,; no murmurs, gallops or rubs  ABDOMEN: Soft, Nontender, Nondistended; Bowel sounds present  EXTREMITIES: no cyanosis; no edema; no calf tenderness  SKIN: warm and dry; no rash  NERVOUS SYSTEM:  AAOx3; no new focal deficits    _________________________________________________  LABS:                        13.3   7.6   )-----------( 228      ( 14 Sep 2017 06:26 )             41.0     09-14    141  |  106  |  8   ----------------------------<  95  3.2<L>   |  28  |  1.24    Ca    8.6      14 Sep 2017 06:26  Phos  4.2     09-14  Mg     2.0     09-14          CAPILLARY BLOOD GLUCOSE          RADIOLOGY & ADDITIONAL TESTS:    Imaging Personally Reviewed:  YES/NO    Consultant(s) Notes Reviewed:   YES/ NO    Care Discussed with Consultants:     Plan of care was discussed with patient and/or primary care giver; all questions and concerns were addressed and care was aligned with patient's wishes. PGY 1 Note discussed with supervising resident and primary attending    Patient is a 46y old  Female who presents with a chief complaint of abdominal pain, N/V (12 Sep 2017 07:17)      INTERVAL HPI/OVERNIGHT EVENTS: patient reported 3 episodes of diarrhea overnight and abdominal cramping, but improved pain, no nausea or vomiting    MEDICATIONS  (STANDING):  pregabalin 300 milliGRAM(s) Oral two times a day  escitalopram 20 milliGRAM(s) Oral daily  clopidogrel Tablet 75 milliGRAM(s) Oral daily  metoprolol 25 milliGRAM(s) Oral two times a day  amLODIPine   Tablet 2.5 milliGRAM(s) Oral daily  pantoprazole    Tablet 40 milliGRAM(s) Oral before breakfast  nicotine - 21 mG/24Hr(s) Patch 1 patch Transdermal daily  enoxaparin Injectable 40 milliGRAM(s) SubCutaneous daily  ciprofloxacin   IVPB 400 milliGRAM(s) IV Intermittent every 12 hours  metroNIDAZOLE  IVPB 500 milliGRAM(s) IV Intermittent every 8 hours  influenza   Vaccine 0.5 milliLiter(s) IntraMuscular once  sodium chloride 0.9% Bolus 1000 milliLiter(s) IV Bolus once  morphine ER Tablet 60 milliGRAM(s) Oral every 12 hours  dronabinol 2.5 milliGRAM(s) Oral daily  metoclopramide 5 milliGRAM(s) Oral two times a day  ergocalciferol 38161 Unit(s) Oral <User Schedule>  lactated ringers. 1000 milliLiter(s) (125 mL/Hr) IV Continuous <Continuous>    MEDICATIONS  (PRN):  ondansetron Injectable 4 milliGRAM(s) IV Push every 6 hours PRN Nausea and/or Vomiting  acetaminophen   Tablet 650 milliGRAM(s) Oral every 6 hours PRN For Temp greater than 38 C (100.4 F)  oxyCODONE    5 mG/acetaminophen 325 mG 2 Tablet(s) Oral every 4 hours PRN Severe Pain (7 - 10)  zaleplon 5 milliGRAM(s) Oral at bedtime PRN Insomnia      __________________________________________________  REVIEW OF SYSTEMS:  CONSTITUTIONAL: No fever  NECK: No pain  RESPIRATORY: No cough; No shortness of breath  CARDIOVASCULAR: No chest pain, no palpitations  GASTROINTESTINAL: reports cramping and 3 episodes of diarrhea. No nausea or vomiting   MUSCULOSKELETAL: No joint pain, no muscle pain  ALL OTHER  ROS negative        Vital Signs Last 24 Hrs  T(C): 36.7 (15 Sep 2017 04:59), Max: 36.8 (14 Sep 2017 05:30)  T(F): 98 (15 Sep 2017 04:59), Max: 98.3 (14 Sep 2017 05:30)  HR: 60 (15 Sep 2017 04:59) (60 - 71)  BP: 99/57 (15 Sep 2017 04:59) (99/57 - 104/66)  BP(mean): --  RR: 16 (15 Sep 2017 04:59) (16 - 18)  SpO2: 97% (15 Sep 2017 04:59) (96% - 100%)    ________________________________________________  PHYSICAL EXAM:  GENERAL: NAD, lying in bed  NECK : supple, trachea midline, left sided thyromegaly, mildly tender to touch, no JVD  CHEST/LUNG: Clear to auscultation bilaterally with good air entry   HEART: S1 S2,  regular rate and rhythm,; no murmurs, gallops or rubs  ABDOMEN: Soft, Nondistended; Diffusely tender to palpation without rebound. Bowel sounds present  EXTREMITIES: no cyanosis; no edema; no calf tenderness  NERVOUS SYSTEM:  AAOx3; no new focal deficits    _________________________________________________  LABS:                        13.3   7.6   )-----------( 228      ( 14 Sep 2017 06:26 )             41.0     09-14    141  |  106  |  8   ----------------------------<  95  3.2<L>   |  28  |  1.24    Ca    8.6      14 Sep 2017 06:26  Phos  4.2     09-14  Mg     2.0     09-14          CAPILLARY BLOOD GLUCOSE          RADIOLOGY & ADDITIONAL TESTS:    Imaging Personally Reviewed:  YES/NO    Consultant(s) Notes Reviewed:   YES/ NO    Care Discussed with Consultants:     Plan of care was discussed with patient and/or primary care giver; all questions and concerns were addressed and care was aligned with patient's wishes.

## 2017-09-15 NOTE — PROGRESS NOTE ADULT - SUBJECTIVE AND OBJECTIVE BOX
pt seen in icu [  ], reg med floor [  x], bed [ x ], chair at bedside [   ]  Awake, alert, comfortable in bed in nad. Still with some abdominal pain and diarrhea.  REVIEW OF SYSTEMS:    CONSTITUTIONAL: No weakness, fevers or chills  EYES/ENT: No visual changes;  No vertigo or throat pain   NECK: No pain or stiffness  RESPIRATORY: No cough, wheezing, hemoptysis; No shortness of breath  CARDIOVASCULAR: No chest pain or palpitations  GASTROINTESTINAL: Abdominal pain and diarrhea  GENITOURINARY: No dysuria, frequency or hematuria  NEUROLOGICAL: No numbness or weakness  SKIN: No itching, burning, rashes, or lesions   All other review of systems is negative unless indicated above.    Physical Exam    General: WN/WD NAD  Neurology: A&Ox3, nonfocal, SCOTT x 4  Respiratory: CTA B/L  CV: RRR, S1S2, no murmurs, rubs or gallops  Abdominal: Soft, mildly tender diffusely  Extremities: No edema, + peripheral pulses      Allergies  aspirin (Unknown)  latex (Unknown)  sulfa drugs (Unknown)      Health Issues  Ulcerative chronic pancolitis without complications  HTN (hypertension)  Cyclical vomiting  Atrial fibrillation  SLE (systemic lupus erythematosus)  Reflex sympathetic dystrophy  H/O partial thyroidectomy  S/P partial hysterectomy      Vitals  T(F): 98 (09-15-17 @ 04:59), Max: 98.3 (09-14-17 @ 15:23)  HR: 60 (09-15-17 @ 04:59) (60 - 71)  BP: 99/57 (09-15-17 @ 04:59) (99/57 - 104/66)  RR: 16 (09-15-17 @ 04:59) (16 - 18)  SpO2: 97% (09-15-17 @ 04:59) (96% - 99%)  Wt(kg): --  CAPILLARY BLOOD GLUCOSE          Labs                          13.0   6.5   )-----------( 246      ( 15 Sep 2017 07:26 )             39.5       09-15    139  |  104  |  10  ----------------------------<  97  3.9   |  30  |  1.17    Ca    8.4      15 Sep 2017 07:26  Phos  3.4     09-15  Mg     2.2     09-15              Radiology Results      Meds    MEDICATIONS  (STANDING):  pregabalin 300 milliGRAM(s) Oral two times a day  escitalopram 20 milliGRAM(s) Oral daily  clopidogrel Tablet 75 milliGRAM(s) Oral daily  metoprolol 25 milliGRAM(s) Oral two times a day  amLODIPine   Tablet 2.5 milliGRAM(s) Oral daily  pantoprazole    Tablet 40 milliGRAM(s) Oral before breakfast  nicotine - 21 mG/24Hr(s) Patch 1 patch Transdermal daily  enoxaparin Injectable 40 milliGRAM(s) SubCutaneous daily  ciprofloxacin   IVPB 400 milliGRAM(s) IV Intermittent every 12 hours  metroNIDAZOLE  IVPB 500 milliGRAM(s) IV Intermittent every 8 hours  influenza   Vaccine 0.5 milliLiter(s) IntraMuscular once  sodium chloride 0.9% Bolus 1000 milliLiter(s) IV Bolus once  morphine ER Tablet 60 milliGRAM(s) Oral every 12 hours  dronabinol 2.5 milliGRAM(s) Oral daily  metoclopramide 5 milliGRAM(s) Oral two times a day  ergocalciferol 55742 Unit(s) Oral <User Schedule>  lactated ringers. 1000 milliLiter(s) (125 mL/Hr) IV Continuous <Continuous>  senna 2 Tablet(s) Oral at bedtime  docusate sodium 100 milliGRAM(s) Oral two times a day      MEDICATIONS  (PRN):  ondansetron Injectable 4 milliGRAM(s) IV Push every 6 hours PRN Nausea and/or Vomiting  acetaminophen   Tablet 650 milliGRAM(s) Oral every 6 hours PRN For Temp greater than 38 C (100.4 F)  oxyCODONE    5 mG/acetaminophen 325 mG 2 Tablet(s) Oral every 4 hours PRN Severe Pain (7 - 10)  zaleplon 5 milliGRAM(s) Oral at bedtime PRN Insomnia

## 2017-09-15 NOTE — PROGRESS NOTE ADULT - PROBLEM SELECTOR PLAN 2
Patient reports history of cyclic vomiting syndrome  Continue with Zofran and Reglan PRN  Continue to monitor BMP and serum electrolytes

## 2017-09-15 NOTE — PROGRESS NOTE ADULT - PROBLEM SELECTOR PLAN 10
Patient has left sided thyromegaly and reports history of right thyroidectomy  TSH within normal limits  F/U thyroid ultrasound

## 2017-09-15 NOTE — PROGRESS NOTE ADULT - SUBJECTIVE AND OBJECTIVE BOX
Meds:  ciprofloxacin   IVPB 400 milliGRAM(s) IV Intermittent every 12 hours  metroNIDAZOLE  IVPB 500 milliGRAM(s) IV Intermittent every 8 hours    Allergies:  Allergies    aspirin (Unknown)  latex (Unknown)  sulfa drugs (Unknown)    Intolerances    ROS  [  ] UNABLE TO ELICIT    General:  [  ] None  [  ] Fever  [  ] Chills  [ x ] Malaise    Skin:  [ x ] None [  ] Rash  [  ] Wound  [  ] Ulcer    HEENT:  [ x ] None  [  ] Sore Throat  [  ] Nasal congestion/ runny nose  [  ] Photophobia [  ] Neck pain      Chest:  [ x ] None   [  ] SOB  [  ] Cough  [  ] None    Cardiovascular:   [ x ] None  [  ] CP  [  ] Palpitation    Gastrointestinal:  [  ] None  [ x ] Abd pain    [ x ] Nausea    [ x ] Vomiting   [  ] Diarrhea	     Genitourinary:  [ x ] None [  ] Polyuria   [  ] Urgency  [  ] Frequency  [  ] Dysuria    [  ]  Hematuria       Musculoskeletal:  [  ] None [  ] Back Pain	[  ] Body aches  [  ] Joint pain    Neurological: [  ] None [  ]Dizziness  [  ]Visual Disturbance  [  ]Headaches   [ x ] Weakness          PHYSICAL EXAM:    Vital Signs Last 24 Hrs  T(C): 36.7 (15 Sep 2017 04:59), Max: 36.8 (14 Sep 2017 15:23)  T(F): 98 (15 Sep 2017 04:59), Max: 98.3 (14 Sep 2017 15:23)  HR: 60 (15 Sep 2017 04:59) (60 - 71)  BP: 99/57 (15 Sep 2017 04:59) (99/57 - 104/66)  BP(mean): --  RR: 16 (15 Sep 2017 04:59) (16 - 18)  SpO2: 97% (15 Sep 2017 04:59) (96% - 99%)    Constitutional:    HEENT: [ x ] Wnl  [  ] Pharyngeal congestion    Neck:  [ x ] Supple  [  ]Lymphadenopathy  [  ] No JVD   [  ] JVD  [  ] Masses   [  ] WNL    CHEST/Respiratory:  [ x ]Clear to auscultation  [  ] Rales   [  ] Rhonchi   [  ] Wheezing     [  ] Chest Tenderness      Cardiovascular:  [ x ] Reg S1 S2   [  ] Irreg S1 S2   [ x ]No Murmur  [  ] +ve Murmurs  [  ]Systolic [  ]Diastolic      Abdomen:  [ x ] Soft  [  ] No tendrerness  [ x ] Improving diffuse and generalized Tenderness  [  ] Organomegaly  [  ] ABD Distention  [  ] Rigidity                       [ x ] No Regidity                       [ x ] No Rebound Tenderness  [ x ] No Guarding Rigidity  [  ] Rebound Tenderness[  ] Guarding Rigidity                          [x  ]  +ve Bowel Sounds  [  ] Decreased Bowel Sounds    [  ] Absent Bowel Sounds                            Extremities: [ x ] No edema [  ] Edema  [  ] Clubbing   [  ] Cyanosis                         [ x ] No Tender Calf muscles  [  ] Tender Calf muscles                        [ x ] Palpable peripheral pulses    Neurological: [ x ] Awake  [ x ] Alert  [ x ] Oriented  x  3                           [  ] Confused  [  ] Drowzy  [  ] repond to painful stimuli  [  ] Unresponsive    Skin:  [ x ] Intact [  ] Redness [  ] Thrombophlebitis  [  ] Rashes  [  ] Dry  [  ] Ulcers    Ortho:  [  ] Joint Swelling  [  ] Joint erythema [  ] Joint tenderness                [  ] Increased temp. to touch  [ x ] WNL         LABS/DIAGNOSTIC TESTS                                                      13.0   6.5   )-----------( 246      ( 15 Sep 2017 07:26 )             39.5   09-15    139  |  104  |  10  ----------------------------<  97  3.9   |  30  |  1.17    Ca    8.4      15 Sep 2017 07:26  Phos  3.4     09-15  Mg     2.2     09-15            CULTURES:   Culture - Blood (09.12.17 @ 09:53)    Specimen Source: .Blood Blood-Peripheral    Culture Results:   No growth to date.    Culture - Blood (09.12.17 @ 09:53)    Specimen Source: .Blood Blood-Peripheral    Culture Results:   No growth to date.    Culture - Urine (09.12.17 @ 00:22)    Specimen Source: .Urine Clean Catch (Midstream)    Culture Results:   10,000 - 49,000 CFU/mL Coag negative Staphylococcus not Staph  saprophyticus        CXR:      EXAM:  CT ABDOMEN AND PELVIS OC IC                            PROCEDURE DATE:  09/11/2017          INTERPRETATION:  CLINICAL HISTORY:  Diffuse abdominal pain; evaluate for   small bowel obstruction.    Multiple axial images of the abdomen and pelvis were obtained from the   lung bases through pubic symphysis with the administration of oral   contrast. 90 cc of Omnipaque 350 was administered intravenously without   complication. Reformatted coronal and sagittal images are submitted.    COMPARISON: June 11, 2017    FINDINGS:    The liver is normal in size. A less than 5 mm hypodense focus is   identified within the posterior segment of the right hepatic lobe, too   small to characterize. There is no evidence for intrahepatic or   extrahepaticbiliary dilatation. No gallstones, gallbladder wall   thickening or pericholecystic fluid identified.    The spleen and adrenal glands are unremarkable. No space-occupying   lesions of the pancreatic parenchyma identified. Mild pancreatic ductal   prominence is noted.    There is no evidence for bilateral hydronephrosis or renal calculi. No   space-occupying lesions of the right kidney are noted. A 5 mm hypodense   focus is identified within the mid to lower pole aspect of the left   kidney, too small to characterize. The abdominal aorta is normal in   course and caliber. No abnormally enlarged retroperitoneal or pelvic   lymphadenopathy is noted.    Fecal material is scattered throughout the colon. There is no evidence   for mechanical bowelobstruction. There is no evidence for acute   appendicitis. There is no evidence for free intraperitoneal air or fluid.   Oral contrast material is not identified within the colonic lumen;   however, mild pancolonic wall thickening is suggested, withmild   infiltration of the paracolic fat.    The uterus is not visualized. The urinary bladder appears unremarkable.     Imaging of the lung bases and osseous structures appears unremarkable.   Note is made of an indwelling neurostimulator device.    IMPRESSION:  No evidence for mechanical bowel obstruction. Oral contrast   material is not identified within the colonic lumen; however, mild   pancolonic wall thickening is suggested, with mild infiltration of the   paracolic fat. Correlate clinically. No free intraperineal air or fluid   identified.      Assessment and Recommendation:   · Assessment		  Patient is a 44 y/o F lives with her sister from home ambulates with walker, current everyday smoker,  PMH Reflex symphatetic dystrophy s/p spinal stimulator, frequent falls, SLE (no treatment at this time), cyclical vomiting syndrome, Afib (not on AC due to frequent falls), HTN, depression, Hemorrhoids (EGD / colonoscopy 3-4 years ago, is due for a next one soon ) and partial thyroidectomy presented with c/o vomiting and diarrhea for 2 days a/w abdominal pain.  Patient was admitted for colitis ans was started on IV Cipro and Flagyl.  Patient feels better, and WBC is now normal.    Problem/Recommendation - 1:  Problem: Pancolitis.   Recommendation:   1- UA & CS suggest for contamination.  2- Follow Blood culture to final report.  3- GI evaluation and management.  4- Continue Cipro and Flagyl, and change it to PO upon discharge till 9/24/17.  5- Fluid and electrolytes management.  6- CBC and BMP follow up.  7- stool for ova and parasites.  8- Stool for culture.  9- stool for CDT.  10- CBC & BMP follow up. 1- UA & CS.  2- Blood culture.  3- GI evaluation and management.  4- Continue Cipro and Flagyl.  5- Fluid and electrolytes management.  6- CBC and BMP follow up.      Problem/Recommendation - 2:  ·  Problem: HTN (hypertension).    Recommendation:   1- Monitor Blood pressure closely.  2- Blood pressure control.  3- BP. meds as per cardiology and primary care team.         Discussed with medical resident

## 2017-09-15 NOTE — PROGRESS NOTE ADULT - SUBJECTIVE AND OBJECTIVE BOX
[   ] ICU             [   ] CCU               [  X ] Medical Floor      Patient is comfortable. No new complaints reported, No abdominal pain, N/V, hematemesis, hematochezia, melena, fever, chills, chest pain, SOB, cough or diarrhea reported.    VITALS  Vital Signs Last 24 Hrs  T(C): 36.7 (15 Sep 2017 04:59), Max: 36.7 (14 Sep 2017 21:11)  T(F): 98 (15 Sep 2017 04:59), Max: 98 (14 Sep 2017 21:11)  HR: 60 (15 Sep 2017 04:59) (60 - 63)  BP: 99/57 (15 Sep 2017 04:59) (99/57 - 103/75)   RR: 16 (15 Sep 2017 04:59) (16 - 16)  SpO2: 97% (15 Sep 2017 04:59) (96% - 97%)         MEDICATIONS  (STANDING):  pregabalin 300 milliGRAM(s) Oral two times a day  escitalopram 20 milliGRAM(s) Oral daily  clopidogrel Tablet 75 milliGRAM(s) Oral daily  metoprolol 25 milliGRAM(s) Oral two times a day  amLODIPine   Tablet 2.5 milliGRAM(s) Oral daily  pantoprazole    Tablet 40 milliGRAM(s) Oral before breakfast  nicotine - 21 mG/24Hr(s) Patch 1 patch Transdermal daily  enoxaparin Injectable 40 milliGRAM(s) SubCutaneous daily  ciprofloxacin   IVPB 400 milliGRAM(s) IV Intermittent every 12 hours  metroNIDAZOLE  IVPB 500 milliGRAM(s) IV Intermittent every 8 hours  influenza   Vaccine 0.5 milliLiter(s) IntraMuscular once  sodium chloride 0.9% Bolus 1000 milliLiter(s) IV Bolus once  morphine ER Tablet 60 milliGRAM(s) Oral every 12 hours  dronabinol 2.5 milliGRAM(s) Oral daily  metoclopramide 5 milliGRAM(s) Oral two times a day  ergocalciferol 26838 Unit(s) Oral <User Schedule>  lactated ringers. 1000 milliLiter(s) (125 mL/Hr) IV Continuous <Continuous>  senna 2 Tablet(s) Oral at bedtime  docusate sodium 100 milliGRAM(s) Oral two times a day    MEDICATIONS  (PRN):  ondansetron Injectable 4 milliGRAM(s) IV Push every 6 hours PRN Nausea and/or Vomiting  acetaminophen   Tablet 650 milliGRAM(s) Oral every 6 hours PRN For Temp greater than 38 C (100.4 F)  oxyCODONE    5 mG/acetaminophen 325 mG 2 Tablet(s) Oral every 4 hours PRN Severe Pain (7 - 10)  zaleplon 5 milliGRAM(s) Oral at bedtime PRN Insomnia                            13.0   6.5   )-----------( 246      ( 15 Sep 2017 07:26 )             39.5       09-15    139  |  104  |  10  ----------------------------<  97  3.9   |  30  |  1.17    Ca    8.4      15 Sep 2017 07:26  Phos  3.4     09-15  Mg     2.2     09-15

## 2017-09-15 NOTE — PROGRESS NOTE ADULT - PROBLEM SELECTOR PLAN 1
Patient c/o N/V/D with fever x 2 days; vomiting currently resolved  CT Abdomen/Pelvis revealed pancolitis  CBC revealed mild leukocytosis with left shift; currently normal  Continue with IVF; will advance diet to regular  Continue with ciprofloxacin and flagyl until 9/24 as per ID  Continue to monitor BMP for electrolyte disturbances   Blood cultures preliminary negative  Follow up stool cultures  ID Dr Artur Wylie

## 2017-09-16 LAB
ANION GAP SERPL CALC-SCNC: 6 MMOL/L — SIGNIFICANT CHANGE UP (ref 5–17)
BUN SERPL-MCNC: 12 MG/DL — SIGNIFICANT CHANGE UP (ref 7–18)
CALCIUM SERPL-MCNC: 8.7 MG/DL — SIGNIFICANT CHANGE UP (ref 8.4–10.5)
CHLORIDE SERPL-SCNC: 107 MMOL/L — SIGNIFICANT CHANGE UP (ref 96–108)
CO2 SERPL-SCNC: 29 MMOL/L — SIGNIFICANT CHANGE UP (ref 22–31)
CREAT SERPL-MCNC: 1.12 MG/DL — SIGNIFICANT CHANGE UP (ref 0.5–1.3)
GLUCOSE SERPL-MCNC: 90 MG/DL — SIGNIFICANT CHANGE UP (ref 70–99)
HCT VFR BLD CALC: 38.8 % — SIGNIFICANT CHANGE UP (ref 34.5–45)
HGB BLD-MCNC: 12.9 G/DL — SIGNIFICANT CHANGE UP (ref 11.5–15.5)
MAGNESIUM SERPL-MCNC: 2.1 MG/DL — SIGNIFICANT CHANGE UP (ref 1.6–2.6)
MCHC RBC-ENTMCNC: 30 PG — SIGNIFICANT CHANGE UP (ref 27–34)
MCHC RBC-ENTMCNC: 33.2 GM/DL — SIGNIFICANT CHANGE UP (ref 32–36)
MCV RBC AUTO: 90.5 FL — SIGNIFICANT CHANGE UP (ref 80–100)
PLATELET # BLD AUTO: 235 K/UL — SIGNIFICANT CHANGE UP (ref 150–400)
POTASSIUM SERPL-MCNC: 3.8 MMOL/L — SIGNIFICANT CHANGE UP (ref 3.5–5.3)
POTASSIUM SERPL-SCNC: 3.8 MMOL/L — SIGNIFICANT CHANGE UP (ref 3.5–5.3)
RBC # BLD: 4.29 M/UL — SIGNIFICANT CHANGE UP (ref 3.8–5.2)
RBC # FLD: 12.7 % — SIGNIFICANT CHANGE UP (ref 10.3–14.5)
SODIUM SERPL-SCNC: 142 MMOL/L — SIGNIFICANT CHANGE UP (ref 135–145)
WBC # BLD: 6.2 K/UL — SIGNIFICANT CHANGE UP (ref 3.8–10.5)
WBC # FLD AUTO: 6.2 K/UL — SIGNIFICANT CHANGE UP (ref 3.8–10.5)

## 2017-09-16 PROCEDURE — 99233 SBSQ HOSP IP/OBS HIGH 50: CPT

## 2017-09-16 PROCEDURE — 74177 CT ABD & PELVIS W/CONTRAST: CPT | Mod: 26

## 2017-09-16 RX ORDER — SODIUM CHLORIDE 9 MG/ML
1000 INJECTION INTRAMUSCULAR; INTRAVENOUS; SUBCUTANEOUS
Qty: 0 | Refills: 0 | Status: DISCONTINUED | OUTPATIENT
Start: 2017-09-16 | End: 2017-09-21

## 2017-09-16 RX ORDER — MORPHINE SULFATE 50 MG/1
4 CAPSULE, EXTENDED RELEASE ORAL EVERY 4 HOURS
Qty: 0 | Refills: 0 | Status: DISCONTINUED | OUTPATIENT
Start: 2017-09-16 | End: 2017-09-16

## 2017-09-16 RX ORDER — HYDROMORPHONE HYDROCHLORIDE 2 MG/ML
1 INJECTION INTRAMUSCULAR; INTRAVENOUS; SUBCUTANEOUS ONCE
Qty: 0 | Refills: 0 | Status: DISCONTINUED | OUTPATIENT
Start: 2017-09-16 | End: 2017-09-16

## 2017-09-16 RX ORDER — MORPHINE SULFATE 50 MG/1
1 CAPSULE, EXTENDED RELEASE ORAL ONCE
Qty: 0 | Refills: 0 | Status: DISCONTINUED | OUTPATIENT
Start: 2017-09-16 | End: 2017-09-16

## 2017-09-16 RX ORDER — DRONABINOL 2.5 MG
5 CAPSULE ORAL EVERY 12 HOURS
Qty: 0 | Refills: 0 | Status: DISCONTINUED | OUTPATIENT
Start: 2017-09-16 | End: 2017-09-21

## 2017-09-16 RX ADMIN — Medication 300 MILLIGRAM(S): at 18:41

## 2017-09-16 RX ADMIN — HYDROMORPHONE HYDROCHLORIDE 1 MILLIGRAM(S): 2 INJECTION INTRAMUSCULAR; INTRAVENOUS; SUBCUTANEOUS at 04:37

## 2017-09-16 RX ADMIN — MORPHINE SULFATE 4 MILLIGRAM(S): 50 CAPSULE, EXTENDED RELEASE ORAL at 12:44

## 2017-09-16 RX ADMIN — HYDROMORPHONE HYDROCHLORIDE 1 MILLIGRAM(S): 2 INJECTION INTRAMUSCULAR; INTRAVENOUS; SUBCUTANEOUS at 05:07

## 2017-09-16 RX ADMIN — OXYCODONE AND ACETAMINOPHEN 2 TABLET(S): 5; 325 TABLET ORAL at 11:30

## 2017-09-16 RX ADMIN — MORPHINE SULFATE 60 MILLIGRAM(S): 50 CAPSULE, EXTENDED RELEASE ORAL at 07:33

## 2017-09-16 RX ADMIN — Medication 300 MILLIGRAM(S): at 07:05

## 2017-09-16 RX ADMIN — Medication 1 PATCH: at 12:50

## 2017-09-16 RX ADMIN — MORPHINE SULFATE 4 MILLIGRAM(S): 50 CAPSULE, EXTENDED RELEASE ORAL at 19:19

## 2017-09-16 RX ADMIN — Medication 5 MILLIGRAM(S): at 18:37

## 2017-09-16 RX ADMIN — CLOPIDOGREL BISULFATE 75 MILLIGRAM(S): 75 TABLET, FILM COATED ORAL at 12:48

## 2017-09-16 RX ADMIN — Medication 25 MILLIGRAM(S): at 18:33

## 2017-09-16 RX ADMIN — Medication 100 MILLIGRAM(S): at 09:20

## 2017-09-16 RX ADMIN — MORPHINE SULFATE 60 MILLIGRAM(S): 50 CAPSULE, EXTENDED RELEASE ORAL at 07:03

## 2017-09-16 RX ADMIN — MORPHINE SULFATE 60 MILLIGRAM(S): 50 CAPSULE, EXTENDED RELEASE ORAL at 19:11

## 2017-09-16 RX ADMIN — MORPHINE SULFATE 4 MILLIGRAM(S): 50 CAPSULE, EXTENDED RELEASE ORAL at 22:04

## 2017-09-16 RX ADMIN — PANTOPRAZOLE SODIUM 40 MILLIGRAM(S): 20 TABLET, DELAYED RELEASE ORAL at 07:03

## 2017-09-16 RX ADMIN — Medication 200 MILLIGRAM(S): at 18:33

## 2017-09-16 RX ADMIN — MORPHINE SULFATE 4 MILLIGRAM(S): 50 CAPSULE, EXTENDED RELEASE ORAL at 20:56

## 2017-09-16 RX ADMIN — MORPHINE SULFATE 1 MILLIGRAM(S): 50 CAPSULE, EXTENDED RELEASE ORAL at 01:51

## 2017-09-16 RX ADMIN — Medication 100 MILLIGRAM(S): at 21:01

## 2017-09-16 RX ADMIN — OXYCODONE AND ACETAMINOPHEN 2 TABLET(S): 5; 325 TABLET ORAL at 00:54

## 2017-09-16 RX ADMIN — Medication 1 PATCH: at 12:48

## 2017-09-16 RX ADMIN — HYDROMORPHONE HYDROCHLORIDE 1 MILLIGRAM(S): 2 INJECTION INTRAMUSCULAR; INTRAVENOUS; SUBCUTANEOUS at 01:39

## 2017-09-16 RX ADMIN — Medication 100 MILLIGRAM(S): at 07:03

## 2017-09-16 RX ADMIN — Medication 5 MILLIGRAM(S): at 07:03

## 2017-09-16 RX ADMIN — SODIUM CHLORIDE 75 MILLILITER(S): 9 INJECTION INTRAMUSCULAR; INTRAVENOUS; SUBCUTANEOUS at 07:14

## 2017-09-16 RX ADMIN — ENOXAPARIN SODIUM 40 MILLIGRAM(S): 100 INJECTION SUBCUTANEOUS at 12:48

## 2017-09-16 RX ADMIN — OXYCODONE AND ACETAMINOPHEN 2 TABLET(S): 5; 325 TABLET ORAL at 00:24

## 2017-09-16 RX ADMIN — Medication 100 MILLIGRAM(S): at 16:30

## 2017-09-16 RX ADMIN — Medication 200 MILLIGRAM(S): at 07:05

## 2017-09-16 RX ADMIN — MORPHINE SULFATE 1 MILLIGRAM(S): 50 CAPSULE, EXTENDED RELEASE ORAL at 01:01

## 2017-09-16 RX ADMIN — ONDANSETRON 4 MILLIGRAM(S): 8 TABLET, FILM COATED ORAL at 20:57

## 2017-09-16 RX ADMIN — Medication 100 MILLIGRAM(S): at 00:25

## 2017-09-16 RX ADMIN — OXYCODONE AND ACETAMINOPHEN 2 TABLET(S): 5; 325 TABLET ORAL at 10:48

## 2017-09-16 RX ADMIN — MORPHINE SULFATE 60 MILLIGRAM(S): 50 CAPSULE, EXTENDED RELEASE ORAL at 18:41

## 2017-09-16 RX ADMIN — ESCITALOPRAM OXALATE 20 MILLIGRAM(S): 10 TABLET, FILM COATED ORAL at 12:48

## 2017-09-16 RX ADMIN — Medication 5 MILLIGRAM(S): at 19:16

## 2017-09-16 RX ADMIN — HYDROMORPHONE HYDROCHLORIDE 1 MILLIGRAM(S): 2 INJECTION INTRAMUSCULAR; INTRAVENOUS; SUBCUTANEOUS at 02:09

## 2017-09-16 NOTE — PROGRESS NOTE ADULT - SUBJECTIVE AND OBJECTIVE BOX
Meds:  ciprofloxacin   IVPB 400 milliGRAM(s) IV Intermittent every 12 hours  metroNIDAZOLE  IVPB 500 milliGRAM(s) IV Intermittent every 8 hours    Allergies:  Allergies    aspirin (Unknown)  latex (Unknown)  sulfa drugs (Unknown)    Intolerances    ROS  [  ] UNABLE TO ELICIT    General:  [  ] None  [  ] Fever  [  ] Chills  [ x ] Malaise    Skin:  [ x ] None [  ] Rash  [  ] Wound  [  ] Ulcer    HEENT:  [ x ] None  [  ] Sore Throat  [  ] Nasal congestion/ runny nose  [  ] Photophobia [  ] Neck pain      Chest:  [ x ] None   [  ] SOB  [  ] Cough  [  ] None    Cardiovascular:   [ x ] None  [  ] CP  [  ] Palpitation    Gastrointestinal:  [  ] None  [ x ] Abd pain    [ x ] Nausea    [ x ] Vomiting   [  ] Diarrhea	     Genitourinary:  [ x ] None [  ] Polyuria   [  ] Urgency  [  ] Frequency  [  ] Dysuria    [  ]  Hematuria       Musculoskeletal:  [  ] None [  ] Back Pain	[  ] Body aches  [  ] Joint pain    Neurological: [  ] None [  ]Dizziness  [  ]Visual Disturbance  [  ]Headaches   [ x ] Weakness          PHYSICAL EXAM:    Vital Signs Last 24 Hrs  T(C): 36.5 (16 Sep 2017 14:42), Max: 36.9 (16 Sep 2017 04:37)  T(F): 97.7 (16 Sep 2017 14:42), Max: 98.4 (16 Sep 2017 04:37)  HR: 70 (16 Sep 2017 14:42) (57 - 76)  BP: 100/65 (16 Sep 2017 14:42) (97/72 - 120/71)  BP(mean): --  RR: 18 (16 Sep 2017 14:42) (14 - 18)  SpO2: 100% (16 Sep 2017 14:42) (98% - 100%)    Constitutional:    HEENT: [ x ] Wnl  [  ] Pharyngeal congestion    Neck:  [ x ] Supple  [  ]Lymphadenopathy  [  ] No JVD   [  ] JVD  [  ] Masses   [  ] WNL    CHEST/Respiratory:  [ x ]Clear to auscultation  [  ] Rales   [  ] Rhonchi   [  ] Wheezing     [  ] Chest Tenderness      Cardiovascular:  [ x ] Reg S1 S2   [  ] Irreg S1 S2   [ x ]No Murmur  [  ] +ve Murmurs  [  ]Systolic [  ]Diastolic      Abdomen:  [ x ] Soft  [  ] No tendrerness  [ x ] Improving diffuse and generalized Tenderness  [  ] Organomegaly  [  ] ABD Distention  [  ] Rigidity                       [ x ] No Regidity                       [ x ] No Rebound Tenderness  [ x ] No Guarding Rigidity  [  ] Rebound Tenderness[  ] Guarding Rigidity                          [x  ]  +ve Bowel Sounds  [  ] Decreased Bowel Sounds    [  ] Absent Bowel Sounds                            Extremities: [ x ] No edema [  ] Edema  [  ] Clubbing   [  ] Cyanosis                         [ x ] No Tender Calf muscles  [  ] Tender Calf muscles                        [ x ] Palpable peripheral pulses    Neurological: [ x ] Awake  [ x ] Alert  [ x ] Oriented  x  3                           [  ] Confused  [  ] Drowzy  [  ] repond to painful stimuli  [  ] Unresponsive    Skin:  [ x ] Intact [  ] Redness [  ] Thrombophlebitis  [  ] Rashes  [  ] Dry  [  ] Ulcers    Ortho:  [  ] Joint Swelling  [  ] Joint erythema [  ] Joint tenderness                [  ] Increased temp. to touch  [ x ] WNL         LABS/DIAGNOSTIC TESTS                                                                     12.9   6.2   )-----------( 235      ( 16 Sep 2017 07:17 )             38.8   09-16    142  |  107  |  12  ----------------------------<  90  3.8   |  29  |  1.12    Ca    8.7      16 Sep 2017 07:17  Phos  3.4     09-15  Mg     2.1     09-16          CULTURES:   Culture - Blood (09.12.17 @ 09:53)    Specimen Source: .Blood Blood-Peripheral    Culture Results:   No growth to date.    Culture - Blood (09.12.17 @ 09:53)    Specimen Source: .Blood Blood-Peripheral    Culture Results:   No growth to date.    Culture - Urine (09.12.17 @ 00:22)    Specimen Source: .Urine Clean Catch (Midstream)    Culture Results:   10,000 - 49,000 CFU/mL Coag negative Staphylococcus not Staph  saprophyticus        CXR:      EXAM:  CT ABDOMEN AND PELVIS OC IC                            PROCEDURE DATE:  09/11/2017          INTERPRETATION:  CLINICAL HISTORY:  Diffuse abdominal pain; evaluate for   small bowel obstruction.    Multiple axial images of the abdomen and pelvis were obtained from the   lung bases through pubic symphysis with the administration of oral   contrast. 90 cc of Omnipaque 350 was administered intravenously without   complication. Reformatted coronal and sagittal images are submitted.    COMPARISON: June 11, 2017    FINDINGS:    The liver is normal in size. A less than 5 mm hypodense focus is   identified within the posterior segment of the right hepatic lobe, too   small to characterize. There is no evidence for intrahepatic or   extrahepaticbiliary dilatation. No gallstones, gallbladder wall   thickening or pericholecystic fluid identified.    The spleen and adrenal glands are unremarkable. No space-occupying   lesions of the pancreatic parenchyma identified. Mild pancreatic ductal   prominence is noted.    There is no evidence for bilateral hydronephrosis or renal calculi. No   space-occupying lesions of the right kidney are noted. A 5 mm hypodense   focus is identified within the mid to lower pole aspect of the left   kidney, too small to characterize. The abdominal aorta is normal in   course and caliber. No abnormally enlarged retroperitoneal or pelvic   lymphadenopathy is noted.    Fecal material is scattered throughout the colon. There is no evidence   for mechanical bowelobstruction. There is no evidence for acute   appendicitis. There is no evidence for free intraperitoneal air or fluid.   Oral contrast material is not identified within the colonic lumen;   however, mild pancolonic wall thickening is suggested, withmild   infiltration of the paracolic fat.    The uterus is not visualized. The urinary bladder appears unremarkable.     Imaging of the lung bases and osseous structures appears unremarkable.   Note is made of an indwelling neurostimulator device.    IMPRESSION:  No evidence for mechanical bowel obstruction. Oral contrast   material is not identified within the colonic lumen; however, mild   pancolonic wall thickening is suggested, with mild infiltration of the   paracolic fat. Correlate clinically. No free intraperineal air or fluid   identified.      Assessment and Recommendation:   · Assessment		  Patient is a 46 y/o F lives with her sister from home ambulates with walker, current everyday smoker,  PMH Reflex symphatetic dystrophy s/p spinal stimulator, frequent falls, SLE (no treatment at this time), cyclical vomiting syndrome, Afib (not on AC due to frequent falls), HTN, depression, Hemorrhoids (EGD / colonoscopy 3-4 years ago, is due for a next one soon ) and partial thyroidectomy presented with c/o vomiting and diarrhea for 2 days a/w abdominal pain.  Patient was admitted for colitis ans was started on IV Cipro and Flagyl.  Patient feels better, and WBC is now normal.    Problem/Recommendation - 1:  Problem: Pancolitis.   Recommendation:   1- UA & CS suggest for contamination.  2- Follow Blood culture to final report.  3- GI evaluation and management.  4- Continue Cipro and Flagyl, and change it to PO upon discharge till 9/24/17.  5- Fluid and electrolytes management.  6- CBC and BMP follow up.  7- stool for ova and parasites.  8- Stool for culture.  9- stool for CDT.  10- CBC & BMP follow up. 1- UA & CS.  2- Blood culture.  3- GI evaluation and management.  4- Continue Cipro and Flagyl.  5- Fluid and electrolytes management.  6- CBC and BMP follow up.    Problem/Recommendation - 2:  ·  Problem: HTN (hypertension).    Recommendation:   1- Monitor Blood pressure closely.  2- Blood pressure control.  3- BP. meds as per cardiology and primary care team.         Discussed with medical resident

## 2017-09-16 NOTE — CHART NOTE - NSCHARTNOTEFT_GEN_A_CORE
pt was seen and examed at bedside with PGY3. Pt's vital sign is stable, s/p 1mg morphine and 1 mg dilaudid. Pt still c/o abd pain, changed diet to full liquid, start IV fluid, will give 1 more dose dilaudid at 4:30am if pain not relived. Ordered CT abd, will sign out to daytime team. pt was seen and examed at bedside with PGY3. Pt's vital sign is stable, s/p 1mg morphine and 1 mg dilaudid. Pt has Reflex sympathetic dystrophy syndrome, on pain meds. Pt still c/o abd pain. Physical exam shows abd soft, tenderness, round tenderness, no distention, no guarding. changed diet to full liquid, start IV fluid, will give 1 more dose dilaudid at 4:30am if pain not relieved. Ordered CT abd, will sign out to daytime team.

## 2017-09-16 NOTE — PROGRESS NOTE ADULT - SUBJECTIVE AND OBJECTIVE BOX
Chief Complaint: abdominal pain    HPI:   46y Female with severe abdominal pain overnight.  Pt described as sharp and very severe.  Pain was not relieved with current pain regimen.  Pt had to receive iv morphine and hydromorphone overnight.  +nausea, - vomiting.  Today, pain is better but mildly present.  Pt to have ct abd/pel with contrast.        PAIN SCORE:     5/10    SCALE USED: (1-10 VNRS)    Allergies    aspirin (Unknown)  latex (Unknown)  sulfa drugs (Unknown)    Intolerances      MEDICATIONS  (STANDING):  pregabalin 300 milliGRAM(s) Oral two times a day  escitalopram 20 milliGRAM(s) Oral daily  clopidogrel Tablet 75 milliGRAM(s) Oral daily  metoprolol 25 milliGRAM(s) Oral two times a day  amLODIPine   Tablet 2.5 milliGRAM(s) Oral daily  pantoprazole    Tablet 40 milliGRAM(s) Oral before breakfast  nicotine - 21 mG/24Hr(s) Patch 1 patch Transdermal daily  enoxaparin Injectable 40 milliGRAM(s) SubCutaneous daily  ciprofloxacin   IVPB 400 milliGRAM(s) IV Intermittent every 12 hours  metroNIDAZOLE  IVPB 500 milliGRAM(s) IV Intermittent every 8 hours  influenza   Vaccine 0.5 milliLiter(s) IntraMuscular once  sodium chloride 0.9% Bolus 1000 milliLiter(s) IV Bolus once  morphine ER Tablet 60 milliGRAM(s) Oral every 12 hours  metoclopramide 5 milliGRAM(s) Oral two times a day  ergocalciferol 26426 Unit(s) Oral <User Schedule>  lactated ringers. 1000 milliLiter(s) (125 mL/Hr) IV Continuous <Continuous>  senna 2 Tablet(s) Oral at bedtime  docusate sodium 100 milliGRAM(s) Oral two times a day  sodium chloride 0.9%. 1000 milliLiter(s) (75 mL/Hr) IV Continuous <Continuous>  dronabinol 5 milliGRAM(s) Oral every 12 hours    MEDICATIONS  (PRN):  ondansetron Injectable 4 milliGRAM(s) IV Push every 6 hours PRN Nausea and/or Vomiting  acetaminophen   Tablet 650 milliGRAM(s) Oral every 6 hours PRN For Temp greater than 38 C (100.4 F)  oxyCODONE    5 mG/acetaminophen 325 mG 2 Tablet(s) Oral every 4 hours PRN Severe Pain (7 - 10)  zaleplon 5 milliGRAM(s) Oral at bedtime PRN Insomnia      PHYSICAL EXAM:    Vital Signs Last 24 Hrs  T(C): 36.9 (16 Sep 2017 04:37), Max: 36.9 (16 Sep 2017 04:37)  T(F): 98.4 (16 Sep 2017 04:37), Max: 98.4 (16 Sep 2017 04:37)  HR: 65 (16 Sep 2017 04:37) (57 - 76)  BP: 102/52 (16 Sep 2017 04:37) (97/72 - 120/71)  BP(mean): --  RR: 18 (16 Sep 2017 04:37) (14 - 18)  SpO2: 98% (16 Sep 2017 04:37) (98% - 98%)             LABS:                          12.9   6.2   )-----------( 235      ( 16 Sep 2017 07:17 )             38.8     09-16    142  |  107  |  12  ----------------------------<  90  3.8   |  29  |  1.12    Ca    8.7      16 Sep 2017 07:17  Phos  3.4     09-15  Mg     2.1     09-16            Drug Screen:        RADIOLOGY:

## 2017-09-16 NOTE — PROGRESS NOTE ADULT - PROBLEM SELECTOR PLAN 1
- continue ms contin 60mg po q 12  - increase marinol to 5mg po 2x a day  - percocet po prn  - continue lyrica 300mg po 2x a day  - ct abd/pel  - iv fluids

## 2017-09-17 DIAGNOSIS — E04.1 NONTOXIC SINGLE THYROID NODULE: ICD-10-CM

## 2017-09-17 LAB
ANION GAP SERPL CALC-SCNC: 4 MMOL/L — LOW (ref 5–17)
BUN SERPL-MCNC: 12 MG/DL — SIGNIFICANT CHANGE UP (ref 7–18)
CALCIUM SERPL-MCNC: 9 MG/DL — SIGNIFICANT CHANGE UP (ref 8.4–10.5)
CHLORIDE SERPL-SCNC: 103 MMOL/L — SIGNIFICANT CHANGE UP (ref 96–108)
CO2 SERPL-SCNC: 30 MMOL/L — SIGNIFICANT CHANGE UP (ref 22–31)
CREAT SERPL-MCNC: 1.14 MG/DL — SIGNIFICANT CHANGE UP (ref 0.5–1.3)
CULTURE RESULTS: SIGNIFICANT CHANGE UP
CULTURE RESULTS: SIGNIFICANT CHANGE UP
GLUCOSE SERPL-MCNC: 84 MG/DL — SIGNIFICANT CHANGE UP (ref 70–99)
HCT VFR BLD CALC: 40.1 % — SIGNIFICANT CHANGE UP (ref 34.5–45)
HGB BLD-MCNC: 13.3 G/DL — SIGNIFICANT CHANGE UP (ref 11.5–15.5)
MAGNESIUM SERPL-MCNC: 2.1 MG/DL — SIGNIFICANT CHANGE UP (ref 1.6–2.6)
MCHC RBC-ENTMCNC: 29.6 PG — SIGNIFICANT CHANGE UP (ref 27–34)
MCHC RBC-ENTMCNC: 33.3 GM/DL — SIGNIFICANT CHANGE UP (ref 32–36)
MCV RBC AUTO: 88.8 FL — SIGNIFICANT CHANGE UP (ref 80–100)
PLATELET # BLD AUTO: 264 K/UL — SIGNIFICANT CHANGE UP (ref 150–400)
POTASSIUM SERPL-MCNC: 4.1 MMOL/L — SIGNIFICANT CHANGE UP (ref 3.5–5.3)
POTASSIUM SERPL-SCNC: 4.1 MMOL/L — SIGNIFICANT CHANGE UP (ref 3.5–5.3)
RBC # BLD: 4.51 M/UL — SIGNIFICANT CHANGE UP (ref 3.8–5.2)
RBC # FLD: 13.1 % — SIGNIFICANT CHANGE UP (ref 10.3–14.5)
SODIUM SERPL-SCNC: 137 MMOL/L — SIGNIFICANT CHANGE UP (ref 135–145)
SPECIMEN SOURCE: SIGNIFICANT CHANGE UP
SPECIMEN SOURCE: SIGNIFICANT CHANGE UP
WBC # BLD: 6.3 K/UL — SIGNIFICANT CHANGE UP (ref 3.8–10.5)
WBC # FLD AUTO: 6.3 K/UL — SIGNIFICANT CHANGE UP (ref 3.8–10.5)

## 2017-09-17 RX ORDER — HYDROMORPHONE HYDROCHLORIDE 2 MG/ML
1 INJECTION INTRAMUSCULAR; INTRAVENOUS; SUBCUTANEOUS ONCE
Qty: 0 | Refills: 0 | Status: DISCONTINUED | OUTPATIENT
Start: 2017-09-17 | End: 2017-09-17

## 2017-09-17 RX ORDER — OXYCODONE AND ACETAMINOPHEN 5; 325 MG/1; MG/1
2 TABLET ORAL EVERY 4 HOURS
Qty: 0 | Refills: 0 | Status: DISCONTINUED | OUTPATIENT
Start: 2017-09-17 | End: 2017-09-19

## 2017-09-17 RX ADMIN — MORPHINE SULFATE 60 MILLIGRAM(S): 50 CAPSULE, EXTENDED RELEASE ORAL at 07:05

## 2017-09-17 RX ADMIN — OXYCODONE AND ACETAMINOPHEN 2 TABLET(S): 5; 325 TABLET ORAL at 10:31

## 2017-09-17 RX ADMIN — Medication 5 MILLIGRAM(S): at 23:13

## 2017-09-17 RX ADMIN — Medication 200 MILLIGRAM(S): at 06:56

## 2017-09-17 RX ADMIN — HYDROMORPHONE HYDROCHLORIDE 1 MILLIGRAM(S): 2 INJECTION INTRAMUSCULAR; INTRAVENOUS; SUBCUTANEOUS at 00:54

## 2017-09-17 RX ADMIN — Medication 200 MILLIGRAM(S): at 23:12

## 2017-09-17 RX ADMIN — SODIUM CHLORIDE 75 MILLILITER(S): 9 INJECTION INTRAMUSCULAR; INTRAVENOUS; SUBCUTANEOUS at 10:04

## 2017-09-17 RX ADMIN — Medication 300 MILLIGRAM(S): at 06:54

## 2017-09-17 RX ADMIN — Medication 5 MILLIGRAM(S): at 06:55

## 2017-09-17 RX ADMIN — ENOXAPARIN SODIUM 40 MILLIGRAM(S): 100 INJECTION SUBCUTANEOUS at 14:48

## 2017-09-17 RX ADMIN — Medication 100 MILLIGRAM(S): at 06:56

## 2017-09-17 RX ADMIN — Medication 5 MILLIGRAM(S): at 07:06

## 2017-09-17 RX ADMIN — OXYCODONE AND ACETAMINOPHEN 2 TABLET(S): 5; 325 TABLET ORAL at 04:21

## 2017-09-17 RX ADMIN — OXYCODONE AND ACETAMINOPHEN 2 TABLET(S): 5; 325 TABLET ORAL at 20:05

## 2017-09-17 RX ADMIN — OXYCODONE AND ACETAMINOPHEN 2 TABLET(S): 5; 325 TABLET ORAL at 10:01

## 2017-09-17 RX ADMIN — OXYCODONE AND ACETAMINOPHEN 2 TABLET(S): 5; 325 TABLET ORAL at 21:30

## 2017-09-17 RX ADMIN — SODIUM CHLORIDE 75 MILLILITER(S): 9 INJECTION INTRAMUSCULAR; INTRAVENOUS; SUBCUTANEOUS at 00:59

## 2017-09-17 RX ADMIN — ESCITALOPRAM OXALATE 20 MILLIGRAM(S): 10 TABLET, FILM COATED ORAL at 14:47

## 2017-09-17 RX ADMIN — MORPHINE SULFATE 60 MILLIGRAM(S): 50 CAPSULE, EXTENDED RELEASE ORAL at 07:35

## 2017-09-17 RX ADMIN — Medication 1 PATCH: at 14:47

## 2017-09-17 RX ADMIN — OXYCODONE AND ACETAMINOPHEN 2 TABLET(S): 5; 325 TABLET ORAL at 02:31

## 2017-09-17 RX ADMIN — PANTOPRAZOLE SODIUM 40 MILLIGRAM(S): 20 TABLET, DELAYED RELEASE ORAL at 06:55

## 2017-09-17 RX ADMIN — HYDROMORPHONE HYDROCHLORIDE 1 MILLIGRAM(S): 2 INJECTION INTRAMUSCULAR; INTRAVENOUS; SUBCUTANEOUS at 01:58

## 2017-09-17 RX ADMIN — CLOPIDOGREL BISULFATE 75 MILLIGRAM(S): 75 TABLET, FILM COATED ORAL at 14:48

## 2017-09-17 NOTE — PROGRESS NOTE ADULT - PROBLEM SELECTOR PLAN 1
continue antibiotics as per ID  Stool studies  Blood culture neg   GI follow up  ID follow up. continue antibiotics as per ID  Stool studies  Blood culture neg  GI follow up   GI follow up  ID follow up.

## 2017-09-17 NOTE — PROGRESS NOTE ADULT - SUBJECTIVE AND OBJECTIVE BOX
[   ] ICU              [   ] CCU           [ X  ] Medical Floor      Patient is comfortable. No new complaints reported, Patient reports less abdominal pain today.  No N/V, hematemesis, hematochezia, melena, fever, chills, chest pain, SOB, cough or diarrhea reported.        VITALS  Vital Signs Last 24 Hrs  T(C): 36.5 (17 Sep 2017 05:44), Max: 36.5 (16 Sep 2017 14:42)  T(F): 97.7 (17 Sep 2017 05:44), Max: 97.7 (16 Sep 2017 14:42)  HR: 69 (17 Sep 2017 05:44) (69 - 71)  BP: 103/66 (17 Sep 2017 05:44) (100/65 - 119/84)   RR: 18 (17 Sep 2017 05:44) (16 - 18)  SpO2: 98% (17 Sep 2017 05:44) (98% - 100%)         MEDICATIONS  (STANDING):  pregabalin 300 milliGRAM(s) Oral two times a day  escitalopram 20 milliGRAM(s) Oral daily  clopidogrel Tablet 75 milliGRAM(s) Oral daily  metoprolol 25 milliGRAM(s) Oral two times a day  amLODIPine   Tablet 2.5 milliGRAM(s) Oral daily  pantoprazole    Tablet 40 milliGRAM(s) Oral before breakfast  nicotine - 21 mG/24Hr(s) Patch 1 patch Transdermal daily  enoxaparin Injectable 40 milliGRAM(s) SubCutaneous daily  ciprofloxacin   IVPB 400 milliGRAM(s) IV Intermittent every 12 hours  metroNIDAZOLE  IVPB 500 milliGRAM(s) IV Intermittent every 8 hours  influenza   Vaccine 0.5 milliLiter(s) IntraMuscular once  sodium chloride 0.9% Bolus 1000 milliLiter(s) IV Bolus once  morphine ER Tablet 60 milliGRAM(s) Oral every 12 hours  metoclopramide 5 milliGRAM(s) Oral two times a day  ergocalciferol 95155 Unit(s) Oral <User Schedule>  lactated ringers. 1000 milliLiter(s) (125 mL/Hr) IV Continuous <Continuous>  senna 2 Tablet(s) Oral at bedtime  docusate sodium 100 milliGRAM(s) Oral two times a day  sodium chloride 0.9%. 1000 milliLiter(s) (75 mL/Hr) IV Continuous <Continuous>  dronabinol 5 milliGRAM(s) Oral every 12 hours    MEDICATIONS  (PRN):  ondansetron Injectable 4 milliGRAM(s) IV Push every 6 hours PRN Nausea and/or Vomiting  acetaminophen   Tablet 650 milliGRAM(s) Oral every 6 hours PRN For Temp greater than 38 C (100.4 F)  oxyCODONE    5 mG/acetaminophen 325 mG 2 Tablet(s) Oral every 4 hours PRN Severe Pain (7 - 10)  zaleplon 5 milliGRAM(s) Oral at bedtime PRN Insomnia                            13.3   6.3   )-----------( 264      ( 17 Sep 2017 08:09 )             40.1       09-17    137  |  103  |  12  ----------------------------<  84  4.1   |  30  |  1.14    Ca    9.0      17 Sep 2017 08:09  Mg     2.1     09-17        EXAM:  CT ABDOMEN AND PELVIS OC IC                            PROCEDURE DATE:  09/16/2017          INTERPRETATION:  CT ABDOMEN AND PELVIS WITH IV CONTRAST    CLINICAL STATEMENT: Abdominal pain.    COMPARISON: CT abdomen pelvis 9/11/2017.    TECHNIQUE: CT scan of the abdomen and pelvis was performed with IV,   without oral contrast. Multiplanar reformations were made.    FINDINGS:  Streaky bibasilar subsegmental atelectasis. No pleural effusion.    Again seen is a 5 mm hypodensity in the right hepatic lobe, too small to   characterize. Mild prominence of the pancreatic duct is again seen. The   pancreatic parenchyma appears unremarkable. The contracted gallbladder,   spleen and adrenal glands are unremarkable.    Kidneys enhance symmetrically. No hydronephrosis. Left-sided extrarenal   pelvis is noted. Again seen is a 5 mm hypodensity in the left kidney mid   to lower pole, too small to characterize.    GI tract is unobstructed. Appendix is unremarkable. No free air or   ascites.    No bulky adenopathy. Normal caliber abdominal aorta. Mild atherosclerosis.    Urinary bladder is distended but otherwise unremarkable. Partial   hysterectomy or uterine atrophy. No free pelvic fluid.    Mild degenerative changes in the spine.    A batterypack is present in the right gluteal subcutaneous fat with   spinal stimulator electrodes entering the spinal canal at T7-T8. Small   focus of air left lower quadrant anterior abdominal wall consistent with   subcutaneous injection.    IMPRESSION:  Distended urinary bladder. The etiology of the patient's abdominal pain   is not clearly elucidated on this exam.

## 2017-09-17 NOTE — PROGRESS NOTE ADULT - SUBJECTIVE AND OBJECTIVE BOX
Meds:  ciprofloxacin   IVPB 400 milliGRAM(s) IV Intermittent every 12 hours  metroNIDAZOLE  IVPB 500 milliGRAM(s) IV Intermittent every 8 hours    Allergies:  Allergies    aspirin (Unknown)  latex (Unknown)  sulfa drugs (Unknown)    Intolerances    ROS  [  ] UNABLE TO ELICIT    General:  [  ] None  [  ] Fever  [  ] Chills  [ x ] Malaise    Skin:  [ x ] None [  ] Rash  [  ] Wound  [  ] Ulcer    HEENT:  [ x ] None  [  ] Sore Throat  [  ] Nasal congestion/ runny nose  [  ] Photophobia [  ] Neck pain      Chest:  [ x ] None   [  ] SOB  [  ] Cough  [  ] None    Cardiovascular:   [ x ] None  [  ] CP  [  ] Palpitation    Gastrointestinal:  [  ] None  [ x ] Abd pain    [ x ] Nausea    [ x ] Vomiting   [  ] Diarrhea	     Genitourinary:  [ x ] None [  ] Polyuria   [  ] Urgency  [  ] Frequency  [  ] Dysuria    [  ]  Hematuria       Musculoskeletal:  [  ] None [  ] Back Pain	[  ] Body aches  [  ] Joint pain    Neurological: [  ] None [  ]Dizziness  [  ]Visual Disturbance  [  ]Headaches   [ x ] Weakness          PHYSICAL EXAM:    Vital Signs Last 24 Hrs  T(C): 36.5 (17 Sep 2017 05:44), Max: 36.5 (16 Sep 2017 14:42)  T(F): 97.7 (17 Sep 2017 05:44), Max: 97.7 (16 Sep 2017 14:42)  HR: 69 (17 Sep 2017 05:44) (69 - 71)  BP: 103/66 (17 Sep 2017 05:44) (100/65 - 119/84)  BP(mean): --  RR: 18 (17 Sep 2017 05:44) (16 - 18)  SpO2: 98% (17 Sep 2017 05:44) (98% - 100%)    Constitutional:    HEENT: [ x ] Wnl  [  ] Pharyngeal congestion    Neck:  [ x ] Supple  [  ]Lymphadenopathy  [  ] No JVD   [  ] JVD  [  ] Masses   [  ] WNL    CHEST/Respiratory:  [ x ]Clear to auscultation  [  ] Rales   [  ] Rhonchi   [  ] Wheezing     [  ] Chest Tenderness      Cardiovascular:  [ x ] Reg S1 S2   [  ] Irreg S1 S2   [ x ]No Murmur  [  ] +ve Murmurs  [  ]Systolic [  ]Diastolic      Abdomen:  [ x ] Soft  [  ] No tendrerness  [ x ] Improving diffuse and generalized Tenderness  [  ] Organomegaly  [  ] ABD Distention  [  ] Rigidity                       [ x ] No Regidity                       [ x ] No Rebound Tenderness  [ x ] No Guarding Rigidity  [  ] Rebound Tenderness[  ] Guarding Rigidity                          [x  ]  +ve Bowel Sounds  [  ] Decreased Bowel Sounds    [  ] Absent Bowel Sounds                            Extremities: [ x ] No edema [  ] Edema  [  ] Clubbing   [  ] Cyanosis                         [ x ] No Tender Calf muscles  [  ] Tender Calf muscles                        [ x ] Palpable peripheral pulses    Neurological: [ x ] Awake  [ x ] Alert  [ x ] Oriented  x  3                           [  ] Confused  [  ] Drowzy  [  ] repond to painful stimuli  [  ] Unresponsive    Skin:  [ x ] Intact [  ] Redness [  ] Thrombophlebitis  [  ] Rashes  [  ] Dry  [  ] Ulcers    Ortho:  [  ] Joint Swelling  [  ] Joint erythema [  ] Joint tenderness                [  ] Increased temp. to touch  [ x ] WNL         LABS/DIAGNOSTIC TESTS                                                                  13.3   6.3   )-----------( 264      ( 17 Sep 2017 08:09 )             40.1   09-17    137  |  103  |  12  ----------------------------<  84  4.1   |  30  |  1.14    Ca    9.0      17 Sep 2017 08:09  Mg     2.1     09-17          CULTURES:   Culture - Blood (09.12.17 @ 09:53)    Specimen Source: .Blood Blood-Peripheral    Culture Results:   No growth to date.    Culture - Blood (09.12.17 @ 09:53)    Specimen Source: .Blood Blood-Peripheral    Culture Results:   No growth to date.    Culture - Urine (09.12.17 @ 00:22)    Specimen Source: .Urine Clean Catch (Midstream)    Culture Results:   10,000 - 49,000 CFU/mL Coag negative Staphylococcus not Staph  saprophyticus    RADIOLOGY:    EXAM:  CT ABDOMEN AND PELVIS OC IC                            PROCEDURE DATE:  09/16/2017          INTERPRETATION:  CT ABDOMEN AND PELVIS WITH IV CONTRAST    CLINICAL STATEMENT: Abdominal pain.    COMPARISON: CT abdomen pelvis 9/11/2017.    TECHNIQUE: CT scan of the abdomen and pelvis was performed with IV,   without oral contrast. Multiplanar reformations were made.    FINDINGS:  Streaky bibasilar subsegmental atelectasis. No pleural effusion.    Again seen is a 5 mm hypodensity in the right hepatic lobe, too small to   characterize. Mild prominence of the pancreatic duct is again seen. The   pancreatic parenchyma appears unremarkable. The contracted gallbladder,   spleen and adrenal glands are unremarkable.    Kidneys enhance symmetrically. No hydronephrosis. Left-sided extrarenal   pelvis is noted. Again seen is a 5 mm hypodensity in the left kidney mid   to lower pole, too small to characterize.    GI tract is unobstructed. Appendix is unremarkable. No free air or   ascites.    No bulky adenopathy. Normal caliber abdominal aorta. Mild atherosclerosis.    Urinary bladder is distended but otherwise unremarkable. Partial   hysterectomy or uterine atrophy. No free pelvic fluid.    Mild degenerative changes in the spine.    A batterypack is present in the right gluteal subcutaneous fat with   spinal stimulator electrodes entering the spinal canal at T7-T8. Small   focus of air left lower quadrant anterior abdominal wall consistent with   subcutaneous injection.    IMPRESSION:  Distended urinary bladder. The etiology of the patient's abdominal pain   is not clearly elucidated on this exam.          Assessment and Recommendation:     Patient is a 44 y/o F lives with her sister from home ambulates with walker, current everyday smoker,  PMH Reflex symphatetic dystrophy s/p spinal stimulator, frequent falls, SLE (no treatment at this time), cyclical vomiting syndrome, Afib (not on AC due to frequent falls), HTN, depression, Hemorrhoids (EGD / colonoscopy 3-4 years ago, is due for a next one soon ) and partial thyroidectomy presented with c/o vomiting and diarrhea for 2 days a/w abdominal pain.  Patient was admitted for colitis ans was started on IV Cipro and Flagyl.  Patient feels better, and WBC is now normal.  9/16/17 Patient had abdominal CT that showed distended urinary bladder.   Problem/Recommendation - 1:  Problem: Pancolitis.   Recommendation:   1- UA & CS suggest for contamination.  2- Follow Blood culture to final report.  3- GI evaluation and management.  4- Continue Cipro and Flagyl, and change it to PO upon discharge till 9/24/17.  5- Fluid and electrolytes management.  6- CBC and BMP follow up.  7- stool for ova and parasites.  8- Stool for culture.  9- stool for CDT.  10- Suggested Santo catheter.   2- Blood culture.  3- GI evaluation and management.  4- Continue Cipro and Flagyl.  5- Fluid and electrolytes management.  6- CBC and BMP follow up.    Problem/Recommendation - 2:  ·  Problem: HTN (hypertension).    Recommendation:   1- Monitor Blood pressure closely.  2- Blood pressure control.  3- BP. meds as per cardiology and primary care team.         Discussed with medical resident

## 2017-09-17 NOTE — PROGRESS NOTE ADULT - SUBJECTIVE AND OBJECTIVE BOX
pt seen in icu [  ], reg med floor [  x ], bed [ x ], chair at bedside [   ]  Patient is a 44 y/o F lives with her sister from home ambulates with walker, current everyday smoker,  PMH Reflex sympathetic dystrophy s/p spinal stimulator, frequent falls, SLE (no treatment at this time), cyclical vomiting syndrome, Afib (not on AC due to frequent falls), HTN, depression, Hemorrhoids (EGD / colonoscopy 3-4 years ago, is due for a next one soon ) and partial thyroidectomy presented with c/o vomiting and diarrhea for 2 days a/w abdominal pain.    Patient found to have a left shift on CBC, Hb 17.1, likely hemoconcentrated, ( Baseline Hb of 15-16), BMP showing K of 3.4, likely related to persistent vomiting/diarrhea, normal AG, Bicarb is WNL, initial lactate is 2.5, patient got 2 L NS in the ED. CT A/P showing evidence of pancolitis given cipro/Flagyl in the ED and morphine, Zofran, Tylenol for symptom management  Patient is admitted for pancolitis and cyclical vomiting syndrome    REVIEW OF SYSTEMS:    CONSTITUTIONAL: No weakness, fevers or chills  EYES/ENT: No visual changes;  No vertigo or throat pain   NECK: No pain or stiffness  RESPIRATORY: No cough, wheezing, hemoptysis; No shortness of breath  CARDIOVASCULAR: No chest pain or palpitations  GASTROINTESTINAL: No abdominal or epigastric pain. No nausea, vomiting, or hematemesis; No diarrhea or constipation. No melena or hematochezia.  GENITOURINARY: No dysuria, frequency or hematuria  NEUROLOGICAL: No numbness or weakness  SKIN: No itching, burning, rashes, or lesions   All other review of systems is negative unless indicated above.    Physical Exam    General: WN/WD NAD  Neurology: A&Ox3, nonfocal, SCOTT x 4  Respiratory: CTA B/L  CV: RRR, S1S2, no murmurs, rubs or gallops  Abdominal: Soft, NT, ND +BS, Last BM  Extremities: No edema, + peripheral pulses      Allergies  aspirin (Unknown)  latex (Unknown)  sulfa drugs (Unknown)      Health Issues  Ulcerative chronic pancolitis without complications  HTN (hypertension)  Cyclical vomiting  Atrial fibrillation  SLE (systemic lupus erythematosus)  Reflex sympathetic dystrophy  H/O partial thyroidectomy  S/P partial hysterectomy      Vitals  T(F): 97.7 (09-17-17 @ 05:44), Max: 97.7 (09-16-17 @ 14:42)  HR: 69 (09-17-17 @ 05:44) (69 - 71)  BP: 103/66 (09-17-17 @ 05:44) (100/65 - 119/84)  RR: 18 (09-17-17 @ 05:44) (16 - 18)  SpO2: 98% (09-17-17 @ 05:44) (98% - 100%)  Wt(kg): --  CAPILLARY BLOOD GLUCOSE          Labs                          13.3   6.3   )-----------( 264      ( 17 Sep 2017 08:09 )             40.1       09-17    137  |  103  |  12  ----------------------------<  84  4.1   |  30  |  1.14    Ca    9.0      17 Sep 2017 08:09  Mg     2.1     09-17              Radiology Results  < from: CT Abdomen and Pelvis w/ Oral Cont and w/ IV Cont (09.16.17 @ 17:31) >  FINDINGS:  Streaky bibasilar subsegmental atelectasis. No pleural effusion.    Again seen is a 5 mm hypodensity in the right hepatic lobe, too small to   characterize. Mild prominence of the pancreatic duct is again seen. The   pancreatic parenchyma appears unremarkable. The contracted gallbladder,   spleen and adrenal glands are unremarkable.    Kidneys enhance symmetrically. No hydronephrosis. Left-sided extrarenal   pelvis is noted. Again seen is a 5 mm hypodensity in the left kidney mid   to lower pole, too small to characterize.    GI tract is unobstructed. Appendix is unremarkable. No free air or   ascites.    No bulky adenopathy. Normal caliber abdominal aorta. Mild atherosclerosis.    Urinary bladder is distended but otherwise unremarkable. Partial   hysterectomy or uterine atrophy. No free pelvic fluid.    Mild degenerative changes in the spine.    A batterypack is present in the right gluteal subcutaneous fat with   spinal stimulator electrodes entering the spinal canal at T7-T8. Small   focus of air left lower quadrant anterior abdominal wall consistent with   subcutaneous injection.    IMPRESSION:  Distended urinary bladder. The etiology of the patient's abdominal pain   is not clearly elucidated on this exam.    < end of copied text >        < from: US Thyroid + Parathyroid (09.15.17 @ 14:34) >  IMPRESSION:  5.6 cm heterogeneous solid nodule in the mid left lobe. Consider FNA   biopsy as clinically warranted    < end of copied text >    Meds    MEDICATIONS  (STANDING):  pregabalin 300 milliGRAM(s) Oral two times a day  escitalopram 20 milliGRAM(s) Oral daily  clopidogrel Tablet 75 milliGRAM(s) Oral daily  metoprolol 25 milliGRAM(s) Oral two times a day  amLODIPine   Tablet 2.5 milliGRAM(s) Oral daily  pantoprazole    Tablet 40 milliGRAM(s) Oral before breakfast  nicotine - 21 mG/24Hr(s) Patch 1 patch Transdermal daily  enoxaparin Injectable 40 milliGRAM(s) SubCutaneous daily  ciprofloxacin   IVPB 400 milliGRAM(s) IV Intermittent every 12 hours  metroNIDAZOLE  IVPB 500 milliGRAM(s) IV Intermittent every 8 hours  influenza   Vaccine 0.5 milliLiter(s) IntraMuscular once  sodium chloride 0.9% Bolus 1000 milliLiter(s) IV Bolus once  morphine ER Tablet 60 milliGRAM(s) Oral every 12 hours  metoclopramide 5 milliGRAM(s) Oral two times a day  ergocalciferol 40544 Unit(s) Oral <User Schedule>  lactated ringers. 1000 milliLiter(s) (125 mL/Hr) IV Continuous <Continuous>  senna 2 Tablet(s) Oral at bedtime  docusate sodium 100 milliGRAM(s) Oral two times a day  sodium chloride 0.9%. 1000 milliLiter(s) (75 mL/Hr) IV Continuous <Continuous>  dronabinol 5 milliGRAM(s) Oral every 12 hours      MEDICATIONS  (PRN):  ondansetron Injectable 4 milliGRAM(s) IV Push every 6 hours PRN Nausea and/or Vomiting  acetaminophen   Tablet 650 milliGRAM(s) Oral every 6 hours PRN For Temp greater than 38 C (100.4 F)  oxyCODONE    5 mG/acetaminophen 325 mG 2 Tablet(s) Oral every 4 hours PRN Severe Pain (7 - 10)  zaleplon 5 milliGRAM(s) Oral at bedtime PRN Insomnia pt seen in icu [  ], reg med floor [  x ], bed [ x ], chair at bedside [   ]  Patient is a 46 y/o F lives with her sister from home ambulates with walker, current everyday smoker,  PMH Reflex sympathetic dystrophy s/p spinal stimulator, frequent falls, SLE (no treatment at this time), cyclical vomiting syndrome, Afib (not on AC due to frequent falls), HTN, depression, Hemorrhoids (EGD / colonoscopy 3-4 years ago, is due for a next one soon ) and partial thyroidectomy presented with c/o vomiting and diarrhea for 2 days a/w abdominal pain.    Patient found to have a left shift on CBC, Hb 17.1, likely hemoconcentrated, ( Baseline Hb of 15-16), BMP showing K of 3.4, likely related to persistent vomiting/diarrhea, normal AG, Bicarb is WNL, initial lactate is 2.5, patient got 2 L NS in the ED. CT A/P showing evidence of pancolitis given cipro/Flagyl in the ED and morphine, Zofran, Tylenol for symptom management  Patient is admitted for pancolitis and cyclical vomiting syndrome    REVIEW OF SYSTEMS:    CONSTITUTIONAL: No weakness, fevers or chills  EYES/ENT: No visual changes;  No vertigo or throat pain   NECK: No pain or stiffness  RESPIRATORY: No cough, wheezing, hemoptysis; No shortness of breath  CARDIOVASCULAR: No chest pain or palpitations  GASTROINTESTINAL: + abdominal mainly epigastric pain. No nausea, vomiting, or hematemesis; + diarrhea but no constipation. No melena or hematochezia.  GENITOURINARY: No dysuria, frequency or hematuria  NEUROLOGICAL: No numbness or weakness  SKIN: No itching, burning, rashes, or lesions   All other review of systems is negative unless indicated above.    Physical Exam    General: WN/WD NAD  Neurology: A&Ox3, nonfocal, SCOTT x 4  Respiratory: CTA B/L  CV: RRR, S1S2, no murmurs, rubs or gallops  Abdominal: Soft, diffusely tender  Extremities: tender legs      Allergies  aspirin (Unknown)  latex (Unknown)  sulfa drugs (Unknown)      Health Issues  Ulcerative chronic pancolitis without complications  HTN (hypertension)  Cyclical vomiting  Atrial fibrillation  SLE (systemic lupus erythematosus)  Reflex sympathetic dystrophy  H/O partial thyroidectomy  S/P partial hysterectomy      Vitals  T(F): 97.7 (09-17-17 @ 05:44), Max: 97.7 (09-16-17 @ 14:42)  HR: 69 (09-17-17 @ 05:44) (69 - 71)  BP: 103/66 (09-17-17 @ 05:44) (100/65 - 119/84)  RR: 18 (09-17-17 @ 05:44) (16 - 18)  SpO2: 98% (09-17-17 @ 05:44) (98% - 100%)  Wt(kg): --  CAPILLARY BLOOD GLUCOSE          Labs                          13.3   6.3   )-----------( 264      ( 17 Sep 2017 08:09 )             40.1       09-17    137  |  103  |  12  ----------------------------<  84  4.1   |  30  |  1.14    Ca    9.0      17 Sep 2017 08:09  Mg     2.1     09-17              Radiology Results  < from: CT Abdomen and Pelvis w/ Oral Cont and w/ IV Cont (09.16.17 @ 17:31) >  FINDINGS:  Streaky bibasilar subsegmental atelectasis. No pleural effusion.    Again seen is a 5 mm hypodensity in the right hepatic lobe, too small to   characterize. Mild prominence of the pancreatic duct is again seen. The   pancreatic parenchyma appears unremarkable. The contracted gallbladder,   spleen and adrenal glands are unremarkable.    Kidneys enhance symmetrically. No hydronephrosis. Left-sided extrarenal   pelvis is noted. Again seen is a 5 mm hypodensity in the left kidney mid   to lower pole, too small to characterize.    GI tract is unobstructed. Appendix is unremarkable. No free air or   ascites.    No bulky adenopathy. Normal caliber abdominal aorta. Mild atherosclerosis.    Urinary bladder is distended but otherwise unremarkable. Partial   hysterectomy or uterine atrophy. No free pelvic fluid.    Mild degenerative changes in the spine.    A batterypack is present in the right gluteal subcutaneous fat with   spinal stimulator electrodes entering the spinal canal at T7-T8. Small   focus of air left lower quadrant anterior abdominal wall consistent with   subcutaneous injection.    IMPRESSION:  Distended urinary bladder. The etiology of the patient's abdominal pain   is not clearly elucidated on this exam.    < end of copied text >        < from: US Thyroid + Parathyroid (09.15.17 @ 14:34) >  IMPRESSION:  5.6 cm heterogeneous solid nodule in the mid left lobe. Consider FNA   biopsy as clinically warranted    < end of copied text >    Meds    MEDICATIONS  (STANDING):  pregabalin 300 milliGRAM(s) Oral two times a day  escitalopram 20 milliGRAM(s) Oral daily  clopidogrel Tablet 75 milliGRAM(s) Oral daily  metoprolol 25 milliGRAM(s) Oral two times a day  amLODIPine   Tablet 2.5 milliGRAM(s) Oral daily  pantoprazole    Tablet 40 milliGRAM(s) Oral before breakfast  nicotine - 21 mG/24Hr(s) Patch 1 patch Transdermal daily  enoxaparin Injectable 40 milliGRAM(s) SubCutaneous daily  ciprofloxacin   IVPB 400 milliGRAM(s) IV Intermittent every 12 hours  metroNIDAZOLE  IVPB 500 milliGRAM(s) IV Intermittent every 8 hours  influenza   Vaccine 0.5 milliLiter(s) IntraMuscular once  sodium chloride 0.9% Bolus 1000 milliLiter(s) IV Bolus once  morphine ER Tablet 60 milliGRAM(s) Oral every 12 hours  metoclopramide 5 milliGRAM(s) Oral two times a day  ergocalciferol 17126 Unit(s) Oral <User Schedule>  lactated ringers. 1000 milliLiter(s) (125 mL/Hr) IV Continuous <Continuous>  senna 2 Tablet(s) Oral at bedtime  docusate sodium 100 milliGRAM(s) Oral two times a day  sodium chloride 0.9%. 1000 milliLiter(s) (75 mL/Hr) IV Continuous <Continuous>  dronabinol 5 milliGRAM(s) Oral every 12 hours      MEDICATIONS  (PRN):  ondansetron Injectable 4 milliGRAM(s) IV Push every 6 hours PRN Nausea and/or Vomiting  acetaminophen   Tablet 650 milliGRAM(s) Oral every 6 hours PRN For Temp greater than 38 C (100.4 F)  oxyCODONE    5 mG/acetaminophen 325 mG 2 Tablet(s) Oral every 4 hours PRN Severe Pain (7 - 10)  zaleplon 5 milliGRAM(s) Oral at bedtime PRN Insomnia

## 2017-09-18 DIAGNOSIS — J98.11 ATELECTASIS: ICD-10-CM

## 2017-09-18 LAB
ANION GAP SERPL CALC-SCNC: 8 MMOL/L — SIGNIFICANT CHANGE UP (ref 5–17)
BUN SERPL-MCNC: 11 MG/DL — SIGNIFICANT CHANGE UP (ref 7–18)
CALCIUM SERPL-MCNC: 9.7 MG/DL — SIGNIFICANT CHANGE UP (ref 8.4–10.5)
CHLORIDE SERPL-SCNC: 106 MMOL/L — SIGNIFICANT CHANGE UP (ref 96–108)
CO2 SERPL-SCNC: 27 MMOL/L — SIGNIFICANT CHANGE UP (ref 22–31)
CREAT SERPL-MCNC: 1.07 MG/DL — SIGNIFICANT CHANGE UP (ref 0.5–1.3)
GLUCOSE SERPL-MCNC: 94 MG/DL — SIGNIFICANT CHANGE UP (ref 70–99)
HCT VFR BLD CALC: 44.3 % — SIGNIFICANT CHANGE UP (ref 34.5–45)
HGB BLD-MCNC: 14.3 G/DL — SIGNIFICANT CHANGE UP (ref 11.5–15.5)
LACTATE SERPL-SCNC: 0.7 MMOL/L — SIGNIFICANT CHANGE UP (ref 0.7–2)
MAGNESIUM SERPL-MCNC: 2 MG/DL — SIGNIFICANT CHANGE UP (ref 1.6–2.6)
MCHC RBC-ENTMCNC: 29.3 PG — SIGNIFICANT CHANGE UP (ref 27–34)
MCHC RBC-ENTMCNC: 32.3 GM/DL — SIGNIFICANT CHANGE UP (ref 32–36)
MCV RBC AUTO: 90.7 FL — SIGNIFICANT CHANGE UP (ref 80–100)
PHOSPHATE SERPL-MCNC: 3.8 MG/DL — SIGNIFICANT CHANGE UP (ref 2.5–4.5)
PLATELET # BLD AUTO: 267 K/UL — SIGNIFICANT CHANGE UP (ref 150–400)
POTASSIUM SERPL-MCNC: 4.1 MMOL/L — SIGNIFICANT CHANGE UP (ref 3.5–5.3)
POTASSIUM SERPL-SCNC: 4.1 MMOL/L — SIGNIFICANT CHANGE UP (ref 3.5–5.3)
RBC # BLD: 4.88 M/UL — SIGNIFICANT CHANGE UP (ref 3.8–5.2)
RBC # FLD: 13 % — SIGNIFICANT CHANGE UP (ref 10.3–14.5)
SODIUM SERPL-SCNC: 141 MMOL/L — SIGNIFICANT CHANGE UP (ref 135–145)
WBC # BLD: 6.1 K/UL — SIGNIFICANT CHANGE UP (ref 3.8–10.5)
WBC # FLD AUTO: 6.1 K/UL — SIGNIFICANT CHANGE UP (ref 3.8–10.5)

## 2017-09-18 RX ORDER — OXYCODONE AND ACETAMINOPHEN 5; 325 MG/1; MG/1
1 TABLET ORAL ONCE
Qty: 0 | Refills: 0 | Status: DISCONTINUED | OUTPATIENT
Start: 2017-09-18 | End: 2017-09-19

## 2017-09-18 RX ORDER — SOD SULF/SODIUM/NAHCO3/KCL/PEG
4000 SOLUTION, RECONSTITUTED, ORAL ORAL ONCE
Qty: 0 | Refills: 0 | Status: COMPLETED | OUTPATIENT
Start: 2017-09-19 | End: 2017-09-19

## 2017-09-18 RX ORDER — POLYETHYLENE GLYCOL 3350 17 G/17G
17 POWDER, FOR SOLUTION ORAL
Qty: 0 | Refills: 0 | Status: COMPLETED | OUTPATIENT
Start: 2017-09-19 | End: 2017-09-19

## 2017-09-18 RX ADMIN — Medication 200 MILLIGRAM(S): at 07:48

## 2017-09-18 RX ADMIN — Medication 5 MILLIGRAM(S): at 06:55

## 2017-09-18 RX ADMIN — Medication 5 MILLIGRAM(S): at 17:34

## 2017-09-18 RX ADMIN — PANTOPRAZOLE SODIUM 40 MILLIGRAM(S): 20 TABLET, DELAYED RELEASE ORAL at 07:01

## 2017-09-18 RX ADMIN — OXYCODONE AND ACETAMINOPHEN 2 TABLET(S): 5; 325 TABLET ORAL at 09:00

## 2017-09-18 RX ADMIN — Medication 100 MILLIGRAM(S): at 06:55

## 2017-09-18 RX ADMIN — AMLODIPINE BESYLATE 2.5 MILLIGRAM(S): 2.5 TABLET ORAL at 06:55

## 2017-09-18 RX ADMIN — Medication 300 MILLIGRAM(S): at 17:42

## 2017-09-18 RX ADMIN — OXYCODONE AND ACETAMINOPHEN 2 TABLET(S): 5; 325 TABLET ORAL at 13:20

## 2017-09-18 RX ADMIN — CLOPIDOGREL BISULFATE 75 MILLIGRAM(S): 75 TABLET, FILM COATED ORAL at 12:14

## 2017-09-18 RX ADMIN — MORPHINE SULFATE 60 MILLIGRAM(S): 50 CAPSULE, EXTENDED RELEASE ORAL at 06:55

## 2017-09-18 RX ADMIN — OXYCODONE AND ACETAMINOPHEN 2 TABLET(S): 5; 325 TABLET ORAL at 02:00

## 2017-09-18 RX ADMIN — Medication 1 PATCH: at 13:21

## 2017-09-18 RX ADMIN — ESCITALOPRAM OXALATE 20 MILLIGRAM(S): 10 TABLET, FILM COATED ORAL at 12:14

## 2017-09-18 RX ADMIN — Medication 100 MILLIGRAM(S): at 00:53

## 2017-09-18 RX ADMIN — OXYCODONE AND ACETAMINOPHEN 2 TABLET(S): 5; 325 TABLET ORAL at 17:33

## 2017-09-18 RX ADMIN — Medication 5 MILLIGRAM(S): at 12:26

## 2017-09-18 RX ADMIN — OXYCODONE AND ACETAMINOPHEN 2 TABLET(S): 5; 325 TABLET ORAL at 14:06

## 2017-09-18 RX ADMIN — MORPHINE SULFATE 60 MILLIGRAM(S): 50 CAPSULE, EXTENDED RELEASE ORAL at 07:45

## 2017-09-18 RX ADMIN — Medication 100 MILLIGRAM(S): at 22:49

## 2017-09-18 RX ADMIN — Medication 200 MILLIGRAM(S): at 17:34

## 2017-09-18 RX ADMIN — Medication 25 MILLIGRAM(S): at 06:55

## 2017-09-18 RX ADMIN — MORPHINE SULFATE 60 MILLIGRAM(S): 50 CAPSULE, EXTENDED RELEASE ORAL at 17:42

## 2017-09-18 RX ADMIN — Medication 100 MILLIGRAM(S): at 17:05

## 2017-09-18 RX ADMIN — Medication 300 MILLIGRAM(S): at 06:55

## 2017-09-18 RX ADMIN — ENOXAPARIN SODIUM 40 MILLIGRAM(S): 100 INJECTION SUBCUTANEOUS at 12:15

## 2017-09-18 RX ADMIN — Medication 1 PATCH: at 12:15

## 2017-09-18 RX ADMIN — MORPHINE SULFATE 60 MILLIGRAM(S): 50 CAPSULE, EXTENDED RELEASE ORAL at 18:39

## 2017-09-18 RX ADMIN — ONDANSETRON 4 MILLIGRAM(S): 8 TABLET, FILM COATED ORAL at 20:31

## 2017-09-18 RX ADMIN — OXYCODONE AND ACETAMINOPHEN 2 TABLET(S): 5; 325 TABLET ORAL at 19:03

## 2017-09-18 RX ADMIN — OXYCODONE AND ACETAMINOPHEN 2 TABLET(S): 5; 325 TABLET ORAL at 11:34

## 2017-09-18 RX ADMIN — OXYCODONE AND ACETAMINOPHEN 2 TABLET(S): 5; 325 TABLET ORAL at 03:21

## 2017-09-18 NOTE — CONSULT NOTE ADULT - PROBLEM SELECTOR RECOMMENDATION 9
44 yo F hx of left partial thyroidectomy was found to have a painless right thyroid nodule.  Function euthyroid   Thyroid US:  5.6 cm heterogeneous solid nodule in the mid left lobe  Recommended FNA of solid nodule 44 yo F hx of left partial thyroidectomy was found to have a painless right thyroid nodule without comp symptoms, euthyroid   Thyroid US:  5.6 cm heterogeneous solid nodule in the mid left lobe  Recommended FNA of solid nodule 46 yo F hx of left partial thyroidectomy was found to have a painless right thyroid nodule with comp symptoms, euthyroid   Thyroid US:  5.6 cm heterogeneous solid nodule in the mid left lobe  consider completion thyroidectomy as out pt  d/w hs  Recommended FNA of solid nodule 44 yo F hx of right partial thyroidectomy was found to have a painful right thyroid nodule with comp symptoms, euthyroid   Thyroid US:  5.6 cm heterogeneous solid nodule in the mid left lobe  consider completion thyroidectomy as out pt  Recommended FNA of solid nodule  d/w hs

## 2017-09-18 NOTE — PROGRESS NOTE ADULT - SUBJECTIVE AND OBJECTIVE BOX
Patient is a 46y old  Female who presents with a chief complaint of abdominal pain, N/V (12 Sep 2017 07:17)    pt seen in icu [  ], reg med floor [  x ], bed [ x ], chair at bedside [   ], a+o x3 [x  ], lethargic [  ],  nad [x  ]    Allergies    aspirin (Unknown)  latex (Unknown)  sulfa drugs (Unknown)        Vitals    T(F): 98.3 (09-18-17 @ 21:28), Max: 98.3 (09-18-17 @ 21:28)  HR: 68 (09-18-17 @ 21:28) (68 - 76)  BP: 107/63 (09-18-17 @ 21:28) (107/63 - 123/86)  RR: 16 (09-18-17 @ 21:28) (16 - 17)  SpO2: 96% (09-18-17 @ 21:28) (96% - 100%)  Wt(kg): --  CAPILLARY BLOOD GLUCOSE          Labs                          14.3   6.1   )-----------( 267      ( 18 Sep 2017 06:29 )             44.3       09-18    141  |  106  |  11  ----------------------------<  94  4.1   |  27  |  1.07    Ca    9.7      18 Sep 2017 06:29  Phos  3.8     09-18  Mg     2.0     09-18                  Radiology Results      Meds    MEDICATIONS  (STANDING):  escitalopram 20 milliGRAM(s) Oral daily  clopidogrel Tablet 75 milliGRAM(s) Oral daily  metoprolol 25 milliGRAM(s) Oral two times a day  amLODIPine   Tablet 2.5 milliGRAM(s) Oral daily  pantoprazole    Tablet 40 milliGRAM(s) Oral before breakfast  nicotine - 21 mG/24Hr(s) Patch 1 patch Transdermal daily  enoxaparin Injectable 40 milliGRAM(s) SubCutaneous daily  ciprofloxacin   IVPB 400 milliGRAM(s) IV Intermittent every 12 hours  metroNIDAZOLE  IVPB 500 milliGRAM(s) IV Intermittent every 8 hours  influenza   Vaccine 0.5 milliLiter(s) IntraMuscular once  sodium chloride 0.9% Bolus 1000 milliLiter(s) IV Bolus once  morphine ER Tablet 60 milliGRAM(s) Oral every 12 hours  ergocalciferol 37013 Unit(s) Oral <User Schedule>  lactated ringers. 1000 milliLiter(s) (125 mL/Hr) IV Continuous <Continuous>  sodium chloride 0.9%. 1000 milliLiter(s) (75 mL/Hr) IV Continuous <Continuous>  dronabinol 5 milliGRAM(s) Oral every 12 hours      MEDICATIONS  (PRN):  ondansetron Injectable 4 milliGRAM(s) IV Push every 6 hours PRN Nausea and/or Vomiting  acetaminophen   Tablet 650 milliGRAM(s) Oral every 6 hours PRN For Temp greater than 38 C (100.4 F)  zaleplon 5 milliGRAM(s) Oral at bedtime PRN Insomnia  oxyCODONE    5 mG/acetaminophen 325 mG 2 Tablet(s) Oral every 4 hours PRN Severe Pain (7 - 10)      Physical Exam    Neuro :  no focal deficits  Respiratory: CTA B/L  CV: RRR, S1S2, no murmurs,   Abdominal: Soft, NT, ND +BS,  Extremities: No edema, + peripheral pulses    ASSESSMENT    fecal impaction  sterocolitis  left hyroid nodule  h/o HTN (hypertension)  Cyclical vomiting  Atrial fibrillation  SLE (systemic lupus erythematosus)  Reflex sympathetic dystrophy  H/O partial thyroidectomy  S/P partial hysterectomy      PLAN    id f/u noted  cont cipro and flagyl  gi f/u noted  pt for egd and colonoscopy on wednesday  cont miralax  add senna  pulm cons noted  endo cons noted  f/u fna  cont current meds

## 2017-09-18 NOTE — PROGRESS NOTE ADULT - PROBLEM SELECTOR PLAN 1
Patient c/o N/V/D with fever x 2 days; vomiting currently resolved  CT Abdomen/Pelvis revealed pancolitis  CBC revealed mild leukocytosis with left shift; currently normal  Continue with IVF; will advance diet to regular  Continue with ciprofloxacin and flagyl until 9/24 as per ID  Continue to monitor BMP for electrolyte disturbances   Blood and stool cultures negative  ID Dr Artur Wylie Patient c/o N/V/D with fever x 2 days; vomiting currently resolved  CT Abdomen/Pelvis revealed pancolitis  CBC revealed mild leukocytosis with left shift; currently normal  Continue with IVF; will advance diet to regular  Continue with ciprofloxacin and flagyl until 9/24 as per ID  Continue to monitor BMP for electrolyte disturbances   Blood and stool cultures negative  ID Dr Siddiqui  GI Dr Wylie  Will consider CTA of abdomen for persistent pain Patient c/o N/V/D with fever x 2 days; vomiting currently resolved  CT Abdomen/Pelvis revealed pancolitis  Continue with IVF; will advance diet to regular  Continue with ciprofloxacin and flagyl until 9/24 as per ID  Continue to monitor BMP for electrolyte disturbances   Blood and stool cultures negative  F/U C dif PCR  ID Dr Siddiqui  GI Dr Wylie; f/u recommendation for potential EGD or Colonoscopy Patient c/o N/V/D with fever x 2 days; vomiting currently resolved  CT Abdomen/Pelvis revealed pancolitis  Continue with IVF; will advance diet to regular  Continue with ciprofloxacin and flagyl until 9/24 as per ID  Continue to monitor BMP for electrolyte disturbances   Blood and stool cultures negative  F/U C dif PCR  ID Dr Siddiqui  GI Dr Wylie; planning for colonoscopy and EGD 9/20

## 2017-09-18 NOTE — PROGRESS NOTE ADULT - PROBLEM SELECTOR PLAN 9
Need for prophylactic measure  -Improve VTE score = 1  -Lovenox for DVT chemoprophylaxis Patient is current every day smoker  Counseled about smoking cessation; patient deferred information and is not ready to quit  Nicotine patch offered

## 2017-09-18 NOTE — CONSULT NOTE ADULT - ASSESSMENT
46 y/o F lives with her sister from home ambulates with walker, current everyday smoker,  PMH Reflex symphatetic dystrophy s/p spinal stimulator, frequent falls, SLE (no treatment at this time), cyclical vomiting syndrome, Afib (not on AC due to frequent falls), HTN, depression, Hemorrhoids (EGD / colonoscopy 3-4 years ago, is due for a next one soon ) and partial thyroidectomy presented with c/o vomiting and diarrhea for 2 days a/w abdominal pain. As per patient she has cyclical vomiting syndrome and gets episode every year, last admission in June 2017. She has been vomiting for the last 2 days , constant, >10 times a day, watery , non bloody , non bilious. Unable to eat anything due to constant vomiting. She also c/o loose watery diarrhea, >5-6 times a day, NBNB, with generalized abdominal pain, in the middle of her abdomen, non radiating, worse with changing positions, improvement with MS contin and morphine, unrelated to food, describes the quality as sharp ache and intermittent, 7/10. Pt was found to have a painless right thyroid nodule with euthyroid function. Thyroid US was done and showed  5.6cm solid  heterogenous nodule in mid-left lobe. 44 y/o F lives with her sister from home ambulates with walker, current everyday smoker,  PMH Reflex symphatetic dystrophy s/p spinal stimulator, frequent falls, SLE (no treatment at this time), cyclical vomiting syndrome, Afib (not on AC due to frequent falls), HTN, depression, Hemorrhoids (EGD / colonoscopy 3-4 years ago, is due for a next one soon ) and partial thyroidectomy presented with c/o vomiting and diarrhea for 2 days a/w abdominal pain. As per patient she has cyclical vomiting syndrome and gets episode every year, last admission in June 2017. She has been vomiting for the last 2 days , constant, >10 times a day, watery , non bloody , non bilious. Unable to eat anything due to constant vomiting. She also c/o loose watery diarrhea, >5-6 times a day, NBNB, with generalized abdominal pain, in the middle of her abdomen, non radiating, worse with changing positions, improvement with MS contin and morphine, unrelated to food, describes the quality as sharp ache and intermittent, 7/10. Pt was found to have a painfull right thyroid nodule with euthyroid function. Thyroid US was done and showed  5.6cm solid  heterogenous nodule in mid-left lobe.

## 2017-09-18 NOTE — PROGRESS NOTE ADULT - PROBLEM SELECTOR PLAN 3
Patient takes multiple analgesic medications at home including morphine, MS contin, and percocet and has spinal neurostimulator in place  Pain management recommends ms contin 60mg q12, percocet 2 tabs q4 PRN, lyrica 300 BID, marinol

## 2017-09-18 NOTE — PROGRESS NOTE ADULT - SUBJECTIVE AND OBJECTIVE BOX
Meds:  ciprofloxacin   IVPB 400 milliGRAM(s) IV Intermittent every 12 hours  metroNIDAZOLE  IVPB 500 milliGRAM(s) IV Intermittent every 8 hours    Allergies:  Allergies    aspirin (Unknown)  latex (Unknown)  sulfa drugs (Unknown)    Intolerances    ROS  [  ] UNABLE TO ELICIT    General:  [  ] None  [  ] Fever  [  ] Chills  [ x ] Malaise    Skin:  [ x ] None [  ] Rash  [  ] Wound  [  ] Ulcer    HEENT:  [ x ] None  [  ] Sore Throat  [  ] Nasal congestion/ runny nose  [  ] Photophobia [  ] Neck pain      Chest:  [ x ] None   [  ] SOB  [  ] Cough  [  ] None    Cardiovascular:   [ x ] None  [  ] CP  [  ] Palpitation    Gastrointestinal:  [  ] None  [ x ] Abd pain    [ x ] Nausea    [ x ] Vomiting   [  ] Diarrhea	     Genitourinary:  [ x ] None [  ] Polyuria   [  ] Urgency  [  ] Frequency  [  ] Dysuria    [  ]  Hematuria       Musculoskeletal:  [  ] None [  ] Back Pain	[  ] Body aches  [  ] Joint pain    Neurological: [  ] None [  ]Dizziness  [  ]Visual Disturbance  [  ]Headaches   [ x ] Weakness          PHYSICAL EXAM:    Vital Signs Last 24 Hrs  T(C): 37.1 (17 Sep 2017 21:41), Max: 37.1 (17 Sep 2017 21:41)  T(F): 98.8 (17 Sep 2017 21:41), Max: 98.8 (17 Sep 2017 21:41)  HR: 64 (17 Sep 2017 21:41) (64 - 69)  BP: 99/67 (17 Sep 2017 21:41) (99/67 - 103/66)  BP(mean): --  RR: 16 (17 Sep 2017 21:41) (16 - 18)  SpO2: 98% (17 Sep 2017 21:41) (98% - 98%)    Constitutional:    HEENT: [ x ] Wnl  [  ] Pharyngeal congestion    Neck:  [ x ] Supple  [  ]Lymphadenopathy  [  ] No JVD   [  ] JVD  [  ] Masses   [  ] WNL    CHEST/Respiratory:  [ x ]Clear to auscultation  [  ] Rales   [  ] Rhonchi   [  ] Wheezing     [  ] Chest Tenderness      Cardiovascular:  [ x ] Reg S1 S2   [  ] Irreg S1 S2   [ x ]No Murmur  [  ] +ve Murmurs  [  ]Systolic [  ]Diastolic      Abdomen:  [ x ] Soft  [  ] No tendrerness  [ x ] Improving diffuse and generalized Tenderness  [  ] Organomegaly  [  ] ABD Distention  [  ] Rigidity                       [ x ] No Regidity                       [ x ] No Rebound Tenderness  [ x ] No Guarding Rigidity  [  ] Rebound Tenderness[  ] Guarding Rigidity                          [x  ]  +ve Bowel Sounds  [  ] Decreased Bowel Sounds    [  ] Absent Bowel Sounds                            Extremities: [ x ] No edema [  ] Edema  [  ] Clubbing   [  ] Cyanosis                         [ x ] No Tender Calf muscles  [  ] Tender Calf muscles                        [ x ] Palpable peripheral pulses    Neurological: [ x ] Awake  [ x ] Alert  [ x ] Oriented  x  3                           [  ] Confused  [  ] Drowzy  [  ] repond to painful stimuli  [  ] Unresponsive    Skin:  [ x ] Intact [  ] Redness [  ] Thrombophlebitis  [  ] Rashes  [  ] Dry  [  ] Ulcers    Ortho:  [  ] Joint Swelling  [  ] Joint erythema [  ] Joint tenderness                [  ] Increased temp. to touch  [ x ] WNL         LABS/DIAGNOSTIC TESTS                                                                  13.3   6.3   )-----------( 264      ( 17 Sep 2017 08:09 )             40.1   09-17    137  |  103  |  12  ----------------------------<  84  4.1   |  30  |  1.14    Ca    9.0      17 Sep 2017 08:09  Mg     2.1     09-17          CULTURES:   Culture - Blood (09.12.17 @ 09:53)    Specimen Source: .Blood Blood-Peripheral    Culture Results:   No growth to date.    Culture - Blood (09.12.17 @ 09:53)    Specimen Source: .Blood Blood-Peripheral    Culture Results:   No growth to date.    Culture - Urine (09.12.17 @ 00:22)    Specimen Source: .Urine Clean Catch (Midstream)    Culture Results:   10,000 - 49,000 CFU/mL Coag negative Staphylococcus not Staph  saprophyticus    RADIOLOGY:    EXAM:  CT ABDOMEN AND PELVIS OC IC                            PROCEDURE DATE:  09/16/2017          INTERPRETATION:  CT ABDOMEN AND PELVIS WITH IV CONTRAST    CLINICAL STATEMENT: Abdominal pain.    COMPARISON: CT abdomen pelvis 9/11/2017.    TECHNIQUE: CT scan of the abdomen and pelvis was performed with IV,   without oral contrast. Multiplanar reformations were made.    FINDINGS:  Streaky bibasilar subsegmental atelectasis. No pleural effusion.    Again seen is a 5 mm hypodensity in the right hepatic lobe, too small to   characterize. Mild prominence of the pancreatic duct is again seen. The   pancreatic parenchyma appears unremarkable. The contracted gallbladder,   spleen and adrenal glands are unremarkable.    Kidneys enhance symmetrically. No hydronephrosis. Left-sided extrarenal   pelvis is noted. Again seen is a 5 mm hypodensity in the left kidney mid   to lower pole, too small to characterize.    GI tract is unobstructed. Appendix is unremarkable. No free air or   ascites.    No bulky adenopathy. Normal caliber abdominal aorta. Mild atherosclerosis.    Urinary bladder is distended but otherwise unremarkable. Partial   hysterectomy or uterine atrophy. No free pelvic fluid.    Mild degenerative changes in the spine.    A batterypack is present in the right gluteal subcutaneous fat with   spinal stimulator electrodes entering the spinal canal at T7-T8. Small   focus of air left lower quadrant anterior abdominal wall consistent with   subcutaneous injection.    IMPRESSION:  Distended urinary bladder. The etiology of the patient's abdominal pain   is not clearly elucidated on this exam.          Assessment and Recommendation:     Patient is a 44 y/o F lives with her sister from home ambulates with walker, current everyday smoker,  PMH Reflex symphatetic dystrophy s/p spinal stimulator, frequent falls, SLE (no treatment at this time), cyclical vomiting syndrome, Afib (not on AC due to frequent falls), HTN, depression, Hemorrhoids (EGD / colonoscopy 3-4 years ago, is due for a next one soon ) and partial thyroidectomy presented with c/o vomiting and diarrhea for 2 days a/w abdominal pain.  Patient was admitted for colitis ans was started on IV Cipro and Flagyl.  Patient feels better, and WBC is now normal.  9/16/17 Patient had abdominal CT that showed distended urinary bladder.     Problem/Recommendation - 1:  Problem: Pancolitis.   Recommendation:   1- UA & CS suggest for contamination.  2- Follow Blood culture to final report.  3- GI evaluation and management.  4- Continue Cipro and Flagyl, and change it to PO upon discharge till 9/24/17.  5- Fluid and electrolytes management.  6- CBC and BMP follow up.  7- stool for ova and parasites.  8- Stool for culture.  9- stool for CDT.  10- Suggested Santo catheter.   2- Blood culture.  3- GI evaluation and management.  4- Continue Cipro and Flagyl.  5- Fluid and electrolytes management.  6- CBC and BMP follow up.    Problem/Recommendation - 2:  ·  Problem: HTN (hypertension).    Recommendation:   1- Monitor Blood pressure closely.  2- Blood pressure control.  3- BP. meds as per cardiology and primary care team.         Discussed with medical resident

## 2017-09-18 NOTE — PROGRESS NOTE ADULT - PROBLEM SELECTOR PLAN 7
Patient normally ambulates with walker and has history of frequent falls  Fall precautions  Follow up PT evaluation Patient has left sided thyromegaly and reports history of right thyroidectomy  TSH within normal limits  Thyroid ultrasound revealed 5.6cm heterogenous nodule in mid-left lobe  Will recommend outpatient follow up with surgeon Patient has left sided thyromegaly and reports history of right thyroidectomy  TSH within normal limits  Thyroid ultrasound revealed 5.6cm heterogenous nodule in mid-left lobe  FNA ordered  Endocrinology Dr. Deal

## 2017-09-18 NOTE — CONSULT NOTE ADULT - PROBLEM SELECTOR RECOMMENDATION 2
Zofran prn
1- Monitor Blood pressure closely.  2- Blood pressure control.  3- BP. meds as per cardiology and primary care team.
c/w IVF, bowel rest  cipro/flagyl  Check BCx
- start marinol 2.5mg po daily  - zofran iv prn  - iv fluids

## 2017-09-18 NOTE — CONSULT NOTE ADULT - SUBJECTIVE AND OBJECTIVE BOX
HPI:    46 y/o F lives with her sister from home ambulates with walker, current everyday smoker,  PMH Reflex symphatetic dystrophy s/p spinal stimulator, frequent falls, SLE (no treatment at this time), cyclical vomiting syndrome, Afib (not on AC due to frequent falls), HTN, depression, Hemorrhoids (EGD / colonoscopy 3-4 years ago, is due for a next one soon ) and partial thyroidectomy presented with c/o vomiting and diarrhea for 2 days a/w abdominal pain. As per patient she has cyclical vomiting syndrome and gets episode every year, last admission in June 2017. She has been vomiting for the last 2 days , constant, >10 times a day, watery , non bloody , non bilious. Unable to eat anything due to constant vomiting. She also c/o loose watery diarrhea, >5-6 times a day, NBNB, with generalized abdominal pain, in the middle of her abdomen, non radiating, worse with changing positions, improvement with MS contin and morphine, unrelated to food, describes the quality as sharp ache and intermittent, 7/10. Pt was found to have a right thyroid nodule with euthyroid function. Thyroid US was done and showed  5.6cm heterogenous nodule in mid-left lobe          PAST MEDICAL:  HTN (hypertension)  Cyclical vomiting  Atrial fibrillation  SLE (systemic lupus erythematosus)  Reflex sympathetic dystrophy      SURGICAL HISTORY:    H/O partial thyroidectomy  S/P partial hysterectomy  back surgery for spinal stimulator        MEDICATIONS  (STANDING):  pregabalin 300 milliGRAM(s) Oral two times a day  escitalopram 20 milliGRAM(s) Oral daily  clopidogrel Tablet 75 milliGRAM(s) Oral daily  metoprolol 25 milliGRAM(s) Oral two times a day  amLODIPine   Tablet 2.5 milliGRAM(s) Oral daily  pantoprazole    Tablet 40 milliGRAM(s) Oral before breakfast  nicotine - 21 mG/24Hr(s) Patch 1 patch Transdermal daily  enoxaparin Injectable 40 milliGRAM(s) SubCutaneous daily  ciprofloxacin   IVPB 400 milliGRAM(s) IV Intermittent every 12 hours  metroNIDAZOLE  IVPB 500 milliGRAM(s) IV Intermittent every 8 hours  influenza   Vaccine 0.5 milliLiter(s) IntraMuscular once  sodium chloride 0.9% Bolus 1000 milliLiter(s) IV Bolus once  morphine ER Tablet 60 milliGRAM(s) Oral every 12 hours  ergocalciferol 06592 Unit(s) Oral <User Schedule>  lactated ringers. 1000 milliLiter(s) (125 mL/Hr) IV Continuous <Continuous>  sodium chloride 0.9%. 1000 milliLiter(s) (75 mL/Hr) IV Continuous <Continuous>  dronabinol 5 milliGRAM(s) Oral every 12 hours    MEDICATIONS  (PRN):  ondansetron Injectable 4 milliGRAM(s) IV Push every 6 hours PRN Nausea and/or Vomiting  acetaminophen   Tablet 650 milliGRAM(s) Oral every 6 hours PRN For Temp greater than 38 C (100.4 F)  zaleplon 5 milliGRAM(s) Oral at bedtime PRN Insomnia  oxyCODONE    5 mG/acetaminophen 325 mG 2 Tablet(s) Oral every 4 hours PRN Severe Pain (7 - 10)      FAMILY HISTORY:  Lung CA mother       SOCIAL HISTORY:    no tobacco  no etoh       REVIEW OF SYSTEMS:    CONSTITUTIONAL: No fever, weight loss, or fatigue  EYES: No eye pain, visual disturbances, or discharge  ENT:  No difficulty hearing, tinnitus, vertigo; No sinus or throat pain  NECK: No pain or stiffness  RESPIRATORY: No cough, wheezing, chills or hemoptysis; No Shortness of Breath  CARDIOVASCULAR: No chest pain, palpitations, passing out, dizziness, or leg swelling  GASTROINTESTINAL: No abdominal or epigastric pain. No nausea, vomiting, or hematemesis; No diarrhea or constipation. No melena or hematochezia.  GENITOURINARY: No dysuria, frequency, hematuria, or incontinence  NEUROLOGICAL: No headaches, memory loss, loss of strength, numbness, or tremors  SKIN: No itching, burning, rashes, or lesions   LYMPH Nodes: No enlarged glands  ENDOCRINE: No heat or cold intolerance; No hair loss  MUSCULOSKELETAL: No joint pain or swelling; No muscle, back, or extremity pain  PSYCHIATRIC: No depression, anxiety, mood swings, or difficulty sleeping  HEME/LYMPH: No easy bruising, or bleeding gums  ALLERGY AND IMMUNOLOGIC: No hives or eczema	        Vital Signs Last 24 Hrs  T(C): 36.3 (18 Sep 2017 05:13), Max: 37.1 (17 Sep 2017 21:41)  T(F): 97.3 (18 Sep 2017 05:13), Max: 98.8 (17 Sep 2017 21:41)  HR: 69 (18 Sep 2017 05:13) (64 - 69)  BP: 123/86 (18 Sep 2017 05:13) (99/67 - 123/86)  BP(mean): --  RR: 16 (18 Sep 2017 05:13) (16 - 16)  SpO2: 100% (18 Sep 2017 05:13) (98% - 100%)      Constitutional:    NC/AT    HEENT:  No JVD, bruits, right thyroid nodule   Respiratory:  Clear without rales or rhonchi    Cardiovascular:  RR without murmur, rub or gallop.    Gastrointestinal: Soft without hepatosplenomegaly.    Extremities: without cyanosis, clubbing or edema.    Neurological:  Oriented   x 3     . No gross sensory or motor defects.        LABS:                        14.3   6.1   )-----------( 267      ( 18 Sep 2017 06:29 )             44.3     09-18    141  |  106  |  11  ----------------------------<  94  4.1   |  27  |  1.07    Ca    9.7      18 Sep 2017 06:29  Phos  3.8     09-18  Mg     2.0     09-18              CAPILLARY BLOOD GLUCOSE          RADIOLOGY & ADDITIONAL STUDIES: HPI:    46 y/o F lives with her sister from home ambulates with walker, current everyday smoker,  PMH Reflex symphatetic dystrophy s/p spinal stimulator, frequent falls, SLE (no treatment at this time), cyclical vomiting syndrome, Afib (not on AC due to frequent falls), HTN, depression, Hemorrhoids (EGD / colonoscopy 3-4 years ago, is due for a next one soon ) and partial thyroidectomy presented with c/o vomiting and diarrhea for 2 days a/w abdominal pain. As per patient she has cyclical vomiting syndrome and gets episode every year, last admission in June 2017. She has been vomiting for the last 2 days , constant, >10 times a day, watery , non bloody , non bilious. Unable to eat anything due to constant vomiting. She also c/o loose watery diarrhea, >5-6 times a day, NBNB, with generalized abdominal pain, in the middle of her abdomen, non radiating, worse with changing positions, improvement with MS contin and morphine, unrelated to food, describes the quality as sharp ache and intermittent, 7/10. Pt was found to have a right thyroid nodule with euthyroid function. Thyroid US was done and showed  5.6cm heterogenous nodule in mid-left lobe          PAST MEDICAL:  HTN (hypertension)  Cyclical vomiting  Atrial fibrillation  SLE (systemic lupus erythematosus)  Reflex sympathetic dystrophy      SURGICAL HISTORY:    H/O partial thyroidectomy  S/P partial hysterectomy  back surgery for spinal stimulator        MEDICATIONS  (STANDING):  pregabalin 300 milliGRAM(s) Oral two times a day  escitalopram 20 milliGRAM(s) Oral daily  clopidogrel Tablet 75 milliGRAM(s) Oral daily  metoprolol 25 milliGRAM(s) Oral two times a day  amLODIPine   Tablet 2.5 milliGRAM(s) Oral daily  pantoprazole    Tablet 40 milliGRAM(s) Oral before breakfast  nicotine - 21 mG/24Hr(s) Patch 1 patch Transdermal daily  enoxaparin Injectable 40 milliGRAM(s) SubCutaneous daily  ciprofloxacin   IVPB 400 milliGRAM(s) IV Intermittent every 12 hours  metroNIDAZOLE  IVPB 500 milliGRAM(s) IV Intermittent every 8 hours  influenza   Vaccine 0.5 milliLiter(s) IntraMuscular once  sodium chloride 0.9% Bolus 1000 milliLiter(s) IV Bolus once  morphine ER Tablet 60 milliGRAM(s) Oral every 12 hours  ergocalciferol 14423 Unit(s) Oral <User Schedule>  lactated ringers. 1000 milliLiter(s) (125 mL/Hr) IV Continuous <Continuous>  sodium chloride 0.9%. 1000 milliLiter(s) (75 mL/Hr) IV Continuous <Continuous>  dronabinol 5 milliGRAM(s) Oral every 12 hours    MEDICATIONS  (PRN):  ondansetron Injectable 4 milliGRAM(s) IV Push every 6 hours PRN Nausea and/or Vomiting  acetaminophen   Tablet 650 milliGRAM(s) Oral every 6 hours PRN For Temp greater than 38 C (100.4 F)  zaleplon 5 milliGRAM(s) Oral at bedtime PRN Insomnia  oxyCODONE    5 mG/acetaminophen 325 mG 2 Tablet(s) Oral every 4 hours PRN Severe Pain (7 - 10)      FAMILY HISTORY:  Lung CA mother       SOCIAL HISTORY:    no tobacco  no etoh       REVIEW OF SYSTEMS:    CONSTITUTIONAL: No fever, weight loss, or fatigue  EYES: No eye pain, visual disturbances, or discharge  ENT:  No difficulty hearing, tinnitus, vertigo; No sinus or throat pain  NECK: No pain or stiffness  RESPIRATORY: No cough, wheezing, chills or hemoptysis; No Shortness of Breath  CARDIOVASCULAR: No chest pain, palpitations, passing out, dizziness, or leg swelling  GASTROINTESTINAL: No abdominal or epigastric pain. No nausea, vomiting, or hematemesis; No diarrhea or constipation. No melena or hematochezia.  GENITOURINARY: No dysuria, frequency, hematuria, or incontinence  NEUROLOGICAL: No headaches, memory loss, loss of strength, numbness, or tremors  SKIN: No itching, burning, rashes, or lesions   LYMPH Nodes: No enlarged glands  ENDOCRINE: No heat or cold intolerance; No hair loss  MUSCULOSKELETAL: No joint pain or swelling; No muscle, back, or extremity pain  PSYCHIATRIC: No depression, anxiety, mood swings, or difficulty sleeping  HEME/LYMPH: No easy bruising, or bleeding gums  ALLERGY AND IMMUNOLOGIC: No hives or eczema	        Vital Signs Last 24 Hrs  T(C): 36.3 (18 Sep 2017 05:13), Max: 37.1 (17 Sep 2017 21:41)  T(F): 97.3 (18 Sep 2017 05:13), Max: 98.8 (17 Sep 2017 21:41)  HR: 69 (18 Sep 2017 05:13) (64 - 69)  BP: 123/86 (18 Sep 2017 05:13) (99/67 - 123/86)  BP(mean): --  RR: 16 (18 Sep 2017 05:13) (16 - 16)  SpO2: 100% (18 Sep 2017 05:13) (98% - 100%)      Constitutional:    NC/AT    HEENT:  No JVD, bruits, right thyroid nodule   Respiratory:  Clear without rales or rhonchi    Cardiovascular:  RR without murmur, rub or gallop.    Gastrointestinal: Soft without hepatosplenomegaly.    Extremities: without cyanosis, clubbing or edema.    Neurological:  Oriented   x 3     . No gross sensory or motor defects.        LABS:                        14.3   6.1   )-----------( 267      ( 18 Sep 2017 06:29 )             44.3     09-18    141  |  106  |  11  ----------------------------<  94  4.1   |  27  |  1.07    Ca    9.7      18 Sep 2017 06:29  Phos  3.8     09-18  Mg     2.0     09-18              CAPILLARY BLOOD GLUCOSE          RADIOLOGY & ADDITIONAL STUDIES:  IMPRESSION:  5.6 cm heterogeneous solid nodule in the mid left lobe. Consider FNA   biopsy as clinically warranted

## 2017-09-18 NOTE — PROGRESS NOTE ADULT - PROBLEM SELECTOR PLAN 8
Patient is current every day smoker  Counseled about smoking cessation; patient deferred information and is not ready to quit  Nicotine patch offered Patient normally ambulates with walker and has history of frequent falls  Fall precautions  Follow up PT evaluation

## 2017-09-18 NOTE — CONSULT NOTE ADULT - SUBJECTIVE AND OBJECTIVE BOX
PULMONARY CONSULT NOTE      JOSE VILLALBA  MRN-[id number]    Patient is a 46y old Female who presents with a chief complaint of abdominal pain. PMH Reflex symphatetic dystrophy s/p spinal stimulator, frequent falls, SLE (no treatment at this time), cyclical vomiting syndrome, Afib (not on AC due to frequent falls), HTN, depression, Hemorrhoids (EGD / colonoscopy 3-4 years ago, is due for a next one soon ) and partial thyroidectomy. Patient is a current everyday smoker.      HISTORY OF PRESENT ILLNESS:    MEDICATIONS  (STANDING):  pregabalin 300 milliGRAM(s) Oral two times a day  escitalopram 20 milliGRAM(s) Oral daily  clopidogrel Tablet 75 milliGRAM(s) Oral daily  metoprolol 25 milliGRAM(s) Oral two times a day  amLODIPine   Tablet 2.5 milliGRAM(s) Oral daily  pantoprazole    Tablet 40 milliGRAM(s) Oral before breakfast  nicotine - 21 mG/24Hr(s) Patch 1 patch Transdermal daily  enoxaparin Injectable 40 milliGRAM(s) SubCutaneous daily  ciprofloxacin   IVPB 400 milliGRAM(s) IV Intermittent every 12 hours  metroNIDAZOLE  IVPB 500 milliGRAM(s) IV Intermittent every 8 hours  influenza   Vaccine 0.5 milliLiter(s) IntraMuscular once  sodium chloride 0.9% Bolus 1000 milliLiter(s) IV Bolus once  morphine ER Tablet 60 milliGRAM(s) Oral every 12 hours  ergocalciferol 07402 Unit(s) Oral <User Schedule>  lactated ringers. 1000 milliLiter(s) (125 mL/Hr) IV Continuous <Continuous>  sodium chloride 0.9%. 1000 milliLiter(s) (75 mL/Hr) IV Continuous <Continuous>  dronabinol 5 milliGRAM(s) Oral every 12 hours      MEDICATIONS  (PRN):  ondansetron Injectable 4 milliGRAM(s) IV Push every 6 hours PRN Nausea and/or Vomiting  acetaminophen   Tablet 650 milliGRAM(s) Oral every 6 hours PRN For Temp greater than 38 C (100.4 F)  zaleplon 5 milliGRAM(s) Oral at bedtime PRN Insomnia  oxyCODONE    5 mG/acetaminophen 325 mG 2 Tablet(s) Oral every 4 hours PRN Severe Pain (7 - 10)      Allergies    aspirin (Unknown)  latex (Unknown)  sulfa drugs (Unknown)    Intolerances        PAST MEDICAL & SURGICAL HISTORY:  HTN (hypertension)  Cyclical vomiting  Atrial fibrillation  SLE (systemic lupus erythematosus)  Reflex sympathetic dystrophy  H/O partial thyroidectomy  S/P partial hysterectomy      FAMILY HISTORY:      SOCIAL HISTORY  Smoking History:     REVIEW OF SYSTEMS:    CONSTITUTIONAL:  No fevers, chills, sweats    HEENT:  Eyes:  No diplopia or blurred vision. ENT:  No earache, sore throat or runny nose.    CARDIOVASCULAR:  No pressure, squeezing, tightness, or heaviness about the chest; no palpitations.    RESPIRATORY:  Per HPI    GASTROINTESTINAL:  No abdominal pain, nausea, vomiting or diarrhea.    GENITOURINARY:  No dysuria, frequency or urgency.    NEUROLOGIC:  No paresthesias, fasciculations, seizures or weakness.    PSYCHIATRIC:  No disorder of thought or mood.    Vital Signs Last 24 Hrs  T(C): 36.3 (18 Sep 2017 05:13), Max: 37.1 (17 Sep 2017 21:41)  T(F): 97.3 (18 Sep 2017 05:13), Max: 98.8 (17 Sep 2017 21:41)  HR: 69 (18 Sep 2017 05:13) (64 - 69)  BP: 123/86 (18 Sep 2017 05:13) (99/67 - 123/86)  BP(mean): --  RR: 16 (18 Sep 2017 05:13) (16 - 16)  SpO2: 100% (18 Sep 2017 05:13) (98% - 100%)  I&O's Detail      PHYSICAL EXAMINATION:    GENERAL: The patient is a well-developed, well-nourished _____in no apparent distress.     HEENT: Head is normocephalic and atraumatic. Extraocular muscles are intact. Mucous membranes are moist.     NECK: Supple.     LUNGS: Clear to auscultation without wheezing, rales, or rhonchi. Respirations unlabored    HEART: Regular rate and rhythm without murmur.    ABDOMEN: Soft, nontender, and nondistended.  No hepatosplenomegaly is noted.    EXTREMITIES: Without any cyanosis, clubbing, rash, lesions or edema.    NEUROLOGIC: Grossly intact.      LABS:                        14.3   6.1   )-----------( 267      ( 18 Sep 2017 06:29 )             44.3     09-18    141  |  106  |  11  ----------------------------<  94  4.1   |  27  |  1.07    Ca    9.7      18 Sep 2017 06:29  Phos  3.8     09-18  Mg     2.0     09-18          MICROBIOLOGY:    RADIOLOGY & ADDITIONAL STUDIES:    CXR:    Ct scan chest:  < from: CT Abdomen and Pelvis w/ Oral Cont and w/ IV Cont (09.16.17 @ 17:31) >  Streaky bibasilar subsegmental atelectasis. No pleural effusion.    Again seen is a 5 mm hypodensity in the right hepatic lobe, too small to   characterize. Mild prominence of the pancreatic duct is again seen. The   pancreatic parenchyma appears unremarkable. The contracted gallbladder,   spleen and adrenal glands are unremarkable.    Kidneys enhance symmetrically. No hydronephrosis. Left-sided extrarenal   pelvis is noted. Again seen is a 5 mm hypodensity in the left kidney mid   to lower pole, too small to characterize.    GI tract is unobstructed. Appendix is unremarkable. No free air or   ascites.    No bulky adenopathy. Normal caliber abdominal aorta. Mild atherosclerosis.    Urinary bladder is distended but otherwise unremarkable. Partial   hysterectomy or uterine atrophy. No free pelvic fluid.    Mild degenerative changes in the spine.    A batterypack is present in the right gluteal subcutaneous fat with   spinal stimulator electrodes entering the spinal canal at T7-T8. Small   focus of air left lower quadrant anterior abdominal wall consistent with   subcutaneous injection.    IMPRESSION:  Distended urinary bladder. The etiology of the patient's abdominal pain   is not clearly elucidated on this exam.    < end of copied text >    ekg;    echo: PULMONARY CONSULT NOTE      JOSE VILLALBA  MRN-[id number]    Patient is a 46y old Female who presents with a chief complaint of abdominal pain. PMH Reflex symphatetic dystrophy s/p spinal stimulator, frequent falls, SLE (no treatment at this time), cyclical vomiting syndrome, Afib (not on AC due to frequent falls), HTN, depression, Hemorrhoids (EGD / colonoscopy 3-4 years ago, is due for a next one soon ) and partial thyroidectomy. Patient is a current everyday smoker.      HISTORY OF PRESENT ILLNESS: As above    MEDICATIONS  (STANDING):  pregabalin 300 milliGRAM(s) Oral two times a day  escitalopram 20 milliGRAM(s) Oral daily  clopidogrel Tablet 75 milliGRAM(s) Oral daily  metoprolol 25 milliGRAM(s) Oral two times a day  amLODIPine   Tablet 2.5 milliGRAM(s) Oral daily  pantoprazole    Tablet 40 milliGRAM(s) Oral before breakfast  nicotine - 21 mG/24Hr(s) Patch 1 patch Transdermal daily  enoxaparin Injectable 40 milliGRAM(s) SubCutaneous daily  ciprofloxacin   IVPB 400 milliGRAM(s) IV Intermittent every 12 hours  metroNIDAZOLE  IVPB 500 milliGRAM(s) IV Intermittent every 8 hours  influenza   Vaccine 0.5 milliLiter(s) IntraMuscular once  sodium chloride 0.9% Bolus 1000 milliLiter(s) IV Bolus once  morphine ER Tablet 60 milliGRAM(s) Oral every 12 hours  ergocalciferol 71830 Unit(s) Oral <User Schedule>  lactated ringers. 1000 milliLiter(s) (125 mL/Hr) IV Continuous <Continuous>  sodium chloride 0.9%. 1000 milliLiter(s) (75 mL/Hr) IV Continuous <Continuous>  dronabinol 5 milliGRAM(s) Oral every 12 hours      MEDICATIONS  (PRN):  ondansetron Injectable 4 milliGRAM(s) IV Push every 6 hours PRN Nausea and/or Vomiting  acetaminophen   Tablet 650 milliGRAM(s) Oral every 6 hours PRN For Temp greater than 38 C (100.4 F)  zaleplon 5 milliGRAM(s) Oral at bedtime PRN Insomnia  oxyCODONE    5 mG/acetaminophen 325 mG 2 Tablet(s) Oral every 4 hours PRN Severe Pain (7 - 10)      Allergies    aspirin (Unknown)  latex (Unknown)  sulfa drugs (Unknown)    Intolerances        PAST MEDICAL & SURGICAL HISTORY:  HTN (hypertension)  Cyclical vomiting  Atrial fibrillation  SLE (systemic lupus erythematosus)  Reflex sympathetic dystrophy  H/O partial thyroidectomy  S/P partial hysterectomy      FAMILY HISTORY:      SOCIAL HISTORY  Smoking History:     REVIEW OF SYSTEMS:    CONSTITUTIONAL:  No fevers, chills, sweats    HEENT:  Eyes:  No diplopia or blurred vision. ENT:  No earache, sore throat or runny nose.    CARDIOVASCULAR:  No pressure, squeezing, tightness, or heaviness about the chest; no palpitations.    RESPIRATORY:  Per HPI    GASTROINTESTINAL:  Abdominal pain    GENITOURINARY:  No dysuria, frequency or urgency.    NEUROLOGIC:  No paresthesias, fasciculations, seizures or weakness.    PSYCHIATRIC:  No disorder of thought or mood.    Vital Signs Last 24 Hrs  T(C): 36.3 (18 Sep 2017 05:13), Max: 37.1 (17 Sep 2017 21:41)  T(F): 97.3 (18 Sep 2017 05:13), Max: 98.8 (17 Sep 2017 21:41)  HR: 69 (18 Sep 2017 05:13) (64 - 69)  BP: 123/86 (18 Sep 2017 05:13) (99/67 - 123/86)  BP(mean): --  RR: 16 (18 Sep 2017 05:13) (16 - 16)  SpO2: 100% (18 Sep 2017 05:13) (98% - 100%)  I&O's Detail      PHYSICAL EXAMINATION:    GENERAL: The patient is a well-developed, well-nourished _____in no apparent distress.     HEENT: Head is normocephalic and atraumatic. Extraocular muscles are intact. Mucous membranes are moist.     NECK: Supple.     LUNGS: Clear to auscultation without wheezing, rales, or rhonchi. Respirations unlabored    HEART: Regular rate and rhythm without murmur.    ABDOMEN: Soft, Diffusely tender    EXTREMITIES: Without any cyanosis, clubbing, rash, lesions or edema.    NEUROLOGIC: Grossly intact.      LABS:                        14.3   6.1   )-----------( 267      ( 18 Sep 2017 06:29 )             44.3     09-18    141  |  106  |  11  ----------------------------<  94  4.1   |  27  |  1.07    Ca    9.7      18 Sep 2017 06:29  Phos  3.8     09-18  Mg     2.0     09-18          MICROBIOLOGY:    RADIOLOGY & ADDITIONAL STUDIES:    CXR:    Ct scan chest:  < from: CT Abdomen and Pelvis w/ Oral Cont and w/ IV Cont (09.16.17 @ 17:31) >  Streaky bibasilar subsegmental atelectasis. No pleural effusion.    Again seen is a 5 mm hypodensity in the right hepatic lobe, too small to   characterize. Mild prominence of the pancreatic duct is again seen. The   pancreatic parenchyma appears unremarkable. The contracted gallbladder,   spleen and adrenal glands are unremarkable.    Kidneys enhance symmetrically. No hydronephrosis. Left-sided extrarenal   pelvis is noted. Again seen is a 5 mm hypodensity in the left kidney mid   to lower pole, too small to characterize.    GI tract is unobstructed. Appendix is unremarkable. No free air or   ascites.    No bulky adenopathy. Normal caliber abdominal aorta. Mild atherosclerosis.    Urinary bladder is distended but otherwise unremarkable. Partial   hysterectomy or uterine atrophy. No free pelvic fluid.    Mild degenerative changes in the spine.    A batterypack is present in the right gluteal subcutaneous fat with   spinal stimulator electrodes entering the spinal canal at T7-T8. Small   focus of air left lower quadrant anterior abdominal wall consistent with   subcutaneous injection.    IMPRESSION:  Distended urinary bladder. The etiology of the patient's abdominal pain   is not clearly elucidated on this exam.    < end of copied text >    ekg;    echo:

## 2017-09-18 NOTE — PROGRESS NOTE ADULT - SUBJECTIVE AND OBJECTIVE BOX
PGY 1 Note discussed with supervising resident and primary attending    Patient is a 46y old  Female who presents with a chief complaint of abdominal pain, N/V (12 Sep 2017 07:17)      INTERVAL HPI/OVERNIGHT EVENTS: offers no new complaints; current symptoms resolving    MEDICATIONS  (STANDING):  pregabalin 300 milliGRAM(s) Oral two times a day  escitalopram 20 milliGRAM(s) Oral daily  clopidogrel Tablet 75 milliGRAM(s) Oral daily  metoprolol 25 milliGRAM(s) Oral two times a day  amLODIPine   Tablet 2.5 milliGRAM(s) Oral daily  pantoprazole    Tablet 40 milliGRAM(s) Oral before breakfast  nicotine - 21 mG/24Hr(s) Patch 1 patch Transdermal daily  enoxaparin Injectable 40 milliGRAM(s) SubCutaneous daily  ciprofloxacin   IVPB 400 milliGRAM(s) IV Intermittent every 12 hours  metroNIDAZOLE  IVPB 500 milliGRAM(s) IV Intermittent every 8 hours  influenza   Vaccine 0.5 milliLiter(s) IntraMuscular once  sodium chloride 0.9% Bolus 1000 milliLiter(s) IV Bolus once  morphine ER Tablet 60 milliGRAM(s) Oral every 12 hours  metoclopramide 5 milliGRAM(s) Oral two times a day  ergocalciferol 55127 Unit(s) Oral <User Schedule>  lactated ringers. 1000 milliLiter(s) (125 mL/Hr) IV Continuous <Continuous>  senna 2 Tablet(s) Oral at bedtime  docusate sodium 100 milliGRAM(s) Oral two times a day  sodium chloride 0.9%. 1000 milliLiter(s) (75 mL/Hr) IV Continuous <Continuous>  dronabinol 5 milliGRAM(s) Oral every 12 hours    MEDICATIONS  (PRN):  ondansetron Injectable 4 milliGRAM(s) IV Push every 6 hours PRN Nausea and/or Vomiting  acetaminophen   Tablet 650 milliGRAM(s) Oral every 6 hours PRN For Temp greater than 38 C (100.4 F)  zaleplon 5 milliGRAM(s) Oral at bedtime PRN Insomnia  oxyCODONE    5 mG/acetaminophen 325 mG 2 Tablet(s) Oral every 4 hours PRN Severe Pain (7 - 10)      __________________________________________________  REVIEW OF SYSTEMS:  CONSTITUTIONAL: No fever  NECK: No pain or stiffness  RESPIRATORY: No cough; No shortness of breath  CARDIOVASCULAR: No chest pain, no palpitations  GASTROINTESTINAL: No pain. No constipation, nausea or vomiting; No diarrhea   NEUROLOGICAL: No headache or numbness, no tremors  MUSCULOSKELETAL: No joint pain, no muscle pain  GENITOURINARY: no dysuria, no frequency, no hesitancy  PSYCHIATRY: no depression , no anxiety  ALL OTHER  ROS negative        Vital Signs Last 24 Hrs  T(C): 37.1 (17 Sep 2017 21:41), Max: 37.1 (17 Sep 2017 21:41)  T(F): 98.8 (17 Sep 2017 21:41), Max: 98.8 (17 Sep 2017 21:41)  HR: 64 (17 Sep 2017 21:41) (64 - 69)  BP: 99/67 (17 Sep 2017 21:41) (99/67 - 103/66)  BP(mean): --  RR: 16 (17 Sep 2017 21:41) (16 - 18)  SpO2: 98% (17 Sep 2017 21:41) (98% - 98%)    ________________________________________________  PHYSICAL EXAM:  GENERAL: NAD, lying in bed  HEENT: Normocephalic;  conjunctivae and sclerae clear; moist mucous membranes  NECK : supple, trachea midline, no JVD  CHEST/LUNG: Clear to auscultation bilaterally with good air entry   HEART: S1 S2,  regular rate and rhythm,; no murmurs, gallops or rubs  ABDOMEN: Soft, Nontender, Nondistended; Bowel sounds present  EXTREMITIES: no cyanosis; no edema; no calf tenderness  SKIN: warm and dry; no rash  NERVOUS SYSTEM:  AAOx3; no new focal deficits    _________________________________________________  LABS:                        13.3   6.3   )-----------( 264      ( 17 Sep 2017 08:09 )             40.1     09-17    137  |  103  |  12  ----------------------------<  84  4.1   |  30  |  1.14    Ca    9.0      17 Sep 2017 08:09  Mg     2.1     09-17          CAPILLARY BLOOD GLUCOSE          RADIOLOGY & ADDITIONAL TESTS:    Imaging Personally Reviewed:  YES    Consultant(s) Notes Reviewed:   YES    Care Discussed with Consultants:   Infectious Disease [x] Cardiology [] Pulmonology [x] Gastroenterology [] Nephrology [] Urology [] Orthopaedics [] Podiatry [] Psychiatry [] Hematology/Oncology [] Surgery [x] Pain [] Palliative Care []    Plan of care was discussed with patient and/or primary care giver; all questions and concerns were addressed and care was aligned with patient's wishes. PGY 1 Note discussed with supervising resident and primary attending    Patient is a 46y old  Female who presents with a chief complaint of abdominal pain, N/V (12 Sep 2017 07:17)      INTERVAL HPI/OVERNIGHT EVENTS: patient still reports abdominal pain, nausea, and diarrhea    MEDICATIONS  (STANDING):  pregabalin 300 milliGRAM(s) Oral two times a day  escitalopram 20 milliGRAM(s) Oral daily  clopidogrel Tablet 75 milliGRAM(s) Oral daily  metoprolol 25 milliGRAM(s) Oral two times a day  amLODIPine   Tablet 2.5 milliGRAM(s) Oral daily  pantoprazole    Tablet 40 milliGRAM(s) Oral before breakfast  nicotine - 21 mG/24Hr(s) Patch 1 patch Transdermal daily  enoxaparin Injectable 40 milliGRAM(s) SubCutaneous daily  ciprofloxacin   IVPB 400 milliGRAM(s) IV Intermittent every 12 hours  metroNIDAZOLE  IVPB 500 milliGRAM(s) IV Intermittent every 8 hours  influenza   Vaccine 0.5 milliLiter(s) IntraMuscular once  sodium chloride 0.9% Bolus 1000 milliLiter(s) IV Bolus once  morphine ER Tablet 60 milliGRAM(s) Oral every 12 hours  metoclopramide 5 milliGRAM(s) Oral two times a day  ergocalciferol 92053 Unit(s) Oral <User Schedule>  lactated ringers. 1000 milliLiter(s) (125 mL/Hr) IV Continuous <Continuous>  senna 2 Tablet(s) Oral at bedtime  docusate sodium 100 milliGRAM(s) Oral two times a day  sodium chloride 0.9%. 1000 milliLiter(s) (75 mL/Hr) IV Continuous <Continuous>  dronabinol 5 milliGRAM(s) Oral every 12 hours    MEDICATIONS  (PRN):  ondansetron Injectable 4 milliGRAM(s) IV Push every 6 hours PRN Nausea and/or Vomiting  acetaminophen   Tablet 650 milliGRAM(s) Oral every 6 hours PRN For Temp greater than 38 C (100.4 F)  zaleplon 5 milliGRAM(s) Oral at bedtime PRN Insomnia  oxyCODONE    5 mG/acetaminophen 325 mG 2 Tablet(s) Oral every 4 hours PRN Severe Pain (7 - 10)      __________________________________________________  REVIEW OF SYSTEMS:  CONSTITUTIONAL: No fever  NECK: No pain or stiffness  RESPIRATORY: No cough; No shortness of breath  CARDIOVASCULAR: No chest pain, no palpitations  GASTROINTESTINAL: reports pain, nausea and diarrhea, but vomiting   MUSCULOSKELETAL: No joint pain, no muscle pain  ALL OTHER  ROS negative        Vital Signs Last 24 Hrs  T(C): 37.1 (17 Sep 2017 21:41), Max: 37.1 (17 Sep 2017 21:41)  T(F): 98.8 (17 Sep 2017 21:41), Max: 98.8 (17 Sep 2017 21:41)  HR: 64 (17 Sep 2017 21:41) (64 - 69)  BP: 99/67 (17 Sep 2017 21:41) (99/67 - 103/66)  BP(mean): --  RR: 16 (17 Sep 2017 21:41) (16 - 18)  SpO2: 98% (17 Sep 2017 21:41) (98% - 98%)    ________________________________________________  PHYSICAL EXAM:  GENERAL: NAD, lying in bed  NECK : supple, trachea midline, left sided thyromegaly appreciated. no JVD  CHEST/LUNG: Clear to auscultation bilaterally with good air entry   HEART: S1 S2,  regular rate and rhythm,; no murmurs, gallops or rubs  ABDOMEN: Soft, diffusely tender to palpation without rebound. Nondistended; Bowel sounds hypoactive  EXTREMITIES: no cyanosis; no edema; no calf tenderness  NERVOUS SYSTEM:  AAOx3; no new focal deficits    _________________________________________________  LABS:                        13.3   6.3   )-----------( 264      ( 17 Sep 2017 08:09 )             40.1     09-17    137  |  103  |  12  ----------------------------<  84  4.1   |  30  |  1.14    Ca    9.0      17 Sep 2017 08:09  Mg     2.1     09-17          CAPILLARY BLOOD GLUCOSE          RADIOLOGY & ADDITIONAL TESTS:    Imaging Personally Reviewed:  YES    Consultant(s) Notes Reviewed:   YES    Care Discussed with Consultants:   Infectious Disease [x] Cardiology [] Pulmonology [x] Gastroenterology [] Nephrology [] Urology [] Orthopaedics [] Podiatry [] Psychiatry [] Hematology/Oncology [] Surgery [x] Pain [] Palliative Care []    Plan of care was discussed with patient and/or primary care giver; all questions and concerns were addressed and care was aligned with patient's wishes.

## 2017-09-18 NOTE — PROGRESS NOTE ADULT - PROBLEM SELECTOR PLAN 10
Patient has left sided thyromegaly and reports history of right thyroidectomy  TSH within normal limits  Thyroid ultrasound revealed 5.6cm heterogenous nodule in mid-left lobe  Will recommend outpatient follow up with surgeon Need for prophylactic measure  -Improve VTE score = 1  -Lovenox for DVT chemoprophylaxis

## 2017-09-18 NOTE — PROGRESS NOTE ADULT - SUBJECTIVE AND OBJECTIVE BOX
[   ] ICU                                          [   ] CCU                                      [  X ] Medical Floor      Patient c/o abdominal pain and nausea. No Vomiting, hematemesis, hematochezia, melena, fever, chills, chest pain, SOB, cough or diarrhea reported.    VITALS  Vital Signs Last 24 Hrs  T(C): 36.8 (18 Sep 2017 14:37), Max: 37.1 (17 Sep 2017 21:41)  T(F): 98.2 (18 Sep 2017 14:37), Max: 98.8 (17 Sep 2017 21:41)  HR: 76 (18 Sep 2017 14:37) (64 - 76)  BP: 107/77 (18 Sep 2017 14:37) (99/67 - 123/86)  BP(mean): --  RR: 17 (18 Sep 2017 14:37) (16 - 17)  SpO2: 98% (18 Sep 2017 14:37) (98% - 100%)       MEDICATIONS  (STANDING):  escitalopram 20 milliGRAM(s) Oral daily  clopidogrel Tablet 75 milliGRAM(s) Oral daily  metoprolol 25 milliGRAM(s) Oral two times a day  amLODIPine   Tablet 2.5 milliGRAM(s) Oral daily  pantoprazole    Tablet 40 milliGRAM(s) Oral before breakfast  nicotine - 21 mG/24Hr(s) Patch 1 patch Transdermal daily  enoxaparin Injectable 40 milliGRAM(s) SubCutaneous daily  ciprofloxacin   IVPB 400 milliGRAM(s) IV Intermittent every 12 hours  metroNIDAZOLE  IVPB 500 milliGRAM(s) IV Intermittent every 8 hours  influenza   Vaccine 0.5 milliLiter(s) IntraMuscular once  sodium chloride 0.9% Bolus 1000 milliLiter(s) IV Bolus once  morphine ER Tablet 60 milliGRAM(s) Oral every 12 hours  ergocalciferol 37870 Unit(s) Oral <User Schedule>  lactated ringers. 1000 milliLiter(s) (125 mL/Hr) IV Continuous <Continuous>  sodium chloride 0.9%. 1000 milliLiter(s) (75 mL/Hr) IV Continuous <Continuous>  dronabinol 5 milliGRAM(s) Oral every 12 hours    MEDICATIONS  (PRN):  ondansetron Injectable 4 milliGRAM(s) IV Push every 6 hours PRN Nausea and/or Vomiting  acetaminophen   Tablet 650 milliGRAM(s) Oral every 6 hours PRN For Temp greater than 38 C (100.4 F)  zaleplon 5 milliGRAM(s) Oral at bedtime PRN Insomnia  oxyCODONE    5 mG/acetaminophen 325 mG 2 Tablet(s) Oral every 4 hours PRN Severe Pain (7 - 10)                            14.3   6.1   )-----------( 267      ( 18 Sep 2017 06:29 )             44.3       09-18    141  |  106  |  11  ----------------------------<  94  4.1   |  27  |  1.07    Ca    9.7      18 Sep 2017 06:29  Phos  3.8     09-18  Mg     2.0     09-18 [   ] ICU                   [   ] CCU           [  X ] Medical Floor      Patient c/o abdominal pain and nausea. No Vomiting, hematemesis, hematochezia, melena, fever, chills, chest pain, SOB, cough or diarrhea reported.    VITALS  Vital Signs Last 24 Hrs  T(C): 36.8 (18 Sep 2017 14:37), Max: 37.1 (17 Sep 2017 21:41)  T(F): 98.2 (18 Sep 2017 14:37), Max: 98.8 (17 Sep 2017 21:41)  HR: 76 (18 Sep 2017 14:37) (64 - 76)  BP: 107/77 (18 Sep 2017 14:37) (99/67 - 123/86)  BP(mean): --  RR: 17 (18 Sep 2017 14:37) (16 - 17)  SpO2: 98% (18 Sep 2017 14:37) (98% - 100%)       MEDICATIONS  (STANDING):  escitalopram 20 milliGRAM(s) Oral daily  clopidogrel Tablet 75 milliGRAM(s) Oral daily  metoprolol 25 milliGRAM(s) Oral two times a day  amLODIPine   Tablet 2.5 milliGRAM(s) Oral daily  pantoprazole    Tablet 40 milliGRAM(s) Oral before breakfast  nicotine - 21 mG/24Hr(s) Patch 1 patch Transdermal daily  enoxaparin Injectable 40 milliGRAM(s) SubCutaneous daily  ciprofloxacin   IVPB 400 milliGRAM(s) IV Intermittent every 12 hours  metroNIDAZOLE  IVPB 500 milliGRAM(s) IV Intermittent every 8 hours  influenza   Vaccine 0.5 milliLiter(s) IntraMuscular once  sodium chloride 0.9% Bolus 1000 milliLiter(s) IV Bolus once  morphine ER Tablet 60 milliGRAM(s) Oral every 12 hours  ergocalciferol 40734 Unit(s) Oral <User Schedule>  lactated ringers. 1000 milliLiter(s) (125 mL/Hr) IV Continuous <Continuous>  sodium chloride 0.9%. 1000 milliLiter(s) (75 mL/Hr) IV Continuous <Continuous>  dronabinol 5 milliGRAM(s) Oral every 12 hours    MEDICATIONS  (PRN):  ondansetron Injectable 4 milliGRAM(s) IV Push every 6 hours PRN Nausea and/or Vomiting  acetaminophen   Tablet 650 milliGRAM(s) Oral every 6 hours PRN For Temp greater than 38 C (100.4 F)  zaleplon 5 milliGRAM(s) Oral at bedtime PRN Insomnia  oxyCODONE    5 mG/acetaminophen 325 mG 2 Tablet(s) Oral every 4 hours PRN Severe Pain (7 - 10)                            14.3   6.1   )-----------( 267      ( 18 Sep 2017 06:29 )             44.3       09-18    141  |  106  |  11  ----------------------------<  94  4.1   |  27  |  1.07    Ca    9.7      18 Sep 2017 06:29  Phos  3.8     09-18  Mg     2.0     09-18

## 2017-09-19 DIAGNOSIS — R33.9 RETENTION OF URINE, UNSPECIFIED: ICD-10-CM

## 2017-09-19 DIAGNOSIS — J44.9 CHRONIC OBSTRUCTIVE PULMONARY DISEASE, UNSPECIFIED: ICD-10-CM

## 2017-09-19 LAB
ANION GAP SERPL CALC-SCNC: 5 MMOL/L — SIGNIFICANT CHANGE UP (ref 5–17)
BUN SERPL-MCNC: 10 MG/DL — SIGNIFICANT CHANGE UP (ref 7–18)
C DIFF BY PCR RESULT: SIGNIFICANT CHANGE UP
C DIFF TOX GENS STL QL NAA+PROBE: SIGNIFICANT CHANGE UP
CALCIUM SERPL-MCNC: 8.7 MG/DL — SIGNIFICANT CHANGE UP (ref 8.4–10.5)
CHLORIDE SERPL-SCNC: 106 MMOL/L — SIGNIFICANT CHANGE UP (ref 96–108)
CO2 SERPL-SCNC: 27 MMOL/L — SIGNIFICANT CHANGE UP (ref 22–31)
CREAT SERPL-MCNC: 1.3 MG/DL — SIGNIFICANT CHANGE UP (ref 0.5–1.3)
GLUCOSE SERPL-MCNC: 75 MG/DL — SIGNIFICANT CHANGE UP (ref 70–99)
HCT VFR BLD CALC: 42.4 % — SIGNIFICANT CHANGE UP (ref 34.5–45)
HGB BLD-MCNC: 13.8 G/DL — SIGNIFICANT CHANGE UP (ref 11.5–15.5)
MCHC RBC-ENTMCNC: 29.4 PG — SIGNIFICANT CHANGE UP (ref 27–34)
MCHC RBC-ENTMCNC: 32.6 GM/DL — SIGNIFICANT CHANGE UP (ref 32–36)
MCV RBC AUTO: 90.1 FL — SIGNIFICANT CHANGE UP (ref 80–100)
PLATELET # BLD AUTO: 287 K/UL — SIGNIFICANT CHANGE UP (ref 150–400)
POTASSIUM SERPL-MCNC: 3.9 MMOL/L — SIGNIFICANT CHANGE UP (ref 3.5–5.3)
POTASSIUM SERPL-SCNC: 3.9 MMOL/L — SIGNIFICANT CHANGE UP (ref 3.5–5.3)
RBC # BLD: 4.7 M/UL — SIGNIFICANT CHANGE UP (ref 3.8–5.2)
RBC # FLD: 13.2 % — SIGNIFICANT CHANGE UP (ref 10.3–14.5)
SODIUM SERPL-SCNC: 138 MMOL/L — SIGNIFICANT CHANGE UP (ref 135–145)
WBC # BLD: 6.7 K/UL — SIGNIFICANT CHANGE UP (ref 3.8–10.5)
WBC # FLD AUTO: 6.7 K/UL — SIGNIFICANT CHANGE UP (ref 3.8–10.5)

## 2017-09-19 PROCEDURE — 99233 SBSQ HOSP IP/OBS HIGH 50: CPT

## 2017-09-19 RX ORDER — SODIUM CHLORIDE 9 MG/ML
500 INJECTION INTRAMUSCULAR; INTRAVENOUS; SUBCUTANEOUS ONCE
Qty: 0 | Refills: 0 | Status: COMPLETED | OUTPATIENT
Start: 2017-09-19 | End: 2017-09-19

## 2017-09-19 RX ORDER — OXYCODONE AND ACETAMINOPHEN 5; 325 MG/1; MG/1
1 TABLET ORAL ONCE
Qty: 0 | Refills: 0 | Status: DISCONTINUED | OUTPATIENT
Start: 2017-09-19 | End: 2017-09-19

## 2017-09-19 RX ORDER — OXYCODONE AND ACETAMINOPHEN 5; 325 MG/1; MG/1
1 TABLET ORAL EVERY 4 HOURS
Qty: 0 | Refills: 0 | Status: DISCONTINUED | OUTPATIENT
Start: 2017-09-19 | End: 2017-09-21

## 2017-09-19 RX ORDER — BETHANECHOL CHLORIDE 25 MG
5 TABLET ORAL THREE TIMES A DAY
Qty: 0 | Refills: 0 | Status: DISCONTINUED | OUTPATIENT
Start: 2017-09-19 | End: 2017-09-21

## 2017-09-19 RX ADMIN — OXYCODONE AND ACETAMINOPHEN 1 TABLET(S): 5; 325 TABLET ORAL at 22:27

## 2017-09-19 RX ADMIN — ENOXAPARIN SODIUM 40 MILLIGRAM(S): 100 INJECTION SUBCUTANEOUS at 16:45

## 2017-09-19 RX ADMIN — OXYCODONE AND ACETAMINOPHEN 1 TABLET(S): 5; 325 TABLET ORAL at 16:44

## 2017-09-19 RX ADMIN — ESCITALOPRAM OXALATE 20 MILLIGRAM(S): 10 TABLET, FILM COATED ORAL at 16:46

## 2017-09-19 RX ADMIN — OXYCODONE AND ACETAMINOPHEN 2 TABLET(S): 5; 325 TABLET ORAL at 09:20

## 2017-09-19 RX ADMIN — OXYCODONE AND ACETAMINOPHEN 1 TABLET(S): 5; 325 TABLET ORAL at 00:22

## 2017-09-19 RX ADMIN — Medication 4000 MILLILITER(S): at 16:44

## 2017-09-19 RX ADMIN — SODIUM CHLORIDE 1000 MILLILITER(S): 9 INJECTION INTRAMUSCULAR; INTRAVENOUS; SUBCUTANEOUS at 07:41

## 2017-09-19 RX ADMIN — Medication 200 MILLIGRAM(S): at 18:31

## 2017-09-19 RX ADMIN — POLYETHYLENE GLYCOL 3350 17 GRAM(S): 17 POWDER, FOR SOLUTION ORAL at 16:49

## 2017-09-19 RX ADMIN — MORPHINE SULFATE 60 MILLIGRAM(S): 50 CAPSULE, EXTENDED RELEASE ORAL at 19:10

## 2017-09-19 RX ADMIN — Medication 5 MILLIGRAM(S): at 22:27

## 2017-09-19 RX ADMIN — CLOPIDOGREL BISULFATE 75 MILLIGRAM(S): 75 TABLET, FILM COATED ORAL at 16:46

## 2017-09-19 RX ADMIN — OXYCODONE AND ACETAMINOPHEN 2 TABLET(S): 5; 325 TABLET ORAL at 02:37

## 2017-09-19 RX ADMIN — OXYCODONE AND ACETAMINOPHEN 1 TABLET(S): 5; 325 TABLET ORAL at 01:30

## 2017-09-19 RX ADMIN — OXYCODONE AND ACETAMINOPHEN 2 TABLET(S): 5; 325 TABLET ORAL at 03:26

## 2017-09-19 RX ADMIN — POLYETHYLENE GLYCOL 3350 17 GRAM(S): 17 POWDER, FOR SOLUTION ORAL at 22:28

## 2017-09-19 RX ADMIN — Medication 5 MILLIGRAM(S): at 07:16

## 2017-09-19 RX ADMIN — ONDANSETRON 4 MILLIGRAM(S): 8 TABLET, FILM COATED ORAL at 18:30

## 2017-09-19 RX ADMIN — Medication 1 PATCH: at 08:59

## 2017-09-19 RX ADMIN — POLYETHYLENE GLYCOL 3350 17 GRAM(S): 17 POWDER, FOR SOLUTION ORAL at 22:42

## 2017-09-19 RX ADMIN — POLYETHYLENE GLYCOL 3350 17 GRAM(S): 17 POWDER, FOR SOLUTION ORAL at 18:32

## 2017-09-19 RX ADMIN — SODIUM CHLORIDE 75 MILLILITER(S): 9 INJECTION INTRAMUSCULAR; INTRAVENOUS; SUBCUTANEOUS at 22:30

## 2017-09-19 RX ADMIN — OXYCODONE AND ACETAMINOPHEN 1 TABLET(S): 5; 325 TABLET ORAL at 17:00

## 2017-09-19 RX ADMIN — Medication 100 MILLIGRAM(S): at 23:52

## 2017-09-19 RX ADMIN — Medication 5 MILLIGRAM(S): at 19:10

## 2017-09-19 RX ADMIN — OXYCODONE AND ACETAMINOPHEN 2 TABLET(S): 5; 325 TABLET ORAL at 08:40

## 2017-09-19 RX ADMIN — Medication 5 MILLIGRAM(S): at 16:45

## 2017-09-19 RX ADMIN — MORPHINE SULFATE 60 MILLIGRAM(S): 50 CAPSULE, EXTENDED RELEASE ORAL at 19:40

## 2017-09-19 RX ADMIN — Medication 200 MILLIGRAM(S): at 07:18

## 2017-09-19 RX ADMIN — Medication 100 MILLIGRAM(S): at 16:44

## 2017-09-19 RX ADMIN — PANTOPRAZOLE SODIUM 40 MILLIGRAM(S): 20 TABLET, DELAYED RELEASE ORAL at 07:16

## 2017-09-19 RX ADMIN — Medication 100 MILLIGRAM(S): at 07:17

## 2017-09-19 NOTE — PROGRESS NOTE ADULT - SUBJECTIVE AND OBJECTIVE BOX
[   ] ICU             [   ] CCU          [ X  ] Medical Floor      Patient is still c/o abdominal pain and nausea. No Vomiting , hematemesis, hematochezia, melena, fever, chills, chest pain, SOB, cough or diarrhea reported.       Vital Signs Last 24 Hrs  T(C): 36.7 (19 Sep 2017 14:21), Max: 36.9 (19 Sep 2017 05:00)  T(F): 98.1 (19 Sep 2017 14:21), Max: 98.5 (19 Sep 2017 05:00)  HR: 77 (19 Sep 2017 18:53) (60 - 77)  BP: 102/67 (19 Sep 2017 18:53) (95/57 - 107/63)  BP(mean): --  RR: 16 (19 Sep 2017 14:21) (16 - 16)  SpO2: 99% (19 Sep 2017 14:21) (96% - 99%)         MEDICATIONS  (STANDING):  escitalopram 20 milliGRAM(s) Oral daily  clopidogrel Tablet 75 milliGRAM(s) Oral daily  metoprolol 25 milliGRAM(s) Oral two times a day  amLODIPine   Tablet 2.5 milliGRAM(s) Oral daily  pantoprazole    Tablet 40 milliGRAM(s) Oral before breakfast  nicotine - 21 mG/24Hr(s) Patch 1 patch Transdermal daily  enoxaparin Injectable 40 milliGRAM(s) SubCutaneous daily  ciprofloxacin   IVPB 400 milliGRAM(s) IV Intermittent every 12 hours  metroNIDAZOLE  IVPB 500 milliGRAM(s) IV Intermittent every 8 hours  influenza   Vaccine 0.5 milliLiter(s) IntraMuscular once  sodium chloride 0.9% Bolus 1000 milliLiter(s) IV Bolus once  ergocalciferol 39921 Unit(s) Oral <User Schedule>  lactated ringers. 1000 milliLiter(s) (125 mL/Hr) IV Continuous <Continuous>  sodium chloride 0.9%. 1000 milliLiter(s) (75 mL/Hr) IV Continuous <Continuous>  dronabinol 5 milliGRAM(s) Oral every 12 hours  polyethylene glycol 3350 17 Gram(s) Oral every 1 hour  bethanechol 5 milliGRAM(s) Oral three times a day    MEDICATIONS  (PRN):  ondansetron Injectable 4 milliGRAM(s) IV Push every 6 hours PRN Nausea and/or Vomiting  acetaminophen   Tablet 650 milliGRAM(s) Oral every 6 hours PRN For Temp greater than 38 C (100.4 F)  zaleplon 5 milliGRAM(s) Oral at bedtime PRN Insomnia  oxyCODONE    5 mG/acetaminophen 325 mG 1 Tablet(s) Oral every 4 hours PRN Severe Pain (7 - 10)                            13.8   6.7   )-----------( 287      ( 19 Sep 2017 08:51 )             42.4       09-19    138  |  106  |  10  ----------------------------<  75  3.9   |  27  |  1.30    Ca    8.7      19 Sep 2017 08:51  Phos  3.8     09-18  Mg     2.0     09-18

## 2017-09-19 NOTE — PROGRESS NOTE ADULT - NEGATIVE ENMT SYMPTOMS
no dysphagia/no ear pain/no hearing difficulty/no gum bleeding/no dry mouth/no throat pain
no ear pain/no hearing difficulty/no gum bleeding/no throat pain/no dysphagia/no dry mouth
no ear pain/no dry mouth/no dysphagia/no hearing difficulty/no throat pain/no gum bleeding
no gum bleeding/no dry mouth/no throat pain/no dysphagia/no hearing difficulty
no dry mouth/no nose bleeds/no gum bleeding/no throat pain/no dysphagia/no hearing difficulty/no ear pain

## 2017-09-19 NOTE — PROGRESS NOTE ADULT - RESPIRATORY
Breath Sounds equal & clear to percussion & auscultation, no accessory muscle use
detailed exam
Breath Sounds equal & clear to percussion & auscultation, no accessory muscle use
detailed exam
Breath Sounds equal & clear to percussion & auscultation, no accessory muscle use
detailed exam

## 2017-09-19 NOTE — PROGRESS NOTE ADULT - NEUROLOGICAL DETAILS
responds to pain/alert and oriented x 3/responds to verbal commands
cranial nerves intact/alert and oriented x 3/responds to pain/responds to verbal commands/sensation intact
alert and oriented x 3/responds to pain/responds to verbal commands
alert and oriented x 3/responds to pain/responds to verbal commands
cranial nerves intact/alert and oriented x 3/responds to pain/responds to verbal commands
responds to pain/alert and oriented x 3/responds to verbal commands/sensation intact/cranial nerves intact
cranial nerves intact/responds to pain/responds to verbal commands
responds to pain/responds to verbal commands/sensation intact/cranial nerves intact/alert and oriented x 3

## 2017-09-19 NOTE — PROGRESS NOTE ADULT - MS GEN HX ROS MEA POS PC
myalgia/stiffness/leg pain L/joint swelling/muscle weakness/back pain/joint pain/leg pain R
joint pain/joint swelling/myalgia/leg pain L/back pain/leg pain R
myalgia/leg pain R/joint pain/leg pain L

## 2017-09-19 NOTE — PROGRESS NOTE ADULT - NEGATIVE SKIN SYMPTOMS
no dryness/no rash/no itching
no rash/no itching/no dryness
no dryness/no rash/no itching
no rash/no itching/no dryness
no rash/no dryness/no itching

## 2017-09-19 NOTE — PROGRESS NOTE ADULT - NEGATIVE PSYCHIATRIC SYMPTOMS
no depression/no anxiety/no suicidal ideation
no suicidal ideation/no depression/no anxiety
no anxiety/no depression/no suicidal ideation
no anxiety/no depression/no suicidal ideation
no depression/no anxiety/no suicidal ideation

## 2017-09-19 NOTE — PROGRESS NOTE ADULT - NEGATIVE HEMATOLOGY SYMPTOMS
no nose bleeding/no gum bleeding
no gum bleeding/no nose bleeding
no nose bleeding/no skin lumps
no gum bleeding/no nose bleeding
no nose bleeding/no gum bleeding
no nose bleeding/no gum bleeding

## 2017-09-19 NOTE — PROGRESS NOTE ADULT - NEGATIVE NEUROLOGICAL SYMPTOMS
no tremors/no focal seizures/no generalized seizures/no syncope
no vertigo/no syncope/no tremors/no headache
no tremors/no headache/no syncope/no vertigo
no syncope/no focal seizures
no syncope/no loss of sensation/no headache/no tremors/no vertigo
no tremors/no headache/no syncope/no vertigo
no tremors/no vertigo/no headache/no syncope

## 2017-09-19 NOTE — PROGRESS NOTE ADULT - MOUTH
normal mouth and gums/moist

## 2017-09-19 NOTE — PROGRESS NOTE ADULT - CVS HE PE MLT D E PC
no rub/regular rate and rhythm
regular rate and rhythm/no rub
no rub/regular rate and rhythm
no rub/regular rate and rhythm
regular rate and rhythm/no rub

## 2017-09-19 NOTE — PROGRESS NOTE ADULT - SUBJECTIVE AND OBJECTIVE BOX
Meds:  ciprofloxacin   IVPB 400 milliGRAM(s) IV Intermittent every 12 hours  metroNIDAZOLE  IVPB 500 milliGRAM(s) IV Intermittent every 8 hours    Allergies:  Allergies    aspirin (Unknown)  latex (Unknown)  sulfa drugs (Unknown)    Intolerances    ROS  [  ] UNABLE TO ELICIT    General:  [  ] None  [  ] Fever  [  ] Chills  [ x ] Malaise    Skin:  [ x ] None [  ] Rash  [  ] Wound  [  ] Ulcer    HEENT:  [ x ] None  [  ] Sore Throat  [  ] Nasal congestion/ runny nose  [  ] Photophobia [  ] Neck pain      Chest:  [ x ] None   [  ] SOB  [  ] Cough  [  ] None    Cardiovascular:   [ x ] None  [  ] CP  [  ] Palpitation    Gastrointestinal:  [  ] None  [ x ] Abd pain    [ x ] Nausea    [ x ] Vomiting   [  ] Diarrhea	     Genitourinary:  [ x ] None [  ] Polyuria   [  ] Urgency  [  ] Frequency  [  ] Dysuria    [  ]  Hematuria       Musculoskeletal:  [  ] None [  ] Back Pain	[  ] Body aches  [  ] Joint pain    Neurological: [  ] None [  ]Dizziness  [  ]Visual Disturbance  [  ]Headaches   [ x ] Weakness          PHYSICAL EXAM:    Vital Signs Last 24 Hrs  T(C): 36.9 (19 Sep 2017 05:00), Max: 36.9 (19 Sep 2017 05:00)  T(F): 98.5 (19 Sep 2017 05:00), Max: 98.5 (19 Sep 2017 05:00)  HR: 77 (19 Sep 2017 05:00) (68 - 77)  BP: 97/80 (19 Sep 2017 05:00) (97/80 - 107/77)  BP(mean): --  RR: 16 (19 Sep 2017 05:00) (16 - 17)  SpO2: 96% (19 Sep 2017 05:00) (96% - 98%)    Constitutional:    HEENT: [ x ] Wnl  [  ] Pharyngeal congestion    Neck:  [ x ] Supple  [  ]Lymphadenopathy  [  ] No JVD   [  ] JVD  [  ] Masses   [  ] WNL    CHEST/Respiratory:  [ x ]Clear to auscultation  [  ] Rales   [  ] Rhonchi   [  ] Wheezing     [  ] Chest Tenderness      Cardiovascular:  [ x ] Reg S1 S2   [  ] Irreg S1 S2   [ x ]No Murmur  [  ] +ve Murmurs  [  ]Systolic [  ]Diastolic      Abdomen:  [ x ] Soft  [  ] No tendrerness  [ x ] Improving diffuse and generalized Tenderness  [  ] Organomegaly  [  ] ABD Distention  [  ] Rigidity                       [ x ] No Regidity                       [ x ] No Rebound Tenderness  [ x ] No Guarding Rigidity  [  ] Rebound Tenderness[  ] Guarding Rigidity                          [x  ]  +ve Bowel Sounds  [  ] Decreased Bowel Sounds    [  ] Absent Bowel Sounds                            Extremities: [ x ] No edema [  ] Edema  [  ] Clubbing   [  ] Cyanosis                         [ x ] No Tender Calf muscles  [  ] Tender Calf muscles                        [ x ] Palpable peripheral pulses    Neurological: [ x ] Awake  [ x ] Alert  [ x ] Oriented  x  3                           [  ] Confused  [  ] Drowzy  [  ] repond to painful stimuli  [  ] Unresponsive    Skin:  [ x ] Intact [  ] Redness [  ] Thrombophlebitis  [  ] Rashes  [  ] Dry  [  ] Ulcers    Ortho:  [  ] Joint Swelling  [  ] Joint erythema [  ] Joint tenderness                [  ] Increased temp. to touch  [ x ] WNL         LABS/DIAGNOSTIC TESTS                                                                  13.3   6.3   )-----------( 264      ( 17 Sep 2017 08:09 )             40.1   09-17    137  |  103  |  12  ----------------------------<  84  4.1   |  30  |  1.14    Ca    9.0      17 Sep 2017 08:09  Mg     2.1     09-17          CULTURES:   Culture - Blood (09.12.17 @ 09:53)    Specimen Source: .Blood Blood-Peripheral    Culture Results:   No growth to date.    Culture - Blood (09.12.17 @ 09:53)    Specimen Source: .Blood Blood-Peripheral    Culture Results:   No growth to date.    Culture - Urine (09.12.17 @ 00:22)    Specimen Source: .Urine Clean Catch (Midstream)    Culture Results:   10,000 - 49,000 CFU/mL Coag negative Staphylococcus not Staph  saprophyticus    RADIOLOGY:    EXAM:  CT ABDOMEN AND PELVIS OC IC                            PROCEDURE DATE:  09/16/2017          INTERPRETATION:  CT ABDOMEN AND PELVIS WITH IV CONTRAST    CLINICAL STATEMENT: Abdominal pain.    COMPARISON: CT abdomen pelvis 9/11/2017.    TECHNIQUE: CT scan of the abdomen and pelvis was performed with IV,   without oral contrast. Multiplanar reformations were made.    FINDINGS:  Streaky bibasilar subsegmental atelectasis. No pleural effusion.    Again seen is a 5 mm hypodensity in the right hepatic lobe, too small to   characterize. Mild prominence of the pancreatic duct is again seen. The   pancreatic parenchyma appears unremarkable. The contracted gallbladder,   spleen and adrenal glands are unremarkable.    Kidneys enhance symmetrically. No hydronephrosis. Left-sided extrarenal   pelvis is noted. Again seen is a 5 mm hypodensity in the left kidney mid   to lower pole, too small to characterize.    GI tract is unobstructed. Appendix is unremarkable. No free air or   ascites.    No bulky adenopathy. Normal caliber abdominal aorta. Mild atherosclerosis.    Urinary bladder is distended but otherwise unremarkable. Partial   hysterectomy or uterine atrophy. No free pelvic fluid.    Mild degenerative changes in the spine.    A batterypack is present in the right gluteal subcutaneous fat with   spinal stimulator electrodes entering the spinal canal at T7-T8. Small   focus of air left lower quadrant anterior abdominal wall consistent with   subcutaneous injection.    IMPRESSION:  Distended urinary bladder. The etiology of the patient's abdominal pain   is not clearly elucidated on this exam.          Assessment and Recommendation:     Patient is a 44 y/o F lives with her sister from home ambulates with walker, current everyday smoker,  PMH Reflex symphatetic dystrophy s/p spinal stimulator, frequent falls, SLE (no treatment at this time), cyclical vomiting syndrome, Afib (not on AC due to frequent falls), HTN, depression, Hemorrhoids (EGD / colonoscopy 3-4 years ago, is due for a next one soon ) and partial thyroidectomy presented with c/o vomiting and diarrhea for 2 days a/w abdominal pain.  Patient was admitted for colitis ans was started on IV Cipro and Flagyl.  Patient feels better, and WBC is now normal.  9/16/17 Patient had abdominal CT that showed distended urinary bladder.     Problem/Recommendation - 1:  Problem: Pancolitis.   Recommendation:   1- UA & CS suggest for contamination.  2- Follow Blood culture to final report.  3- GI evaluation and management.  4- Continue Cipro and Flagyl, and change it to PO upon discharge till 9/24/17.  5- Fluid and electrolytes management.  6- CBC and BMP follow up.  7- stool for ova and parasites.  8- Stool for culture.  9- stool for CDT.  10- Suggested Santo catheter.   2- Blood culture.  3- GI evaluation and management.  4- Continue Cipro and Flagyl.  5- Fluid and electrolytes management.  6- CBC and BMP follow up.    Problem/Recommendation - 2:  ·  Problem: HTN (hypertension).    Recommendation:   1- Monitor Blood pressure closely.  2- Blood pressure control.  3- BP. meds as per cardiology and primary care team.         Discussed with medical resident, and with Patient.

## 2017-09-19 NOTE — PROGRESS NOTE ADULT - CONSTITUTIONAL
detailed exam

## 2017-09-19 NOTE — PROGRESS NOTE ADULT - PROBLEM SELECTOR PLAN 7
Patient has left sided thyromegaly and reports history of right thyroidectomy  TSH within normal limits  Thyroid ultrasound revealed 5.6cm heterogenous nodule in mid-left lobe  Plan for FNA as outpatient  Endocrinology Dr. Deal

## 2017-09-19 NOTE — PROGRESS NOTE ADULT - GASTROINTESTINAL DETAILS
bowel sounds normal
no rebound tenderness/soft/no rigidity/no distention/no guarding/nontender
bowel sounds normal
soft/bowel sounds normal
no distention/no rebound tenderness/soft/no rigidity/nontender/no guarding
no distention/no guarding/soft/nontender/no rebound tenderness/no rigidity
nontender/no rebound tenderness/no rigidity/soft/bowel sounds normal/no distention/no guarding
no guarding/no rigidity/no distention/soft/nontender/no rebound tenderness

## 2017-09-19 NOTE — PROGRESS NOTE ADULT - SUBJECTIVE AND OBJECTIVE BOX
Patient is a 46y old  Female who presents with a chief complaint of abdominal pain, N/V (12 Sep 2017 07:17)    pt seen in icu [  ], reg med floor [  x ], bed [ x ], chair at bedside [   ], a+o x3 [x  ], lethargic [  ],  nad [x  ]    c/o urinary retention overnight    Allergies    aspirin (Unknown)  latex (Unknown)  sulfa drugs (Unknown)        Vitals    T(F): 98.5 (09-19-17 @ 05:00), Max: 98.5 (09-19-17 @ 05:00)  HR: 60 (09-19-17 @ 08:02) (60 - 77)  BP: 95/57 (09-19-17 @ 08:02) (95/57 - 107/77)  RR: 16 (09-19-17 @ 05:00) (16 - 17)  SpO2: 96% (09-19-17 @ 05:00) (96% - 98%)  Wt(kg): --  CAPILLARY BLOOD GLUCOSE          Labs                          13.8   6.7   )-----------( 287      ( 19 Sep 2017 08:51 )             42.4       09-19    138  |  106  |  10  ----------------------------<  75  3.9   |  27  |  1.30    Ca    8.7      19 Sep 2017 08:51  Phos  3.8     09-18  Mg     2.0     09-18                  Radiology Results      Meds    MEDICATIONS  (STANDING):  escitalopram 20 milliGRAM(s) Oral daily  clopidogrel Tablet 75 milliGRAM(s) Oral daily  metoprolol 25 milliGRAM(s) Oral two times a day  amLODIPine   Tablet 2.5 milliGRAM(s) Oral daily  pantoprazole    Tablet 40 milliGRAM(s) Oral before breakfast  nicotine - 21 mG/24Hr(s) Patch 1 patch Transdermal daily  enoxaparin Injectable 40 milliGRAM(s) SubCutaneous daily  ciprofloxacin   IVPB 400 milliGRAM(s) IV Intermittent every 12 hours  metroNIDAZOLE  IVPB 500 milliGRAM(s) IV Intermittent every 8 hours  influenza   Vaccine 0.5 milliLiter(s) IntraMuscular once  sodium chloride 0.9% Bolus 1000 milliLiter(s) IV Bolus once  morphine ER Tablet 60 milliGRAM(s) Oral every 12 hours  ergocalciferol 41404 Unit(s) Oral <User Schedule>  lactated ringers. 1000 milliLiter(s) (125 mL/Hr) IV Continuous <Continuous>  sodium chloride 0.9%. 1000 milliLiter(s) (75 mL/Hr) IV Continuous <Continuous>  dronabinol 5 milliGRAM(s) Oral every 12 hours  polyethylene glycol/electrolyte Solution. 4000 milliLiter(s) Oral once  polyethylene glycol 3350 17 Gram(s) Oral every 1 hour      MEDICATIONS  (PRN):  ondansetron Injectable 4 milliGRAM(s) IV Push every 6 hours PRN Nausea and/or Vomiting  acetaminophen   Tablet 650 milliGRAM(s) Oral every 6 hours PRN For Temp greater than 38 C (100.4 F)  zaleplon 5 milliGRAM(s) Oral at bedtime PRN Insomnia  oxyCODONE    5 mG/acetaminophen 325 mG 2 Tablet(s) Oral every 4 hours PRN Severe Pain (7 - 10)      Physical Exam    Neuro :  no focal deficits  Respiratory: CTA B/L  CV: RRR, S1S2, no murmurs,   Abdominal: Soft, NT, ND +BS,  Extremities: No edema, + peripheral pulses    ASSESSMENT    fecal impaction  sterocolitis  left hyroid nodule  h/o HTN (hypertension)  Cyclical vomiting  Atrial fibrillation  SLE (systemic lupus erythematosus)  Reflex sympathetic dystrophy  H/O partial thyroidectomy  S/P partial hysterectomy      PLAN    id f/u noted  cont cipro and flagyl untill 9/24/17  gi f/u  pt for egd and colonoscopy on wednesday  cont miralax  add senna  pulm f/u noted  endo f/u noted  f/u fna  place [facility]  urology cons  flomax  cont current meds Patient is a 46y old  Female who presents with a chief complaint of abdominal pain, N/V (12 Sep 2017 07:17)    pt seen in icu [  ], reg med floor [  x ], bed [ x ], chair at bedside [   ], a+o x3 [x  ], lethargic [  ],  nad [x  ]    c/o urinary retention overnight    Allergies    aspirin (Unknown)  latex (Unknown)  sulfa drugs (Unknown)        Vitals    T(F): 98.5 (09-19-17 @ 05:00), Max: 98.5 (09-19-17 @ 05:00)  HR: 60 (09-19-17 @ 08:02) (60 - 77)  BP: 95/57 (09-19-17 @ 08:02) (95/57 - 107/77)  RR: 16 (09-19-17 @ 05:00) (16 - 17)  SpO2: 96% (09-19-17 @ 05:00) (96% - 98%)  Wt(kg): --  CAPILLARY BLOOD GLUCOSE          Labs                          13.8   6.7   )-----------( 287      ( 19 Sep 2017 08:51 )             42.4       09-19    138  |  106  |  10  ----------------------------<  75  3.9   |  27  |  1.30    Ca    8.7      19 Sep 2017 08:51  Phos  3.8     09-18  Mg     2.0     09-18                  Radiology Results      Meds    MEDICATIONS  (STANDING):  escitalopram 20 milliGRAM(s) Oral daily  clopidogrel Tablet 75 milliGRAM(s) Oral daily  metoprolol 25 milliGRAM(s) Oral two times a day  amLODIPine   Tablet 2.5 milliGRAM(s) Oral daily  pantoprazole    Tablet 40 milliGRAM(s) Oral before breakfast  nicotine - 21 mG/24Hr(s) Patch 1 patch Transdermal daily  enoxaparin Injectable 40 milliGRAM(s) SubCutaneous daily  ciprofloxacin   IVPB 400 milliGRAM(s) IV Intermittent every 12 hours  metroNIDAZOLE  IVPB 500 milliGRAM(s) IV Intermittent every 8 hours  influenza   Vaccine 0.5 milliLiter(s) IntraMuscular once  sodium chloride 0.9% Bolus 1000 milliLiter(s) IV Bolus once  morphine ER Tablet 60 milliGRAM(s) Oral every 12 hours  ergocalciferol 09530 Unit(s) Oral <User Schedule>  lactated ringers. 1000 milliLiter(s) (125 mL/Hr) IV Continuous <Continuous>  sodium chloride 0.9%. 1000 milliLiter(s) (75 mL/Hr) IV Continuous <Continuous>  dronabinol 5 milliGRAM(s) Oral every 12 hours  polyethylene glycol/electrolyte Solution. 4000 milliLiter(s) Oral once  polyethylene glycol 3350 17 Gram(s) Oral every 1 hour      MEDICATIONS  (PRN):  ondansetron Injectable 4 milliGRAM(s) IV Push every 6 hours PRN Nausea and/or Vomiting  acetaminophen   Tablet 650 milliGRAM(s) Oral every 6 hours PRN For Temp greater than 38 C (100.4 F)  zaleplon 5 milliGRAM(s) Oral at bedtime PRN Insomnia  oxyCODONE    5 mG/acetaminophen 325 mG 2 Tablet(s) Oral every 4 hours PRN Severe Pain (7 - 10)      Physical Exam    Neuro :  no focal deficits  Respiratory: CTA B/L  CV: RRR, S1S2, no murmurs,   Abdominal: Soft, NT, ND +BS,  Extremities: No edema, + peripheral pulses    ASSESSMENT    fecal impaction  sterocolitis  left hyroid nodule  h/o HTN (hypertension)  Cyclical vomiting  Atrial fibrillation  SLE (systemic lupus erythematosus)  Reflex sympathetic dystrophy  H/O partial thyroidectomy  S/P partial hysterectomy      PLAN    id f/u noted  cont cipro and flagyl untill 9/24/17  gi f/u  pt for egd and colonoscopy on wednesday  cont miralax  add senna  pulm f/u noted  endo f/u noted  f/u fna  urology cons  start bethanechol 5mg tid  cont current meds

## 2017-09-19 NOTE — PROGRESS NOTE ADULT - SUBJECTIVE AND OBJECTIVE BOX
PGY 1 Note discussed with supervising resident and primary attending    Patient is a 46y old  Female who presents with a chief complaint of abdominal pain, N/V (12 Sep 2017 07:17)      INTERVAL HPI/OVERNIGHT EVENTS: offers no new complaints; current symptoms resolving    MEDICATIONS  (STANDING):  escitalopram 20 milliGRAM(s) Oral daily  clopidogrel Tablet 75 milliGRAM(s) Oral daily  metoprolol 25 milliGRAM(s) Oral two times a day  amLODIPine   Tablet 2.5 milliGRAM(s) Oral daily  pantoprazole    Tablet 40 milliGRAM(s) Oral before breakfast  nicotine - 21 mG/24Hr(s) Patch 1 patch Transdermal daily  enoxaparin Injectable 40 milliGRAM(s) SubCutaneous daily  ciprofloxacin   IVPB 400 milliGRAM(s) IV Intermittent every 12 hours  metroNIDAZOLE  IVPB 500 milliGRAM(s) IV Intermittent every 8 hours  influenza   Vaccine 0.5 milliLiter(s) IntraMuscular once  sodium chloride 0.9% Bolus 1000 milliLiter(s) IV Bolus once  morphine ER Tablet 60 milliGRAM(s) Oral every 12 hours  ergocalciferol 33868 Unit(s) Oral <User Schedule>  lactated ringers. 1000 milliLiter(s) (125 mL/Hr) IV Continuous <Continuous>  sodium chloride 0.9%. 1000 milliLiter(s) (75 mL/Hr) IV Continuous <Continuous>  dronabinol 5 milliGRAM(s) Oral every 12 hours  polyethylene glycol/electrolyte Solution. 4000 milliLiter(s) Oral once  polyethylene glycol 3350 17 Gram(s) Oral every 1 hour    MEDICATIONS  (PRN):  ondansetron Injectable 4 milliGRAM(s) IV Push every 6 hours PRN Nausea and/or Vomiting  acetaminophen   Tablet 650 milliGRAM(s) Oral every 6 hours PRN For Temp greater than 38 C (100.4 F)  zaleplon 5 milliGRAM(s) Oral at bedtime PRN Insomnia  oxyCODONE    5 mG/acetaminophen 325 mG 2 Tablet(s) Oral every 4 hours PRN Severe Pain (7 - 10)      __________________________________________________  REVIEW OF SYSTEMS:  CONSTITUTIONAL: No fever  NECK: No pain or stiffness  RESPIRATORY: No cough; No shortness of breath  CARDIOVASCULAR: No chest pain, no palpitations  GASTROINTESTINAL: No pain. No constipation, nausea or vomiting; No diarrhea   NEUROLOGICAL: No headache or numbness, no tremors  MUSCULOSKELETAL: No joint pain, no muscle pain  GENITOURINARY: no dysuria, no frequency, no hesitancy  PSYCHIATRY: no depression , no anxiety  ALL OTHER  ROS negative        Vital Signs Last 24 Hrs  T(C): 36.9 (19 Sep 2017 05:00), Max: 36.9 (19 Sep 2017 05:00)  T(F): 98.5 (19 Sep 2017 05:00), Max: 98.5 (19 Sep 2017 05:00)  HR: 77 (19 Sep 2017 05:00) (68 - 77)  BP: 97/80 (19 Sep 2017 05:00) (97/80 - 123/86)  BP(mean): --  RR: 16 (19 Sep 2017 05:00) (16 - 17)  SpO2: 96% (19 Sep 2017 05:00) (96% - 100%)    ________________________________________________  PHYSICAL EXAM:  GENERAL: NAD, lying in bed  HEENT: Normocephalic;  conjunctivae and sclerae clear; moist mucous membranes  NECK : supple, trachea midline, no JVD  CHEST/LUNG: Clear to auscultation bilaterally with good air entry   HEART: S1 S2,  regular rate and rhythm,; no murmurs, gallops or rubs  ABDOMEN: Soft, Nontender, Nondistended; Bowel sounds present  EXTREMITIES: no cyanosis; no edema; no calf tenderness  SKIN: warm and dry; no rash  NERVOUS SYSTEM:  AAOx3; no new focal deficits    _________________________________________________  LABS:                        14.3   6.1   )-----------( 267      ( 18 Sep 2017 06:29 )             44.3     09-18    141  |  106  |  11  ----------------------------<  94  4.1   |  27  |  1.07    Ca    9.7      18 Sep 2017 06:29  Phos  3.8     09-18  Mg     2.0     09-18          CAPILLARY BLOOD GLUCOSE          RADIOLOGY & ADDITIONAL TESTS:    Imaging Personally Reviewed:  YES    Consultant(s) Notes Reviewed:   YES    Care Discussed with Consultants:   Infectious Disease [x] Cardiology [] Pulmonology [x] Gastroenterology [x] Nephrology [] Urology [] Orthopaedics [] Podiatry [] Psychiatry [] Hematology/Oncology [] Surgery [] Pain [x] Palliative Care []    Plan of care was discussed with patient and/or primary care giver; all questions and concerns were addressed and care was aligned with patient's wishes. PGY 1 Note discussed with supervising resident and primary attending    Patient is a 46y old  Female who presents with a chief complaint of abdominal pain, N/V (12 Sep 2017 07:17)      INTERVAL HPI/OVERNIGHT EVENTS: patient reported severe abdominal pain overnight associated with nausea. Still c/o diarrhea    MEDICATIONS  (STANDING):  escitalopram 20 milliGRAM(s) Oral daily  clopidogrel Tablet 75 milliGRAM(s) Oral daily  metoprolol 25 milliGRAM(s) Oral two times a day  amLODIPine   Tablet 2.5 milliGRAM(s) Oral daily  pantoprazole    Tablet 40 milliGRAM(s) Oral before breakfast  nicotine - 21 mG/24Hr(s) Patch 1 patch Transdermal daily  enoxaparin Injectable 40 milliGRAM(s) SubCutaneous daily  ciprofloxacin   IVPB 400 milliGRAM(s) IV Intermittent every 12 hours  metroNIDAZOLE  IVPB 500 milliGRAM(s) IV Intermittent every 8 hours  influenza   Vaccine 0.5 milliLiter(s) IntraMuscular once  sodium chloride 0.9% Bolus 1000 milliLiter(s) IV Bolus once  morphine ER Tablet 60 milliGRAM(s) Oral every 12 hours  ergocalciferol 57597 Unit(s) Oral <User Schedule>  lactated ringers. 1000 milliLiter(s) (125 mL/Hr) IV Continuous <Continuous>  sodium chloride 0.9%. 1000 milliLiter(s) (75 mL/Hr) IV Continuous <Continuous>  dronabinol 5 milliGRAM(s) Oral every 12 hours  polyethylene glycol/electrolyte Solution. 4000 milliLiter(s) Oral once  polyethylene glycol 3350 17 Gram(s) Oral every 1 hour    MEDICATIONS  (PRN):  ondansetron Injectable 4 milliGRAM(s) IV Push every 6 hours PRN Nausea and/or Vomiting  acetaminophen   Tablet 650 milliGRAM(s) Oral every 6 hours PRN For Temp greater than 38 C (100.4 F)  zaleplon 5 milliGRAM(s) Oral at bedtime PRN Insomnia  oxyCODONE    5 mG/acetaminophen 325 mG 2 Tablet(s) Oral every 4 hours PRN Severe Pain (7 - 10)      __________________________________________________  REVIEW OF SYSTEMS:  CONSTITUTIONAL: No fever  RESPIRATORY: No cough; No shortness of breath  CARDIOVASCULAR: No chest pain, no palpitations  GASTROINTESTINAL: Reports pain, nausea, and diarrhea. No constipation or vomiting  NEUROLOGICAL: No headache or numbness, no tremors  ALL OTHER  ROS negative        Vital Signs Last 24 Hrs  T(C): 36.9 (19 Sep 2017 05:00), Max: 36.9 (19 Sep 2017 05:00)  T(F): 98.5 (19 Sep 2017 05:00), Max: 98.5 (19 Sep 2017 05:00)  HR: 77 (19 Sep 2017 05:00) (68 - 77)  BP: 97/80 (19 Sep 2017 05:00) (97/80 - 123/86)  BP(mean): --  RR: 16 (19 Sep 2017 05:00) (16 - 17)  SpO2: 96% (19 Sep 2017 05:00) (96% - 100%)    ________________________________________________  PHYSICAL EXAM:  GENERAL: NAD, lying in bed  NECK : supple, trachea midline, left-sided thyromegaly appreciated no JVD  CHEST/LUNG: Clear to auscultation bilaterally with good air entry   HEART: S1 S2,  regular rate and rhythm,; no murmurs, gallops or rubs  ABDOMEN: Soft, Diffusely tender to light palpation without rebound, Nondistended; Bowel sounds decreased  EXTREMITIES: no cyanosis; no edema; no calf tenderness  NERVOUS SYSTEM:  AAOx3; no new focal deficits    _________________________________________________  LABS:                        14.3   6.1   )-----------( 267      ( 18 Sep 2017 06:29 )             44.3     09-18    141  |  106  |  11  ----------------------------<  94  4.1   |  27  |  1.07    Ca    9.7      18 Sep 2017 06:29  Phos  3.8     09-18  Mg     2.0     09-18          CAPILLARY BLOOD GLUCOSE          RADIOLOGY & ADDITIONAL TESTS:    Imaging Personally Reviewed:  YES    Consultant(s) Notes Reviewed:   YES    Care Discussed with Consultants:   Infectious Disease [x] Cardiology [] Pulmonology [x] Gastroenterology [x] Nephrology [] Urology [] Orthopaedics [] Podiatry [] Psychiatry [] Hematology/Oncology [] Surgery [] Pain [x] Palliative Care []    Plan of care was discussed with patient and/or primary care giver; all questions and concerns were addressed and care was aligned with patient's wishes.

## 2017-09-19 NOTE — PROGRESS NOTE ADULT - NEGATIVE CARDIOVASCULAR SYMPTOMS
no palpitations/no chest pain
no claudication/no palpitations/no orthopnea/no chest pain/no paroxysmal nocturnal dyspnea/no peripheral edema
no orthopnea/no claudication/no paroxysmal nocturnal dyspnea/no peripheral edema/no palpitations/no chest pain
no palpitations/no claudication/no orthopnea/no paroxysmal nocturnal dyspnea/no peripheral edema/no chest pain
no claudication/no orthopnea/no paroxysmal nocturnal dyspnea/no peripheral edema/no chest pain/no palpitations
no dyspnea on exertion/no chest pain/no palpitations/no paroxysmal nocturnal dyspnea/no claudication/no orthopnea/no peripheral edema

## 2017-09-19 NOTE — PROGRESS NOTE ADULT - CONSTITUTIONAL DETAILS
no distress
well-groomed/well-nourished/well-developed
no distress
well-nourished/well-groomed/well-developed
no distress
well-developed/well-groomed/well-nourished
no distress/well-developed/well-groomed/well-nourished
well-nourished/well-groomed/well-developed/no distress

## 2017-09-19 NOTE — PROGRESS NOTE ADULT - RS GEN PE MLT RESP DETAILS PC
no rales/airway patent/no rhonchi/no wheezes/good air movement/breath sounds equal
airway patent/no rhonchi/good air movement/no rales/breath sounds equal/no wheezes/clear to auscultation bilaterally/respirations non-labored
respirations non-labored/no chest wall tenderness/no intercostal retractions/no rhonchi/no wheezes/breath sounds equal/airway patent/clear to auscultation bilaterally/good air movement/no rales
no chest wall tenderness/breath sounds equal/no rales/no rhonchi/respirations non-labored/airway patent/no wheezes/good air movement/clear to auscultation bilaterally
no rhonchi/no rales/good air movement/breath sounds equal/respirations non-labored/no chest wall tenderness/airway patent/no intercostal retractions

## 2017-09-19 NOTE — PROGRESS NOTE ADULT - SUBJECTIVE AND OBJECTIVE BOX
patient is a 46y old Female who presents with a chief complaint of abdominal pain. PMH Reflex symphatetic dystrophy s/p spinal stimulator, frequent falls, SLE (no treatment at this time), cyclical vomiting syndrome, Afib (not on AC due to frequent falls), HTN, depression, Hemorrhoids (EGD / colonoscopy 3-4 years ago, is due for a next one soon ) and partial thyroidectomy. Patient is a current everyday smoker.    INTERVAL HPI/OVERNIGHT EVENTS:      VITAL SIGNS:  T(F): 98.5 (09-19-17 @ 05:00)  HR: 60 (09-19-17 @ 08:02)  BP: 95/57 (09-19-17 @ 08:02)  RR: 16 (09-19-17 @ 05:00)  SpO2: 96% (09-19-17 @ 05:00)  Wt(kg): --  I&O's Detail          REVIEW OF SYSTEMS:    CONSTITUTIONAL:  No fevers, chills, sweats    HEENT:  Eyes:  No diplopia or blurred vision. ENT:  No earache, sore throat or runny nose.    CARDIOVASCULAR:  No pressure, squeezing, tightness, or heaviness about the chest; no palpitations.    RESPIRATORY:  Per HPI    GASTROINTESTINAL:  No abdominal pain, nausea, vomiting or diarrhea.    GENITOURINARY:  No dysuria, frequency or urgency.    NEUROLOGIC:  No paresthesias, fasciculations, seizures or weakness.    PSYCHIATRIC:  No disorder of thought or mood.      PHYSICAL EXAM:    Constitutional: Well developed and nourished  Eyes:Perrla  ENMT: normal  Neck:supple  Respiratory: good air entry  Cardiovascular: S1 S2 regular  Gastrointestinal: Soft, diffusely tender  Extremities: No edema  Vascular:normal  Neurological:Awake, alert,Ox3  Musculoskeletal:Normal      MEDICATIONS  (STANDING):  escitalopram 20 milliGRAM(s) Oral daily  clopidogrel Tablet 75 milliGRAM(s) Oral daily  metoprolol 25 milliGRAM(s) Oral two times a day  amLODIPine   Tablet 2.5 milliGRAM(s) Oral daily  pantoprazole    Tablet 40 milliGRAM(s) Oral before breakfast  nicotine - 21 mG/24Hr(s) Patch 1 patch Transdermal daily  enoxaparin Injectable 40 milliGRAM(s) SubCutaneous daily  ciprofloxacin   IVPB 400 milliGRAM(s) IV Intermittent every 12 hours  metroNIDAZOLE  IVPB 500 milliGRAM(s) IV Intermittent every 8 hours  influenza   Vaccine 0.5 milliLiter(s) IntraMuscular once  sodium chloride 0.9% Bolus 1000 milliLiter(s) IV Bolus once  morphine ER Tablet 60 milliGRAM(s) Oral every 12 hours  ergocalciferol 69889 Unit(s) Oral <User Schedule>  lactated ringers. 1000 milliLiter(s) (125 mL/Hr) IV Continuous <Continuous>  sodium chloride 0.9%. 1000 milliLiter(s) (75 mL/Hr) IV Continuous <Continuous>  dronabinol 5 milliGRAM(s) Oral every 12 hours  polyethylene glycol/electrolyte Solution. 4000 milliLiter(s) Oral once  polyethylene glycol 3350 17 Gram(s) Oral every 1 hour    MEDICATIONS  (PRN):  ondansetron Injectable 4 milliGRAM(s) IV Push every 6 hours PRN Nausea and/or Vomiting  acetaminophen   Tablet 650 milliGRAM(s) Oral every 6 hours PRN For Temp greater than 38 C (100.4 F)  zaleplon 5 milliGRAM(s) Oral at bedtime PRN Insomnia  oxyCODONE    5 mG/acetaminophen 325 mG 2 Tablet(s) Oral every 4 hours PRN Severe Pain (7 - 10)      Allergies    aspirin (Unknown)  latex (Unknown)  sulfa drugs (Unknown)    Intolerances        LABS:                        13.8   6.7   )-----------( 287      ( 19 Sep 2017 08:51 )             42.4     09-19    138  |  106  |  10  ----------------------------<  75  3.9   |  27  |  1.30    Ca    8.7      19 Sep 2017 08:51  Phos  3.8     09-18  Mg     2.0     09-18                CAPILLARY BLOOD GLUCOSE            RADIOLOGY & ADDITIONAL TESTS:    CXR:    Ct scan chest:    ekg;    echo: patient is a 46y old Female who presents with a chief complaint of abdominal pain. PMH Reflex symphatetic dystrophy s/p spinal stimulator, frequent falls, SLE (no treatment at this time), cyclical vomiting syndrome, Afib (not on AC due to frequent falls), HTN, depression, Hemorrhoids (EGD / colonoscopy 3-4 years ago, is due for a next one soon ) and partial thyroidectomy. Patient is a current everyday smoker.  Awake, alert, comfortable in bed in NAD. Still with diarrhea. Had urinary retention this am.  INTERVAL HPI/OVERNIGHT EVENTS:      VITAL SIGNS:  T(F): 98.5 (09-19-17 @ 05:00)  HR: 60 (09-19-17 @ 08:02)  BP: 95/57 (09-19-17 @ 08:02)  RR: 16 (09-19-17 @ 05:00)  SpO2: 96% (09-19-17 @ 05:00)  Wt(kg): --  I&O's Detail          REVIEW OF SYSTEMS:    CONSTITUTIONAL:  No fevers, chills, sweats    HEENT:  Eyes:  No diplopia or blurred vision. ENT:  No earache, sore throat or runny nose.    CARDIOVASCULAR:  No pressure, squeezing, tightness, or heaviness about the chest; no palpitations.    RESPIRATORY:  Per HPI    GASTROINTESTINAL:  Abdominal pain and diarrhea    GENITOURINARY:  No dysuria, frequency or urgency.    NEUROLOGIC:  No paresthesias, fasciculations, seizures or weakness.    PSYCHIATRIC:  No disorder of thought or mood.      PHYSICAL EXAM:    Constitutional: Well developed and nourished  Eyes:Perrla  ENMT: normal  Neck:supple  Respiratory: good air entry  Cardiovascular: S1 S2 regular  Gastrointestinal: Soft, diffusely tender  Extremities: No edema  Vascular:normal  Neurological:Awake, alert,Ox3  Musculoskeletal:Normal      MEDICATIONS  (STANDING):  escitalopram 20 milliGRAM(s) Oral daily  clopidogrel Tablet 75 milliGRAM(s) Oral daily  metoprolol 25 milliGRAM(s) Oral two times a day  amLODIPine   Tablet 2.5 milliGRAM(s) Oral daily  pantoprazole    Tablet 40 milliGRAM(s) Oral before breakfast  nicotine - 21 mG/24Hr(s) Patch 1 patch Transdermal daily  enoxaparin Injectable 40 milliGRAM(s) SubCutaneous daily  ciprofloxacin   IVPB 400 milliGRAM(s) IV Intermittent every 12 hours  metroNIDAZOLE  IVPB 500 milliGRAM(s) IV Intermittent every 8 hours  influenza   Vaccine 0.5 milliLiter(s) IntraMuscular once  sodium chloride 0.9% Bolus 1000 milliLiter(s) IV Bolus once  morphine ER Tablet 60 milliGRAM(s) Oral every 12 hours  ergocalciferol 18812 Unit(s) Oral <User Schedule>  lactated ringers. 1000 milliLiter(s) (125 mL/Hr) IV Continuous <Continuous>  sodium chloride 0.9%. 1000 milliLiter(s) (75 mL/Hr) IV Continuous <Continuous>  dronabinol 5 milliGRAM(s) Oral every 12 hours  polyethylene glycol/electrolyte Solution. 4000 milliLiter(s) Oral once  polyethylene glycol 3350 17 Gram(s) Oral every 1 hour    MEDICATIONS  (PRN):  ondansetron Injectable 4 milliGRAM(s) IV Push every 6 hours PRN Nausea and/or Vomiting  acetaminophen   Tablet 650 milliGRAM(s) Oral every 6 hours PRN For Temp greater than 38 C (100.4 F)  zaleplon 5 milliGRAM(s) Oral at bedtime PRN Insomnia  oxyCODONE    5 mG/acetaminophen 325 mG 2 Tablet(s) Oral every 4 hours PRN Severe Pain (7 - 10)      Allergies    aspirin (Unknown)  latex (Unknown)  sulfa drugs (Unknown)    Intolerances        LABS:                        13.8   6.7   )-----------( 287      ( 19 Sep 2017 08:51 )             42.4     09-19    138  |  106  |  10  ----------------------------<  75  3.9   |  27  |  1.30    Ca    8.7      19 Sep 2017 08:51  Phos  3.8     09-18  Mg     2.0     09-18                CAPILLARY BLOOD GLUCOSE            RADIOLOGY & ADDITIONAL TESTS:    CXR:    Ct scan chest:    ekg;    echo:

## 2017-09-19 NOTE — PROGRESS NOTE ADULT - PROBLEM SELECTOR PLAN 1
- continue ms contin 60mg po q 12 - pt needs to get the ms contin 2x a day  - increase marinol to 5mg po 2x a day  - percocet po prn  - continue lyrica 300mg po 2x a day  - ct abd/pel - normal  - iv fluids  - zofran prn  - egd and colonoscopy tomorrow

## 2017-09-19 NOTE — PROGRESS NOTE ADULT - GIT GEN HX ROS MEA POS PC
abdominal pain
constipation/nausea/vomiting/abdominal pain
nausea/vomiting/diarrhea/abdominal pain
abdominal pain/diarrhea/nausea
abdominal pain/vomiting/nausea

## 2017-09-19 NOTE — PROGRESS NOTE ADULT - NEGATIVE GASTROINTESTINAL SYMPTOMS
no hematochezia/no melena/no jaundice/no vomiting/no diarrhea/no nausea
no diarrhea/no hematochezia/no jaundice
no vomiting/no diarrhea/no nausea/no abdominal pain/no hematochezia/no melena
no vomiting
no hematochezia/no melena
no nausea/no jaundice/no diarrhea/no change in bowel habits/no abdominal pain/no hematochezia/no vomiting/no melena

## 2017-09-19 NOTE — PROGRESS NOTE ADULT - PROBLEM SELECTOR PLAN 10
Need for prophylactic measure  -Improve VTE score = 1  -Lovenox for DVT chemoprophylaxis -Improve VTE score = 1  -Lovenox for DVT chemoprophylaxis

## 2017-09-19 NOTE — PROGRESS NOTE ADULT - NEGATIVE GENERAL GENITOURINARY SYMPTOMS
no hematuria/no renal colic
no hematuria/no renal colic
no renal colic/no hematuria
no hematuria/no renal colic
no renal colic/no hematuria

## 2017-09-19 NOTE — PROGRESS NOTE ADULT - NEGATIVE MUSCULOSKELETAL SYMPTOMS
no arthralgia/no neck pain/no stiffness
no arthralgia/no myalgia/no stiffness/no neck pain
no arthralgia/no muscle cramps/no stiffness/no neck pain
no arthralgia/no stiffness/no muscle cramps/no neck pain
no arthralgia/no stiffness/no neck pain

## 2017-09-19 NOTE — PROGRESS NOTE ADULT - PROBLEM SELECTOR PLAN 3
Patient takes multiple analgesic medications at home including morphine, MS contin, and percocet and has spinal neurostimulator in place  Pain management recommends ms contin 60mg q12, percocet 2 tabs q4 PRN, lyrica 300 BID, marinol Patient takes multiple analgesic medications at home including morphine, MS contin, and percocet and has spinal neurostimulator in place  Pain management following; continue with ms contin 60mg q12, percocet 2 tabs q4 PRN, lyrica 300 BID, marinol

## 2017-09-19 NOTE — PROGRESS NOTE ADULT - MS EXT PE MLT D E PC
no pedal edema/no cyanosis/no clubbing
no cyanosis/no clubbing/no pedal edema
pedal edema
no pedal edema/no clubbing/no cyanosis
cyanosis/no clubbing/no pedal edema/no cyanosis
no cyanosis/no clubbing/no pedal edema

## 2017-09-19 NOTE — PROGRESS NOTE ADULT - NEGATIVE ENDOCRINE SYMPTOMS
no heat intolerance/no cold intolerance
no cold intolerance/no heat intolerance
no heat intolerance/no cold intolerance
no heat intolerance/no cold intolerance

## 2017-09-19 NOTE — PROGRESS NOTE ADULT - PROBLEM SELECTOR PLAN 1
Patient c/o N/V/D with fever x 2 days; vomiting currently resolved  CT Abdomen/Pelvis revealed pancolitis  Continue with ciprofloxacin and flagyl until 9/24 as per ID  Continue to monitor BMP for electrolyte disturbances   Blood and stool cultures negative  F/U C dif PCR  ID Dr Siddiqui  GI Dr Wylie; planning for colonoscopy and EGD 9/20 Patient c/o N/V/D with fever x 2 days; vomiting currently resolved  CT Abdomen/Pelvis revealed pancolitis  Continue with ciprofloxacin and flagyl until 9/24 as per ID  Continue to monitor BMP for electrolyte disturbances   Blood and stool cultures negative  C dif PCR negative  ID Dr Siddqiui  GI Dr Wylie; planning for colonoscopy and EGD 9/20

## 2017-09-19 NOTE — PROGRESS NOTE ADULT - LYMPH NODES
No lymphadedenopathy

## 2017-09-19 NOTE — PROGRESS NOTE ADULT - NEGATIVE RESPIRATORY AND THORAX SYMPTOMS
no cough/no dyspnea
no dyspnea/no cough/no wheezing/no hemoptysis
no wheezing/no dyspnea/no cough/no hemoptysis/no pleuritic chest pain
no dyspnea/no cough/no pleuritic chest pain/no hemoptysis/no wheezing
no cough/no wheezing/no hemoptysis/no pleuritic chest pain/no dyspnea
no dyspnea/no cough/no hemoptysis/no wheezing/no pleuritic chest pain

## 2017-09-19 NOTE — PROGRESS NOTE ADULT - NEGATIVE OPHTHALMOLOGIC SYMPTOMS
no diplopia/no photophobia
no photophobia/no diplopia
no photophobia/no diplopia
no diplopia/no photophobia
no diplopia/no photophobia

## 2017-09-19 NOTE — PROGRESS NOTE ADULT - SUBJECTIVE AND OBJECTIVE BOX
Chief Complaint: abdominal pain    HPI:   46y Female with severe abdominal pain, lying in bed, nad.  Pt complaining that abdominal pain worsens at night.  +nausea - vomiting.  No chest pain or sob.  Pt still complaining of diarrhea.  Pt had a ct of abdomen which did not show any pathology.        PAIN SCORE:    5/10     SCALE USED: (1-10 VNRS)    Allergies    aspirin (Unknown)  latex (Unknown)  sulfa drugs (Unknown)    Intolerances      MEDICATIONS  (STANDING):  escitalopram 20 milliGRAM(s) Oral daily  clopidogrel Tablet 75 milliGRAM(s) Oral daily  metoprolol 25 milliGRAM(s) Oral two times a day  amLODIPine   Tablet 2.5 milliGRAM(s) Oral daily  pantoprazole    Tablet 40 milliGRAM(s) Oral before breakfast  nicotine - 21 mG/24Hr(s) Patch 1 patch Transdermal daily  enoxaparin Injectable 40 milliGRAM(s) SubCutaneous daily  ciprofloxacin   IVPB 400 milliGRAM(s) IV Intermittent every 12 hours  metroNIDAZOLE  IVPB 500 milliGRAM(s) IV Intermittent every 8 hours  influenza   Vaccine 0.5 milliLiter(s) IntraMuscular once  sodium chloride 0.9% Bolus 1000 milliLiter(s) IV Bolus once  morphine ER Tablet 60 milliGRAM(s) Oral every 12 hours  ergocalciferol 61757 Unit(s) Oral <User Schedule>  lactated ringers. 1000 milliLiter(s) (125 mL/Hr) IV Continuous <Continuous>  sodium chloride 0.9%. 1000 milliLiter(s) (75 mL/Hr) IV Continuous <Continuous>  dronabinol 5 milliGRAM(s) Oral every 12 hours  polyethylene glycol/electrolyte Solution. 4000 milliLiter(s) Oral once  polyethylene glycol 3350 17 Gram(s) Oral every 1 hour  bethanechol 5 milliGRAM(s) Oral three times a day    MEDICATIONS  (PRN):  ondansetron Injectable 4 milliGRAM(s) IV Push every 6 hours PRN Nausea and/or Vomiting  acetaminophen   Tablet 650 milliGRAM(s) Oral every 6 hours PRN For Temp greater than 38 C (100.4 F)  zaleplon 5 milliGRAM(s) Oral at bedtime PRN Insomnia  oxyCODONE    5 mG/acetaminophen 325 mG 2 Tablet(s) Oral every 4 hours PRN Severe Pain (7 - 10)      PHYSICAL EXAM:    Vital Signs Last 24 Hrs  T(C): 36.9 (19 Sep 2017 05:00), Max: 36.9 (19 Sep 2017 05:00)  T(F): 98.5 (19 Sep 2017 05:00), Max: 98.5 (19 Sep 2017 05:00)  HR: 60 (19 Sep 2017 08:02) (60 - 77)  BP: 95/57 (19 Sep 2017 08:02) (95/57 - 107/77)  BP(mean): --  RR: 16 (19 Sep 2017 05:00) (16 - 17)  SpO2: 96% (19 Sep 2017 05:00) (96% - 98%)             LABS:                          13.8   6.7   )-----------( 287      ( 19 Sep 2017 08:51 )             42.4     09-19    138  |  106  |  10  ----------------------------<  75  3.9   |  27  |  1.30    Ca    8.7      19 Sep 2017 08:51  Phos  3.8     09-18  Mg     2.0     09-18            Drug Screen:        RADIOLOGY:

## 2017-09-19 NOTE — PROGRESS NOTE ADULT - NEGATIVE GENERAL SYMPTOMS
no chills/no fever
no fever/no chills
no fever/no chills/no sweating/no anorexia
no sweating/no fever/no chills
no fever/no chills/no sweating
no fever/no chills
no chills/no fever/no sweating
no chills/no sweating/no fever

## 2017-09-20 ENCOUNTER — RESULT REVIEW (OUTPATIENT)
Age: 47
End: 2017-09-20

## 2017-09-20 LAB
ANION GAP SERPL CALC-SCNC: 4 MMOL/L — LOW (ref 5–17)
BUN SERPL-MCNC: 6 MG/DL — LOW (ref 7–18)
CALCIUM SERPL-MCNC: 8.4 MG/DL — SIGNIFICANT CHANGE UP (ref 8.4–10.5)
CALPROTECTIN STL-MCNT: 23 UG/G — SIGNIFICANT CHANGE UP (ref 0–120)
CHLORIDE SERPL-SCNC: 107 MMOL/L — SIGNIFICANT CHANGE UP (ref 96–108)
CO2 SERPL-SCNC: 29 MMOL/L — SIGNIFICANT CHANGE UP (ref 22–31)
CREAT SERPL-MCNC: 1.2 MG/DL — SIGNIFICANT CHANGE UP (ref 0.5–1.3)
GLUCOSE SERPL-MCNC: 81 MG/DL — SIGNIFICANT CHANGE UP (ref 70–99)
HCT VFR BLD CALC: 41.2 % — SIGNIFICANT CHANGE UP (ref 34.5–45)
HGB BLD-MCNC: 13.6 G/DL — SIGNIFICANT CHANGE UP (ref 11.5–15.5)
MCHC RBC-ENTMCNC: 29.9 PG — SIGNIFICANT CHANGE UP (ref 27–34)
MCHC RBC-ENTMCNC: 33 GM/DL — SIGNIFICANT CHANGE UP (ref 32–36)
MCV RBC AUTO: 90.6 FL — SIGNIFICANT CHANGE UP (ref 80–100)
PLATELET # BLD AUTO: 261 K/UL — SIGNIFICANT CHANGE UP (ref 150–400)
POTASSIUM SERPL-MCNC: 3.6 MMOL/L — SIGNIFICANT CHANGE UP (ref 3.5–5.3)
POTASSIUM SERPL-SCNC: 3.6 MMOL/L — SIGNIFICANT CHANGE UP (ref 3.5–5.3)
RBC # BLD: 4.55 M/UL — SIGNIFICANT CHANGE UP (ref 3.8–5.2)
RBC # FLD: 13.2 % — SIGNIFICANT CHANGE UP (ref 10.3–14.5)
SODIUM SERPL-SCNC: 140 MMOL/L — SIGNIFICANT CHANGE UP (ref 135–145)
WBC # BLD: 7.1 K/UL — SIGNIFICANT CHANGE UP (ref 3.8–10.5)
WBC # FLD AUTO: 7.1 K/UL — SIGNIFICANT CHANGE UP (ref 3.8–10.5)

## 2017-09-20 PROCEDURE — 88305 TISSUE EXAM BY PATHOLOGIST: CPT | Mod: 26

## 2017-09-20 PROCEDURE — 88312 SPECIAL STAINS GROUP 1: CPT | Mod: 26

## 2017-09-20 RX ORDER — MORPHINE SULFATE 50 MG/1
60 CAPSULE, EXTENDED RELEASE ORAL EVERY 12 HOURS
Qty: 0 | Refills: 0 | Status: DISCONTINUED | OUTPATIENT
Start: 2017-09-20 | End: 2017-09-21

## 2017-09-20 RX ORDER — SENNA PLUS 8.6 MG/1
2 TABLET ORAL AT BEDTIME
Qty: 0 | Refills: 0 | Status: DISCONTINUED | OUTPATIENT
Start: 2017-09-20 | End: 2017-09-21

## 2017-09-20 RX ADMIN — Medication 5 MILLIGRAM(S): at 21:45

## 2017-09-20 RX ADMIN — MORPHINE SULFATE 60 MILLIGRAM(S): 50 CAPSULE, EXTENDED RELEASE ORAL at 17:31

## 2017-09-20 RX ADMIN — Medication 5 MILLIGRAM(S): at 05:35

## 2017-09-20 RX ADMIN — Medication 300 MILLIGRAM(S): at 17:31

## 2017-09-20 RX ADMIN — OXYCODONE AND ACETAMINOPHEN 1 TABLET(S): 5; 325 TABLET ORAL at 00:16

## 2017-09-20 RX ADMIN — OXYCODONE AND ACETAMINOPHEN 1 TABLET(S): 5; 325 TABLET ORAL at 02:48

## 2017-09-20 RX ADMIN — OXYCODONE AND ACETAMINOPHEN 1 TABLET(S): 5; 325 TABLET ORAL at 09:58

## 2017-09-20 RX ADMIN — OXYCODONE AND ACETAMINOPHEN 1 TABLET(S): 5; 325 TABLET ORAL at 22:00

## 2017-09-20 RX ADMIN — OXYCODONE AND ACETAMINOPHEN 1 TABLET(S): 5; 325 TABLET ORAL at 10:00

## 2017-09-20 RX ADMIN — Medication 100 MILLIGRAM(S): at 05:35

## 2017-09-20 RX ADMIN — PANTOPRAZOLE SODIUM 40 MILLIGRAM(S): 20 TABLET, DELAYED RELEASE ORAL at 05:36

## 2017-09-20 RX ADMIN — OXYCODONE AND ACETAMINOPHEN 1 TABLET(S): 5; 325 TABLET ORAL at 14:14

## 2017-09-20 RX ADMIN — Medication 100 MILLIGRAM(S): at 21:46

## 2017-09-20 RX ADMIN — Medication 1 PATCH: at 17:32

## 2017-09-20 RX ADMIN — OXYCODONE AND ACETAMINOPHEN 1 TABLET(S): 5; 325 TABLET ORAL at 21:50

## 2017-09-20 RX ADMIN — MORPHINE SULFATE 60 MILLIGRAM(S): 50 CAPSULE, EXTENDED RELEASE ORAL at 18:45

## 2017-09-20 RX ADMIN — Medication 5 MILLIGRAM(S): at 17:31

## 2017-09-20 RX ADMIN — Medication 200 MILLIGRAM(S): at 05:35

## 2017-09-20 RX ADMIN — Medication 200 MILLIGRAM(S): at 17:32

## 2017-09-20 RX ADMIN — OXYCODONE AND ACETAMINOPHEN 1 TABLET(S): 5; 325 TABLET ORAL at 04:57

## 2017-09-20 RX ADMIN — Medication 100 MILLIGRAM(S): at 17:32

## 2017-09-20 NOTE — PROGRESS NOTE ADULT - PROBLEM SELECTOR PLAN 1
Patient c/o N/V/D with fever x 2 days; vomiting currently resolved  CT Abdomen/Pelvis revealed pancolitis  Continue with ciprofloxacin and flagyl until 9/24 as per ID  Continue to monitor BMP for electrolyte disturbances   Blood and stool cultures negative  C dif PCR negative  ID Dr Siddiqui  GI Dr Wylie; F/U EGD and colonoscopy results

## 2017-09-20 NOTE — PROGRESS NOTE ADULT - SUBJECTIVE AND OBJECTIVE BOX
PULMONARY  progress note    JOSE VILLALBA  MRN-[id number]    Patient is a 46y old  Female who presents with a chief complaint of abdominal pain, N/V (12 Sep 2017 07:17)  No SOB or chest pain    MEDICATIONS  (STANDING):  escitalopram 20 milliGRAM(s) Oral daily  clopidogrel Tablet 75 milliGRAM(s) Oral daily  metoprolol 25 milliGRAM(s) Oral two times a day  amLODIPine   Tablet 2.5 milliGRAM(s) Oral daily  pantoprazole    Tablet 40 milliGRAM(s) Oral before breakfast  nicotine - 21 mG/24Hr(s) Patch 1 patch Transdermal daily  enoxaparin Injectable 40 milliGRAM(s) SubCutaneous daily  ciprofloxacin   IVPB 400 milliGRAM(s) IV Intermittent every 12 hours  metroNIDAZOLE  IVPB 500 milliGRAM(s) IV Intermittent every 8 hours  influenza   Vaccine 0.5 milliLiter(s) IntraMuscular once  sodium chloride 0.9% Bolus 1000 milliLiter(s) IV Bolus once  ergocalciferol 81682 Unit(s) Oral <User Schedule>  lactated ringers. 1000 milliLiter(s) (125 mL/Hr) IV Continuous <Continuous>  sodium chloride 0.9%. 1000 milliLiter(s) (75 mL/Hr) IV Continuous <Continuous>  dronabinol 5 milliGRAM(s) Oral every 12 hours  bethanechol 5 milliGRAM(s) Oral three times a day      MEDICATIONS  (PRN):  ondansetron Injectable 4 milliGRAM(s) IV Push every 6 hours PRN Nausea and/or Vomiting  acetaminophen   Tablet 650 milliGRAM(s) Oral every 6 hours PRN For Temp greater than 38 C (100.4 F)  oxyCODONE    5 mG/acetaminophen 325 mG 1 Tablet(s) Oral every 4 hours PRN Severe Pain (7 - 10)      Allergies    aspirin (Unknown)  latex (Unknown)  sulfa drugs (Unknown)    PAST MEDICAL & SURGICAL HISTORY:  HTN (hypertension)  Cyclical vomiting  Atrial fibrillation  SLE (systemic lupus erythematosus)  Reflex sympathetic dystrophy  H/O partial thyroidectomy  S/P partial hysterectomy           REVIEW OF SYSTEMS:  CONSTITUTIONAL: No fever, weight loss, or fatigue   EYES: No eye pain, visual disturbances, or discharge  ENT:  No difficulty hearing, tinnitus, vertigo; No sinus or throat pain  NECK: No pain or stiffness or nodes  RESPIRATORY no  cough   wheezing   chills   hemoptysis or Shortness of Breath  CARDIOVASCULAR: No chest pain, palpitations, passing out, dizziness, or leg swelling  GASTROINTESTINAL:  abdominal or epigastric pain less, No nausea, vomiting, or hematemesis; No diarrhea or constipation. No melena or hematochezia.  GENITOURINARY: No dysuria, frequency, hematuria, or incontinence  NEUROLOGICAL: No headaches, memory loss, loss of strength, numbness, or tremors  SKIN: No itching, burning, rashes, or lesions   LYMPH Nodes: No enlarged glands  ENDOCRINE: No heat or cold intolerance; No hair loss  MUSCULOSKELETAL: No joint pain or swelling; No muscle, back, or extremity pain  PSYCHIATRIC: No depression, anxiety, mood swings, or difficulty sleeping  HEME/LYMPH: No easy bruising, or bleeding gums  ALLERGY AND IMMUNOLOGIC: No hives or eczema    Vital Signs Last 24 Hrs  T(C): 36.7 (20 Sep 2017 05:20), Max: 36.9 (19 Sep 2017 21:49)  T(F): 98.1 (20 Sep 2017 05:20), Max: 98.5 (19 Sep 2017 21:49)  HR: 74 (20 Sep 2017 05:20) (67 - 77)  BP: 91/49 (20 Sep 2017 05:20) (91/49 - 115/74)  BP(mean): --  RR: 16 (20 Sep 2017 05:20) (16 - 16)  SpO2: 98% (20 Sep 2017 05:20) (98% - 99%)  I&O's Detail      PHYSICAL EXAMINATION:    GENERAL: The patient is a well-developed, well-nourished in no apparent distress.   SKIN no rash ecchymoses or bruises  HEENT: Head is normocephalic and atraumatic  KAYLYN , Extraocular muscles are intact. Mucous membranes  moist.   Neck supple ,No LN felt JVP not increased  Thyroid not enlarged  Cardiovascular:  S1 S2 heard, irregular,  No JVD , systolic  murmur at apex, No gallop or rub  Respiratory: Chest wall symmetrical with good air entry ,Percussion note normal,    Lungs vesicular breathing with  no  rales or   wheeze	  ABDOMEN:  Soft, Non-tender,  no hepatomegaly or splenomegaly BS positive	  Extremities: Normal range of motion, No clubbing, cyanosis or edema  Vascular: Peripheral pulses palpable 2+ bilaterally  CNS:  Alert and oriented x3   Cranial nerves intact  sensory intact  motor power5/5  dtr 2+   Babinski neg    LABS:                        13.8   6.7   )-----------( 287      ( 19 Sep 2017 08:51 )             42.4     09-20    140  |  107  |  6<L>  ----------------------------<  81  3.6   |  29  |  1.20    Ca    8.4      20 Sep 2017 07:55                THROID SCAN  lt lobe nodule    CXR: last 6/2017 --ve    CT ABD: < from: CT Abdomen and Pelvis w/ Oral Cont and w/ IV Cont (09.16.17 @ 17:31) >  Distended urinary bladder. The etiology of the patient's abdominal pain   is not clearly elucidated on this exam.

## 2017-09-20 NOTE — PROGRESS NOTE ADULT - SUBJECTIVE AND OBJECTIVE BOX
Meds:  ciprofloxacin   IVPB 400 milliGRAM(s) IV Intermittent every 12 hours  metroNIDAZOLE  IVPB 500 milliGRAM(s) IV Intermittent every 8 hours    Allergies:  Allergies    aspirin (Unknown)  latex (Unknown)  sulfa drugs (Unknown)    Intolerances    ROS  [  ] UNABLE TO ELICIT    General:  [  ] None  [  ] Fever  [  ] Chills  [ x ] Malaise    Skin:  [ x ] None [  ] Rash  [  ] Wound  [  ] Ulcer    HEENT:  [ x ] None  [  ] Sore Throat  [  ] Nasal congestion/ runny nose  [  ] Photophobia [  ] Neck pain      Chest:  [ x ] None   [  ] SOB  [  ] Cough  [  ] None    Cardiovascular:   [ x ] None  [  ] CP  [  ] Palpitation    Gastrointestinal:  [  ] None  [ x ] Abd pain    [ x ] Nausea    [ x ] Vomiting   [  ] Diarrhea	     Genitourinary:  [ x ] None [  ] Polyuria   [  ] Urgency  [  ] Frequency  [  ] Dysuria    [  ]  Hematuria       Musculoskeletal:  [  ] None [  ] Back Pain	[  ] Body aches  [  ] Joint pain    Neurological: [  ] None [  ]Dizziness  [  ]Visual Disturbance  [  ]Headaches   [ x ] Weakness          PHYSICAL EXAM:    Vital Signs Last 24 Hrs  T(C): 36.7 (20 Sep 2017 05:20), Max: 36.9 (19 Sep 2017 21:49)  T(F): 98.1 (20 Sep 2017 05:20), Max: 98.5 (19 Sep 2017 21:49)  HR: 74 (20 Sep 2017 05:20) (60 - 77)  BP: 91/49 (20 Sep 2017 05:20) (91/49 - 115/74)  BP(mean): --  RR: 16 (20 Sep 2017 05:20) (16 - 16)  SpO2: 98% (20 Sep 2017 05:20) (98% - 99%)    Constitutional:    HEENT: [ x ] Wnl  [  ] Pharyngeal congestion    Neck:  [ x ] Supple  [  ]Lymphadenopathy  [  ] No JVD   [  ] JVD  [  ] Masses   [  ] WNL    CHEST/Respiratory:  [ x ]Clear to auscultation  [  ] Rales   [  ] Rhonchi   [  ] Wheezing     [  ] Chest Tenderness      Cardiovascular:  [ x ] Reg S1 S2   [  ] Irreg S1 S2   [ x ]No Murmur  [  ] +ve Murmurs  [  ]Systolic [  ]Diastolic      Abdomen:  [ x ] Soft  [  ] No tendrerness  [ x ] Improving diffuse and generalized Tenderness  [  ] Organomegaly  [  ] ABD Distention  [  ] Rigidity                       [ x ] No Regidity                       [ x ] No Rebound Tenderness  [ x ] No Guarding Rigidity  [  ] Rebound Tenderness[  ] Guarding Rigidity                          [x  ]  +ve Bowel Sounds  [  ] Decreased Bowel Sounds    [  ] Absent Bowel Sounds                            Extremities: [ x ] No edema [  ] Edema  [  ] Clubbing   [  ] Cyanosis                         [ x ] No Tender Calf muscles  [  ] Tender Calf muscles                        [ x ] Palpable peripheral pulses    Neurological: [ x ] Awake  [ x ] Alert  [ x ] Oriented  x  3                           [  ] Confused  [  ] Drowzy  [  ] repond to painful stimuli  [  ] Unresponsive    Skin:  [ x ] Intact [  ] Redness [  ] Thrombophlebitis  [  ] Rashes  [  ] Dry  [  ] Ulcers    Ortho:  [  ] Joint Swelling  [  ] Joint erythema [  ] Joint tenderness                [  ] Increased temp. to touch  [ x ] WNL         LABS/DIAGNOSTIC TESTS                                                                  13.3   6.3   )-----------( 264      ( 17 Sep 2017 08:09 )             40.1   09-17    137  |  103  |  12  ----------------------------<  84  4.1   |  30  |  1.14    Ca    9.0      17 Sep 2017 08:09  Mg     2.1     09-17      Clostridium difficile Toxin by PCR (09.18.17 @ 23:30)    Clostridium difficile Toxin by PCR: The results of this test should be interpreted with consideration of all  clinical and laboratory findings. This test determines the presence of  the C. difficile tcdB gene at a given time and is not intended to  identify antibiotic associated disease or C. difficile infection without  clinical context. Successful treatment is based on the resolution of  clinical symptoms. This test should not be used as a "test of cure"  because C. difficile DNA will persist after successful treatment. Repeat  testing will not be permitted.    This test is performed on the BD MAX system using Real-Time PCR and  fluorogenic target-specific hybridization.    C Diff by PCR Result: NotDete        CULTURES:   Culture - Blood (09.12.17 @ 09:53)    Specimen Source: .Blood Blood-Peripheral    Culture Results:   No growth to date.    Culture - Blood (09.12.17 @ 09:53)    Specimen Source: .Blood Blood-Peripheral    Culture Results:   No growth to date.    Culture - Urine (09.12.17 @ 00:22)    Specimen Source: .Urine Clean Catch (Midstream)    Culture Results:   10,000 - 49,000 CFU/mL Coag negative Staphylococcus not Staph  saprophyticus    RADIOLOGY:    EXAM:  CT ABDOMEN AND PELVIS OC IC                            PROCEDURE DATE:  09/16/2017          INTERPRETATION:  CT ABDOMEN AND PELVIS WITH IV CONTRAST    CLINICAL STATEMENT: Abdominal pain.    COMPARISON: CT abdomen pelvis 9/11/2017.    TECHNIQUE: CT scan of the abdomen and pelvis was performed with IV,   without oral contrast. Multiplanar reformations were made.    FINDINGS:  Streaky bibasilar subsegmental atelectasis. No pleural effusion.    Again seen is a 5 mm hypodensity in the right hepatic lobe, too small to   characterize. Mild prominence of the pancreatic duct is again seen. The   pancreatic parenchyma appears unremarkable. The contracted gallbladder,   spleen and adrenal glands are unremarkable.    Kidneys enhance symmetrically. No hydronephrosis. Left-sided extrarenal   pelvis is noted. Again seen is a 5 mm hypodensity in the left kidney mid   to lower pole, too small to characterize.    GI tract is unobstructed. Appendix is unremarkable. No free air or   ascites.    No bulky adenopathy. Normal caliber abdominal aorta. Mild atherosclerosis.    Urinary bladder is distended but otherwise unremarkable. Partial   hysterectomy or uterine atrophy. No free pelvic fluid.    Mild degenerative changes in the spine.    A batterypack is present in the right gluteal subcutaneous fat with   spinal stimulator electrodes entering the spinal canal at T7-T8. Small   focus of air left lower quadrant anterior abdominal wall consistent with   subcutaneous injection.    IMPRESSION:  Distended urinary bladder. The etiology of the patient's abdominal pain   is not clearly elucidated on this exam.          Assessment and Recommendation:     Patient is a 44 y/o F lives with her sister from home ambulates with walker, current everyday smoker,  PMH Reflex symphatetic dystrophy s/p spinal stimulator, frequent falls, SLE (no treatment at this time), cyclical vomiting syndrome, Afib (not on AC due to frequent falls), HTN, depression, Hemorrhoids (EGD / colonoscopy 3-4 years ago, is due for a next one soon ) and partial thyroidectomy presented with c/o vomiting and diarrhea for 2 days a/w abdominal pain.  Patient was admitted for colitis ans was started on IV Cipro and Flagyl.  Patient feels better, and WBC is now normal.  9/16/17 Patient had abdominal CT that showed distended urinary bladder.   CDT is negative.    Problem/Recommendation - 1:  Problem: Pancolitis.   Recommendation:   1- UA & CS suggest for contamination.  2- Follow Blood culture to final report were negative.  3- GI evaluation and management.  4- Continue Cipro and Flagyl, and change it to PO upon discharge till 9/24/17.  5- Fluid and electrolytes management.  6- CBC and BMP follow up.  7- stool for ova and parasites.  8- Stool for culture.  9- stool for CDT eas negative.  10- Suggested Santo catheter.   2- Blood culture.  3- GI evaluation and management.  4- Continue Cipro and Flagyl.  5- Fluid and electrolytes management.  6- CBC and BMP follow up.    Problem/Recommendation - 2:  ·  Problem: HTN (hypertension).    Recommendation:   1- Monitor Blood pressure closely.  2- Blood pressure control.  3- BP. meds as per cardiology and primary care team.         Discussed with medical resident, and with Patient.

## 2017-09-20 NOTE — PROGRESS NOTE ADULT - SUBJECTIVE AND OBJECTIVE BOX
PGY 1 Note discussed with supervising resident and primary attending    Patient is a 46y old  Female who presents with a chief complaint of abdominal pain, N/V (12 Sep 2017 07:17)      INTERVAL HPI/OVERNIGHT EVENTS: offers no new complaints; current symptoms resolving    MEDICATIONS  (STANDING):  escitalopram 20 milliGRAM(s) Oral daily  clopidogrel Tablet 75 milliGRAM(s) Oral daily  metoprolol 25 milliGRAM(s) Oral two times a day  amLODIPine   Tablet 2.5 milliGRAM(s) Oral daily  pantoprazole    Tablet 40 milliGRAM(s) Oral before breakfast  nicotine - 21 mG/24Hr(s) Patch 1 patch Transdermal daily  enoxaparin Injectable 40 milliGRAM(s) SubCutaneous daily  ciprofloxacin   IVPB 400 milliGRAM(s) IV Intermittent every 12 hours  metroNIDAZOLE  IVPB 500 milliGRAM(s) IV Intermittent every 8 hours  influenza   Vaccine 0.5 milliLiter(s) IntraMuscular once  sodium chloride 0.9% Bolus 1000 milliLiter(s) IV Bolus once  ergocalciferol 63365 Unit(s) Oral <User Schedule>  lactated ringers. 1000 milliLiter(s) (125 mL/Hr) IV Continuous <Continuous>  sodium chloride 0.9%. 1000 milliLiter(s) (75 mL/Hr) IV Continuous <Continuous>  dronabinol 5 milliGRAM(s) Oral every 12 hours  bethanechol 5 milliGRAM(s) Oral three times a day    MEDICATIONS  (PRN):  ondansetron Injectable 4 milliGRAM(s) IV Push every 6 hours PRN Nausea and/or Vomiting  acetaminophen   Tablet 650 milliGRAM(s) Oral every 6 hours PRN For Temp greater than 38 C (100.4 F)  oxyCODONE    5 mG/acetaminophen 325 mG 1 Tablet(s) Oral every 4 hours PRN Severe Pain (7 - 10)      __________________________________________________  REVIEW OF SYSTEMS:  CONSTITUTIONAL: No fever  NECK: No pain or stiffness  RESPIRATORY: No cough; No shortness of breath  CARDIOVASCULAR: No chest pain, no palpitations  GASTROINTESTINAL: No pain. No constipation, nausea or vomiting; No diarrhea   NEUROLOGICAL: No headache or numbness, no tremors  MUSCULOSKELETAL: No joint pain, no muscle pain  GENITOURINARY: no dysuria, no frequency, no hesitancy  PSYCHIATRY: no depression , no anxiety  ALL OTHER  ROS negative        Vital Signs Last 24 Hrs  T(C): 36.9 (19 Sep 2017 21:49), Max: 36.9 (19 Sep 2017 21:49)  T(F): 98.5 (19 Sep 2017 21:49), Max: 98.5 (19 Sep 2017 21:49)  HR: 74 (19 Sep 2017 21:49) (60 - 77)  BP: 115/74 (19 Sep 2017 21:49) (95/57 - 115/74)  BP(mean): --  RR: 16 (19 Sep 2017 21:49) (16 - 16)  SpO2: 98% (19 Sep 2017 21:49) (98% - 99%)    ________________________________________________  PHYSICAL EXAM:  GENERAL: NAD, lying in bed  HEENT: Normocephalic;  conjunctivae and sclerae clear; moist mucous membranes  NECK : supple, trachea midline, no JVD  CHEST/LUNG: Clear to auscultation bilaterally with good air entry   HEART: S1 S2,  regular rate and rhythm,; no murmurs, gallops or rubs  ABDOMEN: Soft, Nontender, Nondistended; Bowel sounds present  EXTREMITIES: no cyanosis; no edema; no calf tenderness  SKIN: warm and dry; no rash  NERVOUS SYSTEM:  AAOx3; no new focal deficits    _________________________________________________  LABS:                        13.8   6.7   )-----------( 287      ( 19 Sep 2017 08:51 )             42.4     09-19    138  |  106  |  10  ----------------------------<  75  3.9   |  27  |  1.30    Ca    8.7      19 Sep 2017 08:51  Phos  3.8     09-18  Mg     2.0     09-18          CAPILLARY BLOOD GLUCOSE          RADIOLOGY & ADDITIONAL TESTS:    Imaging Personally Reviewed:  YES    Consultant(s) Notes Reviewed:   YES    Care Discussed with Consultants:   Infectious Disease [x] Cardiology [] Pulmonology [x] Gastroenterology [x] Nephrology [] Urology [] Orthopaedics [] Podiatry [] Psychiatry [] Hematology/Oncology [] Surgery [] Pain [x] Palliative Care []    Plan of care was discussed with patient and/or primary care giver; all questions and concerns were addressed and care was aligned with patient's wishes. PGY 1 Note discussed with supervising resident and primary attending    Patient is a 46y old  Female who presents with a chief complaint of abdominal pain, N/V (12 Sep 2017 07:17)      INTERVAL HPI/OVERNIGHT EVENTS: patient still is c/o abdominal pain and feels more back and leg pain    MEDICATIONS  (STANDING):  escitalopram 20 milliGRAM(s) Oral daily  clopidogrel Tablet 75 milliGRAM(s) Oral daily  metoprolol 25 milliGRAM(s) Oral two times a day  amLODIPine   Tablet 2.5 milliGRAM(s) Oral daily  pantoprazole    Tablet 40 milliGRAM(s) Oral before breakfast  nicotine - 21 mG/24Hr(s) Patch 1 patch Transdermal daily  enoxaparin Injectable 40 milliGRAM(s) SubCutaneous daily  ciprofloxacin   IVPB 400 milliGRAM(s) IV Intermittent every 12 hours  metroNIDAZOLE  IVPB 500 milliGRAM(s) IV Intermittent every 8 hours  influenza   Vaccine 0.5 milliLiter(s) IntraMuscular once  sodium chloride 0.9% Bolus 1000 milliLiter(s) IV Bolus once  ergocalciferol 24398 Unit(s) Oral <User Schedule>  lactated ringers. 1000 milliLiter(s) (125 mL/Hr) IV Continuous <Continuous>  sodium chloride 0.9%. 1000 milliLiter(s) (75 mL/Hr) IV Continuous <Continuous>  dronabinol 5 milliGRAM(s) Oral every 12 hours  bethanechol 5 milliGRAM(s) Oral three times a day    MEDICATIONS  (PRN):  ondansetron Injectable 4 milliGRAM(s) IV Push every 6 hours PRN Nausea and/or Vomiting  acetaminophen   Tablet 650 milliGRAM(s) Oral every 6 hours PRN For Temp greater than 38 C (100.4 F)  oxyCODONE    5 mG/acetaminophen 325 mG 1 Tablet(s) Oral every 4 hours PRN Severe Pain (7 - 10)      __________________________________________________  REVIEW OF SYSTEMS:  CONSTITUTIONAL: No fever  RESPIRATORY: No cough; No shortness of breath  CARDIOVASCULAR: No chest pain, no palpitations  GASTROINTESTINAL: Still complaining of pain, had 1 episode of diarrhea and persistent nausea. No constipation  NEUROLOGICAL: No headache or numbness, no tremors  MUSCULOSKELETAL: complaining of lower extremity pain   ALL OTHER  ROS negative        Vital Signs Last 24 Hrs  T(C): 36.9 (19 Sep 2017 21:49), Max: 36.9 (19 Sep 2017 21:49)  T(F): 98.5 (19 Sep 2017 21:49), Max: 98.5 (19 Sep 2017 21:49)  HR: 74 (19 Sep 2017 21:49) (60 - 77)  BP: 115/74 (19 Sep 2017 21:49) (95/57 - 115/74)  BP(mean): --  RR: 16 (19 Sep 2017 21:49) (16 - 16)  SpO2: 98% (19 Sep 2017 21:49) (98% - 99%)    ________________________________________________  PHYSICAL EXAM:  GENERAL: NAD, lying in bed  HEENT: Normocephalic;  conjunctivae and sclerae clear; moist mucous membranes  NECK : supple, trachea midline, no JVD  CHEST/LUNG: Clear to auscultation bilaterally with good air entry   HEART: S1 S2,  regular rate and rhythm,; no murmurs, gallops or rubs  ABDOMEN: Soft, diffusely tender to palpation without rebound, Nondistended; Bowel sounds present  EXTREMITIES: no cyanosis; no edema; no calf tenderness  NERVOUS SYSTEM:  AAOx3; no new focal deficits    _________________________________________________  LABS:                        13.8   6.7   )-----------( 287      ( 19 Sep 2017 08:51 )             42.4     09-19    138  |  106  |  10  ----------------------------<  75  3.9   |  27  |  1.30    Ca    8.7      19 Sep 2017 08:51  Phos  3.8     09-18  Mg     2.0     09-18          CAPILLARY BLOOD GLUCOSE          RADIOLOGY & ADDITIONAL TESTS:    Imaging Personally Reviewed:  YES    Consultant(s) Notes Reviewed:   YES    Care Discussed with Consultants:   Infectious Disease [x] Cardiology [] Pulmonology [x] Gastroenterology [x] Nephrology [] Urology [] Orthopaedics [] Podiatry [] Psychiatry [] Hematology/Oncology [] Surgery [] Pain [x] Palliative Care []    Plan of care was discussed with patient and/or primary care giver; all questions and concerns were addressed and care was aligned with patient's wishes.

## 2017-09-20 NOTE — PROGRESS NOTE ADULT - PROBLEM SELECTOR PLAN 3
Patient takes multiple analgesic medications at home including morphine, MS contin, and percocet and has spinal neurostimulator in place  Pain management following; continue with ms contin 60mg q12, percocet 2 tabs q4 PRN, lyrica 300 BID, marinol

## 2017-09-21 VITALS
HEART RATE: 76 BPM | RESPIRATION RATE: 16 BRPM | TEMPERATURE: 98 F | OXYGEN SATURATION: 98 % | DIASTOLIC BLOOD PRESSURE: 55 MMHG | SYSTOLIC BLOOD PRESSURE: 98 MMHG

## 2017-09-21 LAB
ANION GAP SERPL CALC-SCNC: 10 MMOL/L — SIGNIFICANT CHANGE UP (ref 5–17)
BUN SERPL-MCNC: 7 MG/DL — SIGNIFICANT CHANGE UP (ref 7–18)
CALCIUM SERPL-MCNC: 8.5 MG/DL — SIGNIFICANT CHANGE UP (ref 8.4–10.5)
CHLORIDE SERPL-SCNC: 110 MMOL/L — HIGH (ref 96–108)
CO2 SERPL-SCNC: 24 MMOL/L — SIGNIFICANT CHANGE UP (ref 22–31)
CREAT SERPL-MCNC: 1.09 MG/DL — SIGNIFICANT CHANGE UP (ref 0.5–1.3)
GLUCOSE SERPL-MCNC: 101 MG/DL — HIGH (ref 70–99)
HCT VFR BLD CALC: 36.8 % — SIGNIFICANT CHANGE UP (ref 34.5–45)
HGB BLD-MCNC: 12.6 G/DL — SIGNIFICANT CHANGE UP (ref 11.5–15.5)
MAGNESIUM SERPL-MCNC: 1.9 MG/DL — SIGNIFICANT CHANGE UP (ref 1.6–2.6)
MCHC RBC-ENTMCNC: 31.1 PG — SIGNIFICANT CHANGE UP (ref 27–34)
MCHC RBC-ENTMCNC: 34.3 GM/DL — SIGNIFICANT CHANGE UP (ref 32–36)
MCV RBC AUTO: 90.7 FL — SIGNIFICANT CHANGE UP (ref 80–100)
PHOSPHATE SERPL-MCNC: 3.1 MG/DL — SIGNIFICANT CHANGE UP (ref 2.5–4.5)
PLATELET # BLD AUTO: 226 K/UL — SIGNIFICANT CHANGE UP (ref 150–400)
POTASSIUM SERPL-MCNC: 3.7 MMOL/L — SIGNIFICANT CHANGE UP (ref 3.5–5.3)
POTASSIUM SERPL-SCNC: 3.7 MMOL/L — SIGNIFICANT CHANGE UP (ref 3.5–5.3)
RBC # BLD: 4.06 M/UL — SIGNIFICANT CHANGE UP (ref 3.8–5.2)
RBC # FLD: 12.8 % — SIGNIFICANT CHANGE UP (ref 10.3–14.5)
SODIUM SERPL-SCNC: 144 MMOL/L — SIGNIFICANT CHANGE UP (ref 135–145)
WBC # BLD: 6.3 K/UL — SIGNIFICANT CHANGE UP (ref 3.8–10.5)
WBC # FLD AUTO: 6.3 K/UL — SIGNIFICANT CHANGE UP (ref 3.8–10.5)

## 2017-09-21 RX ORDER — DRONABINOL 2.5 MG
1 CAPSULE ORAL
Qty: 0 | Refills: 0 | COMMUNITY
Start: 2017-09-21

## 2017-09-21 RX ORDER — SENNA PLUS 8.6 MG/1
2 TABLET ORAL
Qty: 0 | Refills: 0 | COMMUNITY
Start: 2017-09-21

## 2017-09-21 RX ORDER — SENNA PLUS 8.6 MG/1
2 TABLET ORAL
Qty: 60 | Refills: 0 | OUTPATIENT
Start: 2017-09-21 | End: 2017-10-21

## 2017-09-21 RX ORDER — PANTOPRAZOLE SODIUM 20 MG/1
1 TABLET, DELAYED RELEASE ORAL
Qty: 30 | Refills: 0
Start: 2017-09-21 | End: 2017-10-21

## 2017-09-21 RX ORDER — AMLODIPINE BESYLATE 2.5 MG/1
1 TABLET ORAL
Qty: 30 | Refills: 0 | OUTPATIENT
Start: 2017-09-21 | End: 2017-10-21

## 2017-09-21 RX ORDER — OXYCODONE HYDROCHLORIDE 5 MG/1
1 TABLET ORAL
Qty: 40 | Refills: 0 | OUTPATIENT
Start: 2017-09-21 | End: 2017-10-01

## 2017-09-21 RX ORDER — MORPHINE SULFATE 50 MG/1
1 CAPSULE, EXTENDED RELEASE ORAL
Qty: 30 | Refills: 0 | OUTPATIENT
Start: 2017-09-21 | End: 2017-10-01

## 2017-09-21 RX ORDER — ESCITALOPRAM OXALATE 10 MG/1
1 TABLET, FILM COATED ORAL
Qty: 30 | Refills: 0 | OUTPATIENT
Start: 2017-09-21 | End: 2017-10-21

## 2017-09-21 RX ORDER — ERGOCALCIFEROL 1.25 MG/1
1 CAPSULE ORAL
Qty: 2 | Refills: 0 | OUTPATIENT
Start: 2017-09-21 | End: 2017-10-06

## 2017-09-21 RX ORDER — METOPROLOL TARTRATE 50 MG
1 TABLET ORAL
Qty: 60 | Refills: 0 | OUTPATIENT
Start: 2017-09-21 | End: 2017-10-21

## 2017-09-21 RX ORDER — METRONIDAZOLE 500 MG
1 TABLET ORAL
Qty: 9 | Refills: 0 | OUTPATIENT
Start: 2017-09-21 | End: 2017-09-24

## 2017-09-21 RX ORDER — HYDROMORPHONE HYDROCHLORIDE 2 MG/ML
1 INJECTION INTRAMUSCULAR; INTRAVENOUS; SUBCUTANEOUS ONCE
Qty: 0 | Refills: 0 | Status: DISCONTINUED | OUTPATIENT
Start: 2017-09-21 | End: 2017-09-21

## 2017-09-21 RX ORDER — ONDANSETRON 8 MG/1
1 TABLET, FILM COATED ORAL
Qty: 90 | Refills: 0 | OUTPATIENT
Start: 2017-09-21 | End: 2017-10-21

## 2017-09-21 RX ORDER — CLOPIDOGREL BISULFATE 75 MG/1
1 TABLET, FILM COATED ORAL
Qty: 30 | Refills: 0
Start: 2017-09-21 | End: 2017-10-21

## 2017-09-21 RX ORDER — PANTOPRAZOLE SODIUM 20 MG/1
1 TABLET, DELAYED RELEASE ORAL
Qty: 30 | Refills: 0 | OUTPATIENT
Start: 2017-09-21 | End: 2017-10-21

## 2017-09-21 RX ORDER — CIPROFLOXACIN LACTATE 400MG/40ML
1 VIAL (ML) INTRAVENOUS
Qty: 6 | Refills: 0 | OUTPATIENT
Start: 2017-09-21 | End: 2017-09-24

## 2017-09-21 RX ADMIN — CLOPIDOGREL BISULFATE 75 MILLIGRAM(S): 75 TABLET, FILM COATED ORAL at 11:02

## 2017-09-21 RX ADMIN — MORPHINE SULFATE 60 MILLIGRAM(S): 50 CAPSULE, EXTENDED RELEASE ORAL at 05:15

## 2017-09-21 RX ADMIN — ESCITALOPRAM OXALATE 20 MILLIGRAM(S): 10 TABLET, FILM COATED ORAL at 11:02

## 2017-09-21 RX ADMIN — OXYCODONE AND ACETAMINOPHEN 1 TABLET(S): 5; 325 TABLET ORAL at 11:30

## 2017-09-21 RX ADMIN — INFLUENZA VIRUS VACCINE 0.5 MILLILITER(S): 15; 15; 15; 15 SUSPENSION INTRAMUSCULAR at 10:59

## 2017-09-21 RX ADMIN — Medication 300 MILLIGRAM(S): at 05:14

## 2017-09-21 RX ADMIN — Medication 5 MILLIGRAM(S): at 05:14

## 2017-09-21 RX ADMIN — Medication 200 MILLIGRAM(S): at 05:15

## 2017-09-21 RX ADMIN — ONDANSETRON 4 MILLIGRAM(S): 8 TABLET, FILM COATED ORAL at 05:14

## 2017-09-21 RX ADMIN — Medication 100 MILLIGRAM(S): at 05:15

## 2017-09-21 RX ADMIN — ERGOCALCIFEROL 50000 UNIT(S): 1.25 CAPSULE ORAL at 11:02

## 2017-09-21 RX ADMIN — HYDROMORPHONE HYDROCHLORIDE 1 MILLIGRAM(S): 2 INJECTION INTRAMUSCULAR; INTRAVENOUS; SUBCUTANEOUS at 00:34

## 2017-09-21 RX ADMIN — ENOXAPARIN SODIUM 40 MILLIGRAM(S): 100 INJECTION SUBCUTANEOUS at 11:02

## 2017-09-21 RX ADMIN — MORPHINE SULFATE 60 MILLIGRAM(S): 50 CAPSULE, EXTENDED RELEASE ORAL at 06:27

## 2017-09-21 RX ADMIN — HYDROMORPHONE HYDROCHLORIDE 1 MILLIGRAM(S): 2 INJECTION INTRAMUSCULAR; INTRAVENOUS; SUBCUTANEOUS at 01:59

## 2017-09-21 RX ADMIN — OXYCODONE AND ACETAMINOPHEN 1 TABLET(S): 5; 325 TABLET ORAL at 10:57

## 2017-09-21 RX ADMIN — PANTOPRAZOLE SODIUM 40 MILLIGRAM(S): 20 TABLET, DELAYED RELEASE ORAL at 05:14

## 2017-09-21 NOTE — PROGRESS NOTE ADULT - PROBLEM SELECTOR PLAN 5
EKG in ED revealed NSR   Continue with metoprolol and plavix; No AC given history of frequent falls
Continue meds  Echo  Cardio follow up
continue meds  Monitor labs  Rheumatology eval.
EKG in ED revealed NSR   Continue with metoprolol and plavix; No AC given history of frequent falls
EKG in ED revealed NSR   Continue with metoprolol and plavix; No AC given history of frequent falls
Santo cath  Urology eval
continue meds  Monitor labs  Rheumatology eval.
Santo cath  Urology eval
Continue meds  Echo  Cardio follow up
EKG in ED revealed NSR   Continue with metoprolol and plavix; No AC given history of frequent falls
Santo cath  Urology eval
continue meds  Monitor labs  Rheumatology eval.
EKG in ED revealed NSR   Continue with metoprolol and plavix; No AC given history of frequent falls

## 2017-09-21 NOTE — PROGRESS NOTE ADULT - PROBLEM SELECTOR PLAN 1
Patient c/o N/V/D with fever x 2 days; vomiting currently resolved  CT Abdomen/Pelvis revealed pancolitis  Continue with ciprofloxacin and flagyl until 9/24 as per ID  Continue to monitor BMP for electrolyte disturbances   Blood and stool cultures negative  C dif PCR negative  EGD and colonoscopy revealed...  ID Dr Artur Wylie Patient c/o N/V/D with fever x 2 days; vomiting currently resolved  CT Abdomen/Pelvis revealed pancolitis  Continue with ciprofloxacin and flagyl until 9/24 as per ID  Continue to monitor BMP for electrolyte disturbances   Blood and stool cultures negative  C dif PCR negative  EGD revealed esophagitis, hiatal hernia, gastritis, and an ulcer  Colonoscopy revealed diverticulosis and polyps  ID Dr Siddiqui  GI Dr Wylie

## 2017-09-21 NOTE — PROGRESS NOTE ADULT - PROBLEM SELECTOR PROBLEM 3
HTN (hypertension)
Reflex sympathetic dystrophy
Reflex sympathetic dystrophy
HTN (hypertension)
HTN (hypertension)
Reflex sympathetic dystrophy
HTN (hypertension)
Reflex sympathetic dystrophy
HTN (hypertension)
HTN (hypertension)
Reflex sympathetic dystrophy

## 2017-09-21 NOTE — PROGRESS NOTE ADULT - SUBJECTIVE AND OBJECTIVE BOX
PULMONARY  progress note    JOSE VILLALBA  [id number]    Patient is a 46y old  Female who presents with a chief complaint of abdominal pain, N/V (12 Sep 2017 07:17)  Feels better Pain decreased still soreness upper abdomen    MEDICATIONS  (STANDING):  escitalopram 20 milliGRAM(s) Oral daily  clopidogrel Tablet 75 milliGRAM(s) Oral daily  metoprolol 25 milliGRAM(s) Oral two times a day  amLODIPine   Tablet 2.5 milliGRAM(s) Oral daily  pantoprazole    Tablet 40 milliGRAM(s) Oral before breakfast  nicotine - 21 mG/24Hr(s) Patch 1 patch Transdermal daily  enoxaparin Injectable 40 milliGRAM(s) SubCutaneous daily  ciprofloxacin   IVPB 400 milliGRAM(s) IV Intermittent every 12 hours  metroNIDAZOLE  IVPB 500 milliGRAM(s) IV Intermittent every 8 hours  influenza   Vaccine 0.5 milliLiter(s) IntraMuscular once  sodium chloride 0.9% Bolus 1000 milliLiter(s) IV Bolus once  ergocalciferol 73987 Unit(s) Oral <User Schedule>  lactated ringers. 1000 milliLiter(s) (125 mL/Hr) IV Continuous <Continuous>  sodium chloride 0.9%. 1000 milliLiter(s) (75 mL/Hr) IV Continuous <Continuous>  dronabinol 5 milliGRAM(s) Oral every 12 hours  bethanechol 5 milliGRAM(s) Oral three times a day  morphine ER Tablet 60 milliGRAM(s) Oral every 12 hours  pregabalin 300 milliGRAM(s) Oral two times a day  senna 2 Tablet(s) Oral at bedtime      MEDICATIONS  (PRN):  ondansetron Injectable 4 milliGRAM(s) IV Push every 6 hours PRN Nausea and/or Vomiting  acetaminophen   Tablet 650 milliGRAM(s) Oral every 6 hours PRN For Temp greater than 38 C (100.4 F)  oxyCODONE    5 mG/acetaminophen 325 mG 1 Tablet(s) Oral every 4 hours PRN Severe Pain (7 - 10)      Allergies    aspirin (Unknown)  latex (Unknown)  sulfa drugs (Unknown)        T MEDICAL & SURGICAL HISTORY:  HTN (hypertension)  Cyclical vomiting  Atrial fibrillation  SLE (systemic lupus erythematosus)  Reflex sympathetic dystrophy  H/O partial thyroidectomy  S/P partial hysterectomy           REVIEW OF SYSTEMS:  CONSTITUTIONAL: No fever, weight loss, or fatigue   EYES: No eye pain, visual disturbances, or discharge  ENT:  No difficulty hearing, tinnitus, vertigo; No sinus or throat pain  NECK: No pain or stiffness or nodes  RESPIRATORY: no cough   wheezing   chills   hemoptysis or Shortness of Breath  CARDIOVASCULAR: No chest pain, palpitations, passing out, dizziness, or leg swelling  GASTROINTESTINAL:abdominal or epigastric pain.++,No nausea, vomiting, or hematemesis; No diarrhea or constipation. No melena or hematochezia.  GENITOURINARY: No dysuria, frequency, hematuria, or incontinence  NEUROLOGICAL: No headaches, memory loss, loss of strength, numbness, or tremors  SKIN: No itching, burning, rashes, or lesions   LYMPH Nodes: No enlarged glands  ENDOCRINE: No heat or cold intolerance; No hair loss  MUSCULOSKELETAL: No joint pain or swelling; No muscle, back, or extremity pain  PSYCHIATRIC: No depression, anxiety, mood swings, or difficulty sleeping  HEME/LYMPH: No easy bruising, or bleeding gums  ALLERGY AND IMMUNOLOGIC: No hives or eczema    Vital Signs Last 24 Hrs  T(C): 36.7 (21 Sep 2017 04:28), Max: 36.9 (20 Sep 2017 21:17)  T(F): 98 (21 Sep 2017 04:28), Max: 98.4 (20 Sep 2017 21:17)  HR: 76 (21 Sep 2017 04:28) (69 - 76)  BP: 98/55 (21 Sep 2017 04:28) (98/55 - 112/68)  BP(mean): --  RR: 16 (21 Sep 2017 04:28) (16 - 16)  SpO2: 98% (21 Sep 2017 04:28) (98% - 100%)  I&O's Detail      PHYSICAL EXAMINATION:    GENERAL: The patient is a well-developed, well-nourished in no apparent distress.   SKIN no rash ecchymoses or bruises  HEENT: Head is normocephalic and atraumatic  KAYLYN , Extraocular muscles are intact. Mucous membranes  moist.   Neck supple ,No LN felt JVP not increased  Thyroid not enlarged  Cardiovascular:  S1 S2 heard, RSR, No JVD , systolic  murmur at apex, No gallop or rub  Respiratory: Chest wall symmetrical with good air entry ,Percussion note normal,    Lungs vesicular breathing with  no  rales  or  wheeze	  ABDOMEN:  Soft, Non-tender,  no hepatomegaly or splenomegaly BS positive	  Extremities: Normal range of motion, No clubbing, cyanosis or edema  Vascular: Peripheral pulses palpable 2+ bilaterally  CNS:  Alert and oriented x3   Cranial nerves intact  sensory intact  motor power5/5  dtr 2+   Babinski neg    LABS:                        12.6   6.3   )-----------( 226      ( 21 Sep 2017 07:11 )             36.8     09-21    144  |  110<H>  |  7   ----------------------------<  101<H>  3.7   |  24  |  1.09    Ca    8.5      21 Sep 2017 07:11  Phos  3.1     09-21  Mg     1.9     09-21      CDT --ve                :

## 2017-09-21 NOTE — PROGRESS NOTE ADULT - PROBLEM SELECTOR PROBLEM 5
Atrial fibrillation
Atrial fibrillation
SLE (systemic lupus erythematosus)
Atrial fibrillation
SLE (systemic lupus erythematosus)
Urinary retention
Urinary retention
Atrial fibrillation
SLE (systemic lupus erythematosus)
Urinary retention

## 2017-09-21 NOTE — PROGRESS NOTE ADULT - ATTENDING COMMENTS
I have examined pt personally Hx chart lab and xrays reviewed and pt discussed with residents
I have examined pt personally Hx chart lab and xrays reviewed and pt discussed with residents

## 2017-09-21 NOTE — PROGRESS NOTE ADULT - PROBLEM SELECTOR PROBLEM 1
Atelectasis
Pancolitis
Atelectasis
Pancolitis
Atelectasis
Pancolitis
Chronic back pain
Pancolitis
Chronic back pain
Pancolitis
Chronic back pain

## 2017-09-21 NOTE — PROGRESS NOTE ADULT - ASSESSMENT
Patient is a 44 y/o F lives with her sister from home ambulates with walker, current everyday smoker,  PMH Reflex symphatetic dystrophy s/p spinal stimulator, frequent falls, SLE (no treatment at this time), cyclical vomiting syndrome, Afib (not on AC due to frequent falls), HTN, depression, Hemorrhoids (EGD / colonoscopy 3-4 years ago, is due for a next one soon ) and partial thyroidectomy presented with c/o vomiting and diarrhea for 2 days a/w abdominal pain.    Patient found to have a left shift on CBC, Hb 17.1, likely hemoconcentrated, ( Baseline Hb of 15-16), BMP showing K of 3.4, likely related to persistent vomiting/diarrhea, normal AG, Bicarb is WNL, initial lactate is 2.5, patient got 2 L NS in the ED. CT A/P showing evidence of pancolitis given cipro/Flagyl in the ED and morphine, Zofran, Tylenol for symptom management  Patient is admitted for pancolitis and cyclical vomiting syndrome
45F PMHx A Fib (no AC d/t frequent falls), HTN, Reflex sympathetic dystrophy (s/p spinal stimulator), Frequency falls (ambulates with walker), SLE (no current treatment), Cyclic vomiting syndrome, MDD, Hemorrhoids (last EGD/colonoscopy 4 years ago) admitted for pancolitis and cyclical vomiting syndrome
Patient continue to c/o abdominal pain and nausea. The etiology is not clear  R/o colitis  R/o peptic ulcer disease    Suggestions:    1. Protonix daily  2. Avoid NSAID's  3. EGD and Colonoscopy  4. DVT prophylaxis
Patient is a 44 y/o F lives with her sister from home ambulates with walker, current everyday smoker,  PMH Reflex symphatetic dystrophy s/p spinal stimulator, frequent falls, SLE (no treatment at this time), cyclical vomiting syndrome, Afib (not on AC due to frequent falls), HTN, depression, Hemorrhoids (EGD / colonoscopy 3-4 years ago, is due for a next one soon ) and partial thyroidectomy presented with c/o vomiting and diarrhea for 2 days a/w abdominal pain.    Patient found to have a left shift on CBC, Hb 17.1, likely hemoconcentrated, ( Baseline Hb of 15-16), BMP showing K of 3.4, likely related to persistent vomiting/diarrhea, normal AG, Bicarb is WNL, initial lactate is 2.5, patient got 2 L NS in the ED. CT A/P showing evidence of pancolitis given cipro/Flagyl in the ED and morphine, Zofran, Tylenol for symptom management  Patient is admitted for pancolitis and cyclical vomiting syndrome
Patient is a 44 y/o F lives with her sister from home ambulates with walker, current everyday smoker,  PMH Reflex symphatetic dystrophy s/p spinal stimulator, frequent falls, SLE (no treatment at this time), cyclical vomiting syndrome, Afib (not on AC due to frequent falls), HTN, depression, Hemorrhoids (EGD / colonoscopy 3-4 years ago, is due for a next one soon ) and partial thyroidectomy presented with c/o vomiting and diarrhea for 2 days a/w abdominal pain.    Patient found to have a left shift on CBC, Hb 17.1, likely hemoconcentrated, ( Baseline Hb of 15-16), BMP showing K of 3.4, likely related to persistent vomiting/diarrhea, normal AG, Bicarb is WNL, initial lactate is 2.5, patient got 2 L NS in the ED. CT A/P showing evidence of pancolitis given cipro/Flagyl in the ED and morphine, Zofran, Tylenol for symptom management  Patient is admitted for pancolitis and cyclical vomiting syndrome
45F PMHx A Fib (no AC d/t frequent falls), HTN, Reflex sympathetic dystrophy (s/p spinal stimulator), Frequency falls (ambulates with walker), SLE (no current treatment), Cyclic vomiting syndrome, MDD, Hemorrhoids (last EGD/colonoscopy 4 years ago) admitted for pancolitis and cyclical vomiting syndrome
1. Fecal impaction  2. Colitis  3. Cyclical vomiting    Suggestions:    1. Monitor H/H  2. Monitor electrolytes  3. Protonix daily  4. Stool softener  5. DVT prophylaxis
1. Abdominal pain (improving)  2. Pancolitis (improving)  3. Fecal impaction improved  4. Bladder distention  5. Vomiting (improving)    Suggestions:  1. Diet as tolerated  2. Change antibiotics to Po  3. Protonix daily  4. Avoid NSAID's  5. DVT prophylaxis
1. Pancolitis  2. Fecal impaction (improved)  3. Vomiting (improved)    Suggestions:    1. Follow up stool study  2. Continue antibiotics as per ID  3. ID follow up  4. Protonix daily  5. DVT prophylaxis
The etiology for abdominal pain still is not clear but persist  1. R/o colitis  2. R/o Peptic ulcer disease  3. R/o gastroparesis    Suggestions:    1. Protonix daily  2. Anti reflux measure  3. EGD and Colonoscopy  4. Avoid NSAID's  5. DVT prophylaxis

## 2017-09-21 NOTE — PROGRESS NOTE ADULT - NSHPATTENDINGPLANDISCUSS_GEN_ALL_CORE
house staff covering
Resident and PMD
Resident and PMD
house staff covering

## 2017-09-21 NOTE — PROGRESS NOTE ADULT - PROBLEM SELECTOR PROBLEM 2
Cyclical vomiting
Cyclical vomiting
Pancolitis
Reflex sympathetic dystrophy
Cyclical vomiting
Cyclical vomiting
Pancolitis
Cyclical vomiting
Pancolitis
Reflex sympathetic dystrophy
Cyclical vomiting
Cyclical vomiting
Reflex sympathetic dystrophy
Cyclical vomiting

## 2017-09-21 NOTE — PROGRESS NOTE ADULT - SUBJECTIVE AND OBJECTIVE BOX
Patient is a 46y old  Female who presents with a chief complaint of abdominal pain, N/V (12 Sep 2017 07:17)    pt seen in icu [  ], reg med floor [   ], bed [  ], chair at bedside [   ], a+o x3 [  ], lethargic [  ],  nad [  ]    skelton [  ], ngt [  ], peg [  ], et tube [  ], cent line [  ], picc line [  ]        Allergies    aspirin (Unknown)  latex (Unknown)  sulfa drugs (Unknown)        Vitals    T(F): 98 (09-21-17 @ 04:28), Max: 98.4 (09-20-17 @ 21:17)  HR: 76 (09-21-17 @ 04:28) (69 - 76)  BP: 98/55 (09-21-17 @ 04:28) (98/55 - 112/68)  RR: 16 (09-21-17 @ 04:28) (16 - 16)  SpO2: 98% (09-21-17 @ 04:28) (98% - 100%)  Wt(kg): --  CAPILLARY BLOOD GLUCOSE          Labs                          12.6   6.3   )-----------( 226      ( 21 Sep 2017 07:11 )             36.8       09-21    144  |  110<H>  |  7   ----------------------------<  101<H>  3.7   |  24  |  1.09    Ca    8.5      21 Sep 2017 07:11  Phos  3.1     09-21  Mg     1.9     09-21                  Radiology Results      Meds    MEDICATIONS  (STANDING):  escitalopram 20 milliGRAM(s) Oral daily  clopidogrel Tablet 75 milliGRAM(s) Oral daily  metoprolol 25 milliGRAM(s) Oral two times a day  amLODIPine   Tablet 2.5 milliGRAM(s) Oral daily  pantoprazole    Tablet 40 milliGRAM(s) Oral before breakfast  nicotine - 21 mG/24Hr(s) Patch 1 patch Transdermal daily  enoxaparin Injectable 40 milliGRAM(s) SubCutaneous daily  ciprofloxacin   IVPB 400 milliGRAM(s) IV Intermittent every 12 hours  metroNIDAZOLE  IVPB 500 milliGRAM(s) IV Intermittent every 8 hours  sodium chloride 0.9% Bolus 1000 milliLiter(s) IV Bolus once  ergocalciferol 57437 Unit(s) Oral <User Schedule>  lactated ringers. 1000 milliLiter(s) (125 mL/Hr) IV Continuous <Continuous>  sodium chloride 0.9%. 1000 milliLiter(s) (75 mL/Hr) IV Continuous <Continuous>  dronabinol 5 milliGRAM(s) Oral every 12 hours  bethanechol 5 milliGRAM(s) Oral three times a day  morphine ER Tablet 60 milliGRAM(s) Oral every 12 hours  pregabalin 300 milliGRAM(s) Oral two times a day  senna 2 Tablet(s) Oral at bedtime      MEDICATIONS  (PRN):  ondansetron Injectable 4 milliGRAM(s) IV Push every 6 hours PRN Nausea and/or Vomiting  acetaminophen   Tablet 650 milliGRAM(s) Oral every 6 hours PRN For Temp greater than 38 C (100.4 F)  oxyCODONE    5 mG/acetaminophen 325 mG 1 Tablet(s) Oral every 4 hours PRN Severe Pain (7 - 10)      Physical Exam  Neuro :  no focal deficits  Respiratory: CTA B/L  CV: RRR, S1S2, no murmurs,   Abdominal: Soft, NT, ND +BS,  Extremities: No edema, + peripheral pulses    ASSESSMENT    fecal impaction  sterocolitis  left thyroid nodule  h/o HTN (hypertension)  Cyclical vomiting  Atrial fibrillation  SLE (systemic lupus erythematosus)  Reflex sympathetic dystrophy  H/O partial thyroidectomy  S/P partial hysterectomy      PLAN    id f/u noted  cont cipro and flagyl untill 9/24/17  gi f/u  pt for egd and colonoscopy  cont miralax  add senna  pulm f/u noted  endo f/u   f/u fna  urology cons  continue bethanechol 5mg tid  cont current meds Patient is a 46y old  Female who presents with a chief complaint of abdominal pain, N/V (12 Sep 2017 07:17)    pt seen in icu [  ], reg med floor [  x ], bed [ x ], chair at bedside [   ], a+o x3 [ x ], lethargic [  ],  nad [ x ]      Allergies    aspirin (Unknown)  latex (Unknown)  sulfa drugs (Unknown)        Vitals    T(F): 98 (09-21-17 @ 04:28), Max: 98.4 (09-20-17 @ 21:17)  HR: 76 (09-21-17 @ 04:28) (69 - 76)  BP: 98/55 (09-21-17 @ 04:28) (98/55 - 112/68)  RR: 16 (09-21-17 @ 04:28) (16 - 16)  SpO2: 98% (09-21-17 @ 04:28) (98% - 100%)  Wt(kg): --  CAPILLARY BLOOD GLUCOSE          Labs                          12.6   6.3   )-----------( 226      ( 21 Sep 2017 07:11 )             36.8       09-21    144  |  110<H>  |  7   ----------------------------<  101<H>  3.7   |  24  |  1.09    Ca    8.5      21 Sep 2017 07:11  Phos  3.1     09-21  Mg     1.9     09-21                  Radiology Results      Meds    MEDICATIONS  (STANDING):  escitalopram 20 milliGRAM(s) Oral daily  clopidogrel Tablet 75 milliGRAM(s) Oral daily  metoprolol 25 milliGRAM(s) Oral two times a day  amLODIPine   Tablet 2.5 milliGRAM(s) Oral daily  pantoprazole    Tablet 40 milliGRAM(s) Oral before breakfast  nicotine - 21 mG/24Hr(s) Patch 1 patch Transdermal daily  enoxaparin Injectable 40 milliGRAM(s) SubCutaneous daily  ciprofloxacin   IVPB 400 milliGRAM(s) IV Intermittent every 12 hours  metroNIDAZOLE  IVPB 500 milliGRAM(s) IV Intermittent every 8 hours  sodium chloride 0.9% Bolus 1000 milliLiter(s) IV Bolus once  ergocalciferol 55668 Unit(s) Oral <User Schedule>  lactated ringers. 1000 milliLiter(s) (125 mL/Hr) IV Continuous <Continuous>  sodium chloride 0.9%. 1000 milliLiter(s) (75 mL/Hr) IV Continuous <Continuous>  dronabinol 5 milliGRAM(s) Oral every 12 hours  bethanechol 5 milliGRAM(s) Oral three times a day  morphine ER Tablet 60 milliGRAM(s) Oral every 12 hours  pregabalin 300 milliGRAM(s) Oral two times a day  senna 2 Tablet(s) Oral at bedtime      MEDICATIONS  (PRN):  ondansetron Injectable 4 milliGRAM(s) IV Push every 6 hours PRN Nausea and/or Vomiting  acetaminophen   Tablet 650 milliGRAM(s) Oral every 6 hours PRN For Temp greater than 38 C (100.4 F)  oxyCODONE    5 mG/acetaminophen 325 mG 1 Tablet(s) Oral every 4 hours PRN Severe Pain (7 - 10)      Physical Exam  Neuro :  no focal deficits  Respiratory: CTA B/L  CV: RRR, S1S2, no murmurs,   Abdominal: Soft, NT, ND +BS,  Extremities: No edema, + peripheral pulses    ASSESSMENT    fecal impaction  sterocolitis  left thyroid nodule  h/o HTN (hypertension)  Cyclical vomiting  Atrial fibrillation  SLE (systemic lupus erythematosus)  Reflex sympathetic dystrophy  H/O partial thyroidectomy  S/P partial hysterectomy      PLAN    id f/u noted  cont cipro and flagyl untill 9/24/17  gi f/u  pt for egd and colonoscopy  cont miralax  add senna  pulm f/u noted  endo f/u   f/u fna outpt  continue bethanechol 5mg tid  cont current meds  pt stable for d/c  f/u in office in 1 wk

## 2017-09-21 NOTE — PROGRESS NOTE ADULT - PROVIDER SPECIALTY LIST ADULT
Gastroenterology
Infectious Disease
Internal Medicine
Pain Medicine
Pain Medicine
Pulmonology
Infectious Disease
Internal Medicine
Pulmonology
Internal Medicine
Pulmonology
Pain Medicine
Internal Medicine
Internal Medicine
Gastroenterology

## 2017-09-21 NOTE — PROGRESS NOTE ADULT - PROBLEM SELECTOR PLAN 3
Patient takes multiple analgesic medications at home including morphine, MS contin, and percocet and has spinal neurostimulator in place  Pain management following; continue with ms contin 60mg q12, percocet 2 tabs q4 PRN, lyrica 300 BID, marinol  Will refer to outpatient provider upon discharge

## 2017-09-21 NOTE — PROGRESS NOTE ADULT - PROBLEM SELECTOR PROBLEM 6
SLE (systemic lupus erythematosus)
Prophylactic measure
SLE (systemic lupus erythematosus)
Prophylactic measure
Prophylactic measure
SLE (systemic lupus erythematosus)

## 2017-09-21 NOTE — PROGRESS NOTE ADULT - SUBJECTIVE AND OBJECTIVE BOX
Meds:  ciprofloxacin   IVPB 400 milliGRAM(s) IV Intermittent every 12 hours  metroNIDAZOLE  IVPB 500 milliGRAM(s) IV Intermittent every 8 hours    Allergies:  Allergies    aspirin (Unknown)  latex (Unknown)  sulfa drugs (Unknown)    Intolerances    ROS  [  ] UNABLE TO ELICIT    General:  [  ] None  [  ] Fever  [  ] Chills  [ x ] Malaise    Skin:  [ x ] None [  ] Rash  [  ] Wound  [  ] Ulcer    HEENT:  [ x ] None  [  ] Sore Throat  [  ] Nasal congestion/ runny nose  [  ] Photophobia [  ] Neck pain      Chest:  [ x ] None   [  ] SOB  [  ] Cough  [  ] None    Cardiovascular:   [ x ] None  [  ] CP  [  ] Palpitation    Gastrointestinal:  [  ] None  [ x ] Abd pain    [ x ] Nausea    [ x ] Vomiting   [  ] Diarrhea	     Genitourinary:  [ x ] None [  ] Polyuria   [  ] Urgency  [  ] Frequency  [  ] Dysuria    [  ]  Hematuria       Musculoskeletal:  [  ] None [  ] Back Pain	[  ] Body aches  [  ] Joint pain    Neurological: [  ] None [  ]Dizziness  [  ]Visual Disturbance  [  ]Headaches   [ x ] Weakness          PHYSICAL EXAM:    Vital Signs Last 24 Hrs  T(C): 36.7 (21 Sep 2017 04:28), Max: 36.9 (20 Sep 2017 21:17)  T(F): 98 (21 Sep 2017 04:28), Max: 98.4 (20 Sep 2017 21:17)  HR: 76 (21 Sep 2017 04:28) (69 - 76)  BP: 98/55 (21 Sep 2017 04:28) (98/55 - 112/68)  BP(mean): --  RR: 16 (21 Sep 2017 04:28) (16 - 16)  SpO2: 98% (21 Sep 2017 04:28) (98% - 100%)    Constitutional:    HEENT: [ x ] Wnl  [  ] Pharyngeal congestion    Neck:  [ x ] Supple  [  ]Lymphadenopathy  [  ] No JVD   [  ] JVD  [  ] Masses   [  ] WNL    CHEST/Respiratory:  [ x ]Clear to auscultation  [  ] Rales   [  ] Rhonchi   [  ] Wheezing     [  ] Chest Tenderness      Cardiovascular:  [ x ] Reg S1 S2   [  ] Irreg S1 S2   [ x ]No Murmur  [  ] +ve Murmurs  [  ]Systolic [  ]Diastolic      Abdomen:  [ x ] Soft  [  ] No tendrerness  [ x ] Improving diffuse and generalized Tenderness  [  ] Organomegaly  [  ] ABD Distention  [  ] Rigidity                       [ x ] No Regidity                       [ x ] No Rebound Tenderness  [ x ] No Guarding Rigidity  [  ] Rebound Tenderness[  ] Guarding Rigidity                          [x  ]  +ve Bowel Sounds  [  ] Decreased Bowel Sounds    [  ] Absent Bowel Sounds                            Extremities: [ x ] No edema [  ] Edema  [  ] Clubbing   [  ] Cyanosis                         [ x ] No Tender Calf muscles  [  ] Tender Calf muscles                        [ x ] Palpable peripheral pulses    Neurological: [ x ] Awake  [ x ] Alert  [ x ] Oriented  x  3                           [  ] Confused  [  ] Drowzy  [  ] repond to painful stimuli  [  ] Unresponsive    Skin:  [ x ] Intact [  ] Redness [  ] Thrombophlebitis  [  ] Rashes  [  ] Dry  [  ] Ulcers    Ortho:  [  ] Joint Swelling  [  ] Joint erythema [  ] Joint tenderness                [  ] Increased temp. to touch  [ x ] WNL         LABS/DIAGNOSTIC TESTS                                     12.6   6.3   )-----------( 226      ( 21 Sep 2017 07:11 )             36.8   09-21    144  |  110<H>  |  7   ----------------------------<  101<H>  3.7   |  24  |  1.09    Ca    8.5      21 Sep 2017 07:11  Phos  3.1     09-21  Mg     1.9     09-21        Clostridium difficile Toxin by PCR (09.18.17 @ 23:30)    Clostridium difficile Toxin by PCR: The results of this test should be interpreted with consideration of all  clinical and laboratory findings. This test determines the presence of  the C. difficile tcdB gene at a given time and is not intended to  identify antibiotic associated disease or C. difficile infection without  clinical context. Successful treatment is based on the resolution of  clinical symptoms. This test should not be used as a "test of cure"  because C. difficile DNA will persist after successful treatment. Repeat  testing will not be permitted.    This test is performed on the BD MAX system using Real-Time PCR and  fluorogenic target-specific hybridization.    [facility]        CULTURES:   Culture - Blood (09.12.17 @ 09:53)    Specimen Source: .Blood Blood-Peripheral    Culture Results:   No growth to date.    Culture - Blood (09.12.17 @ 09:53)    Specimen Source: .Blood Blood-Peripheral    Culture Results:   No growth to date.    Culture - Urine (09.12.17 @ 00:22)    Specimen Source: .Urine Clean Catch (Midstream)    Culture Results:   10,000 - 49,000 CFU/mL Coag negative Staphylococcus not Staph  saprophyticus    RADIOLOGY:    EXAM:  CT ABDOMEN AND PELVIS OC IC                            PROCEDURE DATE:  09/16/2017          INTERPRETATION:  CT ABDOMEN AND PELVIS WITH IV CONTRAST    CLINICAL STATEMENT: Abdominal pain.    COMPARISON: CT abdomen pelvis 9/11/2017.    TECHNIQUE: CT scan of the abdomen and pelvis was performed with IV,   without oral contrast. Multiplanar reformations were made.    FINDINGS:  Streaky bibasilar subsegmental atelectasis. No pleural effusion.    Again seen is a 5 mm hypodensity in the right hepatic lobe, too small to   characterize. Mild prominence of the pancreatic duct is again seen. The   pancreatic parenchyma appears unremarkable. The contracted gallbladder,   spleen and adrenal glands are unremarkable.    Kidneys enhance symmetrically. No hydronephrosis. Left-sided extrarenal   pelvis is noted. Again seen is a 5 mm hypodensity in the left kidney mid   to lower pole, too small to characterize.    GI tract is unobstructed. Appendix is unremarkable. No free air or   ascites.    No bulky adenopathy. Normal caliber abdominal aorta. Mild atherosclerosis.    Urinary bladder is distended but otherwise unremarkable. Partial   hysterectomy or uterine atrophy. No free pelvic fluid.    Mild degenerative changes in the spine.    A batterypack is present in the right gluteal subcutaneous fat with   spinal stimulator electrodes entering the spinal canal at T7-T8. Small   focus of air left lower quadrant anterior abdominal wall consistent with   subcutaneous injection.    IMPRESSION:  Distended urinary bladder. The etiology of the patient's abdominal pain   is not clearly elucidated on this exam.          Assessment and Recommendation:     Patient is a 44 y/o F lives with her sister from home ambulates with walker, current everyday smoker,  PMH Reflex symphatetic dystrophy s/p spinal stimulator, frequent falls, SLE (no treatment at this time), cyclical vomiting syndrome, Afib (not on AC due to frequent falls), HTN, depression, Hemorrhoids (EGD / colonoscopy 3-4 years ago, is due for a next one soon ) and partial thyroidectomy presented with c/o vomiting and diarrhea for 2 days a/w abdominal pain.  Patient was admitted for colitis ans was started on IV Cipro and Flagyl.  Patient feels better, and WBC is now normal.  9/16/17 Patient had abdominal CT that showed distended urinary bladder.   CDT is negative.    Problem/Recommendation - 1:  Problem: Pancolitis.   Recommendation:   1- UA & CS suggest for contamination.  2- Follow Blood culture to final report were negative.  3- GI evaluation and management.  4- Continue Cipro and Flagyl, and change it to PO upon discharge till 9/24/17.  5- Fluid and electrolytes management.  6- CBC and BMP follow up.  7- stool for ova and parasites.  8- Stool for culture.  9- stool for CDT eas negative.  10- Suggested Santo catheter, if post void urine volume is 100 cc or more.   2- Blood culture.  3- GI evaluation and management.  4- Continue Cipro and Flagyl.  5- Fluid and electrolytes management.  6- CBC and BMP follow up.    Problem/Recommendation - 2:  ·  Problem: HTN (hypertension).    Recommendation:   1- Monitor Blood pressure closely.  2- Blood pressure control.  3- BP. meds as per cardiology and primary care team.       Discussed with medical resident, and with Patient again today.

## 2017-09-22 LAB — SURGICAL PATHOLOGY FINAL REPORT - CH: SIGNIFICANT CHANGE UP

## 2017-09-24 DIAGNOSIS — Z88.2 ALLERGY STATUS TO SULFONAMIDES: ICD-10-CM

## 2017-09-24 DIAGNOSIS — Z91.040 LATEX ALLERGY STATUS: ICD-10-CM

## 2017-09-24 DIAGNOSIS — Z71.6 TOBACCO ABUSE COUNSELING: ICD-10-CM

## 2017-09-24 DIAGNOSIS — R33.9 RETENTION OF URINE, UNSPECIFIED: ICD-10-CM

## 2017-09-24 DIAGNOSIS — K64.8 OTHER HEMORRHOIDS: ICD-10-CM

## 2017-09-24 DIAGNOSIS — F17.210 NICOTINE DEPENDENCE, CIGARETTES, UNCOMPLICATED: ICD-10-CM

## 2017-09-24 DIAGNOSIS — R11.10 VOMITING, UNSPECIFIED: ICD-10-CM

## 2017-09-24 DIAGNOSIS — I10 ESSENTIAL (PRIMARY) HYPERTENSION: ICD-10-CM

## 2017-09-24 DIAGNOSIS — K20.9 ESOPHAGITIS, UNSPECIFIED: ICD-10-CM

## 2017-09-24 DIAGNOSIS — K62.1 RECTAL POLYP: ICD-10-CM

## 2017-09-24 DIAGNOSIS — Z91.81 HISTORY OF FALLING: ICD-10-CM

## 2017-09-24 DIAGNOSIS — K51.00 ULCERATIVE (CHRONIC) PANCOLITIS WITHOUT COMPLICATIONS: ICD-10-CM

## 2017-09-24 DIAGNOSIS — K25.9 GASTRIC ULCER, UNSPECIFIED AS ACUTE OR CHRONIC, WITHOUT HEMORRHAGE OR PERFORATION: ICD-10-CM

## 2017-09-24 DIAGNOSIS — K57.30 DIVERTICULOSIS OF LARGE INTESTINE WITHOUT PERFORATION OR ABSCESS WITHOUT BLEEDING: ICD-10-CM

## 2017-09-24 DIAGNOSIS — K44.9 DIAPHRAGMATIC HERNIA WITHOUT OBSTRUCTION OR GANGRENE: ICD-10-CM

## 2017-09-24 DIAGNOSIS — K29.70 GASTRITIS, UNSPECIFIED, WITHOUT BLEEDING: ICD-10-CM

## 2017-09-24 DIAGNOSIS — E55.9 VITAMIN D DEFICIENCY, UNSPECIFIED: ICD-10-CM

## 2017-09-24 DIAGNOSIS — Z88.6 ALLERGY STATUS TO ANALGESIC AGENT: ICD-10-CM

## 2017-09-24 DIAGNOSIS — I48.91 UNSPECIFIED ATRIAL FIBRILLATION: ICD-10-CM

## 2017-09-24 DIAGNOSIS — J44.9 CHRONIC OBSTRUCTIVE PULMONARY DISEASE, UNSPECIFIED: ICD-10-CM

## 2017-09-24 DIAGNOSIS — K31.89 OTHER DISEASES OF STOMACH AND DUODENUM: ICD-10-CM

## 2017-09-24 DIAGNOSIS — K56.41 FECAL IMPACTION: ICD-10-CM

## 2017-09-24 DIAGNOSIS — Z96.89 PRESENCE OF OTHER SPECIFIED FUNCTIONAL IMPLANTS: ICD-10-CM

## 2017-09-24 DIAGNOSIS — K63.5 POLYP OF COLON: ICD-10-CM

## 2017-09-24 DIAGNOSIS — M32.9 SYSTEMIC LUPUS ERYTHEMATOSUS, UNSPECIFIED: ICD-10-CM

## 2017-09-24 DIAGNOSIS — G90.50 COMPLEX REGIONAL PAIN SYNDROME I, UNSPECIFIED: ICD-10-CM

## 2017-10-19 PROCEDURE — 83993 ASSAY FOR CALPROTECTIN FECAL: CPT

## 2017-10-19 PROCEDURE — 88312 SPECIAL STAINS GROUP 1: CPT

## 2017-10-19 PROCEDURE — 85730 THROMBOPLASTIN TIME PARTIAL: CPT

## 2017-10-19 PROCEDURE — 99285 EMERGENCY DEPT VISIT HI MDM: CPT | Mod: 25

## 2017-10-19 PROCEDURE — 80048 BASIC METABOLIC PNL TOTAL CA: CPT

## 2017-10-19 PROCEDURE — 85027 COMPLETE CBC AUTOMATED: CPT

## 2017-10-19 PROCEDURE — 87493 C DIFF AMPLIFIED PROBE: CPT

## 2017-10-19 PROCEDURE — 80053 COMPREHEN METABOLIC PANEL: CPT

## 2017-10-19 PROCEDURE — 76536 US EXAM OF HEAD AND NECK: CPT

## 2017-10-19 PROCEDURE — 87086 URINE CULTURE/COLONY COUNT: CPT

## 2017-10-19 PROCEDURE — 86225 DNA ANTIBODY NATIVE: CPT

## 2017-10-19 PROCEDURE — 82977 ASSAY OF GGT: CPT

## 2017-10-19 PROCEDURE — 85610 PROTHROMBIN TIME: CPT

## 2017-10-19 PROCEDURE — 80061 LIPID PANEL: CPT

## 2017-10-19 PROCEDURE — 82746 ASSAY OF FOLIC ACID SERUM: CPT

## 2017-10-19 PROCEDURE — 83690 ASSAY OF LIPASE: CPT

## 2017-10-19 PROCEDURE — 83605 ASSAY OF LACTIC ACID: CPT

## 2017-10-19 PROCEDURE — 83036 HEMOGLOBIN GLYCOSYLATED A1C: CPT

## 2017-10-19 PROCEDURE — 74177 CT ABD & PELVIS W/CONTRAST: CPT

## 2017-10-19 PROCEDURE — 88305 TISSUE EXAM BY PATHOLOGIST: CPT

## 2017-10-19 PROCEDURE — 81001 URINALYSIS AUTO W/SCOPE: CPT

## 2017-10-19 PROCEDURE — 87040 BLOOD CULTURE FOR BACTERIA: CPT

## 2017-10-19 PROCEDURE — 93005 ELECTROCARDIOGRAM TRACING: CPT

## 2017-10-19 PROCEDURE — 84443 ASSAY THYROID STIM HORMONE: CPT

## 2017-10-19 PROCEDURE — 83735 ASSAY OF MAGNESIUM: CPT

## 2017-10-19 PROCEDURE — 82607 VITAMIN B-12: CPT

## 2017-10-19 PROCEDURE — 96375 TX/PRO/DX INJ NEW DRUG ADDON: CPT

## 2017-10-19 PROCEDURE — 96374 THER/PROPH/DIAG INJ IV PUSH: CPT | Mod: 59

## 2017-10-19 PROCEDURE — 90686 IIV4 VACC NO PRSV 0.5 ML IM: CPT

## 2017-10-19 PROCEDURE — 84702 CHORIONIC GONADOTROPIN TEST: CPT

## 2017-10-19 PROCEDURE — 82306 VITAMIN D 25 HYDROXY: CPT

## 2017-10-19 PROCEDURE — 84100 ASSAY OF PHOSPHORUS: CPT

## 2018-05-03 ENCOUNTER — INPATIENT (INPATIENT)
Facility: HOSPITAL | Age: 48
LOS: 3 days | Discharge: HOME | DRG: 103 | End: 2018-05-07
Attending: INTERNAL MEDICINE | Admitting: INTERNAL MEDICINE
Payer: MEDICARE

## 2018-05-03 VITALS
RESPIRATION RATE: 18 BRPM | TEMPERATURE: 98 F | HEIGHT: 68 IN | WEIGHT: 220.02 LBS | DIASTOLIC BLOOD PRESSURE: 101 MMHG | OXYGEN SATURATION: 99 % | HEART RATE: 62 BPM | SYSTOLIC BLOOD PRESSURE: 141 MMHG

## 2018-05-03 DIAGNOSIS — E89.0 POSTPROCEDURAL HYPOTHYROIDISM: Chronic | ICD-10-CM

## 2018-05-03 DIAGNOSIS — G43.A0 CYCLICAL VOMITING, IN MIGRAINE, NOT INTRACTABLE: ICD-10-CM

## 2018-05-03 LAB
ALBUMIN SERPL ELPH-MCNC: 4.3 G/DL — SIGNIFICANT CHANGE UP (ref 3.5–5)
ALP SERPL-CCNC: 141 U/L — HIGH (ref 40–120)
ALT FLD-CCNC: 17 U/L DA — SIGNIFICANT CHANGE UP (ref 10–60)
ANION GAP SERPL CALC-SCNC: 15 MMOL/L — SIGNIFICANT CHANGE UP (ref 5–17)
APPEARANCE UR: CLEAR — SIGNIFICANT CHANGE UP
AST SERPL-CCNC: 19 U/L — SIGNIFICANT CHANGE UP (ref 10–40)
BASOPHILS # BLD AUTO: 0.1 K/UL — SIGNIFICANT CHANGE UP (ref 0–0.2)
BASOPHILS NFR BLD AUTO: 0.9 % — SIGNIFICANT CHANGE UP (ref 0–2)
BILIRUB SERPL-MCNC: 1.1 MG/DL — SIGNIFICANT CHANGE UP (ref 0.2–1.2)
BILIRUB UR-MCNC: NEGATIVE — SIGNIFICANT CHANGE UP
BUN SERPL-MCNC: 13 MG/DL — SIGNIFICANT CHANGE UP (ref 7–18)
CALCIUM SERPL-MCNC: 10.1 MG/DL — SIGNIFICANT CHANGE UP (ref 8.4–10.5)
CHLORIDE SERPL-SCNC: 110 MMOL/L — HIGH (ref 96–108)
CO2 SERPL-SCNC: 15 MMOL/L — LOW (ref 22–31)
COLOR SPEC: YELLOW — SIGNIFICANT CHANGE UP
CREAT SERPL-MCNC: 1.21 MG/DL — SIGNIFICANT CHANGE UP (ref 0.5–1.3)
DIFF PNL FLD: ABNORMAL
EOSINOPHIL # BLD AUTO: 0 K/UL — SIGNIFICANT CHANGE UP (ref 0–0.5)
EOSINOPHIL NFR BLD AUTO: 0 % — SIGNIFICANT CHANGE UP (ref 0–6)
GLUCOSE SERPL-MCNC: 121 MG/DL — HIGH (ref 70–99)
GLUCOSE UR QL: NEGATIVE — SIGNIFICANT CHANGE UP
HCG SERPL-ACNC: <1 MIU/ML — SIGNIFICANT CHANGE UP
HCT VFR BLD CALC: 48.3 % — HIGH (ref 34.5–45)
HGB BLD-MCNC: 15.6 G/DL — HIGH (ref 11.5–15.5)
KETONES UR-MCNC: ABNORMAL
LACTATE SERPL-SCNC: 3.9 MMOL/L — HIGH (ref 0.7–2)
LEUKOCYTE ESTERASE UR-ACNC: ABNORMAL
LYMPHOCYTES # BLD AUTO: 1.3 K/UL — SIGNIFICANT CHANGE UP (ref 1–3.3)
LYMPHOCYTES # BLD AUTO: 12.4 % — LOW (ref 13–44)
MCHC RBC-ENTMCNC: 29.1 PG — SIGNIFICANT CHANGE UP (ref 27–34)
MCHC RBC-ENTMCNC: 32.4 GM/DL — SIGNIFICANT CHANGE UP (ref 32–36)
MCV RBC AUTO: 89.9 FL — SIGNIFICANT CHANGE UP (ref 80–100)
MONOCYTES # BLD AUTO: 0.4 K/UL — SIGNIFICANT CHANGE UP (ref 0–0.9)
MONOCYTES NFR BLD AUTO: 4.3 % — SIGNIFICANT CHANGE UP (ref 2–14)
NEUTROPHILS # BLD AUTO: 8.3 K/UL — HIGH (ref 1.8–7.4)
NEUTROPHILS NFR BLD AUTO: 82.4 % — HIGH (ref 43–77)
NITRITE UR-MCNC: NEGATIVE — SIGNIFICANT CHANGE UP
PH UR: 5 — SIGNIFICANT CHANGE UP (ref 5–8)
PLATELET # BLD AUTO: 279 K/UL — SIGNIFICANT CHANGE UP (ref 150–400)
POTASSIUM SERPL-MCNC: 3 MMOL/L — LOW (ref 3.5–5.3)
POTASSIUM SERPL-SCNC: 3 MMOL/L — LOW (ref 3.5–5.3)
PROT SERPL-MCNC: 8.5 G/DL — HIGH (ref 6–8.3)
PROT UR-MCNC: 100
RAPID RVP RESULT: SIGNIFICANT CHANGE UP
RBC # BLD: 5.37 M/UL — HIGH (ref 3.8–5.2)
RBC # FLD: 12.3 % — SIGNIFICANT CHANGE UP (ref 10.3–14.5)
SODIUM SERPL-SCNC: 140 MMOL/L — SIGNIFICANT CHANGE UP (ref 135–145)
SP GR SPEC: 1.01 — SIGNIFICANT CHANGE UP (ref 1.01–1.02)
UROBILINOGEN FLD QL: NEGATIVE — SIGNIFICANT CHANGE UP
WBC # BLD: 10.1 K/UL — SIGNIFICANT CHANGE UP (ref 3.8–10.5)
WBC # FLD AUTO: 10.1 K/UL — SIGNIFICANT CHANGE UP (ref 3.8–10.5)

## 2018-05-03 PROCEDURE — 71045 X-RAY EXAM CHEST 1 VIEW: CPT | Mod: 26

## 2018-05-03 PROCEDURE — 99285 EMERGENCY DEPT VISIT HI MDM: CPT

## 2018-05-03 PROCEDURE — 74018 RADEX ABDOMEN 1 VIEW: CPT | Mod: 26

## 2018-05-03 RX ORDER — SODIUM CHLORIDE 9 MG/ML
1000 INJECTION INTRAMUSCULAR; INTRAVENOUS; SUBCUTANEOUS ONCE
Qty: 0 | Refills: 0 | Status: COMPLETED | OUTPATIENT
Start: 2018-05-03 | End: 2018-05-03

## 2018-05-03 RX ORDER — ACETAMINOPHEN 500 MG
1000 TABLET ORAL ONCE
Qty: 0 | Refills: 0 | Status: COMPLETED | OUTPATIENT
Start: 2018-05-03 | End: 2018-05-03

## 2018-05-03 RX ORDER — METOCLOPRAMIDE HCL 10 MG
10 TABLET ORAL ONCE
Qty: 0 | Refills: 0 | Status: COMPLETED | OUTPATIENT
Start: 2018-05-03 | End: 2018-05-03

## 2018-05-03 RX ORDER — POTASSIUM CHLORIDE 20 MEQ
10 PACKET (EA) ORAL
Qty: 0 | Refills: 0 | Status: COMPLETED | OUTPATIENT
Start: 2018-05-03 | End: 2018-05-03

## 2018-05-03 RX ORDER — ONDANSETRON 8 MG/1
4 TABLET, FILM COATED ORAL ONCE
Qty: 0 | Refills: 0 | Status: COMPLETED | OUTPATIENT
Start: 2018-05-03 | End: 2018-05-03

## 2018-05-03 RX ORDER — MORPHINE SULFATE 50 MG/1
1 CAPSULE, EXTENDED RELEASE ORAL ONCE
Qty: 0 | Refills: 0 | Status: DISCONTINUED | OUTPATIENT
Start: 2018-05-03 | End: 2018-05-03

## 2018-05-03 RX ADMIN — Medication 100 MILLIEQUIVALENT(S): at 21:18

## 2018-05-03 RX ADMIN — ONDANSETRON 4 MILLIGRAM(S): 8 TABLET, FILM COATED ORAL at 19:37

## 2018-05-03 RX ADMIN — Medication 100 MILLIEQUIVALENT(S): at 18:45

## 2018-05-03 RX ADMIN — Medication 10 MILLIGRAM(S): at 17:00

## 2018-05-03 RX ADMIN — SODIUM CHLORIDE 1000 MILLILITER(S): 9 INJECTION INTRAMUSCULAR; INTRAVENOUS; SUBCUTANEOUS at 17:00

## 2018-05-03 RX ADMIN — SODIUM CHLORIDE 1000 MILLILITER(S): 9 INJECTION INTRAMUSCULAR; INTRAVENOUS; SUBCUTANEOUS at 18:45

## 2018-05-03 RX ADMIN — Medication 1000 MILLIGRAM(S): at 21:19

## 2018-05-03 RX ADMIN — Medication 400 MILLIGRAM(S): at 21:19

## 2018-05-03 RX ADMIN — ONDANSETRON 4 MILLIGRAM(S): 8 TABLET, FILM COATED ORAL at 18:45

## 2018-05-03 RX ADMIN — MORPHINE SULFATE 1 MILLIGRAM(S): 50 CAPSULE, EXTENDED RELEASE ORAL at 23:41

## 2018-05-03 NOTE — ED PROVIDER NOTE - OBJECTIVE STATEMENT
48 y/o F pt with with PMHx of AFib, Cyclical Vomiting, HTN, RSD, Renal Failure (Stage 3), and SLE and PSHx of Partial Thyroidectomy and Partial Hysterectomy presents to ED c/o fever and chills since yesterday. Pt also reports associated cough and abdominal pain. Pt denies any other complaints. Allergies: Aspirin (unknown), Sulfa Drugs (unknown), Latex (unknown).

## 2018-05-03 NOTE — ED ADULT NURSE NOTE - ED STAT RN HANDOFF DETAILS 3
Received from Elda LAWRENCE from [facility]. Patient noted actively seizing. Placed on cardiac monitor.

## 2018-05-03 NOTE — ED ADULT NURSE NOTE - ED STAT RN HANDOFF DETAILS 2
Pt endorsed to MELINDA Schroeder. Pt medicated per order for pain. Dr praneeth stahl texted via amion for the high blood pressure of the patient. Pt saturating well with nasal canula 2 liters. pt not in sharon acute distress.

## 2018-05-03 NOTE — ED PROVIDER NOTE - MEDICAL DECISION MAKING DETAILS
will check labs, ct, cxr, likely exacerbation of chronic medical conditions, may require admission as has required admission in the past.

## 2018-05-03 NOTE — ED ADULT NURSE NOTE - ED STAT RN HANDOFF DETAILS
no changes noted c/o chills abd pain nausea and vomiting MD Trejo made aware medicated as ordered Pt care endorsed to Viviana LAWRENCE

## 2018-05-03 NOTE — ED ADULT NURSE NOTE - OBJECTIVE STATEMENT
BIBA alert anxious attached to N/C reports having difficulty in breathing and chills Pt noted diaphoretic vomited x2

## 2018-05-03 NOTE — ED PROVIDER NOTE - CARE PLAN
Principal Discharge DX:	Cyclical vomiting  Secondary Diagnosis:	Reflex sympathetic dystrophy  Secondary Diagnosis:	Renal failure, chronic, stage 3 (moderate)

## 2018-05-04 DIAGNOSIS — I24.9 ACUTE ISCHEMIC HEART DISEASE, UNSPECIFIED: ICD-10-CM

## 2018-05-04 DIAGNOSIS — N18.3 CHRONIC KIDNEY DISEASE, STAGE 3 (MODERATE): ICD-10-CM

## 2018-05-04 DIAGNOSIS — I48.91 UNSPECIFIED ATRIAL FIBRILLATION: ICD-10-CM

## 2018-05-04 DIAGNOSIS — G90.50 COMPLEX REGIONAL PAIN SYNDROME I, UNSPECIFIED: ICD-10-CM

## 2018-05-04 DIAGNOSIS — G43.A1 CYCLICAL VOMITING, IN MIGRAINE, INTRACTABLE: ICD-10-CM

## 2018-05-04 DIAGNOSIS — R56.9 UNSPECIFIED CONVULSIONS: ICD-10-CM

## 2018-05-04 DIAGNOSIS — I10 ESSENTIAL (PRIMARY) HYPERTENSION: ICD-10-CM

## 2018-05-04 DIAGNOSIS — Z29.9 ENCOUNTER FOR PROPHYLACTIC MEASURES, UNSPECIFIED: ICD-10-CM

## 2018-05-04 LAB
ANION GAP SERPL CALC-SCNC: 8 MMOL/L — SIGNIFICANT CHANGE UP (ref 5–17)
BASOPHILS # BLD AUTO: 0.1 K/UL — SIGNIFICANT CHANGE UP (ref 0–0.2)
BASOPHILS NFR BLD AUTO: 0.9 % — SIGNIFICANT CHANGE UP (ref 0–2)
BUN SERPL-MCNC: 11 MG/DL — SIGNIFICANT CHANGE UP (ref 7–18)
CALCIUM SERPL-MCNC: 8.9 MG/DL — SIGNIFICANT CHANGE UP (ref 8.4–10.5)
CHLORIDE SERPL-SCNC: 109 MMOL/L — HIGH (ref 96–108)
CK MB BLD-MCNC: 1.3 % — SIGNIFICANT CHANGE UP (ref 0–3.5)
CK MB CFR SERPL CALC: 1.6 NG/ML — SIGNIFICANT CHANGE UP (ref 0–3.6)
CK SERPL-CCNC: 125 U/L — SIGNIFICANT CHANGE UP (ref 21–215)
CO2 SERPL-SCNC: 23 MMOL/L — SIGNIFICANT CHANGE UP (ref 22–31)
CREAT SERPL-MCNC: 1.11 MG/DL — SIGNIFICANT CHANGE UP (ref 0.5–1.3)
CULTURE RESULTS: SIGNIFICANT CHANGE UP
EOSINOPHIL # BLD AUTO: 0 K/UL — SIGNIFICANT CHANGE UP (ref 0–0.5)
EOSINOPHIL NFR BLD AUTO: 0 % — SIGNIFICANT CHANGE UP (ref 0–6)
GLUCOSE SERPL-MCNC: 111 MG/DL — HIGH (ref 70–99)
HCT VFR BLD CALC: 47.5 % — HIGH (ref 34.5–45)
HGB BLD-MCNC: 15.1 G/DL — SIGNIFICANT CHANGE UP (ref 11.5–15.5)
LACTATE SERPL-SCNC: 1 MMOL/L — SIGNIFICANT CHANGE UP (ref 0.7–2)
LYMPHOCYTES # BLD AUTO: 1.3 K/UL — SIGNIFICANT CHANGE UP (ref 1–3.3)
LYMPHOCYTES # BLD AUTO: 9.1 % — LOW (ref 13–44)
MAGNESIUM SERPL-MCNC: 1.8 MG/DL — SIGNIFICANT CHANGE UP (ref 1.6–2.6)
MCHC RBC-ENTMCNC: 28.9 PG — SIGNIFICANT CHANGE UP (ref 27–34)
MCHC RBC-ENTMCNC: 31.9 GM/DL — LOW (ref 32–36)
MCV RBC AUTO: 90.6 FL — SIGNIFICANT CHANGE UP (ref 80–100)
MONOCYTES # BLD AUTO: 1.1 K/UL — HIGH (ref 0–0.9)
MONOCYTES NFR BLD AUTO: 7.6 % — SIGNIFICANT CHANGE UP (ref 2–14)
NEUTROPHILS # BLD AUTO: 11.5 K/UL — HIGH (ref 1.8–7.4)
NEUTROPHILS NFR BLD AUTO: 82.3 % — HIGH (ref 43–77)
PHOSPHATE SERPL-MCNC: 2.7 MG/DL — SIGNIFICANT CHANGE UP (ref 2.5–4.5)
PLATELET # BLD AUTO: 248 K/UL — SIGNIFICANT CHANGE UP (ref 150–400)
POTASSIUM SERPL-MCNC: 3.6 MMOL/L — SIGNIFICANT CHANGE UP (ref 3.5–5.3)
POTASSIUM SERPL-SCNC: 3.6 MMOL/L — SIGNIFICANT CHANGE UP (ref 3.5–5.3)
RBC # BLD: 5.24 M/UL — HIGH (ref 3.8–5.2)
RBC # FLD: 12.6 % — SIGNIFICANT CHANGE UP (ref 10.3–14.5)
SODIUM SERPL-SCNC: 140 MMOL/L — SIGNIFICANT CHANGE UP (ref 135–145)
SPECIMEN SOURCE: SIGNIFICANT CHANGE UP
T4 AB SER-ACNC: 12.1 UG/DL — HIGH (ref 4.6–12)
TROPONIN I SERPL-MCNC: <0.015 NG/ML — SIGNIFICANT CHANGE UP (ref 0–0.04)
TSH SERPL-MCNC: 0.02 UU/ML — LOW (ref 0.34–4.82)
WBC # BLD: 13.9 K/UL — HIGH (ref 3.8–10.5)
WBC # FLD AUTO: 13.9 K/UL — HIGH (ref 3.8–10.5)

## 2018-05-04 PROCEDURE — 99223 1ST HOSP IP/OBS HIGH 75: CPT

## 2018-05-04 PROCEDURE — 95819 EEG AWAKE AND ASLEEP: CPT | Mod: 26

## 2018-05-04 RX ORDER — OXYCODONE AND ACETAMINOPHEN 5; 325 MG/1; MG/1
2 TABLET ORAL EVERY 6 HOURS
Qty: 0 | Refills: 0 | Status: DISCONTINUED | OUTPATIENT
Start: 2018-05-04 | End: 2018-05-07

## 2018-05-04 RX ORDER — CEFTRIAXONE 500 MG/1
1 INJECTION, POWDER, FOR SOLUTION INTRAMUSCULAR; INTRAVENOUS EVERY 24 HOURS
Qty: 0 | Refills: 0 | Status: DISCONTINUED | OUTPATIENT
Start: 2018-05-05 | End: 2018-05-07

## 2018-05-04 RX ORDER — PANTOPRAZOLE SODIUM 20 MG/1
40 TABLET, DELAYED RELEASE ORAL
Qty: 0 | Refills: 0 | Status: DISCONTINUED | OUTPATIENT
Start: 2018-05-04 | End: 2018-05-07

## 2018-05-04 RX ORDER — ESCITALOPRAM OXALATE 10 MG/1
20 TABLET, FILM COATED ORAL DAILY
Qty: 0 | Refills: 0 | Status: DISCONTINUED | OUTPATIENT
Start: 2018-05-04 | End: 2018-05-07

## 2018-05-04 RX ORDER — SODIUM CHLORIDE 9 MG/ML
1000 INJECTION INTRAMUSCULAR; INTRAVENOUS; SUBCUTANEOUS
Qty: 0 | Refills: 0 | Status: DISCONTINUED | OUTPATIENT
Start: 2018-05-04 | End: 2018-05-07

## 2018-05-04 RX ORDER — AMLODIPINE BESYLATE 2.5 MG/1
2.5 TABLET ORAL DAILY
Qty: 0 | Refills: 0 | Status: DISCONTINUED | OUTPATIENT
Start: 2018-05-04 | End: 2018-05-07

## 2018-05-04 RX ORDER — ATORVASTATIN CALCIUM 80 MG/1
20 TABLET, FILM COATED ORAL AT BEDTIME
Qty: 0 | Refills: 0 | Status: DISCONTINUED | OUTPATIENT
Start: 2018-05-04 | End: 2018-05-07

## 2018-05-04 RX ORDER — MORPHINE SULFATE 50 MG/1
60 CAPSULE, EXTENDED RELEASE ORAL EVERY 12 HOURS
Qty: 0 | Refills: 0 | Status: DISCONTINUED | OUTPATIENT
Start: 2018-05-04 | End: 2018-05-07

## 2018-05-04 RX ORDER — CEFTRIAXONE 500 MG/1
1 INJECTION, POWDER, FOR SOLUTION INTRAMUSCULAR; INTRAVENOUS ONCE
Qty: 0 | Refills: 0 | Status: COMPLETED | OUTPATIENT
Start: 2018-05-04 | End: 2018-05-04

## 2018-05-04 RX ORDER — METOCLOPRAMIDE HCL 10 MG
5 TABLET ORAL EVERY 6 HOURS
Qty: 0 | Refills: 0 | Status: DISCONTINUED | OUTPATIENT
Start: 2018-05-04 | End: 2018-05-07

## 2018-05-04 RX ORDER — DRONABINOL 2.5 MG
5 CAPSULE ORAL EVERY 12 HOURS
Qty: 0 | Refills: 0 | Status: DISCONTINUED | OUTPATIENT
Start: 2018-05-04 | End: 2018-05-04

## 2018-05-04 RX ORDER — POTASSIUM CHLORIDE 20 MEQ
40 PACKET (EA) ORAL EVERY 4 HOURS
Qty: 0 | Refills: 0 | Status: COMPLETED | OUTPATIENT
Start: 2018-05-04 | End: 2018-05-04

## 2018-05-04 RX ORDER — MORPHINE SULFATE 50 MG/1
60 CAPSULE, EXTENDED RELEASE ORAL EVERY 12 HOURS
Qty: 0 | Refills: 0 | Status: DISCONTINUED | OUTPATIENT
Start: 2018-05-04 | End: 2018-05-04

## 2018-05-04 RX ORDER — DRONABINOL 2.5 MG
5 CAPSULE ORAL DAILY
Qty: 0 | Refills: 0 | Status: DISCONTINUED | OUTPATIENT
Start: 2018-05-04 | End: 2018-05-04

## 2018-05-04 RX ORDER — CEFTRIAXONE 500 MG/1
INJECTION, POWDER, FOR SOLUTION INTRAMUSCULAR; INTRAVENOUS
Qty: 0 | Refills: 0 | Status: DISCONTINUED | OUTPATIENT
Start: 2018-05-04 | End: 2018-05-07

## 2018-05-04 RX ORDER — ACETAMINOPHEN 500 MG
1000 TABLET ORAL ONCE
Qty: 0 | Refills: 0 | Status: COMPLETED | OUTPATIENT
Start: 2018-05-04 | End: 2018-05-04

## 2018-05-04 RX ORDER — ENOXAPARIN SODIUM 100 MG/ML
40 INJECTION SUBCUTANEOUS DAILY
Qty: 0 | Refills: 0 | Status: DISCONTINUED | OUTPATIENT
Start: 2018-05-04 | End: 2018-05-04

## 2018-05-04 RX ORDER — METOPROLOL TARTRATE 50 MG
25 TABLET ORAL
Qty: 0 | Refills: 0 | Status: DISCONTINUED | OUTPATIENT
Start: 2018-05-04 | End: 2018-05-07

## 2018-05-04 RX ORDER — CLOPIDOGREL BISULFATE 75 MG/1
75 TABLET, FILM COATED ORAL DAILY
Qty: 0 | Refills: 0 | Status: DISCONTINUED | OUTPATIENT
Start: 2018-05-04 | End: 2018-05-07

## 2018-05-04 RX ADMIN — Medication 2 MILLIGRAM(S): at 09:15

## 2018-05-04 RX ADMIN — Medication 5 MILLIGRAM(S): at 03:17

## 2018-05-04 RX ADMIN — Medication 400 MILLIGRAM(S): at 10:46

## 2018-05-04 RX ADMIN — SODIUM CHLORIDE 100 MILLILITER(S): 9 INJECTION INTRAMUSCULAR; INTRAVENOUS; SUBCUTANEOUS at 10:46

## 2018-05-04 RX ADMIN — OXYCODONE AND ACETAMINOPHEN 2 TABLET(S): 5; 325 TABLET ORAL at 03:13

## 2018-05-04 RX ADMIN — PANTOPRAZOLE SODIUM 40 MILLIGRAM(S): 20 TABLET, DELAYED RELEASE ORAL at 07:24

## 2018-05-04 RX ADMIN — Medication 40 MILLIEQUIVALENT(S): at 10:47

## 2018-05-04 RX ADMIN — AMLODIPINE BESYLATE 2.5 MILLIGRAM(S): 2.5 TABLET ORAL at 03:54

## 2018-05-04 RX ADMIN — CEFTRIAXONE 100 GRAM(S): 500 INJECTION, POWDER, FOR SOLUTION INTRAMUSCULAR; INTRAVENOUS at 13:04

## 2018-05-04 RX ADMIN — Medication 300 MILLIGRAM(S): at 07:24

## 2018-05-04 RX ADMIN — MORPHINE SULFATE 60 MILLIGRAM(S): 50 CAPSULE, EXTENDED RELEASE ORAL at 17:36

## 2018-05-04 RX ADMIN — ATORVASTATIN CALCIUM 20 MILLIGRAM(S): 80 TABLET, FILM COATED ORAL at 23:25

## 2018-05-04 RX ADMIN — OXYCODONE AND ACETAMINOPHEN 2 TABLET(S): 5; 325 TABLET ORAL at 23:26

## 2018-05-04 RX ADMIN — Medication 40 MILLIEQUIVALENT(S): at 07:25

## 2018-05-04 RX ADMIN — MORPHINE SULFATE 1 MILLIGRAM(S): 50 CAPSULE, EXTENDED RELEASE ORAL at 00:54

## 2018-05-04 RX ADMIN — OXYCODONE AND ACETAMINOPHEN 2 TABLET(S): 5; 325 TABLET ORAL at 04:31

## 2018-05-04 RX ADMIN — Medication 25 MILLIGRAM(S): at 04:25

## 2018-05-04 RX ADMIN — Medication 5 MILLIGRAM(S): at 05:16

## 2018-05-04 RX ADMIN — CLOPIDOGREL BISULFATE 75 MILLIGRAM(S): 75 TABLET, FILM COATED ORAL at 13:01

## 2018-05-04 RX ADMIN — Medication 2 MILLIGRAM(S): at 08:43

## 2018-05-04 RX ADMIN — Medication 300 MILLIGRAM(S): at 17:36

## 2018-05-04 RX ADMIN — MORPHINE SULFATE 60 MILLIGRAM(S): 50 CAPSULE, EXTENDED RELEASE ORAL at 18:00

## 2018-05-04 RX ADMIN — Medication 5 MILLIGRAM(S): at 17:35

## 2018-05-04 RX ADMIN — Medication 25 MILLIGRAM(S): at 17:36

## 2018-05-04 RX ADMIN — ESCITALOPRAM OXALATE 20 MILLIGRAM(S): 10 TABLET, FILM COATED ORAL at 13:01

## 2018-05-04 RX ADMIN — SODIUM CHLORIDE 100 MILLILITER(S): 9 INJECTION INTRAMUSCULAR; INTRAVENOUS; SUBCUTANEOUS at 23:30

## 2018-05-04 NOTE — H&P ADULT - FAMILY HISTORY
Sibling  Still living? Yes, Estimated age: Age Unknown  Family history of seizure disorder, Age at diagnosis: Age Unknown

## 2018-05-04 NOTE — H&P ADULT - PMH
Atrial fibrillation    Cyclical vomiting    HTN (hypertension)    Reflex sympathetic dystrophy    Renal failure, chronic, stage 3 (moderate)    SLE (systemic lupus erythematosus)

## 2018-05-04 NOTE — H&P ADULT - PROBLEM SELECTOR PLAN 2
Patient presented with intermittent chest pain, has hx of afib in past.  -echocardiogram many years ago, never had an stress test   -cardiac enzymes x 1 negative. trend cardiac enzyme x 3  -f/u EKG . Last EKG from sept 2017 was NSR, with incomplete RBBB, LVH and left atrial enlargement.   -f/u echocardiogram  -Telemetry monitoring   -c/w plavix, atorvastatin and BB Patient presented with intermittent chest pain, has hx of afib in past.  -echocardiogram many years ago, never had an stress test   -cardiac enzymes x 1 negative. trend cardiac enzyme x 3  -f/u EKG . Last EKG from sept 2017 was NSR, with incomplete RBBB, LVH and left atrial enlargement.   -f/u echocardiogram  -Telemetry monitoring   -c/w plavix, atorvastatin and BB  -cardiology consult Dr jorge appreciated

## 2018-05-04 NOTE — H&P ADULT - HISTORY OF PRESENT ILLNESS
Patient is a 48 y/o F lives with her sister from home ambulates with walker, quit smoking 6 months ago, PMH Reflex sympathetic dystrophy s/p spinal stimulator, frequent falls, SLE (no treatment at this time), cyclical vomiting syndrome, Afib (not on AC due to frequent falls), HTN, depression, Hemorrhoids (EGD / colonoscopy 3-4 years ago), diverticulosis, and partial thyroidectomy on right side, presented with c/o vomiting and diarrhea for a days a/w abdominal pain. As per patient she has cyclical vomiting syndrome and has 5-6 episodes in a year, last episode in sept 2017. Her vomiting is constant, multiple episodes, watery , non bloody , greenish colored. un able to keep food and medication down. She also c/o loose watery diarrhea, >5-6 times a day, with streaks of bright red blood in it for a few months which she relates to diverticulosis, with generalized abdominal pain, in the middle of her abdomen, improvement with MS contin and morphine, gets worse with eating, describes the quality as sharp ache and intermittent, 10/10. Also has subjective fever with chills since yesterday. ROS negative except above, denies any recent travel, consumption of spoilt food, no one else is sick around her, except for herself. patient also reported that a seizure occurred yesterday witnessed by her sister, patient has generalized shaky movements with fecal incontinence. patient was confused for 30 mins after the seizure. She never had similar episode in past. Patient denied any weakness, loss of consciousness, headache, numbness , tremors, trauma to head, vision changes.      FHx of DM and HTN in both parents, SHx of partial thyroidectomy and appendectomy, current everyday smoker, no alcohol abuse, Allergies as mentioned in allergy summary  GOC: Full code Patient is a 48 y/o F lives with her sister from home ambulates with walker,  quit smoking 6 months ago, PMH cyclic vomiting syndrome, colitis, Reflex sympathetic dystrophy s/p spinal stimulator, frequent falls, SLE (not on rx), cyclical vomiting syndrome, Afib (not on AC due to frequent falls), HTN, depression, Hemorrhoids (EGD / colonoscopy 3-4 years ago), diverticulosis, and partial thyroidectomy on right side, presented with c/o Abd pain, vomiting, diarrhea & SOB. Pt has hx of similar episodes x5 times per year. last in sept 2017. Her vomiting is constant, multiple episodes, watery , non bloody , greenish colored. un able to keep food and medication down. She also c/o loose watery diarrhea, >5-6 times a day, with streaks of bright red blood in it for a few months which she relates to diverticulosis, Her abdominal pain is sharp, 10/10 intensity, intermittent, in epigastrium, aggravates with eating, relieved with opioids. Also has subjective fever with chills since yesterday. ROS negative except above, denies any recent travel, consumption of spoilt food, no one else is sick around her, except for herself. patient also reported that a seizure occurred yesterday witnessed by her sister, patient has generalized shaky movements with fecal incontinence. patient was confused for 30 mins after the seizure. She never had similar episode in past. Patient denied any weakness, loss of consciousness, headache, numbness , tremors, trauma to head, vision changes.      In ED patient Patient's vitals were stable. received a dose of zofran and IV potassium. Patient's cardiac enzymes negative x 1. CXR clear. pt refused CT abdomen as she thinks she has multiple CTs in past. X ray abdomen doesn't have normal bowel gas pattern, no air fluid level, pending official read Patient is a 46 y/o F lives with her sister from home ambulates with walker,  quit smoking 6 months ago, PMH cyclic vomiting syndrome, colitis, Reflex sympathetic dystrophy s/p spinal stimulator, frequent falls, SLE (not on rx), cyclical vomiting syndrome, Afib (not on AC due to frequent falls), HTN, depression, Hemorrhoids (EGD / colonoscopy 3-4 years ago), diverticulosis, and partial thyroidectomy on right side, presented with c/o Abd pain, vomiting, diarrhea, SOB and intermittent chest pain.. Pt has hx of similar episodes x5 times per year. last in sept 2017. Her vomiting is constant, multiple episodes, watery , non bloody , greenish colored. un able to keep food and medication down. She also c/o loose watery diarrhea, >5-6 times a day, with streaks of bright red blood in it for a few months which she relates to diverticulosis, Her abdominal pain is sharp, 10/10 intensity, intermittent, in epigastrium, aggravates with eating, relieved with opioids. Also has subjective fever with chills since yesterday. ROS negative except above, denies any recent travel, consumption of spoilt food, no one else is sick around her, except for herself. patient also reported that a seizure occurred yesterday witnessed by her sister, patient has generalized shaky movements with fecal incontinence. patient was confused for 30 mins after the seizure. She never had similar episode in past. Patient denied any weakness, loss of consciousness, headache, numbness , tremors, trauma to head, vision changes.      In ED patient Patient's vitals were stable. received a dose of zofran and IV potassium. Patient's cardiac enzymes negative x 1. CXR clear. pt refused CT abdomen as she thinks she has multiple CTs in past. X ray abdomen doesn't have normal bowel gas pattern, no air fluid level, pending official read. Patient had an echocardiogram many years ago. never had a stress test done.

## 2018-05-04 NOTE — EEG REPORT - NS EEG TEXT BOX
[facility]  Report of Routine EEG     [facility]: [address] [place] [address], Phone: [phone]  [facility]: [address] [place] [address], Phone: [phone]  Office: [address] [facility], [facility] [address], [place] [address], Phone: [phone]    Patient Name: JOSE VILLALBA    Age: 47 y  : 1970  Referring Physician: DR BARRIOS    EEG #:   Study Date: 2018		    Technical Information:					  Placement and Labeling of Electrodes:  The EEG was performed utilizing 20 channels referential EEG connections (coronal over temporal over parasagittal montage) using all standard 10-20 electrode placements with EKG.  Recording was at a sampling rate of 256 samples per second per channel.  Time synchronized digital video recording was done simultaneously with EEG recording.  A low light infrared camera was used for low light recording.  Fish and seizure detection algorithms were utilized.    History:  CONCERN FOR SEIZURES    Medication	  Lyrica 	    Study Interpretation:    FINDINGS:  The background was continuous, spontaneously variable and reactive. During wakefulness, the posteriorly dominant rhythm consisted of 8 Hz activity, with amplitude to 30 uV, that attenuated to eye opening. Low amplitude frontal beta was noted in wakefulness.    Background Slowing:  Diffuse polymorphic theta and delta activities .    Focal Slowing:   Intermittent theta and polymorphic delta activity over the left temporal region     Sleep Background:  Drowsiness was characterized by fragmentation, attenuation, and slowing of the background activity.    Sleep was characterized by the presence of symmetric spindles, and K-complexes.    Other Non-Epileptiform Findings:  Intermittent generalized rhythmic delta at 1-1.5 Hz    Interictal Epileptiform Activity:   None were present.    Events:  Clinical events: None recorded.  Seizures: None recorded.    Activation Procedures:   Hyperventilation was not performed.    Photic stimulation was not performed.    Artifacts:  Intermittent myogenic and movement artifacts were noted.    ECG:  The heart rate on single channel ECG was predominantly between 65-75 BPM.    EEG Summary / Classification:  Abnormal EEG in the awake, drowsy, and asleep states.  - Intermittent theta and polymorphic delta activity over the left temporal region   - Mild background slowing     EEG Impression / Clinical Correlate:  Abnormal EEG in the awake, drowsy, and asleep states.  1.	Functional abnormality in the left temporal region   2.	Mild non-specific diffuse or multifocal cerebral dysfunction   3.	There were no epileptiform abnormalities recorded.        Alka Ramos MD  ________________________________________  Neurophysiology/Epilepsy Fellow, [facility] [place] [facility]  Report of Routine EEG     [facility]: [address] [place] [address], Phone: [phone]  [facility]: [address] [place] [address], Phone: [phone]  Office: 1 [facility], [facility] [address], [place] [address], Phone: [phone]    Patient Name: JOSE VILLALBA    Age: 47 y  : 1970  Referring Physician: DR BARRIOS    EEG #:   Study Date: 2018		    Technical Information:					  Placement and Labeling of Electrodes:  The EEG was performed utilizing 20 channels referential EEG connections (coronal over temporal over parasagittal montage) using all standard 10-20 electrode placements with EKG.  Recording was at a sampling rate of 256 samples per second per channel.  Time synchronized digital video recording was done simultaneously with EEG recording.  A low light infrared camera was used for low light recording.  Fish and seizure detection algorithms were utilized.    History:  CONCERN FOR SEIZURES    Medication	  Lyrica 	    Study Interpretation:    FINDINGS:  The background was continuous, spontaneously variable and reactive. During wakefulness, the posteriorly dominant rhythm consisted of 8 Hz activity, with amplitude to 30 uV, that attenuated to eye opening. Low amplitude frontal beta was noted in wakefulness.    Background Slowing:  Diffuse polymorphic theta and delta activities .    Focal Slowing:   Intermittent theta and polymorphic delta activity over the left temporal region     Sleep Background:  Drowsiness was characterized by fragmentation, attenuation, and slowing of the background activity.    Sleep was characterized by the presence of symmetric spindles, and K-complexes.    Other Non-Epileptiform Findings:  Intermittent generalized rhythmic delta at 1-1.5 Hz    Interictal Epileptiform Activity:   None were present.    Events:  Clinical events: None recorded.  Seizures: None recorded.    Activation Procedures:   Hyperventilation was not performed.    Photic stimulation was not performed.    Artifacts:  Intermittent myogenic and movement artifacts were noted.    ECG:  The heart rate on single channel ECG was predominantly between 65-75 BPM.    EEG Summary / Classification:  Abnormal EEG in the awake, drowsy, and asleep states.  - Intermittent theta and polymorphic delta activity over the left temporal region   - Mild background slowing     EEG Impression / Clinical Correlate:  Abnormal EEG in the awake, drowsy, and asleep states.  1.	Functional abnormality in the left temporal region   2.	Mild non-specific diffuse or multifocal cerebral dysfunction   3.	There were no epileptiform abnormalities recorded.        Alka Ramos MD  ________________________________________  Neurophysiology/Epilepsy Fellow, [facility]    Hermes Healy MD

## 2018-05-04 NOTE — H&P ADULT - PROBLEM SELECTOR PLAN 4
-pt has hx of afib, takes plavix for AC, currently rate controlled  -c/w metoprolol 25 BID with parameters -pt has hx of afib, takes plavix for AC, currently rate controlled  -c/w metoprolol 25 BID with parameters  -Cardiology Dr jorge

## 2018-05-04 NOTE — H&P ADULT - PROBLEM SELECTOR PLAN 3
-Patient had an episode of witnessed seizure yesterday  -generalized shaking of body, with stool incontinence  -post ictal phase lasted 30 min  -Family hx of seizure in sister.   -f/u CT head   -f/u EEG   -neurology consult appreciated . will not give any seizure prophylaxis given its first episode of seizure. cannot rule out metabolic causes of seizures. will get CT head and look for foci of seizures.  -no neck rigidity, but subjective fevers, UA negative  -f/u blood cx x 2. -Patient had an episode of witnessed seizure yesterday  -generalized shaking of body, with stool incontinence  -post ictal phase lasted 30 min  -Family hx of seizure in sister.   -f/u CT head   -f/u EEG   -neurology consult appreciated . will not give any seizure prophylaxis given its first episode of seizure. cannot rule out metabolic causes of seizures. will get CT head and look for foci of seizures.  -no neck rigidity, but subjective fevers, UA negative  -f/u blood cx x 2.  -will dc drobinol for risk of seizures -Patient had an episode of witnessed seizure yesterday  -generalized shaking of body, with stool incontinence  -post ictal phase lasted 30 min  -Family hx of seizure in sister.   -f/u CT head   -f/u EEG   -neurology consult appreciated . will not give any seizure prophylaxis given its first episode of seizure. cannot rule out metabolic causes of seizures. will get CT head and look for foci of seizures.  -no neck rigidity, but subjective fevers, UA negative  -f/u blood cx x 2.  -will dc drobinol for risk of seizures  -Neuro consult Dr Elias appreciated

## 2018-05-04 NOTE — H&P ADULT - ATTENDING COMMENTS
Patient is a 48 y/o F lives with her sister from home ambulates with walker,  quit smoking 6 months ago, PMH cyclic vomiting syndrome, colitis, Reflex sympathetic dystrophy s/p spinal stimulator, frequent falls, SLE (not on rx), cyclical vomiting syndrome, Afib (not on AC due to frequent falls), HTN, depression, Hemorrhoids (EGD / colonoscopy 3-4 years ago), diverticulosis, and partial thyroidectomy on right side, presented with c/o Abd pain, vomiting, diarrhea, SOB and intermittent chest pain.. Pt has hx of similar episodes x5 times per year. last in sept 2017. Her vomiting is constant, multiple episodes, watery , non bloody , greenish colored. un able to keep food and medication down. She also c/o loose watery diarrhea, >5-6 times a day, with streaks of bright red blood in it for a few months which she relates to diverticulosis, Her abdominal pain is sharp, 10/10 intensity, intermittent, in epigastrium, aggravates with eating, relieved with opioids. Also has subjective fever with chills since yesterday. ROS negative except above, denies any recent travel, consumption of spoilt food, no one else is sick around her, except for herself. patient also reported that a seizure occurred yesterday witnessed by her sister, patient has generalized shaky movements with fecal incontinence. patient was confused for 30 mins after the seizure. She never had similar episode in past. Patient denied any weakness, loss of consciousness, headache, numbness , tremors, trauma to head, vision changes.      In ED patient Patient's vitals were stable. received a dose of zofran and IV potassium. Patient's cardiac enzymes negative x 1. CXR clear. pt refused CT abdomen as she thinks she has multiple CTs in past. X ray abdomen doesn't have normal bowel gas pattern, no air fluid level, pending official read. Patient had an echocardiogram many years ago. never had a stress test done.       pt seen in bed, a+o x3, nad, vitals stable, physical exam reveals hiccups,no focal motor deficit, clear lungs, regular s1s2, abd soft nd, nt, bs+, ext no edema. labs and diagnostic test result reviewed.    assessment   --- syncope with seiz activity, r/o cns patho, chst pain r/o acs, r/o arythmia, uti, h/o left thyroid nodule, cyclic vomiting syndrome, colitis, Reflex sympathetic dystrophy s/p spinal stimulator, frequent falls, SLE (not on rx), Afib (not on AC due to frequent falls), HTN, depression, Hemorrhoids (EGD / colonoscopy 3-4 years ago), diverticulosis, and partial right thyroidectomy    plan  --  adm to tele, rocephin, aspirin, statin, zofran, ativan prn, cont preadmit home meds, gi and dvt profilaxis  cbc, bmp, mg, phos, lipid, tsh, ce q8 x3, stool ovs para, c diff, blood and ucx    echo    ct brain    ct abd pelv    cardio cons  neuro cons.

## 2018-05-04 NOTE — H&P ADULT - ASSESSMENT
patient is a 46 y/o F with PMH of cyclical vomiting syndrome, chronic pain, SLE, p/w with complaints of interactable vomiting, abdominal pain, diarrhea, shortness of breath, intermittent chest pain and an episode of seizure.

## 2018-05-04 NOTE — CONSULT NOTE ADULT - SUBJECTIVE AND OBJECTIVE BOX
Patient is a 47y old  Female who presents with a chief complaint of Abdominal pain, nausea vomiting and diarrhea (04 May 2018 02:58)      HPI:  Patient is a 46 y/o F lives with her sister from home ambulates with walker,  quit smoking 6 months ago, PMH cyclic vomiting syndrome, colitis, Reflex sympathetic dystrophy s/p spinal stimulator, frequent falls, SLE (not on rx), cyclical vomiting syndrome, Afib (not on AC due to frequent falls), HTN, depression, Hemorrhoids (EGD / colonoscopy 3-4 years ago), diverticulosis, and partial thyroidectomy on right side, presented with c/o Abd pain, vomiting, diarrhea, SOB and intermittent chest pain.. Pt has hx of similar episodes x5 times per year. last in 2017. Her vomiting is constant, multiple episodes, watery , non bloody , greenish colored. un able to keep food and medication down. She also c/o loose watery diarrhea, >5-6 times a day, with streaks of bright red blood in it for a few months which she relates to diverticulosis, Her abdominal pain is sharp, 10/10 intensity, intermittent, in epigastrium, aggravates with eating, relieved with opioids. Also has subjective fever with chills since yesterday. ROS negative except above, denies any recent travel, consumption of spoilt food, no one else is sick around her, except for herself. patient also reported that a seizure occurred yesterday witnessed by her sister, patient has generalized shaky movements with fecal incontinence. patient was confused for 30 mins after the seizure. She never had similar episode in past. Patient denied any weakness, loss of consciousness, headache, numbness , tremors, trauma to head, vision changes.      In ED patient Patient's vitals were stable. received a dose of zofran and IV potassium. Patient's cardiac enzymes negative x 1. CXR clear. pt refused CT abdomen as she thinks she has multiple CTs in past. X ray abdomen doesn't have normal bowel gas pattern, no air fluid level, pending official read. Patient had an echocardiogram many years ago. never had a stress test done. (04 May 2018 02:58)         The seizure is described as  Seizure onset at  The frequency of seizure is  The seizure is brought up by  The patient has been previously on     History of head trauma/ concussion:  History of meningitis:  History of febrile seizures:  Family history of seizures:    Neurological Review of Systems:  No difficulty with language.  No vision loss or double vision.  No dizziness, vertigo or new hearing loss.  No difficulty with speech or swallowing.  No focal weakness.  No focal sensory changes.  No numbness or tingling in the bilateral lower extremities.  No difficulty with balance.  No difficulty with ambulation.        MEDICATIONS  (STANDING):  acetaminophen  IVPB 1000 milliGRAM(s) IV Intermittent once  amLODIPine   Tablet 2.5 milliGRAM(s) Oral daily  atorvastatin 20 milliGRAM(s) Oral at bedtime  clopidogrel Tablet 75 milliGRAM(s) Oral daily  escitalopram 20 milliGRAM(s) Oral daily  LORazepam   Injectable 2 milliGRAM(s) IntraMuscular once  metoprolol tartrate 25 milliGRAM(s) Oral two times a day  pantoprazole    Tablet 40 milliGRAM(s) Oral before breakfast  potassium chloride    Tablet ER 40 milliEquivalent(s) Oral every 4 hours  pregabalin 300 milliGRAM(s) Oral two times a day  sodium chloride 0.9%. 1000 milliLiter(s) (100 mL/Hr) IV Continuous <Continuous>    MEDICATIONS  (PRN):  LORazepam   Injectable 2 milliGRAM(s) IV Push every 4 hours PRN seizure  metoclopramide Injectable 5 milliGRAM(s) IV Push every 6 hours PRN nausea vomiting  morphine ER Tablet 60 milliGRAM(s) Oral every 12 hours PRN severe chronic pain  oxyCODONE    5 mG/acetaminophen 325 mG 2 Tablet(s) Oral every 6 hours PRN Severe Pain (7 - 10)    Allergies    aspirin (Unknown)  latex (Unknown)  sulfa drugs (Unknown)    Intolerances      PAST MEDICAL & SURGICAL HISTORY:  Renal failure, chronic, stage 3 (moderate)  HTN (hypertension)  Cyclical vomiting  Atrial fibrillation  SLE (systemic lupus erythematosus)  Reflex sympathetic dystrophy  H/O partial thyroidectomy  S/P partial hysterectomy    FAMILY HISTORY:  Family history of seizure disorder (Sibling)    SOCIAL HISTORY: non smoker/ former smoker/ active smoker    Review of Systems:  Constitutional: No generalized weakness. No fevers or chills.                    Eyes, Ears, Mouth, Throat: No vision loss   Respiratory: No shortness of breath or cough.                                Cardiovascular: No chest pain or palpitations  Gastrointestinal: No nausea or vomiting.                                         Genitourinary: No urinary incontinence or burning on urination.  Musculoskeletal: No joint pain.                                                           Dermatologic: No rash.  Neurological: as per HPI                                                                      Psychiatric: No behavioral problems.  Endocrine: No known hypoglycemia.               Hematologic/Lymphatic: No easy bleeding.    O:  Vital Signs Last 24 Hrs  T(C): 37.1 (04 May 2018 08:23), Max: 37.4 (04 May 2018 05:15)  T(F): 98.7 (04 May 2018 08:23), Max: 99.4 (04 May 2018 05:15)  HR: 78 (04 May 2018 08:23) (62 - 89)  BP: 161/103 (04 May 2018 08:23) (110/98 - 169/114)  BP(mean): --  RR: 20 (04 May 2018 08:23) (18 - 22)  SpO2: 99% (04 May 2018 08:23) (98% - 100%)    General Exam:   General appearance: No acute distress                 Cardiovascular: Pedal dorsalis pulses intact bilaterally    Mental Status: Orientated to self, date and place.  Attention intact.  No dysarthria, aphasia or neglect.  Knowledge intact.  Registration intact.  Short and long term memory grossly intact.      Cranial Nerves: CN I - not tested.  PERRL, EOMI, VFF, no nystagmus or diplopia.  No APD.  Fundi not visualized.  CN V1-3 intact to light touch and pinprick.  No facial asymmetry.  Hearing intact to finger rub bilaterally.  Tongue, uvula and palate midline.  Sternocleidomastoid and Trapezius intact bilaterally.    Motor:   Tone: normal.                  Strength intact throughout  No pronator drift bilaterally                      No dysmetria on finger-nose-finger or heel-shin-heel  No truncal ataxia.  No resting, postural or action tremor.  No myoclonus.    Sensation: intact to light touch, pinprick, vibration and proprioception    Deep Tendon Reflexes: 1+ bilateral biceps, triceps, brachioradialis, knee and ankle  Toes flexor bilaterally    Gait: normal and stable.  Rhomberg -lynn.    Other:     LABS:                        15.6   10.1  )-----------( 279      ( 03 May 2018 17:21 )             48.3     05-03    140  |  110<H>  |  13  ----------------------------<  121<H>  3.0<L>   |  15<L>  |  1.21    Ca    10.1      03 May 2018 17:21    TPro  8.5<H>  /  Alb  4.3  /  TBili  1.1  /  DBili  x   /  AST  19  /  ALT  17  /  AlkPhos  141<H>  05-03      Urinalysis Basic - ( 03 May 2018 18:10 )    Color: Yellow / Appearance: Clear / S.010 / pH: x  Gluc: x / Ketone: Large  / Bili: Negative / Urobili: Negative   Blood: x / Protein: 100 / Nitrite: Negative   Leuk Esterase: Trace / RBC: 2-5 /HPF / WBC 6-10 /HPF   Sq Epi: x / Non Sq Epi: Few /HPF / Bacteria: Many /HPF        RADIOLOGY & ADDITIONAL STUDIES:    EKG:  tele:  TTE:  EEG:    Impression:         Recommendations:  1.         UA/UCx, Chest Xray, Utox and alcohol level  3.         CTA head and neck  4.         EEG  5.         check level of  6.         Anti-epileptic:  7.         Please give Ativan 2mg for active seizure, can give another 2mg dose of Ativan if the seizure continues or re-occurs, for a maximum of 6mg Ativan in 24 hours.  9.         Seizure and fall precautions  10.        The patient has been advised that driving is not allowed for 1 year after a seizure by [place] law.  The patient is advised to avoid swimming and any activities that may put their life at danger shall they have a seizure.    11.        DVT PPx    Thank you for the courtesy of this consult. Patient is a 47y old  Female who presents with a chief complaint of Abdominal pain, nausea vomiting and diarrhea (04 May 2018 02:58)      HPI:  Patient is a 48 y/o F lives with her sister from home ambulates with walker,  quit smoking 6 months ago, PMH cyclic vomiting syndrome, colitis, Reflex sympathetic dystrophy s/p spinal stimulator, frequent falls, SLE (not on rx), cyclical vomiting syndrome, Afib (not on AC due to frequent falls), HTN, depression, Hemorrhoids (EGD / colonoscopy 3-4 years ago), diverticulosis, and partial thyroidectomy on right side, presented with c/o Abd pain, vomiting, diarrhea, SOB and intermittent chest pain.. Ptient also reported that a seizure occurred yesterday witnessed by her sister, patient has generalized shaky movements with fecal incontinence. Patient was confused for 30 mins after the seizure. She never had similar episode in past.  As per resident, patient had another event in the ED.    Neurological Review of Systems:  No difficulty with language.  No vision loss or double vision.  No dizziness, vertigo or new hearing loss.  No difficulty with speech or swallowing.  No focal weakness.  No focal sensory changes.  No numbness or tingling in the bilateral lower extremities.  No difficulty with balance.  No difficulty with ambulation.        MEDICATIONS  (STANDING):  acetaminophen  IVPB 1000 milliGRAM(s) IV Intermittent once  amLODIPine   Tablet 2.5 milliGRAM(s) Oral daily  atorvastatin 20 milliGRAM(s) Oral at bedtime  clopidogrel Tablet 75 milliGRAM(s) Oral daily  escitalopram 20 milliGRAM(s) Oral daily  LORazepam   Injectable 2 milliGRAM(s) IntraMuscular once  metoprolol tartrate 25 milliGRAM(s) Oral two times a day  pantoprazole    Tablet 40 milliGRAM(s) Oral before breakfast  potassium chloride    Tablet ER 40 milliEquivalent(s) Oral every 4 hours  pregabalin 300 milliGRAM(s) Oral two times a day  sodium chloride 0.9%. 1000 milliLiter(s) (100 mL/Hr) IV Continuous <Continuous>    MEDICATIONS  (PRN):  LORazepam   Injectable 2 milliGRAM(s) IV Push every 4 hours PRN seizure  metoclopramide Injectable 5 milliGRAM(s) IV Push every 6 hours PRN nausea vomiting  morphine ER Tablet 60 milliGRAM(s) Oral every 12 hours PRN severe chronic pain  oxyCODONE    5 mG/acetaminophen 325 mG 2 Tablet(s) Oral every 6 hours PRN Severe Pain (7 - 10)    Allergies    aspirin (Unknown)  latex (Unknown)  sulfa drugs (Unknown)    Intolerances      PAST MEDICAL & SURGICAL HISTORY:  Renal failure, chronic, stage 3 (moderate)  HTN (hypertension)  Cyclical vomiting  Atrial fibrillation  SLE (systemic lupus erythematosus)  Reflex sympathetic dystrophy  H/O partial thyroidectomy  S/P partial hysterectomy    FAMILY HISTORY:  Family history of seizure disorder (Sibling)    SOCIAL HISTORY: non smoker    Review of Systems:  Constitutional: No fevers.                    Eyes, Ears, Mouth, Throat: No vision loss   Respiratory: No cough.                                Cardiovascular: No chest pain or palpitations  Gastrointestinal: + adb pain.                                      Genitourinary: + urinary incontinence.  Musculoskeletal: No joint pain.                                                           Dermatologic: No rash.  Neurological: as per HPI                                                                      Psychiatric: No behavioral problems.  Endocrine: No known hypoglycemia.               Hematologic/Lymphatic: No easy bleeding.    O:  Vital Signs Last 24 Hrs  T(C): 37.1 (04 May 2018 08:23), Max: 37.4 (04 May 2018 05:15)  T(F): 98.7 (04 May 2018 08:23), Max: 99.4 (04 May 2018 05:15)  HR: 78 (04 May 2018 08:23) (62 - 89)  BP: 161/103 (04 May 2018 08:23) (110/98 - 169/114)  BP(mean): --  RR: 20 (04 May 2018 08:23) (18 - 22)  SpO2: 99% (04 May 2018 08:23) (98% - 100%)    General Exam:   General appearance: No acute distress                 Cardiovascular: Pedal dorsalis pulses intact bilaterally    Mental Status: Orientated to self, date and place.  Attention intact.  No dysarthria, aphasia or neglect.  Knowledge intact.  Registration intact.  Short and long term memory grossly intact.      Cranial Nerves: CN I - not tested.  PERRL, EOMI, VFF, no nystagmus or diplopia.  No APD.  Fundi not visualized.  CN V1-3 intact to light touch.  No facial asymmetry.  Hearing intact to finger rub bilaterally.  Tongue, uvula and palate midline.  Sternocleidomastoid and Trapezius intact bilaterally.    Motor:   Tone: normal.                  Strength intact throughout  No pronator drift bilaterally                      No dysmetria on finger-nose-finger or heel-shin-heel    Sensation: intact to light touch    Deep Tendon Reflexes: 1+ bilateral biceps, triceps, brachioradialis, knee  Toes flexor bilaterally    Gait: patient declines    Other:     LABS:                        15.6   10.1  )-----------( 279      ( 03 May 2018 17:21 )             48.3         140  |  110<H>  |  13  ----------------------------<  121<H>  3.0<L>   |  15<L>  |  1.21    Ca    10.1      03 May 2018 17:21    TPro  8.5<H>  /  Alb  4.3  /  TBili  1.1  /  DBili  x   /  AST  19  /  ALT  17  /  AlkPhos  141<H>        Urinalysis Basic - ( 03 May 2018 18:10 )    Color: Yellow / Appearance: Clear / S.010 / pH: x  Gluc: x / Ketone: Large  / Bili: Negative / Urobili: Negative   Blood: x / Protein: 100 / Nitrite: Negative   Leuk Esterase: Trace / RBC: 2-5 /HPF / WBC 6-10 /HPF   Sq Epi: x / Non Sq Epi: Few /HPF / Bacteria: Many /HPF        RADIOLOGY & ADDITIONAL STUDIES:    EKG: NSR

## 2018-05-04 NOTE — CONSULT NOTE ADULT - ASSESSMENT
8 y/o F lives with her sister from home ambulates with walker,  quit smoking 6 months ago, PMH cyclic vomiting syndrome, colitis, Reflex sympathetic dystrophy s/p spinal stimulator, frequent falls, SLE (not on rx), cyclical vomiting syndrome, Afib (not on AC due to frequent falls), HTN, depression, Hemorrhoids (EGD / colonoscopy 3-4 years ago), diverticulosis, and partial thyroidectomy on right side, presented with c/o Abd pain, vomiting, diarrhea, SOB and intermittent chest pain.  1.Tele monitoring.  2.Echocardiogram.  3.Seizure-Neorology eval noted, work-up-p.  4.HTN-cont bp medication.  5.GI and DVT prophylaxis.
Impression:  First time seziure       Recommendations:  1.         UA/UCx, Chest Xray, Utox and alcohol level  2.         CTA head and neck (unable to get MR)  3.         EEG  4.         Anti-epileptic: if there is no infection or Utox -lynn, then start Keppra 500mg bid  5.         Please give Ativan 2mg for active seizure, can give another 2mg dose of Ativan if the seizure continues or re-occurs, for a maximum of 6mg Ativan in 24 hours.  6.         Seizure and fall precautions  7.        The patient has been advised that driving is not allowed for 1 year after a seizure by [place] law.  The patient is advised to avoid swimming and any activities that may put their life at danger shall they have a seizure.    8.        DVT PPx    Thank you for the courtesy of this consult.

## 2018-05-04 NOTE — H&P ADULT - PROBLEM SELECTOR PLAN 8
RISK                                                          Points  [] Previous VTE                                           3  [] Thrombophilia                                        2  [] Lower limb paralysis                              2   [] Current Cancer                                       2   [x] Immobilization > 24 hrs                        1  [] ICU/CCU stay > 24 hours                       1  [] Age > 60                                                   1    Improve score 1. doesn't need chemical dvt prophylaxis

## 2018-05-04 NOTE — CHART NOTE - NSCHARTNOTEFT_GEN_A_CORE
Noted patient with generalized jerking movements throughout body, +twitching of mouth    48 y/o female with muscular dystrophy, cyclical vomitting, SLE, falls admitted for abdominal pain, and reported new seizure at home, now with  1. TC seizure  -received 2mg of ativan x2 so far,   - telemetry monitoring  - neurology consult requested dr. Elias  - consider ICU eval if needs repeated ativan    d/w dr. Doyle

## 2018-05-04 NOTE — CONSULT NOTE ADULT - SUBJECTIVE AND OBJECTIVE BOX
CHIEF COMPLAINT:Patient is a 47y old  Female who presents with a chief complaint of Abdominal pain, nausea vomiting and diarrhea .      HPI:  Patient is a 48 y/o F lives with her sister from home ambulates with walker,  quit smoking 6 months ago, PMH cyclic vomiting syndrome, colitis, Reflex sympathetic dystrophy s/p spinal stimulator, frequent falls, SLE (not on rx), cyclical vomiting syndrome, Afib (not on AC due to frequent falls), HTN, depression, Hemorrhoids (EGD / colonoscopy 3-4 years ago), diverticulosis, and partial thyroidectomy on right side, presented with c/o Abd pain, vomiting, diarrhea, SOB and intermittent chest pain.. Pt has hx of similar episodes x5 times per year. last in sept 2017. Her vomiting is constant, multiple episodes, watery , non bloody , greenish colored. un able to keep food and medication down. She also c/o loose watery diarrhea, >5-6 times a day, with streaks of bright red blood in it for a few months which she relates to diverticulosis, Her abdominal pain is sharp, 10/10 intensity, intermittent, in epigastrium, aggravates with eating, relieved with opioids. Also has subjective fever with chills since yesterday. ROS negative except above, denies any recent travel, consumption of spoilt food, no one else is sick around her, except for herself. patient also reported that a seizure occurred yesterday witnessed by her sister, patient has generalized shaky movements with fecal incontinence. patient was confused for 30 mins after the seizure. She never had similar episode in past. Patient denied any weakness, loss of consciousness, headache, numbness , tremors, trauma to head, vision changes.      In ED patient Patient's vitals were stable. received a dose of zofran and IV potassium. Patient's cardiac enzymes negative x 1. CXR clear. pt refused CT abdomen as she thinks she has multiple CTs in past. X ray abdomen doesn't have normal bowel gas pattern, no air fluid level, pending official read. Patient had an echocardiogram many years ago. never had a stress test done. (04 May 2018 02:58)      PAST MEDICAL & SURGICAL HISTORY:  Renal failure, chronic, stage 3 (moderate)  HTN (hypertension)  Cyclical vomiting  Atrial fibrillation  SLE (systemic lupus erythematosus)  Reflex sympathetic dystrophy  H/O partial thyroidectomy  S/P partial hysterectomy      MEDICATIONS  (STANDING):  amLODIPine   Tablet 2.5 milliGRAM(s) Oral daily  atorvastatin 20 milliGRAM(s) Oral at bedtime  [id number]   IVPB      clopidogrel Tablet 75 milliGRAM(s) Oral daily  escitalopram 20 milliGRAM(s) Oral daily  metoprolol tartrate 25 milliGRAM(s) Oral two times a day  morphine ER Tablet 60 milliGRAM(s) Oral every 12 hours  pantoprazole    Tablet 40 milliGRAM(s) Oral before breakfast  pregabalin 300 milliGRAM(s) Oral two times a day  sodium chloride 0.9%. 1000 milliLiter(s) (100 mL/Hr) IV Continuous <Continuous>    MEDICATIONS  (PRN):  LORazepam   Injectable 2 milliGRAM(s) IV Push every 4 hours PRN seizure  metoclopramide Injectable 5 milliGRAM(s) IV Push every 6 hours PRN nausea vomiting  oxyCODONE    5 mG/acetaminophen 325 mG 2 Tablet(s) Oral every 6 hours PRN Severe Pain (7 - 10)      FAMILY HISTORY:  Family history of seizure disorder (Sibling)      SOCIAL HISTORY:    [x ] Non-smoker    [x ] Alcohol-denies    Allergies    aspirin (Unknown)  latex (Unknown)  sulfa drugs (Unknown)    Intolerances    	    REVIEW OF SYSTEMS:  CONSTITUTIONAL: No fever, weight loss, or fatigue  EYES: No eye pain, visual disturbances, or discharge  ENT:  No difficulty hearing, tinnitus, vertigo; No sinus or throat pain  NECK: No pain or stiffness  RESPIRATORY: No cough, wheezing, chills or hemoptysis; No Shortness of Breath  CARDIOVASCULAR: + chest pain, No palpitations, passing out, dizziness, or leg swelling  GASTROINTESTINAL: No abdominal or epigastric pain. + nausea,+ vomiting; No diarrhea or constipation. No melena or hematochezia.  GENITOURINARY: No dysuria, frequency, hematuria, or incontinence  NEUROLOGICAL: No headaches, memory loss, loss of strength, numbness, or tremors  SKIN: No itching, burning, rashes, or lesions   LYMPH Nodes: No enlarged glands  ENDOCRINE: No heat or cold intolerance; No hair loss  MUSCULOSKELETAL: No joint pain or swelling; No muscle, back, or extremity pain  PSYCHIATRIC: No depression, anxiety, mood swings, or difficulty sleeping  HEME/LYMPH: No easy bruising, or bleeding gums  ALLERGY AND IMMUNOLOGIC: No hives or eczema	      PHYSICAL EXAM:  T(C): 37.1 (05-04-18 @ 08:23), Max: 37.4 (05-04-18 @ 05:15)  HR: 78 (05-04-18 @ 08:23) (62 - 89)  BP: 161/103 (05-04-18 @ 08:23) (110/98 - 169/114)  RR: 20 (05-04-18 @ 08:23) (18 - 22)  SpO2: 99% (05-04-18 @ 08:23) (98% - 100%)      Appearance: Normal	  HEENT:   Normal oral mucosa, PERRL, EOMI	  Lymphatic: No lymphadenopathy  Cardiovascular: Normal S1 S2, No JVD, No murmurs, No edema  Respiratory: Lungs clear to auscultation	  Psychiatry: A & O x 3, Mood & affect appropriate  Gastrointestinal:  Soft, Non-tender, + BS	  Skin: No rashes, No ecchymoses, No cyanosis	  Neurologic: Non-focal  Extremities: Normal range of motion, No clubbing, cyanosis or edema  Vascular: Peripheral pulses palpable 2+ bilaterally    	    ECG:  Normal sinus rhythm  Possible Left atrial enlargement  Left axis deviation  Incomplete right bundle branch block  Left ventricular hypertrophy  Nonspecific T wave abnormality  	    	  LABS:	 	      CARDIAC MARKERS ( 04 May 2018 06:52 )  <0.015 ng/mL / x     / 125 U/L / x     / 1.6 ng/mL                              15.1   13.9  )-----------( 248      ( 04 May 2018 12:40 )             47.5     05-04    140  |  109<H>  |  11  ----------------------------<  111<H>  3.6   |  23  |  1.11    Ca    8.9      04 May 2018 12:40  Phos  2.7     05-04  Mg     1.8     05-04    TPro  8.5<H>  /  Alb  4.3  /  TBili  1.1  /  DBili  x   /  AST  19  /  ALT  17  /  AlkPhos  141<H>  05-03    TSH: Thyroid Stimulating Hormone, Serum: 0.02 uU/mL (05-04 @ 12:40)      EXAM:  XR ABDOMEN PORTABLE URGENT 1V                            PROCEDURE DATE:  05/03/2018          INTERPRETATION:  EXAM: XR ABDOMEN PORTABLE URGENT 1V     HISTORY: abdominal pain.    COMPARISON: None.    TECHNIQUE: Portable supine AP view of theabdomen.    FINDINGS:  The bowel gas pattern is nonspecific. No supine evidence of free air. A   few nondilated small bowel loops are noted in the right hemiabdomen.    Right-sided battery pack with thoracic spinal stimulator electrodes are   noted.    IMPRESSION:  Nonspecific bowel gas pattern.     EXAM:  XR CHEST PORTABLE URGENT 1V                            PROCEDURE DATE:  05/03/2018          INTERPRETATION:  Chest radiograph (one view)     CPT 93260    CLINICAL INFORMATION:       Shortness of breath of unknown severity since   today.    TECHNIQUE:  Single frontal view of the chest was obtained.    FINDINGS:  Prior study dated 6/11/2017 was available for review.    The lungs are clear.  No pleural abnormality is seen.      The heart and mediastinum appear intact.           IMPRESSION: No evidence of active chest disease.

## 2018-05-04 NOTE — H&P ADULT - PROBLEM SELECTOR PLAN 1
-pt has Hx of cyclical vomiting syndrome for 4 years. 5-6 episode every year.  -pt is on extensive pain medications for this condition  -will keep pt on Reglan and Pain management with Home medications MS contin and oxycodone  -Pain management consult appreciated.

## 2018-05-04 NOTE — H&P ADULT - NSHPPHYSICALEXAM_GEN_ALL_CORE
PHYSICAL EXAM:  GENERAL: Distress due to pain in abdomen and nausea  HEENT: Normocephalic;  conjunctival icterus ; moist mucous membranes;   NECK : supple  CHEST/LUNG: Clear to auscultation bilaterally with good air entry   HEART: S1 S2  regular; no murmurs, gallops or rubs  ABDOMEN: Soft, severely tender, Nondistended; Bowel sounds present  EXTREMITIES: no cyanosis; no edema; severe calf tenderness bilateral  SKIN: warm and dry; no rash  NERVOUS SYSTEM:  Awake and alert; Oriented  to place, person and time ; no new deficits

## 2018-05-04 NOTE — H&P ADULT - NSHPREVIEWOFSYSTEMS_GEN_ALL_CORE
REVIEW OF SYSTEMS:    CONSTITUTIONAL: No fever,   EYES: no acute visual disturbances  NECK: No pain or stiffness  RESPIRATORY: mild shortness of breath, sat well on 2L NC  CARDIOVASCULAR: intermittent chest pains, no palpitations  GASTROINTESTINAL: severe abdominal pain. severe nausea +vomiting; + diarrhea   NEUROLOGICAL: mild headache or numbness, no tremors,  MUSCULOSKELETAL: severe muscle pain   GENITOURINARY: no dysuria, no frequency, no hesitancy  PSYCHIATRY: + depression , no anxiety  ALL OTHER  ROS negative

## 2018-05-05 LAB
ANION GAP SERPL CALC-SCNC: 6 MMOL/L — SIGNIFICANT CHANGE UP (ref 5–17)
BUN SERPL-MCNC: 15 MG/DL — SIGNIFICANT CHANGE UP (ref 7–18)
C DIFF BY PCR RESULT: SIGNIFICANT CHANGE UP
C DIFF TOX GENS STL QL NAA+PROBE: SIGNIFICANT CHANGE UP
CALCIUM SERPL-MCNC: 8.8 MG/DL — SIGNIFICANT CHANGE UP (ref 8.4–10.5)
CHLORIDE SERPL-SCNC: 111 MMOL/L — HIGH (ref 96–108)
CO2 SERPL-SCNC: 24 MMOL/L — SIGNIFICANT CHANGE UP (ref 22–31)
CREAT SERPL-MCNC: 1.15 MG/DL — SIGNIFICANT CHANGE UP (ref 0.5–1.3)
GLUCOSE SERPL-MCNC: 82 MG/DL — SIGNIFICANT CHANGE UP (ref 70–99)
HCT VFR BLD CALC: 48.6 % — HIGH (ref 34.5–45)
HGB BLD-MCNC: 15.3 G/DL — SIGNIFICANT CHANGE UP (ref 11.5–15.5)
MCHC RBC-ENTMCNC: 29 PG — SIGNIFICANT CHANGE UP (ref 27–34)
MCHC RBC-ENTMCNC: 31.5 GM/DL — LOW (ref 32–36)
MCV RBC AUTO: 92.2 FL — SIGNIFICANT CHANGE UP (ref 80–100)
PLATELET # BLD AUTO: 217 K/UL — SIGNIFICANT CHANGE UP (ref 150–400)
POTASSIUM SERPL-MCNC: 3.7 MMOL/L — SIGNIFICANT CHANGE UP (ref 3.5–5.3)
POTASSIUM SERPL-SCNC: 3.7 MMOL/L — SIGNIFICANT CHANGE UP (ref 3.5–5.3)
RBC # BLD: 5.28 M/UL — HIGH (ref 3.8–5.2)
RBC # FLD: 12.6 % — SIGNIFICANT CHANGE UP (ref 10.3–14.5)
SODIUM SERPL-SCNC: 141 MMOL/L — SIGNIFICANT CHANGE UP (ref 135–145)
WBC # BLD: 11 K/UL — HIGH (ref 3.8–10.5)
WBC # FLD AUTO: 11 K/UL — HIGH (ref 3.8–10.5)

## 2018-05-05 PROCEDURE — 74176 CT ABD & PELVIS W/O CONTRAST: CPT | Mod: 26

## 2018-05-05 PROCEDURE — 70450 CT HEAD/BRAIN W/O DYE: CPT | Mod: 26

## 2018-05-05 RX ORDER — NICOTINE POLACRILEX 2 MG
1 GUM BUCCAL DAILY
Qty: 0 | Refills: 0 | Status: DISCONTINUED | OUTPATIENT
Start: 2018-05-05 | End: 2018-05-06

## 2018-05-05 RX ADMIN — SODIUM CHLORIDE 100 MILLILITER(S): 9 INJECTION INTRAMUSCULAR; INTRAVENOUS; SUBCUTANEOUS at 06:40

## 2018-05-05 RX ADMIN — CEFTRIAXONE 100 GRAM(S): 500 INJECTION, POWDER, FOR SOLUTION INTRAMUSCULAR; INTRAVENOUS at 11:21

## 2018-05-05 RX ADMIN — Medication 300 MILLIGRAM(S): at 19:24

## 2018-05-05 RX ADMIN — ESCITALOPRAM OXALATE 20 MILLIGRAM(S): 10 TABLET, FILM COATED ORAL at 16:00

## 2018-05-05 RX ADMIN — ATORVASTATIN CALCIUM 20 MILLIGRAM(S): 80 TABLET, FILM COATED ORAL at 22:00

## 2018-05-05 RX ADMIN — Medication 25 MILLIGRAM(S): at 19:25

## 2018-05-05 RX ADMIN — PANTOPRAZOLE SODIUM 40 MILLIGRAM(S): 20 TABLET, DELAYED RELEASE ORAL at 08:03

## 2018-05-05 RX ADMIN — MORPHINE SULFATE 60 MILLIGRAM(S): 50 CAPSULE, EXTENDED RELEASE ORAL at 21:57

## 2018-05-05 RX ADMIN — MORPHINE SULFATE 60 MILLIGRAM(S): 50 CAPSULE, EXTENDED RELEASE ORAL at 06:37

## 2018-05-05 RX ADMIN — Medication 5 MILLIGRAM(S): at 00:29

## 2018-05-05 RX ADMIN — CLOPIDOGREL BISULFATE 75 MILLIGRAM(S): 75 TABLET, FILM COATED ORAL at 11:20

## 2018-05-05 RX ADMIN — AMLODIPINE BESYLATE 2.5 MILLIGRAM(S): 2.5 TABLET ORAL at 05:59

## 2018-05-05 RX ADMIN — OXYCODONE AND ACETAMINOPHEN 2 TABLET(S): 5; 325 TABLET ORAL at 00:32

## 2018-05-05 RX ADMIN — Medication 300 MILLIGRAM(S): at 06:38

## 2018-05-05 RX ADMIN — OXYCODONE AND ACETAMINOPHEN 2 TABLET(S): 5; 325 TABLET ORAL at 14:07

## 2018-05-05 RX ADMIN — Medication 25 MILLIGRAM(S): at 05:59

## 2018-05-05 RX ADMIN — MORPHINE SULFATE 60 MILLIGRAM(S): 50 CAPSULE, EXTENDED RELEASE ORAL at 08:03

## 2018-05-05 NOTE — ED ADULT NURSE REASSESSMENT NOTE - NS ED NURSE REASSESS COMMENT FT1
Pt reassessed, AM meds administered as ordered. Still awaits bed. Endorsed to MELINDA Lorenzana.
Patient remains in no acute distress , speaking in full sentences , hot packs given for pain and medicated for emesis episodes. Patient assisted to bedpan . A second peripheral IV line attempted to insert; however, patient refused. Currently on potassium infusion at 60ml /hr no signs of redness, infiltration and or extravasation.
late entry  NP changed Ativan 1 mg to 2 mg.
pt gets Seizure at this time and NP Yuliet orders Ativan 2mg, now pt calm at this time, Endorsed to MELINDA Lopez/ Gt.

## 2018-05-05 NOTE — PROGRESS NOTE ADULT - SUBJECTIVE AND OBJECTIVE BOX
CARDIOLOGY     PROGRESS  NOTE   ________________________________________________    CHIEF COMPLAINT:Patient is a 47y old  Female who presents with a chief complaint of Abdominal pain, nausea vomiting and diarrhea (04 May 2018 02:58)  no complain today.  	  REVIEW OF SYSTEMS:  CONSTITUTIONAL: No fever, weight loss, or fatigue  EYES: No eye pain, visual disturbances, or discharge  ENT:  No difficulty hearing, tinnitus, vertigo; No sinus or throat pain  NECK: No pain or stiffness  RESPIRATORY: No cough, wheezing, chills or hemoptysis; No Shortness of Breath  CARDIOVASCULAR: No chest pain, palpitations, passing out, dizziness, or leg swelling  GASTROINTESTINAL: No abdominal or epigastric pain. No nausea, vomiting, or hematemesis; No diarrhea or constipation. No melena or hematochezia.  GENITOURINARY: No dysuria, frequency, hematuria, or incontinence  NEUROLOGICAL: No headaches, memory loss, loss of strength, numbness, or tremors  SKIN: No itching, burning, rashes, or lesions   LYMPH Nodes: No enlarged glands  ENDOCRINE: No heat or cold intolerance; No hair loss  MUSCULOSKELETAL: No joint pain or swelling; No muscle, back, or extremity pain  PSYCHIATRIC: No depression, anxiety, mood swings, or difficulty sleeping  HEME/LYMPH: No easy bruising, or bleeding gums  ALLERGY AND IMMUNOLOGIC: No hives or eczema	    [ ] All others negative	  [ ] Unable to obtain    PHYSICAL EXAM:  T(C): 36.8 (05-05-18 @ 07:29), Max: 36.8 (05-05-18 @ 07:29)  HR: 98 (05-05-18 @ 07:29) (98 - 98)  BP: 114/75 (05-05-18 @ 07:29) (114/75 - 114/75)  RR: 20 (05-05-18 @ 07:29) (20 - 20)  SpO2: 99% (05-05-18 @ 07:29) (99% - 99%)  Wt(kg): --  I&O's Summary      Appearance: Normal	  HEENT:   Normal oral mucosa, PERRL, EOMI	  Lymphatic: No lymphadenopathy  Cardiovascular: Normal S1 S2, No JVD, No murmurs, No edema  Respiratory: Lungs clear to auscultation	  Psychiatry: A & O x 3, Mood & affect appropriate  Gastrointestinal:  soft, + mild diffuse tenderness , + BS	  Skin: No rashes, No ecchymoses, No cyanosis	  Neurologic: Non-focal  Extremities: Normal range of motion, No clubbing, cyanosis or edema  Vascular: Peripheral pulses palpable 2+ bilaterally    MEDICATIONS  (STANDING):  amLODIPine   Tablet 2.5 milliGRAM(s) Oral daily  atorvastatin 20 milliGRAM(s) Oral at bedtime  cefTRIAXone   IVPB 1 Gram(s) IV Intermittent every 24 hours  cefTRIAXone   IVPB      clopidogrel Tablet 75 milliGRAM(s) Oral daily  escitalopram 20 milliGRAM(s) Oral daily  metoprolol tartrate 25 milliGRAM(s) Oral two times a day  morphine ER Tablet 60 milliGRAM(s) Oral every 12 hours  pantoprazole    Tablet 40 milliGRAM(s) Oral before breakfast  pregabalin 300 milliGRAM(s) Oral two times a day  sodium chloride 0.9%. 1000 milliLiter(s) (100 mL/Hr) IV Continuous <Continuous>      TELEMETRY: 	    ECG:  	  RADIOLOGY:  OTHER: 	  	  LABS:	 	    CARDIAC MARKERS:  CARDIAC MARKERS ( 04 May 2018 06:52 )  <0.015 ng/mL / x     / 125 U/L / x     / 1.6 ng/mL                                15.3   11.0  )-----------( 217      ( 05 May 2018 06:27 )             48.6     05-05    141  |  111<H>  |  15  ----------------------------<  82  3.7   |  24  |  1.15    Ca    8.8      05 May 2018 06:27  Phos  2.7     05-04  Mg     1.8     05-04    TPro  8.5<H>  /  Alb  4.3  /  TBili  1.1  /  DBili  x   /  AST  19  /  ALT  17  /  AlkPhos  141<H>  05-03    proBNP:   Lipid Profile:   HgA1c:   TSH: Thyroid Stimulating Hormone, Serum: 0.02 uU/mL (05-04 @ 12:40)  < from: CT Abdomen and Pelvis w/ Oral Cont and w/ IV Cont (09.16.17 @ 17:31) >  Distended urinary bladder. The etiology of the patient's abdominal pain   is not clearly elucidated on this exam.    < end of copied text >          Assessment and plan  ---------------------------  48 y/o F lives with her sister from home ambulates with walker,  quit smoking 6 months ago, PMH cyclic vomiting syndrome, colitis, Reflex sympathetic dystrophy s/p spinal stimulator, frequent falls, SLE (not on rx), cyclical vomiting syndrome, Afib (not on AC due to frequent falls), HTN, depression, Hemorrhoids (EGD / colonoscopy 3-4 years ago), diverticulosis, and partial thyroidectomy on right side, presented with c/o Abd pain, vomiting, diarrhea, SOB and intermittent chest pain.  1.Tele monitoring.  2.Echocardiogram.  3.Seizure-Neorology eval noted, work-up-p.  4.HTN-cont bp medication.  5.GI and DVT prophylaxis/ct scan of abdome and pelvis.

## 2018-05-05 NOTE — PROGRESS NOTE ADULT - SUBJECTIVE AND OBJECTIVE BOX
Patient is a 47y old  Female who presents with a chief complaint of Abdominal pain, nausea vomiting and diarrhea (04 May 2018 02:58)    pt seen in icu [  ], reg med floor [   ], bed [  ], chair at bedside [   ], a+o x3 [  ], lethargic [  ],  nad [  ]    skelton [  ], ngt [  ], peg [  ], et tube [  ], cent line [  ], picc line [  ]        Allergies    aspirin (Unknown)  latex (Unknown)  sulfa drugs (Unknown)        Vitals    T(F): 98.2 (05-05-18 @ 07:29), Max: 98.2 (05-05-18 @ 07:29)  HR: 98 (05-05-18 @ 07:29) (98 - 98)  BP: 114/75 (05-05-18 @ 07:29) (114/75 - 114/75)  RR: 20 (05-05-18 @ 07:29) (20 - 20)  SpO2: 99% (05-05-18 @ 07:29) (99% - 99%)  Wt(kg): --  CAPILLARY BLOOD GLUCOSE      POCT Blood Glucose.: 131 mg/dL (03 May 2018 16:36)      Labs                          15.3   11.0  )-----------( 217      ( 05 May 2018 06:27 )             48.6       05-05    141  |  111<H>  |  15  ----------------------------<  82  3.7   |  24  |  1.15    Ca    8.8      05 May 2018 06:27  Phos  2.7     05-04  Mg     1.8     05-04    TPro  8.5<H>  /  Alb  4.3  /  TBili  1.1  /  DBili  x   /  AST  19  /  ALT  17  /  AlkPhos  141<H>  05-03      CARDIAC MARKERS ( 04 May 2018 06:52 )  <0.015 ng/mL / x     / 125 U/L / x     / 1.6 ng/mL        .Blood Blood-Peripheral  05-04 @ 00:35   No growth to date.  --  --      .Urine Clean Catch (Midstream)  05-03 @ 23:31   Culture grew 3 or more types of organisms which indicate  collection contamination; consider recollection only if clinically  indicated.  --  --        EEG Summary / Classification:  Abnormal EEG in the awake, drowsy, and asleep states.  - Intermittent theta and polymorphic delta activity over the left temporal region   - Mild background slowing     EEG Impression / Clinical Correlate:  Abnormal EEG in the awake, drowsy, and asleep states.  1.	Functional abnormality in the left temporal region   2.	Mild non-specific diffuse or multifocal cerebral dysfunction   There were no epileptiform abnormalities recorded.       Radiology Results    < from: CT Head No Cont (05.05.18 @ 10:10) >  FINDINGS:      HEMISPHERES:  No mass or space occupying lesion.  No acute ischemic   changes or hemorrhagic foci are suggested.  VENTRICLES:  Midline and normal in size.  POSTERIOR FOSSA:  The brain stem and cerebellum are unremarkable.  No CP   angle lesion noted.  EXTRACEREBRAL SPACES:  No subdural or epidural collections are noted.  SKULL BASE AND CALVARIUM:  Appears intact.  No fracture or destructive   lesionis identified.  SINUSES AND MASTOIDS:  Clear.  MISCELLANEOUS:  No orbital or suprasellar abnormality noted.      IMPRESSION:    1)  unremarkable CT study of the brain  2)  clear sinuses and mastoids..    < end of copied text >          Meds    MEDICATIONS  (STANDING):  amLODIPine   Tablet 2.5 milliGRAM(s) Oral daily  atorvastatin 20 milliGRAM(s) Oral at bedtime  cefTRIAXone   IVPB 1 Gram(s) IV Intermittent every 24 hours  cefTRIAXone   IVPB      clopidogrel Tablet 75 milliGRAM(s) Oral daily  escitalopram 20 milliGRAM(s) Oral daily  metoprolol tartrate 25 milliGRAM(s) Oral two times a day  morphine ER Tablet 60 milliGRAM(s) Oral every 12 hours  pantoprazole    Tablet 40 milliGRAM(s) Oral before breakfast  pregabalin 300 milliGRAM(s) Oral two times a day  sodium chloride 0.9%. 1000 milliLiter(s) (100 mL/Hr) IV Continuous <Continuous>      MEDICATIONS  (PRN):  LORazepam   Injectable 2 milliGRAM(s) IV Push every 4 hours PRN seizure  metoclopramide Injectable 5 milliGRAM(s) IV Push every 6 hours PRN nausea vomiting  oxyCODONE    5 mG/acetaminophen 325 mG 2 Tablet(s) Oral every 6 hours PRN Severe Pain (7 - 10)      Physical Exam    Neuro :  no focal deficits  Respiratory: CTA B/L  CV: RRR, S1S2, no murmurs,   Abdominal: Soft, NT, ND +BS,  Extremities: No edema, + peripheral pulses    ASSESSMENT      syncope with seiz activity,   r/o cns patho,   chest pain r/o acs,  r/o arythmia,   uti,   Non-intractable cyclical vomiting   h/o left thyroid nodule,   cyclic vomiting syndrome,   colitis,   Reflex sympathetic dystrophy s/p spinal stimulator,   frequent falls,   SLE (not on rx),   Afib (not on AC due to frequent falls),   HTN,   depression,   Hemorrhoids (EGD / colonoscopy 3-4 years ago),   diverticulosis,   partial right thyroidectomy  Renal failure, chronic, stage 3 (moderate)      PLAN    cont tele,   cont aspirin, statin,   nuero f/u   ct head neg for ischemia noted above  eeg abnormal but neg for eleptiform activity noted above  cont ativan prn  cardio f/u noted  ce x 1 neg   f/u echo  cont rocephin  ucx contaminated noted above  rept ucx  ct abd pelv  cont reglan prn  start clears and adv as tolerated  cont current meds

## 2018-05-06 LAB
T3 SERPL-MCNC: 95 NG/DL — SIGNIFICANT CHANGE UP (ref 80–200)
T4 AB SER-ACNC: 10.5 UG/DL — SIGNIFICANT CHANGE UP (ref 4.6–12)
TSH SERPL-MCNC: 0.19 UU/ML — LOW (ref 0.34–4.82)

## 2018-05-06 RX ORDER — NICOTINE POLACRILEX 2 MG
1 GUM BUCCAL DAILY
Qty: 0 | Refills: 0 | Status: DISCONTINUED | OUTPATIENT
Start: 2018-05-06 | End: 2018-05-07

## 2018-05-06 RX ADMIN — Medication 5 MILLIGRAM(S): at 08:02

## 2018-05-06 RX ADMIN — CLOPIDOGREL BISULFATE 75 MILLIGRAM(S): 75 TABLET, FILM COATED ORAL at 11:05

## 2018-05-06 RX ADMIN — Medication 1 PATCH: at 01:56

## 2018-05-06 RX ADMIN — OXYCODONE AND ACETAMINOPHEN 2 TABLET(S): 5; 325 TABLET ORAL at 09:59

## 2018-05-06 RX ADMIN — OXYCODONE AND ACETAMINOPHEN 2 TABLET(S): 5; 325 TABLET ORAL at 20:51

## 2018-05-06 RX ADMIN — MORPHINE SULFATE 60 MILLIGRAM(S): 50 CAPSULE, EXTENDED RELEASE ORAL at 00:29

## 2018-05-06 RX ADMIN — OXYCODONE AND ACETAMINOPHEN 2 TABLET(S): 5; 325 TABLET ORAL at 09:22

## 2018-05-06 RX ADMIN — MORPHINE SULFATE 60 MILLIGRAM(S): 50 CAPSULE, EXTENDED RELEASE ORAL at 08:17

## 2018-05-06 RX ADMIN — ATORVASTATIN CALCIUM 20 MILLIGRAM(S): 80 TABLET, FILM COATED ORAL at 23:05

## 2018-05-06 RX ADMIN — Medication 300 MILLIGRAM(S): at 06:35

## 2018-05-06 RX ADMIN — Medication 5 MILLIGRAM(S): at 21:00

## 2018-05-06 RX ADMIN — OXYCODONE AND ACETAMINOPHEN 2 TABLET(S): 5; 325 TABLET ORAL at 23:05

## 2018-05-06 RX ADMIN — ESCITALOPRAM OXALATE 20 MILLIGRAM(S): 10 TABLET, FILM COATED ORAL at 11:05

## 2018-05-06 RX ADMIN — Medication 300 MILLIGRAM(S): at 17:44

## 2018-05-06 RX ADMIN — PANTOPRAZOLE SODIUM 40 MILLIGRAM(S): 20 TABLET, DELAYED RELEASE ORAL at 06:37

## 2018-05-06 RX ADMIN — CEFTRIAXONE 100 GRAM(S): 500 INJECTION, POWDER, FOR SOLUTION INTRAMUSCULAR; INTRAVENOUS at 13:33

## 2018-05-06 RX ADMIN — MORPHINE SULFATE 60 MILLIGRAM(S): 50 CAPSULE, EXTENDED RELEASE ORAL at 18:43

## 2018-05-06 RX ADMIN — MORPHINE SULFATE 60 MILLIGRAM(S): 50 CAPSULE, EXTENDED RELEASE ORAL at 06:36

## 2018-05-06 RX ADMIN — MORPHINE SULFATE 60 MILLIGRAM(S): 50 CAPSULE, EXTENDED RELEASE ORAL at 19:23

## 2018-05-06 NOTE — PROGRESS NOTE ADULT - SUBJECTIVE AND OBJECTIVE BOX
CARDIOLOGY     PROGRESS  NOTE   ________________________________________________    CHIEF COMPLAINT:Patient is a 47y old  Female who presents with a chief complaint of Abdominal pain, nausea vomiting and diarrhea (04 May 2018 02:58)  no complain.  	  REVIEW OF SYSTEMS:  CONSTITUTIONAL: No fever, weight loss, or fatigue  EYES: No eye pain, visual disturbances, or discharge  ENT:  No difficulty hearing, tinnitus, vertigo; No sinus or throat pain  NECK: No pain or stiffness  RESPIRATORY: No cough, wheezing, chills or hemoptysis; No Shortness of Breath  CARDIOVASCULAR: No chest pain, palpitations, passing out, dizziness, or leg swelling  GASTROINTESTINAL: No abdominal or epigastric pain. No nausea, vomiting, or hematemesis; No diarrhea or constipation. No melena or hematochezia.  GENITOURINARY: No dysuria, frequency, hematuria, or incontinence  NEUROLOGICAL: No headaches, memory loss, loss of strength, numbness, or tremors  SKIN: No itching, burning, rashes, or lesions   LYMPH Nodes: No enlarged glands  ENDOCRINE: No heat or cold intolerance; No hair loss  MUSCULOSKELETAL: No joint pain or swelling; No muscle, back, or extremity pain  PSYCHIATRIC: No depression, anxiety, mood swings, or difficulty sleeping  HEME/LYMPH: No easy bruising, or bleeding gums  ALLERGY AND IMMUNOLOGIC: No hives or eczema	    [ ] All others negative	  [ ] Unable to obtain    PHYSICAL EXAM:  T(C): 36.8 (05-06-18 @ 06:32), Max: 37.3 (05-05-18 @ 12:37)  HR: 68 (05-06-18 @ 07:52) (62 - 77)  BP: 111/68 (05-06-18 @ 07:52) (94/65 - 113/68)  RR: 17 (05-06-18 @ 07:52) (15 - 20)  SpO2: 97% (05-06-18 @ 07:52) (94% - 98%)  Wt(kg): --  I&O's Summary      Appearance: Normal	  HEENT:   Normal oral mucosa, PERRL, EOMI	  Lymphatic: No lymphadenopathy  Cardiovascular: Normal S1 S2, No JVD, No murmurs, No edema  Respiratory: Lungs clear to auscultation	  Psychiatry: A & O x 3, Mood & affect appropriate  Gastrointestinal:  Soft, Non-tender, + BS	  Skin: No rashes, No ecchymoses, No cyanosis	  Neurologic: Non-focal  Extremities: Normal range of motion, No clubbing, cyanosis or edema  Vascular: Peripheral pulses palpable 2+ bilaterally    MEDICATIONS  (STANDING):  amLODIPine   Tablet 2.5 milliGRAM(s) Oral daily  atorvastatin 20 milliGRAM(s) Oral at bedtime  cefTRIAXone   IVPB 1 Gram(s) IV Intermittent every 24 hours  cefTRIAXone   IVPB      clopidogrel Tablet 75 milliGRAM(s) Oral daily  escitalopram 20 milliGRAM(s) Oral daily  metoprolol tartrate 25 milliGRAM(s) Oral two times a day  morphine ER Tablet 60 milliGRAM(s) Oral every 12 hours  nicotine -  14 mG/24Hr(s) Patch 1 patch Transdermal daily  pantoprazole    Tablet 40 milliGRAM(s) Oral before breakfast  pregabalin 300 milliGRAM(s) Oral two times a day  sodium chloride 0.9%. 1000 milliLiter(s) (100 mL/Hr) IV Continuous <Continuous>      TELEMETRY: 	    ECG:  	  RADIOLOGY:  OTHER: 	  	  LABS:	 	    CARDIAC MARKERS:                                15.3   11.0  )-----------( 217      ( 05 May 2018 06:27 )             48.6     05-05    141  |  111<H>  |  15  ----------------------------<  82  3.7   |  24  |  1.15    Ca    8.8      05 May 2018 06:27  Phos  2.7     05-04  Mg     1.8     05-04      proBNP:   Lipid Profile:   HgA1c:   TSH: Thyroid Stimulating Hormone, Serum: 0.02 uU/mL (05-04 @ 12:40)          Assessment and plan  ---------------------------  46 y/o F lives with her sister from home ambulates with walker,  quit smoking 6 months ago, PMH cyclic vomiting syndrome, colitis, Reflex sympathetic dystrophy s/p spinal stimulator, frequent falls, SLE (not on rx), cyclical vomiting syndrome, Afib (not on AC due to frequent falls), HTN, depression, Hemorrhoids (EGD / colonoscopy 3-4 years ago), diverticulosis, and partial thyroidectomy on right side, presented with c/o Abd pain, vomiting, diarrhea, SOB and intermittent chest pain.  1.Tele monitoring.  2.Echocardiogram.  3.Seizure-Neorology eval noted, work-up-p.  4.HTN-cont bp medication.  5.GI and DVT prophylaxis/ct scan of abdomen and pelvis. CARDIOLOGY     PROGRESS  NOTE   ________________________________________________    CHIEF COMPLAINT:Patient is a 47y old  Female who presents with a chief complaint of Abdominal pain, nausea vomiting and diarrhea (04 May 2018 02:58)  no complain.  	  REVIEW OF SYSTEMS:  CONSTITUTIONAL: No fever, weight loss, or fatigue  EYES: No eye pain, visual disturbances, or discharge  ENT:  No difficulty hearing, tinnitus, vertigo; No sinus or throat pain  NECK: No pain or stiffness  RESPIRATORY: No cough, wheezing, chills or hemoptysis; No Shortness of Breath  CARDIOVASCULAR: No chest pain, palpitations, passing out, dizziness, or leg swelling  GASTROINTESTINAL: No abdominal or epigastric pain. No nausea, vomiting, or hematemesis; No diarrhea or constipation. No melena or hematochezia.  GENITOURINARY: No dysuria, frequency, hematuria, or incontinence  NEUROLOGICAL: No headaches, memory loss, loss of strength, numbness, or tremors  SKIN: No itching, burning, rashes, or lesions   LYMPH Nodes: No enlarged glands  ENDOCRINE: No heat or cold intolerance; No hair loss  MUSCULOSKELETAL: No joint pain or swelling; No muscle, back, or extremity pain  PSYCHIATRIC: No depression, anxiety, mood swings, or difficulty sleeping  HEME/LYMPH: No easy bruising, or bleeding gums  ALLERGY AND IMMUNOLOGIC: No hives or eczema	    [ ] All others negative	  [ ] Unable to obtain    PHYSICAL EXAM:  T(C): 36.8 (05-06-18 @ 06:32), Max: 37.3 (05-05-18 @ 12:37)  HR: 68 (05-06-18 @ 07:52) (62 - 77)  BP: 111/68 (05-06-18 @ 07:52) (94/65 - 113/68)  RR: 17 (05-06-18 @ 07:52) (15 - 20)  SpO2: 97% (05-06-18 @ 07:52) (94% - 98%)  Wt(kg): --  I&O's Summary      Appearance: Normal	  HEENT:   Normal oral mucosa, PERRL, EOMI	  Lymphatic: No lymphadenopathy  Cardiovascular: Normal S1 S2, No JVD, No murmurs, No edema  Respiratory: Lungs clear to auscultation	  Psychiatry: A & O x 3, Mood & affect appropriate  Gastrointestinal:  Soft, Non-tender, + BS	  Skin: No rashes, No ecchymoses, No cyanosis	  Neurologic: Non-focal  Extremities: Normal range of motion, No clubbing, cyanosis or edema  Vascular: Peripheral pulses palpable 2+ bilaterally    MEDICATIONS  (STANDING):  amLODIPine   Tablet 2.5 milliGRAM(s) Oral daily  atorvastatin 20 milliGRAM(s) Oral at bedtime  cefTRIAXone   IVPB 1 Gram(s) IV Intermittent every 24 hours  cefTRIAXone   IVPB      clopidogrel Tablet 75 milliGRAM(s) Oral daily  escitalopram 20 milliGRAM(s) Oral daily  metoprolol tartrate 25 milliGRAM(s) Oral two times a day  morphine ER Tablet 60 milliGRAM(s) Oral every 12 hours  nicotine -  14 mG/24Hr(s) Patch 1 patch Transdermal daily  pantoprazole    Tablet 40 milliGRAM(s) Oral before breakfast  pregabalin 300 milliGRAM(s) Oral two times a day  sodium chloride 0.9%. 1000 milliLiter(s) (100 mL/Hr) IV Continuous <Continuous>      TELEMETRY: 	    ECG:  	  RADIOLOGY:  OTHER: 	  	  LABS:	 	    CARDIAC MARKERS:                                15.3   11.0  )-----------( 217      ( 05 May 2018 06:27 )             48.6     05-05    141  |  111<H>  |  15  ----------------------------<  82  3.7   |  24  |  1.15    Ca    8.8      05 May 2018 06:27  Phos  2.7     05-04  Mg     1.8     05-04      proBNP:   Lipid Profile:   HgA1c:   TSH: Thyroid Stimulating Hormone, Serum: 0.02 uU/mL (05-04 @ 12:40)          Assessment and plan  ---------------------------  46 y/o F lives with her sister from home ambulates with walker,  quit smoking 6 months ago, PMH cyclic vomiting syndrome, colitis, Reflex sympathetic dystrophy s/p spinal stimulator, frequent falls, SLE (not on rx), cyclical vomiting syndrome, Afib (not on AC due to frequent falls), HTN, depression, Hemorrhoids (EGD / colonoscopy 3-4 years ago), diverticulosis, and partial thyroidectomy on right side, presented with c/o Abd pain, vomiting, diarrhea, SOB and intermittent chest pain.  1.Tele monitoring.  2.Echocardiogram.  3.Seizure-Neorology eval noted, work-up-p.  4.HTN-cont bp medication.  5.GI and DVT prophylaxis/ct scan of abdomen and pelvis.  6. hyperthyroidism consider endocrine consult ?cause of diarrhea

## 2018-05-06 NOTE — PROGRESS NOTE ADULT - SUBJECTIVE AND OBJECTIVE BOX
Patient is a 47y old  Female who presents with a chief complaint of Abdominal pain, nausea vomiting and diarrhea (04 May 2018 02:58)    pt seen in ed tele [ x ], reg med floor [   ], bed [x  ], chair at bedside [   ], a+o x3 [ x ], lethargic [  ],  nad [x  ]        Allergies    aspirin (Unknown)  latex (Unknown)  sulfa drugs (Unknown)        Vitals    T(F): 98.3 (05-06-18 @ 06:32), Max: 99.1 (05-05-18 @ 12:37)  HR: 68 (05-06-18 @ 07:52) (62 - 77)  BP: 111/68 (05-06-18 @ 07:52) (94/65 - 113/68)  RR: 17 (05-06-18 @ 07:52) (15 - 20)  SpO2: 97% (05-06-18 @ 07:52) (94% - 98%)  Wt(kg): --  CAPILLARY BLOOD GLUCOSE          Labs                          15.3   11.0  )-----------( 217      ( 05 May 2018 06:27 )             48.6       05-05    141  |  111<H>  |  15  ----------------------------<  82  3.7   |  24  |  1.15    Ca    8.8      05 May 2018 06:27  Phos  2.7     05-04  Mg     1.8     05-04              .Blood Blood-Peripheral  05-04 @ 00:35   No growth to date.  --  --      .Urine Clean Catch (Midstream)  05-03 @ 23:31   Culture grew 3 or more types of organisms which indicate  collection contamination; consider recollection only if clinically  indicated.  --  --          Radiology Results      < from: CT Abdomen and Pelvis No Cont (05.05.18 @ 10:11) >  LOWER CHEST: Within normal limits.    LIVER: Within normal limits.  BILE DUCTS: Normal caliber.  GALLBLADDER: Within normal limits.  SPLEEN: Within normal limits.  PANCREAS: Within normal limits.  ADRENALS: Within normal limits.  KIDNEYS/URETERS: Within normal limits.    BLADDER: Within normal limits.  REPRODUCTIVE ORGANS: Status post hysterectomy.     BOWEL: No bowel obstruction. Appendix normal.  PERITONEUM: No ascites.  VESSELS:  Within normal limits.  RETROPERITONEUM: No lymphadenopathy.    ABDOMINAL WALL: Right flank subcutaneous neurostimulator device is   present with catheter entering the spinal canal at the T10-11 level.  BONES: Within normal limits.    IMPRESSION: No bowel obstruction.    < end of copied text >      Meds    MEDICATIONS  (STANDING):  amLODIPine   Tablet 2.5 milliGRAM(s) Oral daily  atorvastatin 20 milliGRAM(s) Oral at bedtime  cefTRIAXone   IVPB 1 Gram(s) IV Intermittent every 24 hours  cefTRIAXone   IVPB      clopidogrel Tablet 75 milliGRAM(s) Oral daily  escitalopram 20 milliGRAM(s) Oral daily  metoprolol tartrate 25 milliGRAM(s) Oral two times a day  morphine ER Tablet 60 milliGRAM(s) Oral every 12 hours  nicotine -  14 mG/24Hr(s) Patch 1 patch Transdermal daily  pantoprazole    Tablet 40 milliGRAM(s) Oral before breakfast  pregabalin 300 milliGRAM(s) Oral two times a day  sodium chloride 0.9%. 1000 milliLiter(s) (100 mL/Hr) IV Continuous <Continuous>      MEDICATIONS  (PRN):  LORazepam   Injectable 2 milliGRAM(s) IV Push every 4 hours PRN seizure  metoclopramide Injectable 5 milliGRAM(s) IV Push every 6 hours PRN nausea vomiting  oxyCODONE    5 mG/acetaminophen 325 mG 2 Tablet(s) Oral every 6 hours PRN Severe Pain (7 - 10)      Physical Exam    Neuro :  no focal deficits  Respiratory: CTA B/L  CV: RRR, S1S2, no murmurs,   Abdominal: Soft, NT, ND +BS,  Extremities: No edema, + peripheral pulses    ASSESSMENT      syncope with seiz activity,   r/o cns patho,   chest pain r/o acs,  r/o arythmia,   uti,   Non-intractable cyclical vomiting   h/o left thyroid nodule,   cyclic vomiting syndrome,   colitis,   Reflex sympathetic dystrophy s/p spinal stimulator,   frequent falls,   SLE (not on rx),   Afib (not on AC due to frequent falls),   HTN,   depression,   Hemorrhoids (EGD / colonoscopy 3-4 years ago),   diverticulosis,   partial right thyroidectomy  Renal failure, chronic, stage 3 (moderate)      PLAN    cont tele,   cont aspirin, statin,   nuero f/u   ct head neg for ischemia noted above  eeg abnormal but neg for eleptiform activity noted above  cont ativan prn  cardio f/u  ce x 1 neg   f/u echo  cont rocephin  ucx contaminated noted above  rept ucx  ct abd pelv wnl noted above  cont reglan prn  pt tolerating clear liquid diet  adv to full diet  cont current meds

## 2018-05-07 ENCOUNTER — TRANSCRIPTION ENCOUNTER (OUTPATIENT)
Age: 48
End: 2018-05-07

## 2018-05-07 VITALS
TEMPERATURE: 98 F | OXYGEN SATURATION: 100 % | SYSTOLIC BLOOD PRESSURE: 108 MMHG | HEART RATE: 64 BPM | RESPIRATION RATE: 16 BRPM | DIASTOLIC BLOOD PRESSURE: 68 MMHG

## 2018-05-07 LAB
ANION GAP SERPL CALC-SCNC: 7 MMOL/L — SIGNIFICANT CHANGE UP (ref 5–17)
BUN SERPL-MCNC: 11 MG/DL — SIGNIFICANT CHANGE UP (ref 7–18)
CALCIUM SERPL-MCNC: 8.8 MG/DL — SIGNIFICANT CHANGE UP (ref 8.4–10.5)
CHLORIDE SERPL-SCNC: 108 MMOL/L — SIGNIFICANT CHANGE UP (ref 96–108)
CO2 SERPL-SCNC: 26 MMOL/L — SIGNIFICANT CHANGE UP (ref 22–31)
CREAT SERPL-MCNC: 1.15 MG/DL — SIGNIFICANT CHANGE UP (ref 0.5–1.3)
CULTURE RESULTS: SIGNIFICANT CHANGE UP
GLUCOSE SERPL-MCNC: 63 MG/DL — LOW (ref 70–99)
HCT VFR BLD CALC: 42.7 % — SIGNIFICANT CHANGE UP (ref 34.5–45)
HGB BLD-MCNC: 13.4 G/DL — SIGNIFICANT CHANGE UP (ref 11.5–15.5)
MCHC RBC-ENTMCNC: 29 PG — SIGNIFICANT CHANGE UP (ref 27–34)
MCHC RBC-ENTMCNC: 31.3 GM/DL — LOW (ref 32–36)
MCV RBC AUTO: 92.4 FL — SIGNIFICANT CHANGE UP (ref 80–100)
PLATELET # BLD AUTO: 219 K/UL — SIGNIFICANT CHANGE UP (ref 150–400)
POTASSIUM SERPL-MCNC: 3.7 MMOL/L — SIGNIFICANT CHANGE UP (ref 3.5–5.3)
POTASSIUM SERPL-SCNC: 3.7 MMOL/L — SIGNIFICANT CHANGE UP (ref 3.5–5.3)
RBC # BLD: 4.62 M/UL — SIGNIFICANT CHANGE UP (ref 3.8–5.2)
RBC # FLD: 12.6 % — SIGNIFICANT CHANGE UP (ref 10.3–14.5)
SODIUM SERPL-SCNC: 141 MMOL/L — SIGNIFICANT CHANGE UP (ref 135–145)
SPECIMEN SOURCE: SIGNIFICANT CHANGE UP
THYROGLOB AB FLD IA-ACNC: <20 IU/ML — SIGNIFICANT CHANGE UP (ref 0–40)
THYROGLOB AB SERPL-ACNC: <20 IU/ML — SIGNIFICANT CHANGE UP (ref 0–40)
WBC # BLD: 8.2 K/UL — SIGNIFICANT CHANGE UP (ref 3.8–10.5)
WBC # FLD AUTO: 8.2 K/UL — SIGNIFICANT CHANGE UP (ref 3.8–10.5)

## 2018-05-07 PROCEDURE — 82962 GLUCOSE BLOOD TEST: CPT

## 2018-05-07 PROCEDURE — 71045 X-RAY EXAM CHEST 1 VIEW: CPT

## 2018-05-07 PROCEDURE — 93306 TTE W/DOPPLER COMPLETE: CPT

## 2018-05-07 PROCEDURE — 87040 BLOOD CULTURE FOR BACTERIA: CPT

## 2018-05-07 PROCEDURE — 83605 ASSAY OF LACTIC ACID: CPT

## 2018-05-07 PROCEDURE — 74018 RADEX ABDOMEN 1 VIEW: CPT

## 2018-05-07 PROCEDURE — 84702 CHORIONIC GONADOTROPIN TEST: CPT

## 2018-05-07 PROCEDURE — 87086 URINE CULTURE/COLONY COUNT: CPT

## 2018-05-07 PROCEDURE — G0378: CPT

## 2018-05-07 PROCEDURE — 84443 ASSAY THYROID STIM HORMONE: CPT

## 2018-05-07 PROCEDURE — 70450 CT HEAD/BRAIN W/O DYE: CPT

## 2018-05-07 PROCEDURE — 84100 ASSAY OF PHOSPHORUS: CPT

## 2018-05-07 PROCEDURE — 83735 ASSAY OF MAGNESIUM: CPT

## 2018-05-07 PROCEDURE — 82550 ASSAY OF CK (CPK): CPT

## 2018-05-07 PROCEDURE — 84480 ASSAY TRIIODOTHYRONINE (T3): CPT

## 2018-05-07 PROCEDURE — 87798 DETECT AGENT NOS DNA AMP: CPT

## 2018-05-07 PROCEDURE — 87493 C DIFF AMPLIFIED PROBE: CPT

## 2018-05-07 PROCEDURE — 85027 COMPLETE CBC AUTOMATED: CPT

## 2018-05-07 PROCEDURE — 80053 COMPREHEN METABOLIC PANEL: CPT

## 2018-05-07 PROCEDURE — 74176 CT ABD & PELVIS W/O CONTRAST: CPT

## 2018-05-07 PROCEDURE — 95819 EEG AWAKE AND ASLEEP: CPT

## 2018-05-07 PROCEDURE — 87486 CHLMYD PNEUM DNA AMP PROBE: CPT

## 2018-05-07 PROCEDURE — 84436 ASSAY OF TOTAL THYROXINE: CPT

## 2018-05-07 PROCEDURE — 80048 BASIC METABOLIC PNL TOTAL CA: CPT

## 2018-05-07 PROCEDURE — 87633 RESP VIRUS 12-25 TARGETS: CPT

## 2018-05-07 PROCEDURE — 93005 ELECTROCARDIOGRAM TRACING: CPT

## 2018-05-07 PROCEDURE — 99285 EMERGENCY DEPT VISIT HI MDM: CPT | Mod: 25

## 2018-05-07 PROCEDURE — 95957 EEG DIGITAL ANALYSIS: CPT

## 2018-05-07 PROCEDURE — 84484 ASSAY OF TROPONIN QUANT: CPT

## 2018-05-07 PROCEDURE — 82553 CREATINE MB FRACTION: CPT

## 2018-05-07 PROCEDURE — 87581 M.PNEUMON DNA AMP PROBE: CPT

## 2018-05-07 PROCEDURE — 81001 URINALYSIS AUTO W/SCOPE: CPT

## 2018-05-07 PROCEDURE — 96376 TX/PRO/DX INJ SAME DRUG ADON: CPT

## 2018-05-07 PROCEDURE — 96375 TX/PRO/DX INJ NEW DRUG ADDON: CPT

## 2018-05-07 PROCEDURE — 96374 THER/PROPH/DIAG INJ IV PUSH: CPT

## 2018-05-07 RX ORDER — NICOTINE POLACRILEX 2 MG
1 GUM BUCCAL
Qty: 30 | Refills: 0 | OUTPATIENT
Start: 2018-05-07 | End: 2018-06-05

## 2018-05-07 RX ORDER — ONDANSETRON 8 MG/1
1 TABLET, FILM COATED ORAL
Qty: 1 | Refills: 0 | OUTPATIENT
Start: 2018-05-07 | End: 2018-06-05

## 2018-05-07 RX ORDER — ATORVASTATIN CALCIUM 80 MG/1
1 TABLET, FILM COATED ORAL
Qty: 30 | Refills: 0 | OUTPATIENT
Start: 2018-05-07 | End: 2018-06-05

## 2018-05-07 RX ORDER — CIPROFLOXACIN LACTATE 400MG/40ML
1 VIAL (ML) INTRAVENOUS
Qty: 6 | Refills: 0 | OUTPATIENT
Start: 2018-05-07 | End: 2018-05-09

## 2018-05-07 RX ADMIN — Medication 1 PATCH: at 01:32

## 2018-05-07 RX ADMIN — PANTOPRAZOLE SODIUM 40 MILLIGRAM(S): 20 TABLET, DELAYED RELEASE ORAL at 08:45

## 2018-05-07 RX ADMIN — MORPHINE SULFATE 60 MILLIGRAM(S): 50 CAPSULE, EXTENDED RELEASE ORAL at 06:52

## 2018-05-07 RX ADMIN — Medication 300 MILLIGRAM(S): at 06:52

## 2018-05-07 RX ADMIN — MORPHINE SULFATE 60 MILLIGRAM(S): 50 CAPSULE, EXTENDED RELEASE ORAL at 07:21

## 2018-05-07 NOTE — DISCHARGE NOTE ADULT - PLAN OF CARE
resolved Continue with Zofran, as prescribed.  Followup with Dr. Doyle, PCP, in two to three days. Followup with Dr. Benton, cardiologist, in two to three days for echo. You are at your baseline Continue with your home medication of lyrica Continue with metoprolol and amlodipine, maintain healthy lifestyle measures, which include DASH diet and exercise. Continue with Plavix and followup with Dr. Benton, cardiologist, in two to three days.

## 2018-05-07 NOTE — DISCHARGE NOTE ADULT - SECONDARY DIAGNOSIS.
ACS (acute coronary syndrome) Renal failure, chronic, stage 3 (moderate) Reflex sympathetic dystrophy HTN (hypertension) Atrial fibrillation

## 2018-05-07 NOTE — DISCHARGE NOTE ADULT - HOSPITAL COURSE
Patient is a 46 y/o F with PMH of cyclical vomiting syndrome, chronic pain, SLE, p/w with complaints of interactable vomiting, abdominal pain, diarrhea, shortness of breath, intermittent chest pain and an episode of seizure. Patient admitted to telemetry for further workup.      Intractable cyclical vomiting with nausea.   Pt has Hx of cyclical vomiting syndrome for 4 years. 5-6 episode every year, and is on extensive pain medications for this condition  Xray of the abdomen showed nonspecific bowel gas pattern  CT of the abdomen showed no bowel obstruction  Pt was given reglan and pain management.    Dr. Wylie, GI, consulted    ACS (acute coronary syndrome).   Patient presented with intermittent chest pain  Cardiac enzyme X 1 was negative  Dr. Benton, cardio, consulted  Telemetry was discontinued and outpatient echo was recommended.    UTI  Pt started on IV Rocephin and switched to   PO Ceftin 500 mg., q12H X three days at discharge    Seizure.    Patient had an episode of witnessed seizure yesterday  CT of the brain was unremarkable  EEG showed abnormal EEG in the awake, drowsy, and asleep states.  Functional abnormality in the left temporal region.  Mild non-specific diffuse or multifocal cerebral dysfunction.  There were no epileptiform abnormalities recorded.    Dr. Elias, neurology, consulted    Atrial fibrillation.    Pt has hx of afib, takes plavix for AC, currently rate controlled  Metoprolol 25 BID with parameters  Dr. Benton, cardiologist, consulted    HTN (hypertension).    Pt c/w amlodipine and metoprolol 25 BID,  DASH diet  BP monitored     Reflex sympathetic dystrophy.  spinal stimulator  c/w home dose of lyrica    Renal failure, chronic, stage 3 (moderate).   Pt's renal function at base line.     Prophylactic measure.   Improve score 1.  Pt did not need chemical dvt prophylaxis.     Attending cleared patient for discharge with Ceftin 500 mg., q12 X 3 days as well as outpatient echo.  Pt to followup with PCP in two to three days.

## 2018-05-07 NOTE — DISCHARGE NOTE ADULT - MEDICATION SUMMARY - MEDICATIONS TO STOP TAKING
I will STOP taking the medications listed below when I get home from the hospital:    oxyCODONE-acetaminophen 5 mg-325 mg oral tablet  -- 1 tab(s) by mouth every 4 hours, As needed, Severe Pain (7 - 10)    Flagyl 500 mg oral tablet  -- 1 tab(s) by mouth 3 times a day

## 2018-05-07 NOTE — PROGRESS NOTE ADULT - SUBJECTIVE AND OBJECTIVE BOX
Patient is a 47y old  Female who presents with a chief complaint of Abdominal pain, nausea vomiting and diarrhea (04 May 2018 02:58)    pt seen in ed tele [ x ], reg med floor [   ], bed [x  ], chair at bedside [   ], a+o x3 [ x ], lethargic [  ],  nad [x  ]      no further c/o seiz, n/v/abd pain      Allergies    aspirin (Unknown)  latex (Unknown)  sulfa drugs (Unknown)        Vitals    T(F): 97.8 (05-07-18 @ 08:03), Max: 98.2 (05-06-18 @ 11:32)  HR: 64 (05-07-18 @ 08:03) (51 - 68)  BP: 108/68 (05-07-18 @ 08:03) (85/62 - 109/67)  RR: 16 (05-07-18 @ 08:03) (16 - 18)  SpO2: 100% (05-07-18 @ 08:03) (96% - 100%)  Wt(kg): --  CAPILLARY BLOOD GLUCOSE          Labs                      .Blood Blood-Peripheral  05-04 @ 00:35   No growth to date.  --  --      .Urine Clean Catch (Midstream)  05-03 @ 23:31   Culture grew 3 or more types of organisms which indicate  collection contamination; consider recollection only if clinically  indicated.  --  --          Radiology Results      Meds    MEDICATIONS  (STANDING):  amLODIPine   Tablet 2.5 milliGRAM(s) Oral daily  atorvastatin 20 milliGRAM(s) Oral at bedtime  cefTRIAXone   IVPB 1 Gram(s) IV Intermittent every 24 hours  cefTRIAXone   IVPB      clopidogrel Tablet 75 milliGRAM(s) Oral daily  escitalopram 20 milliGRAM(s) Oral daily  metoprolol tartrate 25 milliGRAM(s) Oral two times a day  morphine ER Tablet 60 milliGRAM(s) Oral every 12 hours  nicotine -  14 mG/24Hr(s) Patch 1 patch Transdermal daily  pantoprazole    Tablet 40 milliGRAM(s) Oral before breakfast  pregabalin 300 milliGRAM(s) Oral two times a day  sodium chloride 0.9%. 1000 milliLiter(s) (100 mL/Hr) IV Continuous <Continuous>      MEDICATIONS  (PRN):  LORazepam   Injectable 2 milliGRAM(s) IV Push every 4 hours PRN seizure  metoclopramide Injectable 5 milliGRAM(s) IV Push every 6 hours PRN nausea vomiting  oxyCODONE    5 mG/acetaminophen 325 mG 2 Tablet(s) Oral every 6 hours PRN Severe Pain (7 - 10)      Physical Exam    Neuro :  no focal deficits  Respiratory: CTA B/L  CV: RRR, S1S2, no murmurs,   Abdominal: Soft, NT, ND +BS,  Extremities: No edema, + peripheral pulses    ASSESSMENT      syncope with seiz activity,   r/o cns patho,   chest pain r/o acs,  r/o arythmia,   uti,   Non-intractable cyclical vomiting   h/o left thyroid nodule,   cyclic vomiting syndrome,   colitis,   Reflex sympathetic dystrophy s/p spinal stimulator,   frequent falls,   SLE (not on rx),   Afib (not on AC due to frequent falls),   HTN,   depression,   Hemorrhoids (EGD / colonoscopy 3-4 years ago),   diverticulosis,   partial right thyroidectomy  Renal failure, chronic, stage 3 (moderate)      PLAN    d/c tele,   cont aspirin, statin,   nuero f/u   ct head neg for ischemia noted   eeg abnormal but neg for eleptiform activity noted  The patient has been advised that driving is not allowed for 1 year after a seizure by [facility] law.  The patient is advised to avoid swimming and any activities that may put their life at danger shall they have a seizure.    no further seiz activity  cardio f/u  ce x 1 neg   f/u echo outpt  d/c rocephin  start ceftin 500 mg q12 x 3  ucx contaminated noted above  ct abd pelv wnl noted   cont reglan prn  pt tolerating full diet  cont current meds  pt stable for d/c  f/u in office on wednesday

## 2018-05-07 NOTE — DISCHARGE NOTE ADULT - SMOKING EVEN A SINGLE PUFF INCREASES THE LIKELIHOOD OF A FULL RELAPSE, WITHDRAWAL SYMPTOMS PEAK WITHIN 1-2 WEEKS, BUT CAN PERSIST FOR MONTHS
Juan Luis (dad) called regarding patient is to see Dr. Lopez on Wednesday for a ED follow up. Juan Luis requested a call back at [phone]. Thank you.    true Statement Selected

## 2018-05-07 NOTE — DISCHARGE NOTE ADULT - CARE PROVIDER_API CALL
Stevan Doyle), Internal Medicine  [address] [place] [address]  Phone: [phone]  Fax: [phone]    Grazyna Benton), Internal Medicine  [address] [place] [id number]  Phone: [phone]  Fax: [phone]

## 2018-05-07 NOTE — DISCHARGE NOTE ADULT - MEDICATION SUMMARY - MEDICATIONS TO TAKE
I will START or STAY ON the medications listed below when I get home from the hospital:    morphine 15 mg oral tablet  -- 1 tab(s) by mouth once a day in the evening  -- Indication: For Chronic pain    acetaminophen-oxyCODONE 325 mg-5 mg oral tablet  -- 1 tab(s) by mouth every 6 hours, As needed, Severe Pain (7 - 10) MDD:4 tabs  -- Indication: For Chronic pain    morphine 60 mg/12 hours oral tablet, extended release  -- 1 tab(s) by mouth every 8 hours MDD:3 tabs  -- Indication: For Chronic pain    pregabalin 300 mg oral capsule  -- 1 cap(s) by mouth 2 times a day  -- Indication: For Chronic pain    escitalopram 20 mg oral tablet  -- 1 tab(s) by mouth once a day  -- Indication: For depression    ondansetron 4 mg oral tablet  -- 1 tab(s) by mouth every 8 hours PRN n/v  -- Indication: For Intractable cyclical vomiting with nausea    dronabinol 5 mg oral capsule  -- 1 cap(s) by mouth every 12 hours  -- Indication: For Chronic pain    atorvastatin 20 mg oral tablet  -- 1 tab(s) by mouth once a day (at bedtime)  -- Indication: For HLD    clopidogrel 75 mg oral tablet  -- 1 tab(s) by mouth once a day  -- Indication: For Atrial fibrillation    metoprolol tartrate 25 mg oral tablet  -- 1 tab(s) by mouth 2 times a day  -- Indication: For Atrial fibrillation    amLODIPine 2.5 mg oral tablet  -- 1 tab(s) by mouth once a day  -- Indication: For HTN (hypertension)    senna oral tablet  -- 2 tab(s) by mouth once a day (at bedtime)  -- Indication: For Constipation    Protonix 40 mg oral delayed release tablet  -- 1 tab(s) by mouth once a day  -- Indication: For GI ppx    ciprofloxacin 500 mg oral tablet  -- 1 tab(s) by mouth every 12 hours  -- Indication: For UTI    nicotine 14 mg/24 hr transdermal film, extended release  -- 1 patch by transdermal patch once a day  -- Indication: For Smoking cessation    Drisdol 50,000 intl units (1.25 mg) oral capsule  -- 1 cap(s) by mouth once a week  -- Indication: For Vit D defic I will START or STAY ON the medications listed below when I get home from the hospital:    morphine 15 mg oral tablet  -- 1 tab(s) by mouth once a day in the evening  -- Indication: For Chronic pain    acetaminophen-oxyCODONE 325 mg-5 mg oral tablet  -- 1 tab(s) by mouth every 6 hours, As needed, Severe Pain (7 - 10) MDD:4 tabs  -- Indication: For Chronic pain    morphine 60 mg/12 hours oral tablet, extended release  -- 1 tab(s) by mouth every 8 hours MDD:3 tabs  -- Indication: For Chronic pain    pregabalin 300 mg oral capsule  -- 1 cap(s) by mouth 2 times a day  -- Indication: For Chronic pain    escitalopram 20 mg oral tablet  -- 1 tab(s) by mouth once a day  -- Indication: For depression    ondansetron 4 mg oral disintegrating strip  -- 1 each by mouth 3 times a day   -- Check with your doctor before becoming pregnant.  Do not chew, break, or crush.  Obtain medical advice before taking any non-prescription drugs as some may affect the action of this medication.    -- Indication: For Intractable cyclical vomiting with nausea    dronabinol 5 mg oral capsule  -- 1 cap(s) by mouth every 12 hours  -- Indication: For Chronic pain    atorvastatin 20 mg oral tablet  -- 1 tab(s) by mouth once a day (at bedtime)  -- Indication: For HLD    clopidogrel 75 mg oral tablet  -- 1 tab(s) by mouth once a day  -- Indication: For Atrial fibrillation    metoprolol tartrate 25 mg oral tablet  -- 1 tab(s) by mouth 2 times a day  -- Indication: For Atrial fibrillation    amLODIPine 2.5 mg oral tablet  -- 1 tab(s) by mouth once a day  -- Indication: For HTN (hypertension)    senna oral tablet  -- 2 tab(s) by mouth once a day (at bedtime)  -- Indication: For Constipation    Protonix 40 mg oral delayed release tablet  -- 1 tab(s) by mouth once a day  -- Indication: For GI ppx    ciprofloxacin 500 mg oral tablet  -- 1 tab(s) by mouth every 12 hours  -- Indication: For UTI    nicotine 14 mg/24 hr transdermal film, extended release  -- 1 patch by transdermal patch once a day  -- Indication: For Smoking cessation    Drisdol 50,000 intl units (1.25 mg) oral capsule  -- 1 cap(s) by mouth once a week  -- Indication: For Vit D defic

## 2018-05-07 NOTE — DISCHARGE NOTE ADULT - PATIENT PORTAL LINK FT
You can access the [facility] Patient Portal, offered by [facility], by registering with the following website: [email or web address]

## 2018-05-07 NOTE — DISCHARGE NOTE ADULT - CARE PLAN
Principal Discharge DX:	Intractable cyclical vomiting with nausea  Goal:	resolved  Assessment and plan of treatment:	Continue with Zofran, as prescribed.  Followup with Dr. Dolye, PCP, in two to three days.  Secondary Diagnosis:	ACS (acute coronary syndrome)  Assessment and plan of treatment:	Followup with Dr. Benton, cardiologist, in two to three days for echo.  Secondary Diagnosis:	Renal failure, chronic, stage 3 (moderate)  Assessment and plan of treatment:	You are at your baseline  Secondary Diagnosis:	Reflex sympathetic dystrophy  Assessment and plan of treatment:	Continue with your home medication of lyrica  Secondary Diagnosis:	HTN (hypertension)  Assessment and plan of treatment:	Continue with metoprolol and amlodipine, maintain healthy lifestyle measures, which include DASH diet and exercise.  Secondary Diagnosis:	Atrial fibrillation  Assessment and plan of treatment:	Continue with Plavix and followup with Dr. Benton, cardiologist, in two to three days.

## 2018-05-09 ENCOUNTER — APPOINTMENT (OUTPATIENT)
Dept: ENDOCRINOLOGY | Facility: CLINIC | Age: 48
End: 2018-05-09

## 2018-05-18 ENCOUNTER — APPOINTMENT (OUTPATIENT)
Dept: RHEUMATOLOGY | Facility: CLINIC | Age: 48
End: 2018-05-18
Payer: MEDICARE

## 2018-05-18 VITALS
RESPIRATION RATE: 16 BRPM | TEMPERATURE: 98 F | BODY MASS INDEX: 29.92 KG/M2 | WEIGHT: 202 LBS | DIASTOLIC BLOOD PRESSURE: 74 MMHG | HEART RATE: 86 BPM | HEIGHT: 69 IN | SYSTOLIC BLOOD PRESSURE: 106 MMHG | OXYGEN SATURATION: 97 %

## 2018-05-18 DIAGNOSIS — G43.909 MIGRAINE, UNSPECIFIED, NOT INTRACTABLE, W/OUT STATUS MIGRAINOSUS: ICD-10-CM

## 2018-05-18 DIAGNOSIS — F17.200 NICOTINE DEPENDENCE, UNSPECIFIED, UNCOMPLICATED: ICD-10-CM

## 2018-05-18 DIAGNOSIS — Z80.0 FAMILY HISTORY OF MALIGNANT NEOPLASM OF DIGESTIVE ORGANS: ICD-10-CM

## 2018-05-18 DIAGNOSIS — Z86.69 PERSONAL HISTORY OF OTHER DISEASES OF THE NERVOUS SYSTEM AND SENSE ORGANS: ICD-10-CM

## 2018-05-18 DIAGNOSIS — Z83.49 FAMILY HISTORY OF OTHER ENDOCRINE, NUTRITIONAL AND METABOLIC DISEASES: ICD-10-CM

## 2018-05-18 DIAGNOSIS — Z80.1 FAMILY HISTORY OF MALIGNANT NEOPLASM OF TRACHEA, BRONCHUS AND LUNG: ICD-10-CM

## 2018-05-18 DIAGNOSIS — Z86.79 PERSONAL HISTORY OF OTHER DISEASES OF THE CIRCULATORY SYSTEM: ICD-10-CM

## 2018-05-18 DIAGNOSIS — Z87.448 PERSONAL HISTORY OF OTHER DISEASES OF URINARY SYSTEM: ICD-10-CM

## 2018-05-18 DIAGNOSIS — R76.8 OTHER SPECIFIED ABNORMAL IMMUNOLOGICAL FINDINGS IN SERUM: ICD-10-CM

## 2018-05-18 DIAGNOSIS — Z82.49 FAMILY HISTORY OF ISCHEMIC HEART DISEASE AND OTHER DISEASES OF THE CIRCULATORY SYSTEM: ICD-10-CM

## 2018-05-18 DIAGNOSIS — M19.90 UNSPECIFIED OSTEOARTHRITIS, UNSPECIFIED SITE: ICD-10-CM

## 2018-05-18 DIAGNOSIS — Z83.3 FAMILY HISTORY OF DIABETES MELLITUS: ICD-10-CM

## 2018-05-18 DIAGNOSIS — Z87.39 PERSONAL HISTORY OF OTHER DISEASES OF THE MUSCULOSKELETAL SYSTEM AND CONNECTIVE TISSUE: ICD-10-CM

## 2018-05-18 LAB
APPEARANCE: CLEAR
APTT BLD: 28.8 SEC
BACTERIA: NEGATIVE
BASOPHILS # BLD AUTO: 0.02 K/UL
BASOPHILS NFR BLD AUTO: 0.2 %
BILIRUBIN URINE: NEGATIVE
BLOOD URINE: NEGATIVE
COLOR: YELLOW
EOSINOPHIL # BLD AUTO: 0.14 K/UL
EOSINOPHIL NFR BLD AUTO: 1.1 %
ERYTHROCYTE [SEDIMENTATION RATE] IN BLOOD BY WESTERGREN METHOD: 9 MM/HR
GLUCOSE QUALITATIVE U: NEGATIVE MG/DL
HCT VFR BLD CALC: 38.9 %
HGB BLD-MCNC: 12.4 G/DL
HYALINE CASTS: 0 /LPF
IMM GRANULOCYTES NFR BLD AUTO: 0.4 %
KETONES URINE: NEGATIVE
LEUKOCYTE ESTERASE URINE: NEGATIVE
LYMPHOCYTES # BLD AUTO: 4.81 K/UL
LYMPHOCYTES NFR BLD AUTO: 38.7 %
MAN DIFF?: NORMAL
MCHC RBC-ENTMCNC: 29 PG
MCHC RBC-ENTMCNC: 31.9 GM/DL
MCV RBC AUTO: 90.9 FL
MICROSCOPIC-UA: NORMAL
MONOCYTES # BLD AUTO: 1.04 K/UL
MONOCYTES NFR BLD AUTO: 8.4 %
NEUTROPHILS # BLD AUTO: 6.38 K/UL
NEUTROPHILS NFR BLD AUTO: 51.2 %
NITRITE URINE: NEGATIVE
PH URINE: 6
PLATELET # BLD AUTO: 351 K/UL
PROTEIN URINE: NEGATIVE MG/DL
RBC # BLD: 4.28 M/UL
RBC # FLD: 14.9 %
RED BLOOD CELLS URINE: 1 /HPF
SPECIFIC GRAVITY URINE: 1.01
SQUAMOUS EPITHELIAL CELLS: 0 /HPF
UROBILINOGEN URINE: NEGATIVE MG/DL
WBC # FLD AUTO: 12.44 K/UL
WHITE BLOOD CELLS URINE: 2 /HPF

## 2018-05-18 PROCEDURE — 99204 OFFICE O/P NEW MOD 45 MIN: CPT

## 2018-05-18 RX ORDER — PREDNISONE 10 MG/1
10 TABLET ORAL
Refills: 0 | Status: DISCONTINUED | COMMUNITY

## 2018-05-25 PROBLEM — R76.8 ANA POSITIVE: Status: ACTIVE | Noted: 2018-05-18

## 2018-05-25 LAB
25(OH)D3 SERPL-MCNC: 11.5 NG/ML
ALBUMIN SERPL ELPH-MCNC: 4.1 G/DL
ALP BLD-CCNC: 119 U/L
ALT SERPL-CCNC: 14 U/L
ANA SER IF-ACNC: NEGATIVE
ANION GAP SERPL CALC-SCNC: 13 MMOL/L
AST SERPL-CCNC: 16 U/L
B2 GLYCOPROT1 AB SER QL: NEGATIVE
BILIRUB SERPL-MCNC: 0.2 MG/DL
BUN SERPL-MCNC: 14 MG/DL
CALCIUM SERPL-MCNC: 9.4 MG/DL
CARDIOLIPIN AB SER IA-ACNC: NEGATIVE
CCP AB SER IA-ACNC: <8 UNITS
CENTROMERE IGG SER-ACNC: <0.2 AL
CHLORIDE SERPL-SCNC: 103 MMOL/L
CO2 SERPL-SCNC: 27 MMOL/L
CONFIRM: 26.5 SEC
CREAT SERPL-MCNC: 1.21 MG/DL
CREAT SPEC-SCNC: 91 MG/DL
CREAT/PROT UR: 0.1 RATIO
CRP SERPL-MCNC: 0.8 MG/DL
DRVVT IMM 1:2 NP PPP: NORMAL
DRVVT SCREEN TO CONFIRM RATIO: 0.93 RATIO
DSDNA AB SER-ACNC: 12 IU/ML
ENA RNP AB SER IA-ACNC: <0.2 AL
ENA SCL70 IGG SER IA-ACNC: <0.2 AL
ENA SM AB SER IA-ACNC: <0.2 AL
ENA SS-A AB SER IA-ACNC: <0.2 AL
ENA SS-B AB SER IA-ACNC: <0.2 AL
FOLATE SERPL-MCNC: 8.9 NG/ML
GLUCOSE SERPL-MCNC: 113 MG/DL
POTASSIUM SERPL-SCNC: 3.9 MMOL/L
PROT SERPL-MCNC: 6.5 G/DL
PROT UR-MCNC: 5 MG/DL
RF+CCP IGG SER-IMP: NEGATIVE
RHEUMATOID FACT SER QL: <7 IU/ML
RNA POLYMERASE III IGG: <10 U
SCREEN DRVVT: 30.5 SEC
SILICA CLOTTING TIME INTERPRETATION: NORMAL
SILICA CLOTTING TIME S/C: 0.98 RATIO
SODIUM SERPL-SCNC: 143 MMOL/L
THYROGLOB AB SERPL-ACNC: <20 IU/ML
THYROPEROXIDASE AB SERPL IA-ACNC: <10 IU/ML
TSH SERPL-ACNC: 0.41 UIU/ML
VIT B12 SERPL-MCNC: 600 PG/ML

## 2018-11-30 NOTE — PROGRESS NOTE ADULT - SUBJECTIVE AND OBJECTIVE BOX
Call to [facility] for Dr. Radha Bonds   [phone] Patient is a 44 y/o F lives with her sister from home ambulates with walker, current everyday smoker,  PMH Reflex symphatetic dystrophy s/p spinal stimulator, frequent falls, SLE (no treatment at this time), cyclical vomiting syndrome, Afib (not on AC due to frequent falls), HTN, depression, Hemorrhoids (EGD / colonoscopy 3-4 years ago, is due for a next one soon ) and partial thyroidectomy presented with c/o vomiting and diarrhea for 2 days a/w abdominal pain.    Patient found to have a left shift on CBC, Hb 17.1, likely hemoconcentrated, ( Baseline Hb of 15-16), BMP showing K of 3.4, likely related to persistent vomiting/diarrhea, normal AG, Bicarb is WNL, initial lactate is 2.5, patient got 2 L NS in the ED. CT A/P showing evidence of pancolitis given cipro/Flagyl in the ED and morphine, Zofran, Tylenol for symptom management  Patient is admitted for pancolitis and cyclical vomiting syndrome    REVIEW OF SYSTEMS:    CONSTITUTIONAL: No weakness, fevers or chills  EYES/ENT: No visual changes;  No vertigo or throat pain   NECK: No pain or stiffness  RESPIRATORY: No cough, wheezing, hemoptysis; No shortness of breath  CARDIOVASCULAR: No chest pain or palpitations  GASTROINTESTINAL: No abdominal or epigastric pain. No nausea, vomiting, or hematemesis; No diarrhea or constipation. No melena or hematochezia.  GENITOURINARY: No dysuria, frequency or hematuria  NEUROLOGICAL: No numbness or weakness  SKIN: No itching, burning, rashes, or lesions   All other review of systems is negative unless indicated above.    Physical Exam    General: WN/WD NAD  Neurology: A&Ox3, nonfocal, SCOTT x 4  Respiratory: CTA B/L  CV: RRR, S1S2, no murmurs, rubs or gallops  Abdominal: Soft, NT, ND +BS, Last BM  Extremities: No edema, + peripheral pulses      Allergies  aspirin (Unknown)  latex (Unknown)  sulfa drugs (Unknown)      Health Issues  Ulcerative chronic pancolitis without complications  HTN (hypertension)  Cyclical vomiting  Atrial fibrillation  SLE (systemic lupus erythematosus)  Reflex sympathetic dystrophy  H/O partial thyroidectomy  S/P partial hysterectomy      Vitals  T(F): 98.4 (09-16-17 @ 04:37), Max: 98.4 (09-16-17 @ 04:37)  HR: 65 (09-16-17 @ 04:37) (57 - 76)  BP: 102/52 (09-16-17 @ 04:37) (97/72 - 120/71)  RR: 18 (09-16-17 @ 04:37) (14 - 18)  SpO2: 98% (09-16-17 @ 04:37) (98% - 98%)  Wt(kg): --  CAPILLARY BLOOD GLUCOSE          Labs                          12.9   6.2   )-----------( 235      ( 16 Sep 2017 07:17 )             38.8       09-16    142  |  107  |  12  ----------------------------<  90  3.8   |  29  |  1.12    Ca    8.7      16 Sep 2017 07:17  Phos  3.4     09-15  Mg     2.1     09-16              Radiology Results      Meds    MEDICATIONS  (STANDING):  pregabalin 300 milliGRAM(s) Oral two times a day  escitalopram 20 milliGRAM(s) Oral daily  clopidogrel Tablet 75 milliGRAM(s) Oral daily  metoprolol 25 milliGRAM(s) Oral two times a day  amLODIPine   Tablet 2.5 milliGRAM(s) Oral daily  pantoprazole    Tablet 40 milliGRAM(s) Oral before breakfast  nicotine - 21 mG/24Hr(s) Patch 1 patch Transdermal daily  enoxaparin Injectable 40 milliGRAM(s) SubCutaneous daily  ciprofloxacin   IVPB 400 milliGRAM(s) IV Intermittent every 12 hours  metroNIDAZOLE  IVPB 500 milliGRAM(s) IV Intermittent every 8 hours  influenza   Vaccine 0.5 milliLiter(s) IntraMuscular once  sodium chloride 0.9% Bolus 1000 milliLiter(s) IV Bolus once  morphine ER Tablet 60 milliGRAM(s) Oral every 12 hours  metoclopramide 5 milliGRAM(s) Oral two times a day  ergocalciferol 32172 Unit(s) Oral <User Schedule>  lactated ringers. 1000 milliLiter(s) (125 mL/Hr) IV Continuous <Continuous>  senna 2 Tablet(s) Oral at bedtime  docusate sodium 100 milliGRAM(s) Oral two times a day  sodium chloride 0.9%. 1000 milliLiter(s) (75 mL/Hr) IV Continuous <Continuous>  dronabinol 5 milliGRAM(s) Oral every 12 hours      MEDICATIONS  (PRN):  ondansetron Injectable 4 milliGRAM(s) IV Push every 6 hours PRN Nausea and/or Vomiting  acetaminophen   Tablet 650 milliGRAM(s) Oral every 6 hours PRN For Temp greater than 38 C (100.4 F)  oxyCODONE    5 mG/acetaminophen 325 mG 2 Tablet(s) Oral every 4 hours PRN Severe Pain (7 - 10)  zaleplon 5 milliGRAM(s) Oral at bedtime PRN Insomnia  morphine  - Injectable 4 milliGRAM(s) IV Push every 4 hours PRN breakthrough pain Patient is a 44 y/o F lives with her sister from home ambulates with walker, current everyday smoker,  PMH Reflex symphatetic dystrophy s/p spinal stimulator, frequent falls, SLE (no treatment at this time), cyclical vomiting syndrome, Afib (not on AC due to frequent falls), HTN, depression, Hemorrhoids (EGD / colonoscopy 3-4 years ago, is due for a next one soon ) and partial thyroidectomy presented with c/o vomiting and diarrhea for 2 days a/w abdominal pain.    Patient found to have a left shift on CBC, Hb 17.1, likely hemoconcentrated, ( Baseline Hb of 15-16), BMP showing K of 3.4, likely related to persistent vomiting/diarrhea, normal AG, Bicarb is WNL, initial lactate is 2.5, patient got 2 L NS in the ED. CT A/P showing evidence of pancolitis given cipro/Flagyl in the ED and morphine, Zofran, Tylenol for symptom management  Patient is admitted for pancolitis and cyclical vomiting syndrome    REVIEW OF SYSTEMS:    CONSTITUTIONAL: No weakness, fevers or chills  EYES/ENT: No visual changes;  No vertigo or throat pain   NECK: No pain or stiffness  RESPIRATORY: No cough, wheezing, hemoptysis; No shortness of breath  CARDIOVASCULAR: No chest pain or palpitations  GASTROINTESTINAL:  Abdominal pain and diarrhea  GENITOURINARY: No dysuria, frequency or hematuria  NEUROLOGICAL: No numbness or weakness  SKIN: No itching, burning, rashes, or lesions   All other review of systems is negative unless indicated above.    Physical Exam    General: WN/WD NAD  Neurology: A&Ox3, nonfocal, SCOTT x 4  Respiratory: CTA B/L  CV: RRR, S1S2, no murmurs, rubs or gallops  Abdominal: Soft, mildly tender  Extremities: No edema, + peripheral pulses      Allergies  aspirin (Unknown)  latex (Unknown)  sulfa drugs (Unknown)      Health Issues  Ulcerative chronic pancolitis without complications  HTN (hypertension)  Cyclical vomiting  Atrial fibrillation  SLE (systemic lupus erythematosus)  Reflex sympathetic dystrophy  H/O partial thyroidectomy  S/P partial hysterectomy      Vitals  T(F): 98.4 (09-16-17 @ 04:37), Max: 98.4 (09-16-17 @ 04:37)  HR: 65 (09-16-17 @ 04:37) (57 - 76)  BP: 102/52 (09-16-17 @ 04:37) (97/72 - 120/71)  RR: 18 (09-16-17 @ 04:37) (14 - 18)  SpO2: 98% (09-16-17 @ 04:37) (98% - 98%)  Wt(kg): --  CAPILLARY BLOOD GLUCOSE          Labs                          12.9   6.2   )-----------( 235      ( 16 Sep 2017 07:17 )             38.8       09-16    142  |  107  |  12  ----------------------------<  90  3.8   |  29  |  1.12    Ca    8.7      16 Sep 2017 07:17  Phos  3.4     09-15  Mg     2.1     09-16              Radiology Results      Meds    MEDICATIONS  (STANDING):  pregabalin 300 milliGRAM(s) Oral two times a day  escitalopram 20 milliGRAM(s) Oral daily  clopidogrel Tablet 75 milliGRAM(s) Oral daily  metoprolol 25 milliGRAM(s) Oral two times a day  amLODIPine   Tablet 2.5 milliGRAM(s) Oral daily  pantoprazole    Tablet 40 milliGRAM(s) Oral before breakfast  nicotine - 21 mG/24Hr(s) Patch 1 patch Transdermal daily  enoxaparin Injectable 40 milliGRAM(s) SubCutaneous daily  ciprofloxacin   IVPB 400 milliGRAM(s) IV Intermittent every 12 hours  metroNIDAZOLE  IVPB 500 milliGRAM(s) IV Intermittent every 8 hours  influenza   Vaccine 0.5 milliLiter(s) IntraMuscular once  sodium chloride 0.9% Bolus 1000 milliLiter(s) IV Bolus once  morphine ER Tablet 60 milliGRAM(s) Oral every 12 hours  metoclopramide 5 milliGRAM(s) Oral two times a day  ergocalciferol 71937 Unit(s) Oral <User Schedule>  lactated ringers. 1000 milliLiter(s) (125 mL/Hr) IV Continuous <Continuous>  senna 2 Tablet(s) Oral at bedtime  docusate sodium 100 milliGRAM(s) Oral two times a day  sodium chloride 0.9%. 1000 milliLiter(s) (75 mL/Hr) IV Continuous <Continuous>  dronabinol 5 milliGRAM(s) Oral every 12 hours      MEDICATIONS  (PRN):  ondansetron Injectable 4 milliGRAM(s) IV Push every 6 hours PRN Nausea and/or Vomiting  acetaminophen   Tablet 650 milliGRAM(s) Oral every 6 hours PRN For Temp greater than 38 C (100.4 F)  oxyCODONE    5 mG/acetaminophen 325 mG 2 Tablet(s) Oral every 4 hours PRN Severe Pain (7 - 10)  zaleplon 5 milliGRAM(s) Oral at bedtime PRN Insomnia  morphine  - Injectable 4 milliGRAM(s) IV Push every 4 hours PRN breakthrough pain

## 2018-12-04 ENCOUNTER — INPATIENT (INPATIENT)
Facility: HOSPITAL | Age: 48
LOS: 2 days | Discharge: HOME | DRG: 392 | End: 2018-12-07
Attending: INTERNAL MEDICINE | Admitting: INTERNAL MEDICINE
Payer: MEDICARE

## 2018-12-04 VITALS
WEIGHT: 138.89 LBS | HEIGHT: 69 IN | TEMPERATURE: 98 F | SYSTOLIC BLOOD PRESSURE: 132 MMHG | DIASTOLIC BLOOD PRESSURE: 66 MMHG | HEART RATE: 78 BPM | OXYGEN SATURATION: 97 % | RESPIRATION RATE: 17 BRPM

## 2018-12-04 DIAGNOSIS — G43.A0 CYCLICAL VOMITING, IN MIGRAINE, NOT INTRACTABLE: ICD-10-CM

## 2018-12-04 DIAGNOSIS — E89.0 POSTPROCEDURAL HYPOTHYROIDISM: Chronic | ICD-10-CM

## 2018-12-04 LAB
ALBUMIN SERPL ELPH-MCNC: 4.4 G/DL — SIGNIFICANT CHANGE UP (ref 3.5–5)
ALP SERPL-CCNC: 154 U/L — HIGH (ref 40–120)
ALT FLD-CCNC: 23 U/L DA — SIGNIFICANT CHANGE UP (ref 10–60)
ANION GAP SERPL CALC-SCNC: 12 MMOL/L — SIGNIFICANT CHANGE UP (ref 5–17)
APPEARANCE UR: CLEAR — SIGNIFICANT CHANGE UP
APTT BLD: 31.5 SEC — SIGNIFICANT CHANGE UP (ref 27.5–36.3)
AST SERPL-CCNC: 22 U/L — SIGNIFICANT CHANGE UP (ref 10–40)
BASOPHILS # BLD AUTO: 0.1 K/UL — SIGNIFICANT CHANGE UP (ref 0–0.2)
BASOPHILS NFR BLD AUTO: 0.4 % — SIGNIFICANT CHANGE UP (ref 0–2)
BILIRUB SERPL-MCNC: 0.6 MG/DL — SIGNIFICANT CHANGE UP (ref 0.2–1.2)
BILIRUB UR-MCNC: NEGATIVE — SIGNIFICANT CHANGE UP
BUN SERPL-MCNC: 9 MG/DL — SIGNIFICANT CHANGE UP (ref 7–18)
CALCIUM SERPL-MCNC: 10.3 MG/DL — SIGNIFICANT CHANGE UP (ref 8.4–10.5)
CHLORIDE SERPL-SCNC: 103 MMOL/L — SIGNIFICANT CHANGE UP (ref 96–108)
CO2 SERPL-SCNC: 24 MMOL/L — SIGNIFICANT CHANGE UP (ref 22–31)
COLOR SPEC: YELLOW — SIGNIFICANT CHANGE UP
CREAT SERPL-MCNC: 1.29 MG/DL — SIGNIFICANT CHANGE UP (ref 0.5–1.3)
DIFF PNL FLD: NEGATIVE — SIGNIFICANT CHANGE UP
EOSINOPHIL # BLD AUTO: 0 K/UL — SIGNIFICANT CHANGE UP (ref 0–0.5)
EOSINOPHIL NFR BLD AUTO: 0 % — SIGNIFICANT CHANGE UP (ref 0–6)
GLUCOSE SERPL-MCNC: 118 MG/DL — HIGH (ref 70–99)
GLUCOSE UR QL: NEGATIVE — SIGNIFICANT CHANGE UP
HCG SERPL-ACNC: <1 MIU/ML — SIGNIFICANT CHANGE UP
HCT VFR BLD CALC: 53.5 % — HIGH (ref 34.5–45)
HGB BLD-MCNC: 17.3 G/DL — HIGH (ref 11.5–15.5)
INR BLD: 1.02 RATIO — SIGNIFICANT CHANGE UP (ref 0.88–1.16)
KETONES UR-MCNC: ABNORMAL
LEUKOCYTE ESTERASE UR-ACNC: NEGATIVE — SIGNIFICANT CHANGE UP
LIDOCAIN IGE QN: 13 U/L — SIGNIFICANT CHANGE UP (ref 7–60)
LYMPHOCYTES # BLD AUTO: 1.2 K/UL — SIGNIFICANT CHANGE UP (ref 1–3.3)
LYMPHOCYTES # BLD AUTO: 9.2 % — LOW (ref 13–44)
MCHC RBC-ENTMCNC: 28.7 PG — SIGNIFICANT CHANGE UP (ref 27–34)
MCHC RBC-ENTMCNC: 32.4 GM/DL — SIGNIFICANT CHANGE UP (ref 32–36)
MCV RBC AUTO: 88.5 FL — SIGNIFICANT CHANGE UP (ref 80–100)
MONOCYTES # BLD AUTO: 0.5 K/UL — SIGNIFICANT CHANGE UP (ref 0–0.9)
MONOCYTES NFR BLD AUTO: 3.6 % — SIGNIFICANT CHANGE UP (ref 2–14)
NEUTROPHILS # BLD AUTO: 11 K/UL — HIGH (ref 1.8–7.4)
NEUTROPHILS NFR BLD AUTO: 86.7 % — HIGH (ref 43–77)
NITRITE UR-MCNC: NEGATIVE — SIGNIFICANT CHANGE UP
PH UR: 9 — HIGH (ref 5–8)
PLATELET # BLD AUTO: 334 K/UL — SIGNIFICANT CHANGE UP (ref 150–400)
POTASSIUM SERPL-MCNC: 3 MMOL/L — LOW (ref 3.5–5.3)
POTASSIUM SERPL-SCNC: 3 MMOL/L — LOW (ref 3.5–5.3)
PROT SERPL-MCNC: 9.1 G/DL — HIGH (ref 6–8.3)
PROT UR-MCNC: 30 MG/DL
PROTHROM AB SERPL-ACNC: 11.3 SEC — SIGNIFICANT CHANGE UP (ref 10–12.9)
RBC # BLD: 6.04 M/UL — HIGH (ref 3.8–5.2)
RBC # FLD: 12.8 % — SIGNIFICANT CHANGE UP (ref 10.3–14.5)
SODIUM SERPL-SCNC: 139 MMOL/L — SIGNIFICANT CHANGE UP (ref 135–145)
SP GR SPEC: 1.01 — SIGNIFICANT CHANGE UP (ref 1.01–1.02)
TROPONIN I SERPL-MCNC: <0.015 NG/ML — SIGNIFICANT CHANGE UP (ref 0–0.04)
UROBILINOGEN FLD QL: NEGATIVE — SIGNIFICANT CHANGE UP
WBC # BLD: 12.7 K/UL — HIGH (ref 3.8–10.5)
WBC # FLD AUTO: 12.7 K/UL — HIGH (ref 3.8–10.5)

## 2018-12-04 PROCEDURE — 71045 X-RAY EXAM CHEST 1 VIEW: CPT | Mod: 26

## 2018-12-04 PROCEDURE — 99285 EMERGENCY DEPT VISIT HI MDM: CPT | Mod: 25

## 2018-12-04 PROCEDURE — 93010 ELECTROCARDIOGRAM REPORT: CPT

## 2018-12-04 PROCEDURE — 74177 CT ABD & PELVIS W/CONTRAST: CPT | Mod: 26

## 2018-12-04 RX ORDER — MORPHINE SULFATE 50 MG/1
2 CAPSULE, EXTENDED RELEASE ORAL ONCE
Qty: 0 | Refills: 0 | Status: DISCONTINUED | OUTPATIENT
Start: 2018-12-04 | End: 2018-12-04

## 2018-12-04 RX ORDER — POTASSIUM CHLORIDE 20 MEQ
40 PACKET (EA) ORAL ONCE
Qty: 0 | Refills: 0 | Status: DISCONTINUED | OUTPATIENT
Start: 2018-12-04 | End: 2018-12-04

## 2018-12-04 RX ORDER — ONDANSETRON 8 MG/1
4 TABLET, FILM COATED ORAL ONCE
Qty: 0 | Refills: 0 | Status: COMPLETED | OUTPATIENT
Start: 2018-12-04 | End: 2018-12-04

## 2018-12-04 RX ORDER — SODIUM CHLORIDE 9 MG/ML
1000 INJECTION INTRAMUSCULAR; INTRAVENOUS; SUBCUTANEOUS ONCE
Qty: 0 | Refills: 0 | Status: COMPLETED | OUTPATIENT
Start: 2018-12-04 | End: 2018-12-04

## 2018-12-04 RX ORDER — METOCLOPRAMIDE HCL 10 MG
10 TABLET ORAL ONCE
Qty: 0 | Refills: 0 | Status: COMPLETED | OUTPATIENT
Start: 2018-12-04 | End: 2018-12-04

## 2018-12-04 RX ORDER — MORPHINE SULFATE 50 MG/1
4 CAPSULE, EXTENDED RELEASE ORAL ONCE
Qty: 0 | Refills: 0 | Status: DISCONTINUED | OUTPATIENT
Start: 2018-12-04 | End: 2018-12-04

## 2018-12-04 RX ORDER — POTASSIUM CHLORIDE 20 MEQ
40 PACKET (EA) ORAL ONCE
Qty: 0 | Refills: 0 | Status: COMPLETED | OUTPATIENT
Start: 2018-12-04 | End: 2018-12-04

## 2018-12-04 RX ADMIN — SODIUM CHLORIDE 1000 MILLILITER(S): 9 INJECTION INTRAMUSCULAR; INTRAVENOUS; SUBCUTANEOUS at 23:45

## 2018-12-04 RX ADMIN — MORPHINE SULFATE 4 MILLIGRAM(S): 50 CAPSULE, EXTENDED RELEASE ORAL at 21:11

## 2018-12-04 RX ADMIN — SODIUM CHLORIDE 1000 MILLILITER(S): 9 INJECTION INTRAMUSCULAR; INTRAVENOUS; SUBCUTANEOUS at 18:30

## 2018-12-04 RX ADMIN — Medication 10 MILLIGRAM(S): at 21:10

## 2018-12-04 RX ADMIN — ONDANSETRON 4 MILLIGRAM(S): 8 TABLET, FILM COATED ORAL at 18:31

## 2018-12-04 RX ADMIN — ONDANSETRON 4 MILLIGRAM(S): 8 TABLET, FILM COATED ORAL at 19:45

## 2018-12-04 RX ADMIN — MORPHINE SULFATE 2 MILLIGRAM(S): 50 CAPSULE, EXTENDED RELEASE ORAL at 19:35

## 2018-12-04 RX ADMIN — SODIUM CHLORIDE 1000 MILLILITER(S): 9 INJECTION INTRAMUSCULAR; INTRAVENOUS; SUBCUTANEOUS at 19:42

## 2018-12-04 RX ADMIN — MORPHINE SULFATE 4 MILLIGRAM(S): 50 CAPSULE, EXTENDED RELEASE ORAL at 21:09

## 2018-12-04 RX ADMIN — MORPHINE SULFATE 2 MILLIGRAM(S): 50 CAPSULE, EXTENDED RELEASE ORAL at 18:31

## 2018-12-04 NOTE — ED ADULT NURSE NOTE - OBJECTIVE STATEMENT
Pt AOx4, ambulatory, h/o lupus, BIB EMS, c/o vomit, diarrhea since this morning. Pt denies SOB, hemoptysis, hematuria.

## 2018-12-04 NOTE — ED PROVIDER NOTE - CARE PLAN
Principal Discharge DX:	Cyclic vomiting syndrome, intractability of vomiting not specified, presence of nausea not specified

## 2018-12-04 NOTE — ED ADULT NURSE NOTE - ED STAT RN HANDOFF DETAILS 2
Patient endorsed by MELINDA Latham. Patient continues to vomit and pending CT Scan. Medication to be administered

## 2018-12-04 NOTE — ED ADULT NURSE NOTE - NSIMPLEMENTINTERV_GEN_ALL_ED
Implemented All Fall Risk Interventions:  [place] to call system. Call bell, personal items and telephone within reach. Instruct patient to call for assistance. Room bathroom lighting operational. Non-slip footwear when patient is off stretcher. Physically safe environment: no spills, clutter or unnecessary equipment. Stretcher in lowest position, wheels locked, appropriate side rails in place. Provide visual cue, wrist band, yellow gown, etc. Monitor gait and stability. Monitor for mental status changes and reorient to person, place, and time. Review medications for side effects contributing to fall risk. Reinforce activity limits and safety measures with patient and family.

## 2018-12-04 NOTE — ED PROVIDER NOTE - MEDICAL DECISION MAKING DETAILS
46 y/o F pt w/ vomiting, chest pain, abdominal pain otherwise vitals are normal, will check blood work, CT abdomen, chest x-ray, antiemetics and reassess

## 2018-12-04 NOTE — ED PROVIDER NOTE - OBJECTIVE STATEMENT
48 y/o F pt w/ PMHx of HTN, cyclical vomiting presents to ED c/o vomiting, abdominal pain, and chest pain after vomiting, diarrhea x 1 day. Pt denies fever, shortness of breath, blood per vomit, blood in stool and all other complaints. NKDA

## 2018-12-04 NOTE — ED PROVIDER NOTE - PROGRESS NOTE DETAILS
patient ct show no acute finding, patient otherwise still vomiting, unable to tolerate po. lab consistent with vomiting and dehydration. admitted due to inability to tolerate po. iv contrast extravasate. patient iv replaced. radial pulse and sensation to right arm intact.

## 2018-12-05 DIAGNOSIS — G43.A0 CYCLICAL VOMITING, IN MIGRAINE, NOT INTRACTABLE: ICD-10-CM

## 2018-12-05 DIAGNOSIS — G89.4 CHRONIC PAIN SYNDROME: ICD-10-CM

## 2018-12-05 DIAGNOSIS — M32.9 SYSTEMIC LUPUS ERYTHEMATOSUS, UNSPECIFIED: ICD-10-CM

## 2018-12-05 PROBLEM — N18.3 CHRONIC KIDNEY DISEASE, STAGE 3 (MODERATE): Chronic | Status: ACTIVE | Noted: 2018-05-03

## 2018-12-05 LAB
ANION GAP SERPL CALC-SCNC: 13 MMOL/L — SIGNIFICANT CHANGE UP (ref 5–17)
BUN SERPL-MCNC: 8 MG/DL — SIGNIFICANT CHANGE UP (ref 7–18)
CALCIUM SERPL-MCNC: 9.2 MG/DL — SIGNIFICANT CHANGE UP (ref 8.4–10.5)
CHLORIDE SERPL-SCNC: 105 MMOL/L — SIGNIFICANT CHANGE UP (ref 96–108)
CHOLEST SERPL-MCNC: 158 MG/DL — SIGNIFICANT CHANGE UP (ref 10–199)
CO2 SERPL-SCNC: 22 MMOL/L — SIGNIFICANT CHANGE UP (ref 22–31)
CREAT SERPL-MCNC: 1.27 MG/DL — SIGNIFICANT CHANGE UP (ref 0.5–1.3)
GLUCOSE SERPL-MCNC: 115 MG/DL — HIGH (ref 70–99)
HBA1C BLD-MCNC: 5.6 % — SIGNIFICANT CHANGE UP (ref 4–5.6)
HCT VFR BLD CALC: 46.5 % — HIGH (ref 34.5–45)
HDLC SERPL-MCNC: 48 MG/DL — LOW
HGB BLD-MCNC: 15 G/DL — SIGNIFICANT CHANGE UP (ref 11.5–15.5)
LIPID PNL WITH DIRECT LDL SERPL: 93 MG/DL — SIGNIFICANT CHANGE UP
MAGNESIUM SERPL-MCNC: 1.7 MG/DL — SIGNIFICANT CHANGE UP (ref 1.6–2.6)
MCHC RBC-ENTMCNC: 28.7 PG — SIGNIFICANT CHANGE UP (ref 27–34)
MCHC RBC-ENTMCNC: 32.4 GM/DL — SIGNIFICANT CHANGE UP (ref 32–36)
MCV RBC AUTO: 88.7 FL — SIGNIFICANT CHANGE UP (ref 80–100)
PCP SPEC-MCNC: SIGNIFICANT CHANGE UP
PHOSPHATE SERPL-MCNC: 3.2 MG/DL — SIGNIFICANT CHANGE UP (ref 2.5–4.5)
PLATELET # BLD AUTO: 279 K/UL — SIGNIFICANT CHANGE UP (ref 150–400)
POTASSIUM SERPL-MCNC: 3.3 MMOL/L — LOW (ref 3.5–5.3)
POTASSIUM SERPL-SCNC: 3.3 MMOL/L — LOW (ref 3.5–5.3)
RBC # BLD: 5.25 M/UL — HIGH (ref 3.8–5.2)
RBC # FLD: 12.6 % — SIGNIFICANT CHANGE UP (ref 10.3–14.5)
SODIUM SERPL-SCNC: 140 MMOL/L — SIGNIFICANT CHANGE UP (ref 135–145)
TOTAL CHOLESTEROL/HDL RATIO MEASUREMENT: 3.3 RATIO — SIGNIFICANT CHANGE UP (ref 3.3–7.1)
TRIGL SERPL-MCNC: 83 MG/DL — SIGNIFICANT CHANGE UP (ref 10–149)
TSH SERPL-MCNC: 0.57 UU/ML — SIGNIFICANT CHANGE UP (ref 0.34–4.82)
WBC # BLD: 13.1 K/UL — HIGH (ref 3.8–10.5)
WBC # FLD AUTO: 13.1 K/UL — HIGH (ref 3.8–10.5)

## 2018-12-05 PROCEDURE — 72125 CT NECK SPINE W/O DYE: CPT | Mod: 26

## 2018-12-05 PROCEDURE — 99223 1ST HOSP IP/OBS HIGH 75: CPT

## 2018-12-05 RX ORDER — METOCLOPRAMIDE HCL 10 MG
10 TABLET ORAL ONCE
Qty: 0 | Refills: 0 | Status: COMPLETED | OUTPATIENT
Start: 2018-12-05 | End: 2018-12-05

## 2018-12-05 RX ORDER — ATORVASTATIN CALCIUM 80 MG/1
20 TABLET, FILM COATED ORAL AT BEDTIME
Qty: 0 | Refills: 0 | Status: DISCONTINUED | OUTPATIENT
Start: 2018-12-05 | End: 2018-12-07

## 2018-12-05 RX ORDER — NICOTINE POLACRILEX 2 MG
1 GUM BUCCAL DAILY
Qty: 0 | Refills: 0 | Status: DISCONTINUED | OUTPATIENT
Start: 2018-12-05 | End: 2018-12-07

## 2018-12-05 RX ORDER — MORPHINE SULFATE 50 MG/1
4 CAPSULE, EXTENDED RELEASE ORAL EVERY 6 HOURS
Qty: 0 | Refills: 0 | Status: DISCONTINUED | OUTPATIENT
Start: 2018-12-05 | End: 2018-12-06

## 2018-12-05 RX ORDER — ESCITALOPRAM OXALATE 10 MG/1
20 TABLET, FILM COATED ORAL DAILY
Qty: 0 | Refills: 0 | Status: DISCONTINUED | OUTPATIENT
Start: 2018-12-05 | End: 2018-12-07

## 2018-12-05 RX ORDER — PANTOPRAZOLE SODIUM 20 MG/1
40 TABLET, DELAYED RELEASE ORAL
Qty: 0 | Refills: 0 | Status: DISCONTINUED | OUTPATIENT
Start: 2018-12-05 | End: 2018-12-07

## 2018-12-05 RX ORDER — SODIUM CHLORIDE 9 MG/ML
1000 INJECTION, SOLUTION INTRAVENOUS
Qty: 0 | Refills: 0 | Status: DISCONTINUED | OUTPATIENT
Start: 2018-12-05 | End: 2018-12-07

## 2018-12-05 RX ORDER — AMLODIPINE BESYLATE 2.5 MG/1
2.5 TABLET ORAL DAILY
Qty: 0 | Refills: 0 | Status: DISCONTINUED | OUTPATIENT
Start: 2018-12-05 | End: 2018-12-07

## 2018-12-05 RX ORDER — ONDANSETRON 8 MG/1
4 TABLET, FILM COATED ORAL EVERY 4 HOURS
Qty: 0 | Refills: 0 | Status: DISCONTINUED | OUTPATIENT
Start: 2018-12-05 | End: 2018-12-05

## 2018-12-05 RX ORDER — DRONABINOL 2.5 MG
5 CAPSULE ORAL EVERY 12 HOURS
Qty: 0 | Refills: 0 | Status: DISCONTINUED | OUTPATIENT
Start: 2018-12-05 | End: 2018-12-07

## 2018-12-05 RX ORDER — MORPHINE SULFATE 50 MG/1
1 CAPSULE, EXTENDED RELEASE ORAL ONCE
Qty: 0 | Refills: 0 | Status: DISCONTINUED | OUTPATIENT
Start: 2018-12-05 | End: 2018-12-05

## 2018-12-05 RX ORDER — METOPROLOL TARTRATE 50 MG
25 TABLET ORAL
Qty: 0 | Refills: 0 | Status: DISCONTINUED | OUTPATIENT
Start: 2018-12-05 | End: 2018-12-07

## 2018-12-05 RX ORDER — INFLUENZA VIRUS VACCINE 15; 15; 15; 15 UG/.5ML; UG/.5ML; UG/.5ML; UG/.5ML
0.5 SUSPENSION INTRAMUSCULAR ONCE
Qty: 0 | Refills: 0 | Status: COMPLETED | OUTPATIENT
Start: 2018-12-05 | End: 2018-12-07

## 2018-12-05 RX ORDER — CLOPIDOGREL BISULFATE 75 MG/1
75 TABLET, FILM COATED ORAL DAILY
Qty: 0 | Refills: 0 | Status: DISCONTINUED | OUTPATIENT
Start: 2018-12-05 | End: 2018-12-07

## 2018-12-05 RX ORDER — ACETAMINOPHEN 500 MG
650 TABLET ORAL ONCE
Qty: 0 | Refills: 0 | Status: COMPLETED | OUTPATIENT
Start: 2018-12-05 | End: 2018-12-05

## 2018-12-05 RX ORDER — MORPHINE SULFATE 50 MG/1
4 CAPSULE, EXTENDED RELEASE ORAL EVERY 6 HOURS
Qty: 0 | Refills: 0 | Status: DISCONTINUED | OUTPATIENT
Start: 2018-12-05 | End: 2018-12-05

## 2018-12-05 RX ORDER — ONDANSETRON 8 MG/1
4 TABLET, FILM COATED ORAL ONCE
Qty: 0 | Refills: 0 | Status: COMPLETED | OUTPATIENT
Start: 2018-12-05 | End: 2018-12-05

## 2018-12-05 RX ORDER — ONDANSETRON 8 MG/1
4 TABLET, FILM COATED ORAL EVERY 4 HOURS
Qty: 0 | Refills: 0 | Status: DISCONTINUED | OUTPATIENT
Start: 2018-12-05 | End: 2018-12-07

## 2018-12-05 RX ORDER — ENOXAPARIN SODIUM 100 MG/ML
40 INJECTION SUBCUTANEOUS DAILY
Qty: 0 | Refills: 0 | Status: DISCONTINUED | OUTPATIENT
Start: 2018-12-05 | End: 2018-12-07

## 2018-12-05 RX ORDER — POTASSIUM CHLORIDE 20 MEQ
40 PACKET (EA) ORAL ONCE
Qty: 0 | Refills: 0 | Status: COMPLETED | OUTPATIENT
Start: 2018-12-05 | End: 2018-12-05

## 2018-12-05 RX ORDER — MORPHINE SULFATE 50 MG/1
2 CAPSULE, EXTENDED RELEASE ORAL EVERY 8 HOURS
Qty: 0 | Refills: 0 | Status: DISCONTINUED | OUTPATIENT
Start: 2018-12-05 | End: 2018-12-05

## 2018-12-05 RX ADMIN — MORPHINE SULFATE 2 MILLIGRAM(S): 50 CAPSULE, EXTENDED RELEASE ORAL at 03:10

## 2018-12-05 RX ADMIN — Medication 10 MILLIGRAM(S): at 00:52

## 2018-12-05 RX ADMIN — ONDANSETRON 4 MILLIGRAM(S): 8 TABLET, FILM COATED ORAL at 07:23

## 2018-12-05 RX ADMIN — Medication 1 PATCH: at 19:15

## 2018-12-05 RX ADMIN — CLOPIDOGREL BISULFATE 75 MILLIGRAM(S): 75 TABLET, FILM COATED ORAL at 18:03

## 2018-12-05 RX ADMIN — MORPHINE SULFATE 1 MILLIGRAM(S): 50 CAPSULE, EXTENDED RELEASE ORAL at 10:11

## 2018-12-05 RX ADMIN — MORPHINE SULFATE 2 MILLIGRAM(S): 50 CAPSULE, EXTENDED RELEASE ORAL at 03:44

## 2018-12-05 RX ADMIN — MORPHINE SULFATE 1 MILLIGRAM(S): 50 CAPSULE, EXTENDED RELEASE ORAL at 11:30

## 2018-12-05 RX ADMIN — Medication 40 MILLIEQUIVALENT(S): at 13:44

## 2018-12-05 RX ADMIN — ONDANSETRON 4 MILLIGRAM(S): 8 TABLET, FILM COATED ORAL at 18:10

## 2018-12-05 RX ADMIN — MORPHINE SULFATE 4 MILLIGRAM(S): 50 CAPSULE, EXTENDED RELEASE ORAL at 21:12

## 2018-12-05 RX ADMIN — Medication 1 PATCH: at 18:03

## 2018-12-05 RX ADMIN — Medication 25 MILLIGRAM(S): at 18:03

## 2018-12-05 RX ADMIN — Medication 650 MILLIGRAM(S): at 15:30

## 2018-12-05 RX ADMIN — SODIUM CHLORIDE 65 MILLILITER(S): 9 INJECTION, SOLUTION INTRAVENOUS at 01:14

## 2018-12-05 RX ADMIN — MORPHINE SULFATE 4 MILLIGRAM(S): 50 CAPSULE, EXTENDED RELEASE ORAL at 15:44

## 2018-12-05 RX ADMIN — Medication 5 MILLIGRAM(S): at 19:20

## 2018-12-05 RX ADMIN — MORPHINE SULFATE 4 MILLIGRAM(S): 50 CAPSULE, EXTENDED RELEASE ORAL at 21:42

## 2018-12-05 RX ADMIN — MORPHINE SULFATE 4 MILLIGRAM(S): 50 CAPSULE, EXTENDED RELEASE ORAL at 16:10

## 2018-12-05 RX ADMIN — Medication 650 MILLIGRAM(S): at 15:01

## 2018-12-05 RX ADMIN — ONDANSETRON 4 MILLIGRAM(S): 8 TABLET, FILM COATED ORAL at 23:01

## 2018-12-05 RX ADMIN — Medication 300 MILLIGRAM(S): at 18:49

## 2018-12-05 RX ADMIN — ESCITALOPRAM OXALATE 20 MILLIGRAM(S): 10 TABLET, FILM COATED ORAL at 18:03

## 2018-12-05 NOTE — H&P ADULT - PROBLEM SELECTOR PLAN 1
-pt has Hx of cyclical vomiting syndrome for 4 years. 5-6 episode every year.  -pt is on extensive pain medications for this condition  -will keep pt on Reglan and IV morphine for now  -Resume home medications MS contin and oxycodone when pt tolerating PO  -IV hydration and electrolyte repletion   -f/u BMP, mag, phos daily   -Pain management consult

## 2018-12-05 NOTE — CONSULT NOTE ADULT - SUBJECTIVE AND OBJECTIVE BOX
NP Note discussed with  Primary Attending    Patient is a 47y old  Female who presents with a chief complaint of Abd pain (05 Dec 2018 05:32).  Patient is a 48 y/o F, lives with her sister, from home, ambulates with walker,  w/ PMHx of cyclic vomiting syndrome, colitis, Reflex sympathetic dystrophy s/p spinal stimulator, frequent falls, SLE (not on rx), Afib (not on AC due to frequent falls), HTN, depression, Hemorrhoids (EGD / colonoscopy 3-4 years ago), diverticulosis, and partial thyroidectomy on right side, who presents with c/o Abd pain, and  intractable vomiting x 2 days. She describes the abd pain as sharp, 10/10 intensity, intermittent, in epigastrium, aggravated by eating, and relieved with opioids.  Pt has hx of multiple similar episodes- -usually 5x times per year- last in May 2018.  Her vomiting is constant, multiple episodes, watery, non bloody , greenish colored.  Pt is unable to keep food and medication down.  She also c/o formed non bloody diarrhea x 2 yesterday. Pt denies any recent travel,  or sick contacts.  pt also c/o neck pain that she has had since recent fall. Pt denies CP, palpitations, HA, blurred vision, slurred speech, extremity weakness/ numbness, or syncope.     47 year old female with history of cyclic vomiting syndrome and history of RSD with spinal cord stimulator is admitted with abdominal pain and severe vomiting.  Pt has a history of chronic pain and takes ms contin 60mg po q 12 hours and percocet po prn.  Pt had spinal cord stimulator placed several years ago but it not functionally currently.  Pt is known to take marijuana and uses it regularly.      INTERVAL HPI/OVERNIGHT EVENTS: no new complaints    MEDICATIONS  (STANDING):  amLODIPine   Tablet 2.5 milliGRAM(s) Oral daily  atorvastatin 20 milliGRAM(s) Oral at bedtime  clopidogrel Tablet 75 milliGRAM(s) Oral daily  dextrose 5% + sodium chloride 0.9%. 1000 milliLiter(s) (65 mL/Hr) IV Continuous <Continuous>  dronabinol 5 milliGRAM(s) Oral every 12 hours  enoxaparin Injectable 40 milliGRAM(s) SubCutaneous daily  escitalopram 20 milliGRAM(s) Oral daily  influenza   Vaccine 0.5 milliLiter(s) IntraMuscular once  metoprolol tartrate 25 milliGRAM(s) Oral two times a day  nicotine -  14 mG/24Hr(s) Patch 1 patch Transdermal daily  pantoprazole    Tablet 40 milliGRAM(s) Oral before breakfast  pregabalin 300 milliGRAM(s) Oral two times a day    MEDICATIONS  (PRN):  morphine  - Injectable 2 milliGRAM(s) IV Push every 8 hours PRN Severe Pain (7 - 10)  ondansetron Injectable 4 milliGRAM(s) IV Push every 4 hours PRN Nausea and/or Vomiting      __________________________________________________  REVIEW OF SYSTEMS:    CONSTITUTIONAL: No fever,   RESPIRATORY: No cough; No shortness of breath  CARDIOVASCULAR: No chest pain, no palpitations  GASTROINTESTINAL: No pain. No nausea or vomiting; No diarrhea   NEUROLOGICAL: No headache or numbness, no tremors  MUSCULOSKELETAL: No joint pain, no muscle pain  GENITOURINARY: no dysuria, no frequency, no hesitancy        Vital Signs Last 24 Hrs  T(C): 36.4 (05 Dec 2018 14:38), Max: 37.8 (05 Dec 2018 05:02)  T(F): 97.6 (05 Dec 2018 14:38), Max: 100 (05 Dec 2018 05:02)  HR: 70 (05 Dec 2018 14:38) (70 - 85)  BP: 158/99 (05 Dec 2018 14:38) (132/66 - 158/99)  BP(mean): --  RR: 18 (05 Dec 2018 14:38) (17 - 18)  SpO2: 100% (05 Dec 2018 14:38) (94% - 100%)    ________________________________________________  PHYSICAL EXAM:  GENERAL: NAD  CHEST/LUNG: Clear to auscultation bilaterally with good air entry   HEART: S1 S2  regular; no murmurs, gallops or rubs  ABDOMEN: Soft, Nontender, Nondistended; Bowel sounds present  EXTREMITIES: no cyanosis; no edema; no calf tenderness  NERVOUS SYSTEM:  Awake and alert; Oriented  to place, person and time ; no new deficits  MUSCULOSKELETAL:  _________________________________________________  LABS:                        15.0   13.1  )-----------( 279      ( 05 Dec 2018 10:43 )             46.5     12-    140  |  105  |  8   ----------------------------<  115<H>  3.3<L>   |  22  |  1.27    Ca    9.2      05 Dec 2018 10:43  Phos  3.2     12-  Mg     1.7         TPro  9.1<H>  /  Alb  4.4  /  TBili  0.6  /  DBili  x   /  AST  22  /  ALT  23  /  AlkPhos  154<H>  12-    PT/INR - ( 04 Dec 2018 18:25 )   PT: 11.3 sec;   INR: 1.02 ratio         PTT - ( 04 Dec 2018 18:25 )  PTT:31.5 sec  Urinalysis Basic - ( 04 Dec 2018 18:40 )    Color: Yellow / Appearance: Clear / S.015 / pH: x  Gluc: x / Ketone: Small  / Bili: Negative / Urobili: Negative   Blood: x / Protein: 30 mg/dL / Nitrite: Negative   Leuk Esterase: Negative / RBC: 0-2 /HPF / WBC 0-2 /HPF   Sq Epi: x / Non Sq Epi: x / Bacteria: Trace /HPF      CAPILLARY BLOOD GLUCOSE            RADIOLOGY & ADDITIONAL TESTS:    Imaging Personally Reviewed:  YES/NO    Consultant(s) Notes Reviewed:   YES/ No    Care Discussed with Consultants :     Plan of care was discussed with patient and /or primary care giver; all questions and concerns were addressed and care was aligned with patient's wishes. NP Note discussed with  Primary Attending    Patient is a 47y old  Female who presents with a chief complaint of Abd pain (05 Dec 2018 05:32).  Patient is a 46 y/o F, lives with her sister, from home, ambulates with walker,  w/ PMHx of cyclic vomiting syndrome, colitis, Reflex sympathetic dystrophy s/p spinal stimulator, frequent falls, SLE (not on rx), Afib (not on AC due to frequent falls), HTN, depression, Hemorrhoids (EGD / colonoscopy 3-4 years ago), diverticulosis, and partial thyroidectomy on right side, who presents with c/o Abd pain, and  intractable vomiting x 2 days. She describes the abd pain as sharp, 10/10 intensity, intermittent, in epigastrium, aggravated by eating, and relieved with opioids.  Pt has hx of multiple similar episodes- -usually 5x times per year- last in May 2018.  Her vomiting is constant, multiple episodes, watery, non bloody , greenish colored.  Pt is unable to keep food and medication down.  She also c/o formed non bloody diarrhea x 2 yesterday. Pt denies any recent travel,  or sick contacts.  pt also c/o neck pain that she has had since recent fall. Pt denies CP, palpitations, HA, blurred vision, slurred speech, extremity weakness/ numbness, or syncope.     47 year old female with history of cyclic vomiting syndrome and history of RSD with spinal cord stimulator is admitted with abdominal pain and severe vomiting.  Pt has a history of chronic pain and takes ms contin 60mg po q 12 hours and percocet po prn.  Pt had spinal cord stimulator placed several years ago but it not functionally currently.  Pt is known to take marijuana and uses it regularly.  Pt sees dr. arias for pain mgt.  Pt frequently gets admitted for these symptoms.  Currently pt complaining of severe abdominal pain. Pain worsens when eating and movement.  Pt recently had a fall and was given a cervical neck collar for mild compression fractures.      INTERVAL HPI/OVERNIGHT EVENTS: no new complaints    MEDICATIONS  (STANDING):  amLODIPine   Tablet 2.5 milliGRAM(s) Oral daily  atorvastatin 20 milliGRAM(s) Oral at bedtime  clopidogrel Tablet 75 milliGRAM(s) Oral daily  dextrose 5% + sodium chloride 0.9%. 1000 milliLiter(s) (65 mL/Hr) IV Continuous <Continuous>  dronabinol 5 milliGRAM(s) Oral every 12 hours  enoxaparin Injectable 40 milliGRAM(s) SubCutaneous daily  escitalopram 20 milliGRAM(s) Oral daily  influenza   Vaccine 0.5 milliLiter(s) IntraMuscular once  metoprolol tartrate 25 milliGRAM(s) Oral two times a day  nicotine -  14 mG/24Hr(s) Patch 1 patch Transdermal daily  pantoprazole    Tablet 40 milliGRAM(s) Oral before breakfast  pregabalin 300 milliGRAM(s) Oral two times a day    MEDICATIONS  (PRN):  morphine  - Injectable 2 milliGRAM(s) IV Push every 8 hours PRN Severe Pain (7 - 10)  ondansetron Injectable 4 milliGRAM(s) IV Push every 4 hours PRN Nausea and/or Vomiting      __________________________________________________  REVIEW OF SYSTEMS:    CONSTITUTIONAL: No fever   RESPIRATORY: No cough; No shortness of breath  CARDIOVASCULAR: No chest pain, no palpitations  GASTROINTESTINAL:+ vomiting, + abdominal pain  NEUROLOGICAL: No headache or numbness, no tremors  MUSCULOSKELETAL: + back pain - chronic and bilateral lower extremity pain  GENITOURINARY: no dysuria, no frequency, no hesitancy        Vital Signs Last 24 Hrs  T(C): 36.4 (05 Dec 2018 14:38), Max: 37.8 (05 Dec 2018 05:02)  T(F): 97.6 (05 Dec 2018 14:38), Max: 100 (05 Dec 2018 05:02)  HR: 70 (05 Dec 2018 14:38) (70 - 85)  BP: 158/99 (05 Dec 2018 14:38) (132/66 - 158/99)  BP(mean): --  RR: 18 (05 Dec 2018 14:38) (17 - 18)  SpO2: 100% (05 Dec 2018 14:38) (94% - 100%)    ________________________________________________  PHYSICAL EXAM:  GENERAL: NAD  CHEST/LUNG: Clear to auscultation bilaterally with good air entry   HEART: S1 S2  regular; no murmurs, gallops or rubs  ABDOMEN: Soft, +tenderness   EXTREMITIES: no cyanosis; no edema + pain on palpation   NERVOUS SYSTEM:  Awake and alert; Oriented  to place, person and time ; no new deficits  MUSCULOSKELETAL: + decreased rom, + lumbar spine tenderness + tenderness on light palpation of bilateral lower extremities.   _________________________________________________  LABS:                        15.0   13.1  )-----------( 279      ( 05 Dec 2018 10:43 )             46.5     12-    140  |  105  |  8   ----------------------------<  115<H>  3.3<L>   |  22  |  1.27    Ca    9.2      05 Dec 2018 10:43  Phos  3.2       Mg     1.7         TPro  9.1<H>  /  Alb  4.4  /  TBili  0.6  /  DBili  x   /  AST  22  /  ALT  23  /  AlkPhos  154<H>  12-    PT/INR - ( 04 Dec 2018 18:25 )   PT: 11.3 sec;   INR: 1.02 ratio         PTT - ( 04 Dec 2018 18:25 )  PTT:31.5 sec  Urinalysis Basic - ( 04 Dec 2018 18:40 )    Color: Yellow / Appearance: Clear / S.015 / pH: x  Gluc: x / Ketone: Small  / Bili: Negative / Urobili: Negative   Blood: x / Protein: 30 mg/dL / Nitrite: Negative   Leuk Esterase: Negative / RBC: 0-2 /HPF / WBC 0-2 /HPF   Sq Epi: x / Non Sq Epi: x / Bacteria: Trace /HPF      CAPILLARY BLOOD GLUCOSE            RADIOLOGY & ADDITIONAL TESTS:      Imaging Personally Reviewed:  YES/NO  < from: CT Cervical Spine No Cont (18 @ 11:36) >    EXAM:  CT CERVICAL SPINE                            PROCEDURE DATE:  2018          INTERPRETATION:  CLINICAL STATEMENT: Trauma.    TECHNIQUE: CT of the cervical spine was performed without contrast. 3-D   MIP images obtained    COMPARISON: None.    FINDINGS:  Mild reversal of cervical lordosis. Mild compression deformities of C4   and C5 vertebral bodies. Sagittal alignment intact    There is no prevertebral soft tissue swelling. The odontoid is intact.     Evaluation of the spinal canal is limited on a CT exam.  Multilevel   degenerative changes noted resulting in multilevel spinal canal stenosis   and neural foraminal narrowing. Impingement of ventral cord at multiple   levels on a degenerative basis.    3.2 cm left thyroid nodule noted with mass effect on the trachea to the   right    IMPRESSION:  No acute fracture cervical spine    < end of copied text >    Consultant(s) Notes Reviewed:   YES/ No    Care Discussed with Consultants :     Plan of care was discussed with patient and /or primary care giver; all questions and concerns were addressed and care was aligned with patient's wishes.

## 2018-12-05 NOTE — H&P ADULT - PROBLEM SELECTOR PLAN 6
RISK                                                          Points  [] Previous VTE                                           3  [] Thrombophilia                                        2  [] Lower limb paralysis                              2   [] Current Cancer                                       2   [x] Immobilization > 24 hrs                        1  [] ICU/CCU stay > 24 hours                       1  [] Age > 60                                                   1    Improve score 1. Lovenox for DVT prophy

## 2018-12-05 NOTE — H&P ADULT - NEUROLOGICAL DETAILS
responds to verbal commands/sensation intact/responds to pain/alert and oriented x 3/cranial nerves intact

## 2018-12-05 NOTE — H&P ADULT - NSHPPHYSICALEXAM_GEN_ALL_CORE
Vital Signs Last 24 Hrs  T(C): 37.8 (05 Dec 2018 05:02), Max: 37.8 (05 Dec 2018 05:02)  T(F): 100 (05 Dec 2018 05:02), Max: 100 (05 Dec 2018 05:02)  HR: 85 (05 Dec 2018 05:02) (73 - 85)  BP: 144/95 (05 Dec 2018 05:02) (132/66 - 146/99)  RR: 18 (05 Dec 2018 05:02) (17 - 18)  SpO2: 96% (05 Dec 2018 05:02) (94% - 100%)

## 2018-12-05 NOTE — H&P ADULT - HISTORY OF PRESENT ILLNESS
Patient is a 46 y/o F, lives with her sister, from home, ambulates with walker,  w/ PMHx of cyclic vomiting syndrome, colitis, Reflex sympathetic dystrophy s/p spinal stimulator, frequent falls, SLE (not on rx), Afib (not on AC due to frequent falls), HTN, depression, Hemorrhoids (EGD / colonoscopy 3-4 years ago), diverticulosis, and partial thyroidectomy on right side, who presents with c/o Abd pain, and  intractable vomiting x 2 days. She describes the abd pain as sharp, 10/10 intensity, intermittent, in epigastrium, aggravated by eating, and relieved with opioids.  Pt has hx of multiple similar episodes- -usually 5x times per year- last in May 2018.  Her vomiting is constant, multiple episodes, watery, non bloody , greenish colored.  Pt is unable to keep food and medication down.  She also c/o formed non bloody diarrhea x 2 yesterday. Pt denies any recent travel,  or sick contacts.   Pt denies CP, palpitations, HA, blurred vision, slurred speech, extremity weakness/ numbness, or syncope.      In the ED, pt presents in mild painful distress, and vitals WNL Patient is a 46 y/o F, lives with her sister, from home, ambulates with walker,  w/ PMHx of cyclic vomiting syndrome, colitis, Reflex sympathetic dystrophy s/p spinal stimulator, frequent falls, SLE (not on rx), Afib (not on AC due to frequent falls), HTN, depression, Hemorrhoids (EGD / colonoscopy 3-4 years ago), diverticulosis, and partial thyroidectomy on right side, who presents with c/o Abd pain, and  intractable vomiting x 2 days. She describes the abd pain as sharp, 10/10 intensity, intermittent, in epigastrium, aggravated by eating, and relieved with opioids.  Pt has hx of multiple similar episodes- -usually 5x times per year- last in May 2018.  Her vomiting is constant, multiple episodes, watery, non bloody , greenish colored.  Pt is unable to keep food and medication down.  She also c/o formed non bloody diarrhea x 2 yesterday. Pt denies any recent travel,  or sick contacts.  pt also c/o neck pain that she has had since recent fall. Pt denies CP, palpitations, HA, blurred vision, slurred speech, extremity weakness/ numbness, or syncope.      In the ED, pt presents in mild painful distress, and vitals WNL

## 2018-12-05 NOTE — PATIENT PROFILE ADULT - TOBACCO USE
3/21/2014    Performed by Burak [facility] at [facility] OR 2ND FLR    INJECTION-PIRIFORMIS Right 2018    Per[facility] at [facility] PAIN MGT    INJECTION-PIRIFORMIS Right 1/15/2018    Per[facility] at [facility] PAIN MGT    Lysis of synechiae Right     NASAL SEPTUM SURGERY      RESECTION-TURBINATES (SMR) N/A 10/17/2014    Performed by [facility], [facility] at [facility] OR 2ND FLR    SEPTOPLASTY N/A 10/17/2014    Performed by [facility], [facility] at [facility] Rocío Motley   Current every day smoker

## 2018-12-05 NOTE — H&P ADULT - ATTENDING COMMENTS
Patient is a 46 y/o F, lives with her sister, from home, ambulates with walker,  w/ PMHx of cyclic vomiting syndrome, colitis, Reflex sympathetic dystrophy s/p spinal stimulator, frequent falls, SLE (not on rx), Afib (not on AC due to frequent falls), HTN, depression, Hemorrhoids (EGD / colonoscopy 3-4 years ago), diverticulosis, and partial thyroidectomy on right side, who presents with c/o Abd pain, and  intractable vomiting x 2 days. She describes the abd pain as sharp, 10/10 intensity, intermittent, in epigastrium, aggravated by eating, and relieved with opioids.  Pt has hx of multiple similar episodes- -usually 5x times per year- last in May 2018.  Her vomiting is constant, multiple episodes, watery, non bloody , greenish colored.  Pt is unable to keep food and medication down.  She also c/o formed non bloody diarrhea x 2 yesterday. Pt denies any recent travel,  or sick contacts.   Pt denies CP, palpitations, HA, blurred vision, slurred speech, extremity weakness/ numbness, or syncope.      In the ED, pt presents in mild painful distress, and vitals WNL    pt seen in bed, vitals stable, physical exam reveals lungs cta b/l, heart s1s2, abd soft nd nt bs+, ext no edema. labs and diagnostic test result reviewed.    assessment  --  exacerbation of cyclic vomiting synd, h/o left thyroid nodule, cyclic vomiting syndrome, colitis, Reflex sympathetic dystrophy s/p spinal stimulator, frequent falls, SLE (not on rx), Afib (not on AC due to frequent falls), HTN, depression, Hemorrhoids (EGD / colonoscopy 3-4 years ago), diverticulosis, and partial right thyroidectomy    plan  --  admit to med, npo, reglan, zofran, cont preadmit home meds, gi and dvt profilaxis,  cbc, bmp, mg, phos, lipids, tsh,     gi cons Patient is a 48 y/o F, lives with her sister, from home, ambulates with walker,  w/ PMHx of cyclic vomiting syndrome, colitis, Reflex sympathetic dystrophy s/p spinal stimulator, frequent falls, SLE (not on rx), Afib (not on AC due to frequent falls), HTN, depression, Hemorrhoids (EGD / colonoscopy 3-4 years ago), diverticulosis, and partial thyroidectomy on right side, who presents with c/o Abd pain, and  intractable vomiting x 2 days. She describes the abd pain as sharp, 10/10 intensity, intermittent, in epigastrium, aggravated by eating, and relieved with opioids.  Pt has hx of multiple similar episodes- -usually 5x times per year- last in May 2018.  Her vomiting is constant, multiple episodes, watery, non bloody , greenish colored.  Pt is unable to keep food and medication down.  She also c/o formed non bloody diarrhea x 2 yesterday. Pt denies any recent travel,  or sick contacts.   pt also c/o neck pain that she has had since recent fall. Pt denies CP, palpitations, HA, blurred vision, slurred speech, extremity weakness/ numbness, or syncope.      In the ED, pt presents in mild painful distress, and vitals WNL    pt seen in bed, vitals stable, physical exam reveals lungs cta b/l, heart s1s2, abd soft nd nt bs+, ext no edema. labs and diagnostic test result reviewed.    assessment  --  exacerbation of cyclic vomiting synd, neck pain, h/o left thyroid nodule, cyclic vomiting syndrome, colitis, Reflex sympathetic dystrophy s/p spinal stimulator, frequent falls, SLE (not on rx), Afib (not on AC due to frequent falls), HTN, depression, Hemorrhoids (EGD / colonoscopy 3-4 years ago), diverticulosis, and partial right thyroidectomy    plan  --  admit to med, npo, reglan, zofran, cont preadmit home meds, gi and dvt profilaxis,  cbc, bmp, mg, phos, lipids, tsh,     ct c-spine    gi cons  pain mgmt cons

## 2018-12-05 NOTE — H&P ADULT - ASSESSMENT
Patient is a 46 y/o F with PMHx of cyclical vomiting syndrome, chronic pain, SLE, p/w with complaints of intractable vomiting, abdominal pain, and diarrhea.  Pt remains afebrile w/o leukocytosis.  Remaining labs sig for Hb 17, likely hemoconcentrated from severe dehydration.  K+ 3.1 on admission likely from vomiting.      Pt will be admitted to medicine for management of intractable vomiting

## 2018-12-05 NOTE — H&P ADULT - PROBLEM SELECTOR PLAN 2
-pt has hx of afib, takes plavix for AC, currently rate controlled  -c/w metoprolol 25 BID with parameters

## 2018-12-06 DIAGNOSIS — M54.2 CERVICALGIA: ICD-10-CM

## 2018-12-06 LAB
-  AMIKACIN: SIGNIFICANT CHANGE UP
-  AMPICILLIN/SULBACTAM: SIGNIFICANT CHANGE UP
-  AMPICILLIN: SIGNIFICANT CHANGE UP
-  AMPICILLIN: SIGNIFICANT CHANGE UP
-  AZTREONAM: SIGNIFICANT CHANGE UP
-  CEFAZOLIN: SIGNIFICANT CHANGE UP
-  CEFEPIME: SIGNIFICANT CHANGE UP
-  CEFOXITIN: SIGNIFICANT CHANGE UP
-  CEFTRIAXONE: SIGNIFICANT CHANGE UP
-  CIPROFLOXACIN: SIGNIFICANT CHANGE UP
-  CIPROFLOXACIN: SIGNIFICANT CHANGE UP
-  ERTAPENEM: SIGNIFICANT CHANGE UP
-  GENTAMICIN: SIGNIFICANT CHANGE UP
-  IMIPENEM: SIGNIFICANT CHANGE UP
-  LEVOFLOXACIN: SIGNIFICANT CHANGE UP
-  LEVOFLOXACIN: SIGNIFICANT CHANGE UP
-  MEROPENEM: SIGNIFICANT CHANGE UP
-  NITROFURANTOIN: SIGNIFICANT CHANGE UP
-  NITROFURANTOIN: SIGNIFICANT CHANGE UP
-  PIPERACILLIN/TAZOBACTAM: SIGNIFICANT CHANGE UP
-  TETRACYCLINE: SIGNIFICANT CHANGE UP
-  TIGECYCLINE: SIGNIFICANT CHANGE UP
-  TOBRAMYCIN: SIGNIFICANT CHANGE UP
-  TRIMETHOPRIM/SULFAMETHOXAZOLE: SIGNIFICANT CHANGE UP
-  VANCOMYCIN: SIGNIFICANT CHANGE UP
ANION GAP SERPL CALC-SCNC: 10 MMOL/L — SIGNIFICANT CHANGE UP (ref 5–17)
APPEARANCE UR: CLEAR — SIGNIFICANT CHANGE UP
BASOPHILS # BLD AUTO: 0.1 K/UL — SIGNIFICANT CHANGE UP (ref 0–0.2)
BASOPHILS NFR BLD AUTO: 0.8 % — SIGNIFICANT CHANGE UP (ref 0–2)
BILIRUB UR-MCNC: NEGATIVE — SIGNIFICANT CHANGE UP
BUN SERPL-MCNC: 11 MG/DL — SIGNIFICANT CHANGE UP (ref 7–18)
CALCIUM SERPL-MCNC: 8.7 MG/DL — SIGNIFICANT CHANGE UP (ref 8.4–10.5)
CHLORIDE SERPL-SCNC: 105 MMOL/L — SIGNIFICANT CHANGE UP (ref 96–108)
CO2 SERPL-SCNC: 25 MMOL/L — SIGNIFICANT CHANGE UP (ref 22–31)
COLOR SPEC: YELLOW — SIGNIFICANT CHANGE UP
CREAT SERPL-MCNC: 1.35 MG/DL — HIGH (ref 0.5–1.3)
CULTURE RESULTS: SIGNIFICANT CHANGE UP
DIFF PNL FLD: NEGATIVE — SIGNIFICANT CHANGE UP
EOSINOPHIL # BLD AUTO: 0 K/UL — SIGNIFICANT CHANGE UP (ref 0–0.5)
EOSINOPHIL NFR BLD AUTO: 0.1 % — SIGNIFICANT CHANGE UP (ref 0–6)
GLUCOSE BLDC GLUCOMTR-MCNC: 108 MG/DL — HIGH (ref 70–99)
GLUCOSE SERPL-MCNC: 92 MG/DL — SIGNIFICANT CHANGE UP (ref 70–99)
GLUCOSE UR QL: NEGATIVE — SIGNIFICANT CHANGE UP
HCT VFR BLD CALC: 46 % — HIGH (ref 34.5–45)
HGB BLD-MCNC: 14.7 G/DL — SIGNIFICANT CHANGE UP (ref 11.5–15.5)
KETONES UR-MCNC: ABNORMAL
LEUKOCYTE ESTERASE UR-ACNC: ABNORMAL
LYMPHOCYTES # BLD AUTO: 2.7 K/UL — SIGNIFICANT CHANGE UP (ref 1–3.3)
LYMPHOCYTES # BLD AUTO: 23.2 % — SIGNIFICANT CHANGE UP (ref 13–44)
MAGNESIUM SERPL-MCNC: 1.8 MG/DL — SIGNIFICANT CHANGE UP (ref 1.6–2.6)
MCHC RBC-ENTMCNC: 29.4 PG — SIGNIFICANT CHANGE UP (ref 27–34)
MCHC RBC-ENTMCNC: 31.9 GM/DL — LOW (ref 32–36)
MCV RBC AUTO: 92.1 FL — SIGNIFICANT CHANGE UP (ref 80–100)
METHOD TYPE: SIGNIFICANT CHANGE UP
METHOD TYPE: SIGNIFICANT CHANGE UP
MONOCYTES # BLD AUTO: 1.2 K/UL — HIGH (ref 0–0.9)
MONOCYTES NFR BLD AUTO: 10 % — SIGNIFICANT CHANGE UP (ref 2–14)
NEUTROPHILS # BLD AUTO: 7.7 K/UL — HIGH (ref 1.8–7.4)
NEUTROPHILS NFR BLD AUTO: 66 % — SIGNIFICANT CHANGE UP (ref 43–77)
NITRITE UR-MCNC: NEGATIVE — SIGNIFICANT CHANGE UP
ORGANISM # SPEC MICROSCOPIC CNT: SIGNIFICANT CHANGE UP
PH UR: 6 — SIGNIFICANT CHANGE UP (ref 5–8)
PHOSPHATE SERPL-MCNC: 3.4 MG/DL — SIGNIFICANT CHANGE UP (ref 2.5–4.5)
PLATELET # BLD AUTO: 268 K/UL — SIGNIFICANT CHANGE UP (ref 150–400)
POTASSIUM SERPL-MCNC: 3.5 MMOL/L — SIGNIFICANT CHANGE UP (ref 3.5–5.3)
POTASSIUM SERPL-SCNC: 3.5 MMOL/L — SIGNIFICANT CHANGE UP (ref 3.5–5.3)
PROT UR-MCNC: 30 MG/DL
RBC # BLD: 5 M/UL — SIGNIFICANT CHANGE UP (ref 3.8–5.2)
RBC # FLD: 13.2 % — SIGNIFICANT CHANGE UP (ref 10.3–14.5)
SODIUM SERPL-SCNC: 140 MMOL/L — SIGNIFICANT CHANGE UP (ref 135–145)
SP GR SPEC: 1.01 — SIGNIFICANT CHANGE UP (ref 1.01–1.02)
SPECIMEN SOURCE: SIGNIFICANT CHANGE UP
UROBILINOGEN FLD QL: NEGATIVE — SIGNIFICANT CHANGE UP
VIT B12 SERPL-MCNC: 597 PG/ML — SIGNIFICANT CHANGE UP (ref 232–1245)
WBC # BLD: 11.6 K/UL — HIGH (ref 3.8–10.5)
WBC # FLD AUTO: 11.6 K/UL — HIGH (ref 3.8–10.5)

## 2018-12-06 PROCEDURE — 99232 SBSQ HOSP IP/OBS MODERATE 35: CPT

## 2018-12-06 RX ORDER — ONDANSETRON 8 MG/1
4 TABLET, FILM COATED ORAL ONCE
Qty: 0 | Refills: 0 | Status: COMPLETED | OUTPATIENT
Start: 2018-12-06 | End: 2018-12-06

## 2018-12-06 RX ORDER — CYCLOBENZAPRINE HYDROCHLORIDE 10 MG/1
5 TABLET, FILM COATED ORAL ONCE
Qty: 0 | Refills: 0 | Status: COMPLETED | OUTPATIENT
Start: 2018-12-06 | End: 2018-12-06

## 2018-12-06 RX ORDER — MORPHINE SULFATE 50 MG/1
4 CAPSULE, EXTENDED RELEASE ORAL EVERY 6 HOURS
Qty: 0 | Refills: 0 | Status: DISCONTINUED | OUTPATIENT
Start: 2018-12-06 | End: 2018-12-07

## 2018-12-06 RX ORDER — ACETAMINOPHEN 500 MG
650 TABLET ORAL ONCE
Qty: 0 | Refills: 0 | Status: COMPLETED | OUTPATIENT
Start: 2018-12-06 | End: 2018-12-06

## 2018-12-06 RX ORDER — DEXAMETHASONE 0.5 MG/5ML
10 ELIXIR ORAL EVERY 6 HOURS
Qty: 0 | Refills: 0 | Status: DISCONTINUED | OUTPATIENT
Start: 2018-12-06 | End: 2018-12-06

## 2018-12-06 RX ADMIN — Medication 300 MILLIGRAM(S): at 17:21

## 2018-12-06 RX ADMIN — ESCITALOPRAM OXALATE 20 MILLIGRAM(S): 10 TABLET, FILM COATED ORAL at 14:35

## 2018-12-06 RX ADMIN — MORPHINE SULFATE 4 MILLIGRAM(S): 50 CAPSULE, EXTENDED RELEASE ORAL at 23:21

## 2018-12-06 RX ADMIN — MORPHINE SULFATE 4 MILLIGRAM(S): 50 CAPSULE, EXTENDED RELEASE ORAL at 17:15

## 2018-12-06 RX ADMIN — MORPHINE SULFATE 4 MILLIGRAM(S): 50 CAPSULE, EXTENDED RELEASE ORAL at 03:51

## 2018-12-06 RX ADMIN — Medication 25 MILLIGRAM(S): at 05:13

## 2018-12-06 RX ADMIN — Medication 300 MILLIGRAM(S): at 05:13

## 2018-12-06 RX ADMIN — MORPHINE SULFATE 4 MILLIGRAM(S): 50 CAPSULE, EXTENDED RELEASE ORAL at 04:21

## 2018-12-06 RX ADMIN — Medication 650 MILLIGRAM(S): at 04:20

## 2018-12-06 RX ADMIN — Medication 5 MILLIGRAM(S): at 17:21

## 2018-12-06 RX ADMIN — Medication 1 PATCH: at 14:35

## 2018-12-06 RX ADMIN — MORPHINE SULFATE 4 MILLIGRAM(S): 50 CAPSULE, EXTENDED RELEASE ORAL at 11:20

## 2018-12-06 RX ADMIN — MORPHINE SULFATE 4 MILLIGRAM(S): 50 CAPSULE, EXTENDED RELEASE ORAL at 11:46

## 2018-12-06 RX ADMIN — Medication 650 MILLIGRAM(S): at 04:50

## 2018-12-06 RX ADMIN — ONDANSETRON 4 MILLIGRAM(S): 8 TABLET, FILM COATED ORAL at 07:38

## 2018-12-06 RX ADMIN — Medication 1 PATCH: at 19:05

## 2018-12-06 RX ADMIN — CYCLOBENZAPRINE HYDROCHLORIDE 5 MILLIGRAM(S): 10 TABLET, FILM COATED ORAL at 17:21

## 2018-12-06 RX ADMIN — ONDANSETRON 4 MILLIGRAM(S): 8 TABLET, FILM COATED ORAL at 01:49

## 2018-12-06 RX ADMIN — PANTOPRAZOLE SODIUM 40 MILLIGRAM(S): 20 TABLET, DELAYED RELEASE ORAL at 05:13

## 2018-12-06 RX ADMIN — Medication 5 MILLIGRAM(S): at 05:13

## 2018-12-06 RX ADMIN — MORPHINE SULFATE 4 MILLIGRAM(S): 50 CAPSULE, EXTENDED RELEASE ORAL at 16:56

## 2018-12-06 RX ADMIN — AMLODIPINE BESYLATE 2.5 MILLIGRAM(S): 2.5 TABLET ORAL at 05:13

## 2018-12-06 NOTE — CONSULT NOTE ADULT - SUBJECTIVE AND OBJECTIVE BOX
NP Note discussed with  Primary Attending    Patient is a 47y old  Female who presents with a chief complaint of Abd pain (06 Dec 2018 12:35). Pt with cyclic vomiting syndrome rsd, and chronic pain.  Pt with abdominal pain which is better today.  Pt tolerating clears.  + neck pain due to recent falls. Pt with mild compression deformities.  Soft collar in place.        INTERVAL HPI/OVERNIGHT EVENTS: no new complaints    MEDICATIONS  (STANDING):  amLODIPine   Tablet 2.5 milliGRAM(s) Oral daily  atorvastatin 20 milliGRAM(s) Oral at bedtime  clopidogrel Tablet 75 milliGRAM(s) Oral daily  dexamethasone  IVPB 10 milliGRAM(s) IV Intermittent every 6 hours  dextrose 5% + sodium chloride 0.9%. 1000 milliLiter(s) (65 mL/Hr) IV Continuous <Continuous>  dronabinol 5 milliGRAM(s) Oral every 12 hours  enoxaparin Injectable 40 milliGRAM(s) SubCutaneous daily  escitalopram 20 milliGRAM(s) Oral daily  influenza   Vaccine 0.5 milliLiter(s) IntraMuscular once  metoprolol tartrate 25 milliGRAM(s) Oral two times a day  nicotine -  14 mG/24Hr(s) Patch 1 patch Transdermal daily  pantoprazole    Tablet 40 milliGRAM(s) Oral before breakfast  pregabalin 300 milliGRAM(s) Oral two times a day    MEDICATIONS  (PRN):  morphine  - Injectable 4 milliGRAM(s) IntraMuscular every 6 hours PRN Severe Pain (7 - 10)  ondansetron Injectable 4 milliGRAM(s) IntraMuscular every 4 hours PRN Nausea and/or Vomiting      __________________________________________________  REVIEW OF SYSTEMS:    CONSTITUTIONAL: + fevers   RESPIRATORY: No cough; No shortness of breath  CARDIOVASCULAR: No chest pain, no palpitations  GASTROINTESTINAL:+ vomiting, + abdominal pain - constipation  NEUROLOGICAL: No headache or numbness, no tremors  MUSCULOSKELETAL: + back pain, + bilateral leg pain  GENITOURINARY: no dysuria, no frequency, no hesitancy        Vital Signs Last 24 Hrs  T(C): 36.9 (06 Dec 2018 14:51), Max: 38.1 (06 Dec 2018 04:01)  T(F): 98.5 (06 Dec 2018 14:51), Max: 100.5 (06 Dec 2018 04:01)  HR: 76 (06 Dec 2018 14:51) (62 - 76)  BP: 122/78 (06 Dec 2018 14:51) (122/78 - 149/89)  BP(mean): --  RR: 17 (06 Dec 2018 14:51) (16 - 17)  SpO2: 99% (06 Dec 2018 14:51) (97% - 100%)    ________________________________________________  PHYSICAL EXAM:  GENERAL: NAD  CHEST/LUNG: Clear to auscultation bilaterally with good air entry   HEART: S1 S2  regular; no murmurs, gallops or rubs  ABDOMEN: distended, + tender  EXTREMITIES: no cyanosis; no edema; no calf tenderness  NERVOUS SYSTEM:  Awake and alert; Oriented  to place, person and time  MUSCULOSKELETAL: + decreased rom + lower extremity - pain on palpation, + neck pain - + hyperalgesia - pain on palpation  _________________________________________________  LABS:                        14.7   11.6  )-----------( 268      ( 06 Dec 2018 06:24 )             46.0     12-06    140  |  105  |  11  ----------------------------<  92  3.5   |  25  |  1.35<H>    Ca    8.7      06 Dec 2018 06:24  Phos  3.4     12-06  Mg     1.8     12-06    TPro  9.1<H>  /  Alb  4.4  /  TBili  0.6  /  DBili  x   /  AST  22  /  ALT  23  /  AlkPhos  154<H>  12-04    PT/INR - ( 04 Dec 2018 18:25 )   PT: 11.3 sec;   INR: 1.02 ratio         PTT - ( 04 Dec 2018 18:25 )  PTT:31.5 sec  Urinalysis Basic - ( 06 Dec 2018 12:06 )    Color: Yellow / Appearance: Clear / S.015 / pH: x  Gluc: x / Ketone: Trace  / Bili: Negative / Urobili: Negative   Blood: x / Protein: 30 mg/dL / Nitrite: Negative   Leuk Esterase: Small / RBC: 0-2 /HPF / WBC 6-10 /HPF   Sq Epi: x / Non Sq Epi: Occasional /HPF / Bacteria: Few /HPF      CAPILLARY BLOOD GLUCOSE      POCT Blood Glucose.: 108 mg/dL (06 Dec 2018 07:52)        RADIOLOGY & ADDITIONAL TESTS:    Imaging Personally Reviewed:  YES/NO    Consultant(s) Notes Reviewed:   YES/ No    Care Discussed with Consultants :     Plan of care was discussed with patient and /or primary care giver; all questions and concerns were addressed and care was aligned with patient's wishes.

## 2018-12-06 NOTE — PROGRESS NOTE ADULT - SUBJECTIVE AND OBJECTIVE BOX
PGY 1 Note discussed with supervising resident and primary attending    Patient is a 47y old  Female who presents with a chief complaint of Abd pain (06 Dec 2018 08:23)      INTERVAL HPI/OVERNIGHT EVENTS: offers no new complaints; current symptoms resolving    MEDICATIONS  (STANDING):  amLODIPine   Tablet 2.5 milliGRAM(s) Oral daily  atorvastatin 20 milliGRAM(s) Oral at bedtime  clopidogrel Tablet 75 milliGRAM(s) Oral daily  dextrose 5% + sodium chloride 0.9%. 1000 milliLiter(s) (65 mL/Hr) IV Continuous <Continuous>  dronabinol 5 milliGRAM(s) Oral every 12 hours  enoxaparin Injectable 40 milliGRAM(s) SubCutaneous daily  escitalopram 20 milliGRAM(s) Oral daily  influenza   Vaccine 0.5 milliLiter(s) IntraMuscular once  metoprolol tartrate 25 milliGRAM(s) Oral two times a day  nicotine -  14 mG/24Hr(s) Patch 1 patch Transdermal daily  pantoprazole    Tablet 40 milliGRAM(s) Oral before breakfast  pregabalin 300 milliGRAM(s) Oral two times a day    MEDICATIONS  (PRN):  morphine  - Injectable 4 milliGRAM(s) IntraMuscular every 6 hours PRN Severe Pain (7 - 10)  ondansetron Injectable 4 milliGRAM(s) IntraMuscular every 4 hours PRN Nausea and/or Vomiting      __________________________________________________  REVIEW OF SYSTEMS:    CONSTITUTIONAL: No fever,   EYES: no acute visual disturbances  NECK: No pain or stiffness  RESPIRATORY: No cough; No shortness of breath  CARDIOVASCULAR: No chest pain, no palpitations  GASTROINTESTINAL: No pain. No nausea or vomiting; No diarrhea   NEUROLOGICAL: No headache or numbness, no tremors  MUSCULOSKELETAL: No joint pain, no muscle pain  GENITOURINARY: no dysuria, no frequency, no hesitancy  PSYCHIATRY: no depression , no anxiety  ALL OTHER  ROS negative        Vital Signs Last 24 Hrs  T(C): 37.3 (06 Dec 2018 05:16), Max: 38.1 (06 Dec 2018 04:01)  T(F): 99.2 (06 Dec 2018 05:16), Max: 100.5 (06 Dec 2018 04:01)  HR: 76 (06 Dec 2018 04:01) (62 - 76)  BP: 149/89 (06 Dec 2018 04:01) (146/96 - 158/99)  BP(mean): --  RR: 16 (06 Dec 2018 04:01) (16 - 18)  SpO2: 97% (06 Dec 2018 04:01) (97% - 100%)    ________________________________________________  PHYSICAL EXAM:  GENERAL: NAD  HEENT: Normocephalic;  conjunctivae and sclerae clear; moist mucous membranes;   NECK : supple  CHEST/LUNG: Clear to auscultation bilaterally with good air entry   HEART: S1 S2  regular; no murmurs, gallops or rubs  ABDOMEN: Soft, Nontender, Nondistended; Bowel sounds present  EXTREMITIES: no cyanosis; no edema; no calf tenderness  SKIN: warm and dry; no rash  NERVOUS SYSTEM:  Awake and alert; Oriented  to place, person and time ; no new deficits    _________________________________________________  LABS:                        14.7   11.6  )-----------( 268      ( 06 Dec 2018 06:24 )             46.0     12-    140  |  105  |  11  ----------------------------<  92  3.5   |  25  |  1.35<H>    Ca    8.7      06 Dec 2018 06:24  Phos  3.4     12-  Mg     1.8     12-    TPro  9.1<H>  /  Alb  4.4  /  TBili  0.6  /  DBili  x   /  AST  22  /  ALT  23  /  AlkPhos  154<H>  12-04    PT/INR - ( 04 Dec 2018 18:25 )   PT: 11.3 sec;   INR: 1.02 ratio         PTT - ( 04 Dec 2018 18:25 )  PTT:31.5 sec  Urinalysis Basic - ( 04 Dec 2018 18:40 )    Color: Yellow / Appearance: Clear / S.015 / pH: x  Gluc: x / Ketone: Small  / Bili: Negative / Urobili: Negative   Blood: x / Protein: 30 mg/dL / Nitrite: Negative   Leuk Esterase: Negative / RBC: 0-2 /HPF / WBC 0-2 /HPF   Sq Epi: x / Non Sq Epi: x / Bacteria: Trace /HPF      CAPILLARY BLOOD GLUCOSE      POCT Blood Glucose.: 108 mg/dL (06 Dec 2018 07:52)        RADIOLOGY & ADDITIONAL TESTS:    Imaging Personally Reviewed:  YES/NO    Consultant(s) Notes Reviewed:   YES/ No    Care Discussed with Consultants :     Plan of care was discussed with patient and /or primary care giver; all questions and concerns were addressed and care was aligned with patient's wishes. PGY 1 Note discussed with supervising resident and primary attending    Patient is a 47y old  Female who presents with a chief complaint of Abd pain (06 Dec 2018 08:23)      INTERVAL HPI/OVERNIGHT EVENTS: offers no new complaints; current symptoms resolving    MEDICATIONS  (STANDING):  amLODIPine   Tablet 2.5 milliGRAM(s) Oral daily  atorvastatin 20 milliGRAM(s) Oral at bedtime  clopidogrel Tablet 75 milliGRAM(s) Oral daily  dextrose 5% + sodium chloride 0.9%. 1000 milliLiter(s) (65 mL/Hr) IV Continuous <Continuous>  dronabinol 5 milliGRAM(s) Oral every 12 hours  enoxaparin Injectable 40 milliGRAM(s) SubCutaneous daily  escitalopram 20 milliGRAM(s) Oral daily  influenza   Vaccine 0.5 milliLiter(s) IntraMuscular once  metoprolol tartrate 25 milliGRAM(s) Oral two times a day  nicotine -  14 mG/24Hr(s) Patch 1 patch Transdermal daily  pantoprazole    Tablet 40 milliGRAM(s) Oral before breakfast  pregabalin 300 milliGRAM(s) Oral two times a day    MEDICATIONS  (PRN):  morphine  - Injectable 4 milliGRAM(s) IntraMuscular every 6 hours PRN Severe Pain (7 - 10)  ondansetron Injectable 4 milliGRAM(s) IntraMuscular every 4 hours PRN Nausea and/or Vomiting      __________________________________________________  REVIEW OF SYSTEMS:    CONSTITUTIONAL: No fever,   EYES: no acute visual disturbances  NECK: No pain or stiffness  RESPIRATORY: No cough; No shortness of breath  CARDIOVASCULAR: No chest pain, no palpitations  GASTROINTESTINAL: No pain. No nausea or vomiting; No diarrhea   NEUROLOGICAL: No headache or numbness, no tremors  MUSCULOSKELETAL: No joint pain, no muscle pain  GENITOURINARY: no dysuria, no frequency, no hesitancy  PSYCHIATRY: no depression , no anxiety  ALL OTHER  ROS negative        Vital Signs Last 24 Hrs  T(C): 37.3 (06 Dec 2018 05:16), Max: 38.1 (06 Dec 2018 04:01)  T(F): 99.2 (06 Dec 2018 05:16), Max: 100.5 (06 Dec 2018 04:01)  HR: 76 (06 Dec 2018 04:01) (62 - 76)  BP: 149/89 (06 Dec 2018 04:01) (146/96 - 158/99)  BP(mean): --  RR: 16 (06 Dec 2018 04:01) (16 - 18)  SpO2: 97% (06 Dec 2018 04:01) (97% - 100%)    ________________________________________________  PHYSICAL EXAM:  GENERAL: NAD  HEENT: Normocephalic;  conjunctivae and sclerae clear; moist mucous membranes;   NECK : supple  CHEST/LUNG: Clear to auscultation bilaterally with good air entry   HEART: S1 S2  regular; no murmurs, gallops or rubs  ABDOMEN: Soft, Nontender, Nondistended; Bowel sounds present  EXTREMITIES: no cyanosis; no edema; no calf tenderness  SKIN: warm and dry; no rash  NERVOUS SYSTEM:  Awake and alert; Oriented  to place, person and time ; no new deficits    _________________________________________________  LABS:                        14.7   11.6  )-----------( 268      ( 06 Dec 2018 06:24 )             46.0     12-    140  |  105  |  11  ----------------------------<  92  3.5   |  25  |  1.35<H>    Ca    8.7      06 Dec 2018 06:24  Phos  3.4     12-  Mg     1.8     12-    TPro  9.1<H>  /  Alb  4.4  /  TBili  0.6  /  DBili  x   /  AST  22  /  ALT  23  /  AlkPhos  154<H>  12-04    PT/INR - ( 04 Dec 2018 18:25 )   PT: 11.3 sec;   INR: 1.02 ratio         PTT - ( 04 Dec 2018 18:25 )  PTT:31.5 sec  Urinalysis Basic - ( 04 Dec 2018 18:40 )    Color: Yellow / Appearance: Clear / S.015 / pH: x  Gluc: x / Ketone: Small  / Bili: Negative / Urobili: Negative   Blood: x / Protein: 30 mg/dL / Nitrite: Negative   Leuk Esterase: Negative / RBC: 0-2 /HPF / WBC 0-2 /HPF   Sq Epi: x / Non Sq Epi: x / Bacteria: Trace /HPF      CAPILLARY BLOOD GLUCOSE      POCT Blood Glucose.: 108 mg/dL (06 Dec 2018 07:52)        RADIOLOGY & ADDITIONAL TESTS:    Care Discussed with Consultants : YES    Plan of care was discussed with patient and /or primary care giver; all questions and concerns were addressed and care was aligned with patient's wishes. YES

## 2018-12-06 NOTE — PROGRESS NOTE ADULT - SUBJECTIVE AND OBJECTIVE BOX
Pt Name: JOSE VILLALBA  MRN: [id number]      ORTHOPEDIC SPINE CONSULT:    Diagnosis:     Patient is a 47y Female   HPI:  Patient is a 46 y/o F, lives with her sister, from home, ambulates with walker,  w/ PMHx of cyclic vomiting syndrome, colitis, Reflex sympathetic dystrophy s/p spinal stimulator, frequent falls, SLE (not on rx), Afib (not on AC due to frequent falls), HTN, depression, Hemorrhoids (EGD / colonoscopy 3-4 years ago), diverticulosis, and partial thyroidectomy on right side, who presents with c/o Abd pain, and  intractable vomiting x 2 days. She describes the abd pain as sharp, 10/10 intensity, intermittent, in epigastrium, aggravated by eating, and relieved with opioids.  Pt has hx of multiple similar episodes- -usually 5x times per year- last in May 2018.  Her vomiting is constant, multiple episodes, watery, non bloody , greenish colored.  Pt is unable to keep food and medication down.  She also c/o formed non bloody diarrhea x 2 yesterday. Pt denies any recent travel,  or sick contacts.  pt also c/o neck pain that she has had since recent fall. Pt denies CP, palpitations, HA, blurred vision, slurred speech, extremity weakness/ numbness, or syncope.      Ortho called to evaluate patient for cervical neck pain. Patient is a 46 y/o F ambulates at baseline with walker with a history of frequent falls, RSD with SCS placed 5 years ago, however has not been functional for the past 2 years c/o neck pain s/p mechanical fall 2 weeks ago. Patient states she was getting out of her bed using her cane, lost her balance, fell forward hitting her head on the door with hyperextension of her neck. Denies any LOC. Patient states she saw her PCP and was put in a soft cervical collar. Patient states the pain is rated "8/10' with radiation to left shoulder. Patient admits to new onset of paresthesias to left shoulder and L fingertips of digits 2-5. Pt denies Chest pain, SOB, dyspnea, syncope.       HEALTH ISSUES - PROBLEM Dx:  Neck pain: Neck pain  SLE (systemic lupus erythematosus): SLE (systemic lupus erythematosus)  Cyclic vomiting syndrome, intractability of vomiting not specified, presence of nausea not specified: Cyclic vomiting syndrome, intractability of vomiting not specified, presence of nausea not specified  Chronic pain syndrome: Chronic pain syndrome      .    Ambulation:  [ X ] With Walker    PAST MEDICAL & SURGICAL HISTORY:  Renal failure, chronic, stage 3 (moderate)  HTN (hypertension)  Cyclical vomiting  Atrial fibrillation  SLE (systemic lupus erythematosus)  Reflex sympathetic dystrophy  H/O partial thyroidectomy  S/P partial hysterectomy      Allergies: aspirin (Unknown)  latex (Unknown)  sulfa drugs (Unknown)      Vital Signs Last 24 Hrs  T(C): 37.3 (06 Dec 2018 05:16), Max: 38.1 (06 Dec 2018 04:01)  T(F): 99.2 (06 Dec 2018 05:16), Max: 100.5 (06 Dec 2018 04:01)  HR: 76 (06 Dec 2018 04:01) (62 - 76)  BP: 149/89 (06 Dec 2018 04:01) (146/96 - 158/99)  BP(mean): --  RR: 16 (06 Dec 2018 04:01) (16 - 18)  SpO2: 97% (06 Dec 2018 04:01) (97% - 100%)    Physical Exam:  Appearance: Alert, responsive, in no acute distress.  Musculoskeletal:         Left Upper Extremity: Atraumatic with normal alignment.  No crepitus. No bony tenderness.        Right Upper Extremity: Atraumatic with normal alignment NROM. No crepitus. No bony tenderness.     Neurological: Diminished sensation over left deltoid, and left fingertips digits 2-5.     Motor exam:          Spine: Skin is pink warm, Tender of the cervical spine. No step offs noted.         LUE: Decreased /bicep/deltoid/tricep strength.            Labs:                        14.7   11.6  )-----------( 268      ( 06 Dec 2018 06:24 )             46.0     12-06    140  |  105  |  11  ----------------------------<  92  3.5   |  25  |  1.35<H>    Ca    8.7      06 Dec 2018 06:24  Phos  3.4     12-06  Mg     1.8     12-06    TPro  9.1<H>  /  Alb  4.4  /  TBili  0.6  /  DBili  x   /  AST  22  /  ALT  23  /  AlkPhos  154<H>  12-04      Radiology:   EXAM:  CT CERVICAL SPINE                          PROCEDURE DATE:  12/05/2018      INTERPRETATION:  CLINICAL STATEMENT: Trauma.    TECHNIQUE: CT of the cervical spine was performed without contrast. 3-D   MIP images obtained    COMPARISON: None.    FINDINGS:  Mild reversal of cervical lordosis. Mild compression deformities of C4   and C5 vertebral bodies. Sagittal alignment intact    There is no prevertebral soft tissue swelling. The odontoid is intact.     Evaluation of the spinal canal is limited on a CT exam.  Multilevel   degenerative changes noted resulting in multilevel spinal canal stenosis   and neural foraminal narrowing. Impingement of ventral cord at multiple   levels on a degenerative basis.    3.2 cm left thyroid nodule noted with mass effect on the trachea to the   right    IMPRESSION:  No acute fracture cervical spine    Impression:  Pt is a  47y Female with Cervical neck pain    Plan:  - Recommendation: Conservative treatment for now  - Pain management  - DVT ppx with venodynes  - Continue with soft cervical neck collar.  - Will discuss case with Dr. Espino Pt Name: JOSE VILLALBA  MRN: [id number]      ORTHOPEDIC SPINE CONSULT:    Diagnosis:     Patient is a 47y Female   HPI:  Patient is a 46 y/o F, lives with her sister, from home, ambulates with walker,  w/ PMHx of cyclic vomiting syndrome, colitis, Reflex sympathetic dystrophy s/p spinal stimulator, frequent falls, SLE (not on rx), Afib (not on AC due to frequent falls), HTN, depression, Hemorrhoids (EGD / colonoscopy 3-4 years ago), diverticulosis, and partial thyroidectomy on right side, who presents with c/o Abd pain, and  intractable vomiting x 2 days. She describes the abd pain as sharp, 10/10 intensity, intermittent, in epigastrium, aggravated by eating, and relieved with opioids.  Pt has hx of multiple similar episodes- -usually 5x times per year- last in May 2018.  Her vomiting is constant, multiple episodes, watery, non bloody , greenish colored.  Pt is unable to keep food and medication down.  She also c/o formed non bloody diarrhea x 2 yesterday. Pt denies any recent travel,  or sick contacts.  pt also c/o neck pain that she has had since recent fall. Pt denies CP, palpitations, HA, blurred vision, slurred speech, extremity weakness/ numbness, or syncope.      Ortho called to evaluate patient for cervical neck pain. Patient is a 46 y/o F ambulates at baseline with walker with a history of frequent falls, RSD with SCS placed 5 years ago, however has not been functional for the past 2 years c/o neck pain s/p mechanical fall 2 weeks ago. Patient states she was getting out of her bed using her cane, lost her balance, fell forward hitting her head on the door with hyperextension of her neck. Denies any LOC. Patient states she saw her PCP and was put in a soft cervical collar. Patient states the pain is rated "8/10' with radiation to left shoulder. Patient admits to new onset of paresthesias to left shoulder and L fingertips of digits 2-5. Pt denies Chest pain, SOB, dyspnea, syncope.       HEALTH ISSUES - PROBLEM Dx:  Neck pain: Neck pain  SLE (systemic lupus erythematosus): SLE (systemic lupus erythematosus)  Cyclic vomiting syndrome, intractability of vomiting not specified, presence of nausea not specified: Cyclic vomiting syndrome, intractability of vomiting not specified, presence of nausea not specified  Chronic pain syndrome: Chronic pain syndrome      .    Ambulation:  [ X ] With Walker    PAST MEDICAL & SURGICAL HISTORY:  Renal failure, chronic, stage 3 (moderate)  HTN (hypertension)  Cyclical vomiting  Atrial fibrillation  SLE (systemic lupus erythematosus)  Reflex sympathetic dystrophy  H/O partial thyroidectomy  S/P partial hysterectomy      Allergies: aspirin (Unknown)  latex (Unknown)  sulfa drugs (Unknown)      Vital Signs Last 24 Hrs  T(C): 37.3 (06 Dec 2018 05:16), Max: 38.1 (06 Dec 2018 04:01)  T(F): 99.2 (06 Dec 2018 05:16), Max: 100.5 (06 Dec 2018 04:01)  HR: 76 (06 Dec 2018 04:01) (62 - 76)  BP: 149/89 (06 Dec 2018 04:01) (146/96 - 158/99)  BP(mean): --  RR: 16 (06 Dec 2018 04:01) (16 - 18)  SpO2: 97% (06 Dec 2018 04:01) (97% - 100%)    Physical Exam:  Appearance: Alert, responsive, in no acute distress.  Musculoskeletal:         Left Upper Extremity: Atraumatic with normal alignment.  No crepitus. No bony tenderness.        Right Upper Extremity: Atraumatic with normal alignment NROM. No crepitus. No bony tenderness.     Neurological: Diminished sensation over left deltoid, and left fingertips digits 2-5.     Motor exam:          Spine: Skin is pink warm, Tender of the cervical spine. No step offs noted.         LUE: 4/5 /bicep/tricep/deltoid strength          RUE: 5/5 /bicep/tricep/deltoid strength       Labs:                        14.7   11.6  )-----------( 268      ( 06 Dec 2018 06:24 )             46.0     12-06    140  |  105  |  11  ----------------------------<  92  3.5   |  25  |  1.35<H>    Ca    8.7      06 Dec 2018 06:24  Phos  3.4     12-06  Mg     1.8     12-06    TPro  9.1<H>  /  Alb  4.4  /  TBili  0.6  /  DBili  x   /  AST  22  /  ALT  23  /  AlkPhos  154<H>  12-04      Radiology:   EXAM:  CT CERVICAL SPINE                          PROCEDURE DATE:  12/05/2018      INTERPRETATION:  CLINICAL STATEMENT: Trauma.    TECHNIQUE: CT of the cervical spine was performed without contrast. 3-D   MIP images obtained    COMPARISON: None.    FINDINGS:  Mild reversal of cervical lordosis. Mild compression deformities of C4   and C5 vertebral bodies. Sagittal alignment intact    There is no prevertebral soft tissue swelling. The odontoid is intact.     Evaluation of the spinal canal is limited on a CT exam.  Multilevel   degenerative changes noted resulting in multilevel spinal canal stenosis   and neural foraminal narrowing. Impingement of ventral cord at multiple   levels on a degenerative basis.    3.2 cm left thyroid nodule noted with mass effect on the trachea to the   right    IMPRESSION:  No acute fracture cervical spine    Impression:  Pt is a  47y Female with Cervical neck pain    Plan:  - Recommendation: Conservative treatment for now  - Pain management  - DVT ppx with venodynes  - Continue with soft cervical neck collar.  - Will discuss case with Dr. Espino Pt Name: JOSE VILLALBA  MRN: [id number]      ORTHOPEDIC SPINE CONSULT:    Diagnosis:     Patient is a 47y Female   HPI:  Patient is a 48 y/o F, lives with her sister, from home, ambulates with walker,  w/ PMHx of cyclic vomiting syndrome, colitis, Reflex sympathetic dystrophy s/p spinal stimulator, frequent falls, SLE (not on rx), Afib (not on AC due to frequent falls), HTN, depression, Hemorrhoids (EGD / colonoscopy 3-4 years ago), diverticulosis, and partial thyroidectomy on right side, who presents with c/o Abd pain, and  intractable vomiting x 2 days. She describes the abd pain as sharp, 10/10 intensity, intermittent, in epigastrium, aggravated by eating, and relieved with opioids.  Pt has hx of multiple similar episodes- -usually 5x times per year- last in May 2018.  Her vomiting is constant, multiple episodes, watery, non bloody , greenish colored.  Pt is unable to keep food and medication down.  She also c/o formed non bloody diarrhea x 2 yesterday. Pt denies any recent travel,  or sick contacts.  pt also c/o neck pain that she has had since recent fall. Pt denies CP, palpitations, HA, blurred vision, slurred speech, extremity weakness/ numbness, or syncope.      Ortho called to evaluate patient for cervical neck pain. Patient is a 48 y/o F ambulates at baseline with walker with a history of frequent falls, RSD with SCS placed 5 years ago, however has not been functional for the past 2 years c/o neck pain s/p mechanical fall 2 weeks ago. Patient states she was getting out of her bed using her cane, lost her balance, fell forward hitting her head on the door with hyperextension of her neck. Denies any LOC. Patient states she saw her PCP and was put in a soft cervical collar. Patient states the pain is rated "8/10' with radiation to left shoulder. Patient admits to new onset of paresthesias to left shoulder and L fingertips of digits 2-5. Pt denies Chest pain, SOB, dyspnea, syncope.       HEALTH ISSUES - PROBLEM Dx:  Neck pain: Neck pain  SLE (systemic lupus erythematosus): SLE (systemic lupus erythematosus)  Cyclic vomiting syndrome, intractability of vomiting not specified, presence of nausea not specified: Cyclic vomiting syndrome, intractability of vomiting not specified, presence of nausea not specified  Chronic pain syndrome: Chronic pain syndrome      .    Ambulation:  [ X ] With Walker    PAST MEDICAL & SURGICAL HISTORY:  Renal failure, chronic, stage 3 (moderate)  HTN (hypertension)  Cyclical vomiting  Atrial fibrillation  SLE (systemic lupus erythematosus)  Reflex sympathetic dystrophy  H/O partial thyroidectomy  S/P partial hysterectomy      Allergies: aspirin (Unknown)  latex (Unknown)  sulfa drugs (Unknown)      Vital Signs Last 24 Hrs  T(C): 37.3 (06 Dec 2018 05:16), Max: 38.1 (06 Dec 2018 04:01)  T(F): 99.2 (06 Dec 2018 05:16), Max: 100.5 (06 Dec 2018 04:01)  HR: 76 (06 Dec 2018 04:01) (62 - 76)  BP: 149/89 (06 Dec 2018 04:01) (146/96 - 158/99)  BP(mean): --  RR: 16 (06 Dec 2018 04:01) (16 - 18)  SpO2: 97% (06 Dec 2018 04:01) (97% - 100%)    Physical Exam:  Appearance: Alert, responsive, in no acute distress.  Musculoskeletal:         Left Upper Extremity: Atraumatic with normal alignment.  No crepitus. No bony tenderness.        Right Upper Extremity: Atraumatic with normal alignment NROM. No crepitus. No bony tenderness.     Neurological: Diminished sensation over left deltoid, and left fingertips digits 2-5.     Motor exam:          Spine: Skin is pink warm, Tender of the cervical spine. No step offs noted.         LUE: 4/5 /bicep/tricep/deltoid strength          RUE: 5/5 /bicep/tricep/deltoid strength       Labs:                        14.7   11.6  )-----------( 268      ( 06 Dec 2018 06:24 )             46.0     12-06    140  |  105  |  11  ----------------------------<  92  3.5   |  25  |  1.35<H>    Ca    8.7      06 Dec 2018 06:24  Phos  3.4     12-06  Mg     1.8     12-06    TPro  9.1<H>  /  Alb  4.4  /  TBili  0.6  /  DBili  x   /  AST  22  /  ALT  23  /  AlkPhos  154<H>  12-04      Radiology:   EXAM:  CT CERVICAL SPINE                          PROCEDURE DATE:  12/05/2018      INTERPRETATION:  CLINICAL STATEMENT: Trauma.    TECHNIQUE: CT of the cervical spine was performed without contrast. 3-D   MIP images obtained    COMPARISON: None.    FINDINGS:  Mild reversal of cervical lordosis. Mild compression deformities of C4   and C5 vertebral bodies. Sagittal alignment intact    There is no prevertebral soft tissue swelling. The odontoid is intact.     Evaluation of the spinal canal is limited on a CT exam.  Multilevel   degenerative changes noted resulting in multilevel spinal canal stenosis   and neural foraminal narrowing. Impingement of ventral cord at multiple   levels on a degenerative basis.    3.2 cm left thyroid nodule noted with mass effect on the trachea to the   right    IMPRESSION:  No acute fracture cervical spine    Impression:  Pt is a  47y Female with Cervical neck pain    Plan:  - Recommendation: Conservative treatment   - Pain management  - DVT ppx with venodynes  - Discussed case and CT results with Dr. Espino who recommends to continue with soft cervical neck collar and start on Decadron 10mg q6  - Patient is to follow up with Dr. Espino in office upon discharge at [phone]

## 2018-12-06 NOTE — PROGRESS NOTE ADULT - PROBLEM SELECTOR PROBLEM 1
Intractable cyclical vomiting with nausea Cyclic vomiting syndrome, intractability of vomiting not specified, presence of nausea not specified

## 2018-12-06 NOTE — PROGRESS NOTE ADULT - PROBLEM SELECTOR PLAN 4
-spinal stimulator  -c/w home dose of gabapentin   -pain management consult appreciated c/w amlodipine and metoprolol 25 BID,  Monitor BP and adjust meds PRN

## 2018-12-06 NOTE — PROGRESS NOTE ADULT - PROBLEM SELECTOR PLAN 1
-pt has Hx of cyclical vomiting syndrome for 4 years. 5-6 episode every year.  -pt is on extensive pain medications for this condition  -will keep pt on Reglan and IV morphine for now  -Resume home medications MS contin and oxycodone when pt tolerating PO  -IV hydration and electrolyte repletion   -f/u BMP, mag, phos daily   -Pain management consult Hx of cyclical vomiting syndrome for 4 years. 5-6 episode every year.  c/w pain management and Zofran PRN  Follow up BMP QD

## 2018-12-06 NOTE — PROGRESS NOTE ADULT - SUBJECTIVE AND OBJECTIVE BOX
Patient is a 46 y/o F, lives with her sister, from home, ambulates with walker,  w/ PMHx of cyclic vomiting syndrome, colitis, Reflex sympathetic dystrophy s/p spinal stimulator, frequent falls, SLE (not on rx), Afib (not on AC due to frequent falls), HTN, depression, Hemorrhoids (EGD / colonoscopy 3-4 years ago), diverticulosis, and partial thyroidectomy on right side, who presents with c/o Abd pain, and  intractable vomiting x 2 days. She describes the abd pain as sharp, 10/10 intensity, intermittent, in epigastrium, aggravated by eating, and relieved with opioids.  Pt has hx of multiple similar episodes- -usually 5x times per year- last in May 2018.  Her vomiting is constant, multiple episodes, watery, non bloody , greenish colored.  Pt is unable to keep food and medication down.  She also c/o formed non bloody diarrhea x 2 yesterday. Pt denies any recent travel,  or sick contacts.  pt also c/o neck pain that she has had since recent fall. Pt denies CP, palpitations, HA, blurred vision, slurred speech, extremity weakness/ numbness, or syncope.      pt seen in icu [  ], reg med floor [ x  ], bed [  ], chair at bedside [   ], a+o x3 [  ], lethargic [  ],  nad [x  ]    skelton [  ], ngt [  ], peg [  ], et tube [  ], cent line [  ], picc line [  ]        Allergies    aspirin (Unknown)  latex (Unknown)  sulfa drugs (Unknown)        Vitals    T(F): 99.2 (12-06-18 @ 05:16), Max: 100.5 (12-06-18 @ 04:01)  HR: 76 (12-06-18 @ 04:01) (62 - 76)  BP: 149/89 (12-06-18 @ 04:01) (146/96 - 158/99)  RR: 16 (12-06-18 @ 04:01) (16 - 18)  SpO2: 97% (12-06-18 @ 04:01) (97% - 100%)  Wt(kg): --  CAPILLARY BLOOD GLUCOSE      POCT Blood Glucose.: 108 mg/dL (06 Dec 2018 07:52)      Labs                          14.7   11.6  )-----------( 268      ( 06 Dec 2018 06:24 )             46.0       12-06    140  |  105  |  11  ----------------------------<  92  3.5   |  25  |  1.35<H>    Ca    8.7      06 Dec 2018 06:24  Phos  3.4     12-06  Mg     1.8     12-06    TPro  9.1<H>  /  Alb  4.4  /  TBili  0.6  /  DBili  x   /  AST  22  /  ALT  23  /  AlkPhos  154<H>  12-04      CARDIAC MARKERS ( 04 Dec 2018 18:25 )  <0.015 ng/mL / x     / x     / x     / x            .Urine Clean Catch (Midstream)  12-04 @ 23:59   10,000 - 49,000 CFU/mL Proteus species  50,000 - 99,000 CFU/mL Enterococcus faecalis  <10,000 CFU/ml Normal Urogenital britni present  --  --          Radiology Results      Meds    MEDICATIONS  (STANDING):  amLODIPine   Tablet 2.5 milliGRAM(s) Oral daily  atorvastatin 20 milliGRAM(s) Oral at bedtime  clopidogrel Tablet 75 milliGRAM(s) Oral daily  dextrose 5% + sodium chloride 0.9%. 1000 milliLiter(s) (65 mL/Hr) IV Continuous <Continuous>  dronabinol 5 milliGRAM(s) Oral every 12 hours  enoxaparin Injectable 40 milliGRAM(s) SubCutaneous daily  escitalopram 20 milliGRAM(s) Oral daily  influenza   Vaccine 0.5 milliLiter(s) IntraMuscular once  metoprolol tartrate 25 milliGRAM(s) Oral two times a day  nicotine -  14 mG/24Hr(s) Patch 1 patch Transdermal daily  pantoprazole    Tablet 40 milliGRAM(s) Oral before breakfast  pregabalin 300 milliGRAM(s) Oral two times a day      MEDICATIONS  (PRN):  morphine  - Injectable 4 milliGRAM(s) IntraMuscular every 6 hours PRN Severe Pain (7 - 10)  ondansetron Injectable 4 milliGRAM(s) IntraMuscular every 4 hours PRN Nausea and/or Vomiting      Physical Exam    Neuro :  no focal deficits  Respiratory: CTA B/L  CV: RRR, S1S2, no murmurs,   Abdominal: Soft, NT, ND +BS,  Extremities: No edema, + peripheral pulses    ASSESSMENT    Non-intractable cyclical vomiting  Renal failure, chronic, stage 3 (moderate)  HTN (hypertension)  Cyclical vomiting  Atrial fibrillation  SLE (systemic lupus erythematosus)  Reflex sympathetic dystrophy  H/O partial thyroidectomy  S/P partial hysterectomy      pt seen in bed, vitals stable, physical exam reveals lungs cta b/l, heart s1s2, abd soft nd nt bs+, ext no edema. labs and diagnostic test result reviewed.    assessment  --  exacerbation of cyclic vomiting synd, neck pain, h/o left thyroid nodule, cyclic vomiting syndrome, colitis, Reflex sympathetic dystrophy s/p spinal stimulator, frequent falls, SLE (not on rx), Afib (not on AC due to frequent falls), HTN, depression, Hemorrhoids (EGD / colonoscopy 3-4 years ago), diverticulosis, and partial right thyroidectomy    plan  --  admit to med, npo, reglan, zofran, cont preadmit home meds, gi and dvt profilaxis,  cbc, bmp, mg, phos, lipids, tsh,     ct c-spine    gi cons  pain mgmt cons .PLAN Patient is a 46 y/o F, lives with her sister, from home, ambulates with walker,  w/ PMHx of cyclic vomiting syndrome, colitis, Reflex sympathetic dystrophy s/p spinal stimulator, frequent falls, SLE (not on rx), Afib (not on AC due to frequent falls), HTN, depression, Hemorrhoids (EGD / colonoscopy 3-4 years ago), diverticulosis, and partial thyroidectomy on right side, who presents with c/o Abd pain, and  intractable vomiting x 2 days. She describes the abd pain as sharp, 10/10 intensity, intermittent, in epigastrium, aggravated by eating, and relieved with opioids.  Pt has hx of multiple similar episodes- -usually 5x times per year- last in May 2018.  Her vomiting is constant, multiple episodes, watery, non bloody , greenish colored.  Pt is unable to keep food and medication down.  She also c/o formed non bloody diarrhea x 2 yesterday. Pt denies any recent travel,  or sick contacts.  pt also c/o neck pain that she has had since recent fall. Pt denies CP, palpitations, HA, blurred vision, slurred speech, extremity weakness/ numbness, or syncope.      pt seen in icu [  ], reg med floor [ x  ], bed [  ], chair at bedside [   ], a+o x3 [  ], lethargic [  ],  nad [x  ]    skelton [  ], ngt [  ], peg [  ], et tube [  ], cent line [  ], picc line [  ]        Allergies    aspirin (Unknown)  latex (Unknown)  sulfa drugs (Unknown)        Vitals    T(F): 99.2 (12-06-18 @ 05:16), Max: 100.5 (12-06-18 @ 04:01)  HR: 76 (12-06-18 @ 04:01) (62 - 76)  BP: 149/89 (12-06-18 @ 04:01) (146/96 - 158/99)  RR: 16 (12-06-18 @ 04:01) (16 - 18)  SpO2: 97% (12-06-18 @ 04:01) (97% - 100%)  Wt(kg): --  CAPILLARY BLOOD GLUCOSE      POCT Blood Glucose.: 108 mg/dL (06 Dec 2018 07:52)      Labs                          14.7   11.6  )-----------( 268      ( 06 Dec 2018 06:24 )             46.0       12-06    140  |  105  |  11  ----------------------------<  92  3.5   |  25  |  1.35<H>    Ca    8.7      06 Dec 2018 06:24  Phos  3.4     12-06  Mg     1.8     12-06    TPro  9.1<H>  /  Alb  4.4  /  TBili  0.6  /  DBili  x   /  AST  22  /  ALT  23  /  AlkPhos  154<H>  12-04      CARDIAC MARKERS ( 04 Dec 2018 18:25 )  <0.015 ng/mL / x     / x     / x     / x            .Urine Clean Catch (Midstream)  12-04 @ 23:59   10,000 - 49,000 CFU/mL Proteus species  50,000 - 99,000 CFU/mL Enterococcus faecalis  <10,000 CFU/ml Normal Urogenital britni present  --  --          Radiology Results      Meds    MEDICATIONS  (STANDING):  amLODIPine   Tablet 2.5 milliGRAM(s) Oral daily  atorvastatin 20 milliGRAM(s) Oral at bedtime  clopidogrel Tablet 75 milliGRAM(s) Oral daily  dextrose 5% + sodium chloride 0.9%. 1000 milliLiter(s) (65 mL/Hr) IV Continuous <Continuous>  dronabinol 5 milliGRAM(s) Oral every 12 hours  enoxaparin Injectable 40 milliGRAM(s) SubCutaneous daily  escitalopram 20 milliGRAM(s) Oral daily  influenza   Vaccine 0.5 milliLiter(s) IntraMuscular once  metoprolol tartrate 25 milliGRAM(s) Oral two times a day  nicotine -  14 mG/24Hr(s) Patch 1 patch Transdermal daily  pantoprazole    Tablet 40 milliGRAM(s) Oral before breakfast  pregabalin 300 milliGRAM(s) Oral two times a day      MEDICATIONS  (PRN):  morphine  - Injectable 4 milliGRAM(s) IntraMuscular every 6 hours PRN Severe Pain (7 - 10)  ondansetron Injectable 4 milliGRAM(s) IntraMuscular every 4 hours PRN Nausea and/or Vomiting      Physical Exam    Neuro :  no focal deficits  Respiratory: CTA B/L  CV: RRR, S1S2, no murmurs,   Abdominal: Soft, NT, ND +BS,  Extremities: No edema, + peripheral pulses    ASSESSMENT    exacerbation of cyclic vomiting synd, neck pain  h/o left thyroid nodule,   cyclic vomiting syndrome,   colitis,   Reflex sympathetic dystrophy s/p spinal stimulator,   frequent falls,   SLE (not on rx),   Afib (not on AC due to frequent falls),   HTN,   depression,   Hemorrhoids (EGD / colonoscopy 3-4 years ago),   diverticulosis,   partial right thyroidectomy    PLAN  admit to med,   npo,   reglan, zofran,   Cont preadmit home meds,   gi and dvt profilaxis,  ct c-spine  gi cons  pain mgmt cons Patient is a 47y old  Female who presents with a chief complaint of Abd pain (06 Dec 2018 08:23)    pt seen in icu [  ], reg med floor [ x  ], bed [x  ], chair at bedside [   ], a+o x3 [ x ], lethargic [  ],  nad [x  ]        Allergies    aspirin (Unknown)  latex (Unknown)  sulfa drugs (Unknown)        Vitals    T(F): 99.2 (12-06-18 @ 05:16), Max: 100.5 (12-06-18 @ 04:01)  HR: 76 (12-06-18 @ 04:01) (62 - 76)  BP: 149/89 (12-06-18 @ 04:01) (146/96 - 158/99)  RR: 16 (12-06-18 @ 04:01) (16 - 18)  SpO2: 97% (12-06-18 @ 04:01) (97% - 100%)  Wt(kg): --  CAPILLARY BLOOD GLUCOSE      POCT Blood Glucose.: 108 mg/dL (06 Dec 2018 07:52)      Labs                          14.7   11.6  )-----------( 268      ( 06 Dec 2018 06:24 )             46.0       12-06    140  |  105  |  11  ----------------------------<  92  3.5   |  25  |  1.35<H>    Ca    8.7      06 Dec 2018 06:24  Phos  3.4     12-06  Mg     1.8     12-06    TPro  9.1<H>  /  Alb  4.4  /  TBili  0.6  /  DBili  x   /  AST  22  /  ALT  23  /  AlkPhos  154<H>  12-04      CARDIAC MARKERS ( 04 Dec 2018 18:25 )  <0.015 ng/mL / x     / x     / x     / x            .Urine Clean Catch (Midstream)  12-04 @ 23:59   10,000 - 49,000 CFU/mL Proteus species  50,000 - 99,000 CFU/mL Enterococcus faecalis  <10,000 CFU/ml Normal Urogenital britni present  --  --          Radiology Results    < from: CT Cervical Spine No Cont (12.05.18 @ 11:36) >  FINDINGS:  Mild reversal of cervical lordosis. Mild compression deformities of C4   and C5 vertebral bodies. Sagittal alignment intact    There is no prevertebral soft tissue swelling. The odontoid is intact.     Evaluation of the spinal canal is limited on a CT exam.  Multilevel   degenerative changes noted resulting in multilevel spinal canal stenosis   and neural foraminal narrowing. Impingement of ventral cord at multiple   levels on a degenerative basis.    3.2 cm left thyroid nodule noted with mass effect on the trachea to the   right    IMPRESSION:  No acute fracture cervical spine    < end of copied text >        < from: CT Abdomen and Pelvis w/ IV Cont (12.04.18 @ 22:47) >  IMPRESSION:     No bowel obstruction. Additionalfindings as described.    < end of copied text >          Meds    MEDICATIONS  (STANDING):  amLODIPine   Tablet 2.5 milliGRAM(s) Oral daily  atorvastatin 20 milliGRAM(s) Oral at bedtime  clopidogrel Tablet 75 milliGRAM(s) Oral daily  dextrose 5% + sodium chloride 0.9%. 1000 milliLiter(s) (65 mL/Hr) IV Continuous <Continuous>  dronabinol 5 milliGRAM(s) Oral every 12 hours  enoxaparin Injectable 40 milliGRAM(s) SubCutaneous daily  escitalopram 20 milliGRAM(s) Oral daily  influenza   Vaccine 0.5 milliLiter(s) IntraMuscular once  metoprolol tartrate 25 milliGRAM(s) Oral two times a day  nicotine -  14 mG/24Hr(s) Patch 1 patch Transdermal daily  pantoprazole    Tablet 40 milliGRAM(s) Oral before breakfast  pregabalin 300 milliGRAM(s) Oral two times a day      MEDICATIONS  (PRN):  morphine  - Injectable 4 milliGRAM(s) IntraMuscular every 6 hours PRN Severe Pain (7 - 10)  ondansetron Injectable 4 milliGRAM(s) IntraMuscular every 4 hours PRN Nausea and/or Vomiting      Physical Exam    Neuro :  no focal deficits  Respiratory: CTA B/L  CV: RRR, S1S2, no murmurs,   Abdominal: Soft, NT, ND +BS,  Extremities: No edema, + peripheral pulses    ASSESSMENT    exacerbation of cyclic vomiting synd,   neck pain poss 2nd to c4 c5 compression deformity vs. degenerative disease  cervical cord impingement   h/o left thyroid nodule,   cyclic vomiting syndrome,   colitis,   Reflex sympathetic dystrophy s/p spinal stimulator,   frequent falls,   SLE (not on rx),   Afib (not on AC due to frequent falls),   HTN,   depression,   Hemorrhoids (EGD / colonoscopy 3-4 years ago),   diverticulosis,   partial right thyroidectomy    PLAN    cont clears  vomiting resolved   cont reglan, zofran PRN   ct abd neg noted above  gi cons  ct c-spine with C4 C5 compression deformity, degenerative changes and cord impingement noted above  ortho spine cons  cont cervical collar   pain mgmt cons noted  would put pt on morphine 4mg ivp q 6 hours prn for now.  When pt tolerating po, will transition back to home meds.    marinol 5mg po q 12 hours  no nsaids due to allergy  lyrica 300mg po q 12 hours  zofran 4mg ivp prn  endo cons for thyroid nodule  cont current meds

## 2018-12-06 NOTE — PROGRESS NOTE ADULT - PROBLEM SELECTOR PLAN 2
-pt has hx of afib, takes plavix for AC, currently rate controlled  -c/w metoprolol 25 BID with parameters CT Cervical Spine: Mild compression deformities of C4   and C5 vertebral bodies. Degenerative changes noted resulting in multilevel spinal canal stenosis and neural foraminal narrowing. Impingement of ventral cord at multiple levels on a degenerative basis.  Ortho consulted

## 2018-12-06 NOTE — PROGRESS NOTE ADULT - PROBLEM SELECTOR PLAN 3
-c/w amlodipine and metoprolol 25 BID,  -Clear diet  -Monitor BP c/w Metoprolol 25mg BID with parameters and Plavix

## 2018-12-06 NOTE — PROGRESS NOTE ADULT - PROBLEM SELECTOR PLAN 6
RISK                                                          Points  [] Previous VTE                                           3  [] Thrombophilia                                        2  [] Lower limb paralysis                              2   [] Current Cancer                                       2   [x] Immobilization > 24 hrs                        1  [] ICU/CCU stay > 24 hours                       1  [] Age > 60                                                   1    Improve score 1. Lovenox for DVT prophy Renal function at baseline  Follow up BMP QD

## 2018-12-07 ENCOUNTER — TRANSCRIPTION ENCOUNTER (OUTPATIENT)
Age: 48
End: 2018-12-07

## 2018-12-07 VITALS
OXYGEN SATURATION: 98 % | TEMPERATURE: 98 F | DIASTOLIC BLOOD PRESSURE: 97 MMHG | SYSTOLIC BLOOD PRESSURE: 131 MMHG | RESPIRATION RATE: 16 BRPM | HEART RATE: 90 BPM

## 2018-12-07 DIAGNOSIS — N39.0 URINARY TRACT INFECTION, SITE NOT SPECIFIED: ICD-10-CM

## 2018-12-07 DIAGNOSIS — G43.A0 CYCLICAL VOMITING, IN MIGRAINE, NOT INTRACTABLE: ICD-10-CM

## 2018-12-07 DIAGNOSIS — E04.1 NONTOXIC SINGLE THYROID NODULE: ICD-10-CM

## 2018-12-07 LAB
ANION GAP SERPL CALC-SCNC: 9 MMOL/L — SIGNIFICANT CHANGE UP (ref 5–17)
BUN SERPL-MCNC: 13 MG/DL — SIGNIFICANT CHANGE UP (ref 7–18)
CALCIUM SERPL-MCNC: 8.5 MG/DL — SIGNIFICANT CHANGE UP (ref 8.4–10.5)
CHLORIDE SERPL-SCNC: 102 MMOL/L — SIGNIFICANT CHANGE UP (ref 96–108)
CO2 SERPL-SCNC: 27 MMOL/L — SIGNIFICANT CHANGE UP (ref 22–31)
CREAT SERPL-MCNC: 1.51 MG/DL — HIGH (ref 0.5–1.3)
GLUCOSE SERPL-MCNC: 87 MG/DL — SIGNIFICANT CHANGE UP (ref 70–99)
MAGNESIUM SERPL-MCNC: 1.8 MG/DL — SIGNIFICANT CHANGE UP (ref 1.6–2.6)
PHOSPHATE SERPL-MCNC: 4.5 MG/DL — SIGNIFICANT CHANGE UP (ref 2.5–4.5)
POTASSIUM SERPL-MCNC: 3.4 MMOL/L — LOW (ref 3.5–5.3)
POTASSIUM SERPL-SCNC: 3.4 MMOL/L — LOW (ref 3.5–5.3)
SODIUM SERPL-SCNC: 138 MMOL/L — SIGNIFICANT CHANGE UP (ref 135–145)

## 2018-12-07 PROCEDURE — 85730 THROMBOPLASTIN TIME PARTIAL: CPT

## 2018-12-07 PROCEDURE — 80053 COMPREHEN METABOLIC PANEL: CPT

## 2018-12-07 PROCEDURE — 72125 CT NECK SPINE W/O DYE: CPT

## 2018-12-07 PROCEDURE — 80307 DRUG TEST PRSMV CHEM ANLYZR: CPT

## 2018-12-07 PROCEDURE — 71045 X-RAY EXAM CHEST 1 VIEW: CPT

## 2018-12-07 PROCEDURE — 80048 BASIC METABOLIC PNL TOTAL CA: CPT

## 2018-12-07 PROCEDURE — 96374 THER/PROPH/DIAG INJ IV PUSH: CPT | Mod: XU

## 2018-12-07 PROCEDURE — 83735 ASSAY OF MAGNESIUM: CPT

## 2018-12-07 PROCEDURE — 82607 VITAMIN B-12: CPT

## 2018-12-07 PROCEDURE — 93005 ELECTROCARDIOGRAM TRACING: CPT

## 2018-12-07 PROCEDURE — 87186 SC STD MICRODIL/AGAR DIL: CPT

## 2018-12-07 PROCEDURE — 90686 IIV4 VACC NO PRSV 0.5 ML IM: CPT

## 2018-12-07 PROCEDURE — 80061 LIPID PANEL: CPT

## 2018-12-07 PROCEDURE — 85610 PROTHROMBIN TIME: CPT

## 2018-12-07 PROCEDURE — 85027 COMPLETE CBC AUTOMATED: CPT

## 2018-12-07 PROCEDURE — 96376 TX/PRO/DX INJ SAME DRUG ADON: CPT | Mod: 76

## 2018-12-07 PROCEDURE — 87040 BLOOD CULTURE FOR BACTERIA: CPT

## 2018-12-07 PROCEDURE — 84702 CHORIONIC GONADOTROPIN TEST: CPT

## 2018-12-07 PROCEDURE — 87086 URINE CULTURE/COLONY COUNT: CPT

## 2018-12-07 PROCEDURE — 83036 HEMOGLOBIN GLYCOSYLATED A1C: CPT

## 2018-12-07 PROCEDURE — 83690 ASSAY OF LIPASE: CPT

## 2018-12-07 PROCEDURE — 84443 ASSAY THYROID STIM HORMONE: CPT

## 2018-12-07 PROCEDURE — 84484 ASSAY OF TROPONIN QUANT: CPT

## 2018-12-07 PROCEDURE — 96375 TX/PRO/DX INJ NEW DRUG ADDON: CPT

## 2018-12-07 PROCEDURE — 82962 GLUCOSE BLOOD TEST: CPT

## 2018-12-07 PROCEDURE — 84100 ASSAY OF PHOSPHORUS: CPT

## 2018-12-07 PROCEDURE — 99285 EMERGENCY DEPT VISIT HI MDM: CPT | Mod: 25

## 2018-12-07 PROCEDURE — 74177 CT ABD & PELVIS W/CONTRAST: CPT

## 2018-12-07 PROCEDURE — 81001 URINALYSIS AUTO W/SCOPE: CPT

## 2018-12-07 RX ORDER — OXYCODONE AND ACETAMINOPHEN 5; 325 MG/1; MG/1
1 TABLET ORAL ONCE
Qty: 0 | Refills: 0 | Status: DISCONTINUED | OUTPATIENT
Start: 2018-12-07 | End: 2018-12-07

## 2018-12-07 RX ORDER — CIPROFLOXACIN LACTATE 400MG/40ML
500 VIAL (ML) INTRAVENOUS EVERY 12 HOURS
Qty: 0 | Refills: 0 | Status: DISCONTINUED | OUTPATIENT
Start: 2018-12-07 | End: 2018-12-07

## 2018-12-07 RX ORDER — CIPROFLOXACIN LACTATE 400MG/40ML
1 VIAL (ML) INTRAVENOUS
Qty: 14 | Refills: 0 | OUTPATIENT
Start: 2018-12-07 | End: 2018-12-13

## 2018-12-07 RX ORDER — CIPROFLOXACIN LACTATE 400MG/40ML
250 VIAL (ML) INTRAVENOUS EVERY 12 HOURS
Qty: 0 | Refills: 0 | Status: DISCONTINUED | OUTPATIENT
Start: 2018-12-07 | End: 2018-12-07

## 2018-12-07 RX ORDER — NICOTINE POLACRILEX 2 MG
1 GUM BUCCAL
Qty: 30 | Refills: 0
Start: 2018-12-07 | End: 2019-01-05

## 2018-12-07 RX ADMIN — PANTOPRAZOLE SODIUM 40 MILLIGRAM(S): 20 TABLET, DELAYED RELEASE ORAL at 05:21

## 2018-12-07 RX ADMIN — ONDANSETRON 4 MILLIGRAM(S): 8 TABLET, FILM COATED ORAL at 13:39

## 2018-12-07 RX ADMIN — INFLUENZA VIRUS VACCINE 0.5 MILLILITER(S): 15; 15; 15; 15 SUSPENSION INTRAMUSCULAR at 14:39

## 2018-12-07 RX ADMIN — Medication 1 PATCH: at 08:00

## 2018-12-07 RX ADMIN — MORPHINE SULFATE 4 MILLIGRAM(S): 50 CAPSULE, EXTENDED RELEASE ORAL at 08:03

## 2018-12-07 RX ADMIN — Medication 300 MILLIGRAM(S): at 05:21

## 2018-12-07 RX ADMIN — OXYCODONE AND ACETAMINOPHEN 1 TABLET(S): 5; 325 TABLET ORAL at 03:50

## 2018-12-07 RX ADMIN — OXYCODONE AND ACETAMINOPHEN 1 TABLET(S): 5; 325 TABLET ORAL at 03:20

## 2018-12-07 RX ADMIN — Medication 1 PATCH: at 14:09

## 2018-12-07 RX ADMIN — MORPHINE SULFATE 4 MILLIGRAM(S): 50 CAPSULE, EXTENDED RELEASE ORAL at 10:35

## 2018-12-07 RX ADMIN — CLOPIDOGREL BISULFATE 75 MILLIGRAM(S): 75 TABLET, FILM COATED ORAL at 11:47

## 2018-12-07 RX ADMIN — Medication 1 PATCH: at 11:47

## 2018-12-07 RX ADMIN — Medication 5 MILLIGRAM(S): at 05:21

## 2018-12-07 RX ADMIN — ESCITALOPRAM OXALATE 20 MILLIGRAM(S): 10 TABLET, FILM COATED ORAL at 11:47

## 2018-12-07 NOTE — CONSULT NOTE ADULT - ASSESSMENT
Patient is a 46 y/o F, lives with her sister, from home, ambulates with walker,  w/ PMHx of cyclic vomiting syndrome, colitis, Reflex sympathetic dystrophy s/p spinal stimulator, frequent falls, SLE (not on rx), Afib (not on AC due to frequent falls), HTN, depression, Hemorrhoids (EGD / colonoscopy 3-4 years ago), diverticulosis, and partial thyroidectomy on right side, who presents with c/o Abd pain, and  intractable vomiting. Found to thyroid nodule. Pt has hx of right hemithyroidectomy about 4 yrs ago due to growth in thyroid. Pt denies dysphagia and dysphonia.

## 2018-12-07 NOTE — DISCHARGE NOTE ADULT - CARE PROVIDER_API CALL
Trent Espino), Orthopaedic Surgery  [address] [place] [address]  Phone: [phone]  Fax: [phone]

## 2018-12-07 NOTE — DISCHARGE NOTE ADULT - CARE PROVIDERS DIRECT ADDRESSES
[email or web address]

## 2018-12-07 NOTE — CONSULT NOTE ADULT - SUBJECTIVE AND OBJECTIVE BOX
Patient is a 47y old  Female who presents with a chief complaint of Abd pain (07 Dec 2018 11:26)      HPI:  Patient is a 48 y/o F, lives with her sister, from home, ambulates with walker,  w/ PMHx of cyclic vomiting syndrome, colitis, Reflex sympathetic dystrophy s/p spinal stimulator, frequent falls, SLE (not on rx), Afib (not on AC due to frequent falls), HTN, depression, Hemorrhoids (EGD / colonoscopy 3-4 years ago), diverticulosis, and partial thyroidectomy on right side, who presents with c/o Abd pain, and  intractable vomiting x 2 days. She describes the abd pain as sharp, 10/10 intensity, intermittent, in epigastrium, aggravated by eating, and relieved with opioids.  Pt has hx of multiple similar episodes- -usually 5x times per year- last in May 2018.  Her vomiting is constant, multiple episodes, watery, non bloody , greenish colored.  Pt is unable to keep food and medication down.  She also c/o formed non bloody diarrhea x 2 yesterday. Pt denies any recent travel,  or sick contacts.  pt also c/o neck pain that she has had since recent fall. Pt denies CP, palpitations, HA, blurred vision, slurred speech, extremity weakness/ numbness, or syncope.      In the ED, pt presents in mild painful distress, and vitals WNL (05 Dec 2018 05:32)      PAST MEDICAL & SURGICAL HISTORY:  Renal failure, chronic, stage 3 (moderate)  HTN (hypertension)  Cyclical vomiting  Atrial fibrillation  SLE (systemic lupus erythematosus)  Reflex sympathetic dystrophy  H/O partial thyroidectomy  S/P partial hysterectomy         MEDICATIONS  (STANDING):  amLODIPine   Tablet 2.5 milliGRAM(s) Oral daily  atorvastatin 20 milliGRAM(s) Oral at bedtime  ciprofloxacin     Tablet 500 milliGRAM(s) Oral every 12 hours  clopidogrel Tablet 75 milliGRAM(s) Oral daily  dextrose 5% + sodium chloride 0.9%. 1000 milliLiter(s) (65 mL/Hr) IV Continuous <Continuous>  dronabinol 5 milliGRAM(s) Oral every 12 hours  enoxaparin Injectable 40 milliGRAM(s) SubCutaneous daily  escitalopram 20 milliGRAM(s) Oral daily  influenza   Vaccine 0.5 milliLiter(s) IntraMuscular once  metoprolol tartrate 25 milliGRAM(s) Oral two times a day  nicotine -  14 mG/24Hr(s) Patch 1 patch Transdermal daily  pantoprazole    Tablet 40 milliGRAM(s) Oral before breakfast  predniSONE   Tablet 40 milliGRAM(s) Oral two times a day  pregabalin 300 milliGRAM(s) Oral two times a day    MEDICATIONS  (PRN):  morphine  - Injectable 4 milliGRAM(s) IV Push every 6 hours PRN Severe Pain (7 - 10)  ondansetron Injectable 4 milliGRAM(s) IntraMuscular every 4 hours PRN Nausea and/or Vomiting      FAMILY HISTORY:  Family history of seizure disorder (Sibling)      SOCIAL HISTORY:      REVIEW OF SYSTEMS:  CONSTITUTIONAL: No fever, weight loss, or fatigue  EYES: No eye pain, visual disturbances, or discharge  ENT:  No difficulty hearing, tinnitus, vertigo; No sinus or throat pain  NECK: No pain or stiffness  RESPIRATORY: No cough, wheezing, chills or hemoptysis; No Shortness of Breath  CARDIOVASCULAR: No chest pain, palpitations, passing out, dizziness, or leg swelling  GASTROINTESTINAL: No abdominal or epigastric pain. No nausea, vomiting, or hematemesis; No diarrhea or constipation. No melena or hematochezia.  GENITOURINARY: No dysuria, frequency, hematuria, or incontinence  NEUROLOGICAL: No headaches, memory loss, loss of strength, numbness, or tremors  SKIN: No itching, burning, rashes, or lesions   LYMPH Nodes: No enlarged glands  ENDOCRINE: No heat or cold intolerance; No hair loss  MUSCULOSKELETAL: No joint pain or swelling; No muscle, back, or extremity pain  PSYCHIATRIC: No depression, anxiety, mood swings, or difficulty sleeping  HEME/LYMPH: No easy bruising, or bleeding gums  ALLERGY AND IMMUNOLOGIC: No hives or eczema	        Vital Signs Last 24 Hrs  T(C): 37.1 (07 Dec 2018 05:24), Max: 37.1 (07 Dec 2018 05:24)  T(F): 98.7 (07 Dec 2018 05:24), Max: 98.7 (07 Dec 2018 05:24)  HR: 65 (07 Dec 2018 05:24) (65 - 77)  BP: 94/56 (07 Dec 2018 05:24) (94/56 - 122/78)  BP(mean): --  RR: 17 (07 Dec 2018 05:24) (16 - 17)  SpO2: 98% (07 Dec 2018 05:24) (97% - 99%)      Constitutional:    NC/AT:    HEENT:    Neck:  No JVD, bruits or thyromegaly    Respiratory:  Clear without rales or rhonchi    Cardiovascular:  RR without murmur, rub or gallop.    Gastrointestinal: Soft without hepatosplenomegaly.    Extremities: without cyanosis, clubbing or edema.    Neurological:  Oriented   x      . No gross sensory or motor defects.        LABS:                        14.7   11.6  )-----------( 268      ( 06 Dec 2018 06:24 )             46.0         138  |  102  |  13  ----------------------------<  87  3.4<L>   |  27  |  1.51<H>    Ca    8.5      07 Dec 2018 06:22  Phos  4.5       Mg     1.8                 Urinalysis Basic - ( 06 Dec 2018 12:06 )    Color: Yellow / Appearance: Clear / S.015 / pH: x  Gluc: x / Ketone: Trace  / Bili: Negative / Urobili: Negative   Blood: x / Protein: 30 mg/dL / Nitrite: Negative   Leuk Esterase: Small / RBC: 0-2 /HPF / WBC 6-10 /HPF   Sq Epi: x / Non Sq Epi: Occasional /HPF / Bacteria: Few /HPF      CAPILLARY BLOOD GLUCOSE          RADIOLOGY & ADDITIONAL STUDIES: Patient is a 47y old  Female who presents with a chief complaint of Abd pain (07 Dec 2018 11:26)      HPI:  Patient is a 48 y/o F, lives with her sister, from home, ambulates with walker,  w/ PMHx of cyclic vomiting syndrome, colitis, Reflex sympathetic dystrophy s/p spinal stimulator, frequent falls, SLE (not on rx), Afib (not on AC due to frequent falls), HTN, depression, Hemorrhoids (EGD / colonoscopy 3-4 years ago), diverticulosis, and partial thyroidectomy on right side, who presents with c/o Abd pain, and  intractable vomiting x 2 days. She describes the abd pain as sharp, 10/10 intensity, intermittent, in epigastrium, aggravated by eating, and relieved with opioids.  Pt has hx of multiple similar episodes- -usually 5x times per year- last in May 2018.  Her vomiting is constant, multiple episodes, watery, non bloody , greenish colored.  Pt is unable to keep food and medication down.  She also c/o formed non bloody diarrhea x 2 yesterday. Pt denies any recent travel,  or sick contacts.  pt also c/o neck pain that she has had since recent fall. Pt denies CP, palpitations, HA, blurred vision, slurred speech, extremity weakness/ numbness, or syncope.      In the ED, pt presents in mild painful distress, and vitals WNL (05 Dec 2018 05:32). Found to thyroid nodule. Pt has hx of right hemithyroidectomy about 4 yrs ago due to growth in thyroid. Pt denies dysphagia and dysphonia.      PAST MEDICAL & SURGICAL HISTORY:  Renal failure, chronic, stage 3 (moderate)  HTN (hypertension)  Cyclical vomiting  Atrial fibrillation  SLE (systemic lupus erythematosus)  Reflex sympathetic dystrophy  H/O partial thyroidectomy  S/P partial hysterectomy         MEDICATIONS  (STANDING):  amLODIPine   Tablet 2.5 milliGRAM(s) Oral daily  atorvastatin 20 milliGRAM(s) Oral at bedtime  ciprofloxacin     Tablet 500 milliGRAM(s) Oral every 12 hours  clopidogrel Tablet 75 milliGRAM(s) Oral daily  dextrose 5% + sodium chloride 0.9%. 1000 milliLiter(s) (65 mL/Hr) IV Continuous <Continuous>  dronabinol 5 milliGRAM(s) Oral every 12 hours  enoxaparin Injectable 40 milliGRAM(s) SubCutaneous daily  escitalopram 20 milliGRAM(s) Oral daily  influenza   Vaccine 0.5 milliLiter(s) IntraMuscular once  metoprolol tartrate 25 milliGRAM(s) Oral two times a day  nicotine -  14 mG/24Hr(s) Patch 1 patch Transdermal daily  pantoprazole    Tablet 40 milliGRAM(s) Oral before breakfast  predniSONE   Tablet 40 milliGRAM(s) Oral two times a day  pregabalin 300 milliGRAM(s) Oral two times a day    MEDICATIONS  (PRN):  morphine  - Injectable 4 milliGRAM(s) IV Push every 6 hours PRN Severe Pain (7 - 10)  ondansetron Injectable 4 milliGRAM(s) IntraMuscular every 4 hours PRN Nausea and/or Vomiting      FAMILY HISTORY:  Family history of seizure disorder (Sibling)      SOCIAL HISTORY:      REVIEW OF SYSTEMS:  CONSTITUTIONAL: No fever, weight loss, or fatigue  EYES: No eye pain, visual disturbances, or discharge  ENT:  No difficulty hearing, tinnitus, vertigo; No sinus or throat pain  NECK: No pain or stiffness  RESPIRATORY: No cough, wheezing, chills or hemoptysis; No Shortness of Breath  CARDIOVASCULAR: No chest pain, palpitations, passing out, dizziness, or leg swelling  GASTROINTESTINAL: No abdominal or epigastric pain. No nausea, vomiting, or hematemesis; No diarrhea or constipation. No melena or hematochezia.  GENITOURINARY: No dysuria, frequency, hematuria, or incontinence  NEUROLOGICAL: No headaches, memory loss, loss of strength, numbness, or tremors  SKIN: No itching, burning, rashes, or lesions   LYMPH Nodes: No enlarged glands  ENDOCRINE: No heat or cold intolerance; No hair loss  MUSCULOSKELETAL: No joint pain or swelling; No muscle, back, or extremity pain  PSYCHIATRIC: No depression, anxiety, mood swings, or difficulty sleeping  HEME/LYMPH: No easy bruising, or bleeding gums  ALLERGY AND IMMUNOLOGIC: No hives or eczema	        Vital Signs Last 24 Hrs  T(C): 37.1 (07 Dec 2018 05:24), Max: 37.1 (07 Dec 2018 05:24)  T(F): 98.7 (07 Dec 2018 05:24), Max: 98.7 (07 Dec 2018 05:24)  HR: 65 (07 Dec 2018 05:24) (65 - 77)  BP: 94/56 (07 Dec 2018 05:24) (94/56 - 122/78)  BP(mean): --  RR: 17 (07 Dec 2018 05:24) (16 - 17)  SpO2: 98% (07 Dec 2018 05:24) (97% - 99%)      Constitutional:    HEENT: nad    Neck:  No JVD, bruits or thyromegaly    Respiratory:  Clear without rales or rhonchi    Cardiovascular:  RR without murmur, rub or gallop.    Gastrointestinal: Soft without hepatosplenomegaly.    Extremities: without cyanosis, clubbing or edema.    Neurological:  Oriented   x  3    . No gross sensory or motor defects.        LABS:                        14.7   11.6  )-----------( 268      ( 06 Dec 2018 06:24 )             46.0     12-    138  |  102  |  13  ----------------------------<  87  3.4<L>   |  27  |  1.51<H>    Ca    8.5      07 Dec 2018 06:22  Phos  4.5     12-07  Mg     1.8     12-            Urinalysis Basic - ( 06 Dec 2018 12:06 )    Color: Yellow / Appearance: Clear / S.015 / pH: x  Gluc: x / Ketone: Trace  / Bili: Negative / Urobili: Negative   Blood: x / Protein: 30 mg/dL / Nitrite: Negative   Leuk Esterase: Small / RBC: 0-2 /HPF / WBC 6-10 /HPF   Sq Epi: x / Non Sq Epi: Occasional /HPF / Bacteria: Few /HPF          RADIOLOGY & ADDITIONAL STUDIES:  < from: CT Cervical Spine No Cont (18 @ 11:36) >    EXAM:  CT CERVICAL SPINE                            PROCEDURE DATE:  2018          INTERPRETATION:  CLINICAL STATEMENT: Trauma.    TECHNIQUE: CT of the cervical spine was performed without contrast. 3-D   MIP images obtained    COMPARISON: None.    FINDINGS:  Mild reversal of cervical lordosis. Mild compression deformities of C4   and C5 vertebral bodies. Sagittal alignment intact    There is no prevertebral soft tissue swelling. The odontoid is intact.     Evaluation of the spinal canal is limited on a CT exam.  Multilevel   degenerative changes noted resulting in multilevel spinal canal stenosis   and neural foraminal narrowing. Impingement of ventral cord at multiple   levels on a degenerative basis.    3.2 cm left thyroid nodule noted with mass effect on the trachea to the   right    IMPRESSION:  No acute fracture cervical spine    < end of copied text >

## 2018-12-07 NOTE — PROGRESS NOTE ADULT - PROBLEM SELECTOR PLAN 1
- pt with chronic pain.    - ms contin 60mg po q 12 hours  - percocet po prn  - dc morphine iv  - follow up with pain mgt - dr. arias  - pt does not need any scripts

## 2018-12-07 NOTE — PROGRESS NOTE ADULT - SUBJECTIVE AND OBJECTIVE BOX
NP Note discussed with  Primary Attending    Patient is a 47y old  Female who presents with a chief complaint of Abd pain (07 Dec 2018 11:32).  Pt in bed, nad.  Pt states that she feels better and ready for discharge.        INTERVAL HPI/OVERNIGHT EVENTS: no new complaints    MEDICATIONS  (STANDING):  amLODIPine   Tablet 2.5 milliGRAM(s) Oral daily  atorvastatin 20 milliGRAM(s) Oral at bedtime  ciprofloxacin     Tablet 500 milliGRAM(s) Oral every 12 hours  clopidogrel Tablet 75 milliGRAM(s) Oral daily  dextrose 5% + sodium chloride 0.9%. 1000 milliLiter(s) (65 mL/Hr) IV Continuous <Continuous>  dronabinol 5 milliGRAM(s) Oral every 12 hours  enoxaparin Injectable 40 milliGRAM(s) SubCutaneous daily  escitalopram 20 milliGRAM(s) Oral daily  influenza   Vaccine 0.5 milliLiter(s) IntraMuscular once  metoprolol tartrate 25 milliGRAM(s) Oral two times a day  nicotine -  14 mG/24Hr(s) Patch 1 patch Transdermal daily  pantoprazole    Tablet 40 milliGRAM(s) Oral before breakfast  predniSONE   Tablet 40 milliGRAM(s) Oral two times a day  pregabalin 300 milliGRAM(s) Oral two times a day    MEDICATIONS  (PRN):  morphine  - Injectable 4 milliGRAM(s) IV Push every 6 hours PRN Severe Pain (7 - 10)  ondansetron Injectable 4 milliGRAM(s) IntraMuscular every 4 hours PRN Nausea and/or Vomiting      __________________________________________________  REVIEW OF SYSTEMS:    CONSTITUTIONAL: No fever,   RESPIRATORY: No cough; No shortness of breath  CARDIOVASCULAR: No chest pain, no palpitations  GASTROINTESTINAL: + abdominal pain  NEUROLOGICAL: No headache or numbness, no tremors  MUSCULOSKELETAL: + back pain, + LE pain  GENITOURINARY: no dysuria, no frequency, no hesitancy          Vital Signs Last 24 Hrs  T(C): 37.1 (07 Dec 2018 05:24), Max: 37.1 (07 Dec 2018 05:24)  T(F): 98.7 (07 Dec 2018 05:24), Max: 98.7 (07 Dec 2018 05:24)  HR: 65 (07 Dec 2018 05:24) (65 - 77)  BP: 94/56 (07 Dec 2018 05:24) (94/56 - 122/78)  BP(mean): --  RR: 17 (07 Dec 2018 05:24) (16 - 17)  SpO2: 98% (07 Dec 2018 05:24) (97% - 99%)    ________________________________________________  PHYSICAL EXAM:  GENERAL: NAD  HEENT: Normocephalic;  conjunctivae and sclerae clear; moist mucous membranes;   NECK : supple  CHEST/LUNG: Clear to auscultation bilaterally with good air entry   HEART: S1 S2  regular; no murmurs, gallops or rubs  ABDOMEN: Soft, Nontender, Nondistended; Bowel sounds present  EXTREMITIES: no cyanosis; no edema; no calf tenderness  SKIN: warm and dry; no rash  NERVOUS SYSTEM:  Awake and alert; Oriented  to place, person and time ; no new deficits  MUSCULOSKELETAL:  _________________________________________________  LABS:                        14.7   11.6  )-----------( 268      ( 06 Dec 2018 06:24 )             46.0     12-07    138  |  102  |  13  ----------------------------<  87  3.4<L>   |  27  |  1.51<H>    Ca    8.5      07 Dec 2018 06:22  Phos  4.5     12-07  Mg     1.8     12-07        Urinalysis Basic - ( 06 Dec 2018 12:06 )    Color: Yellow / Appearance: Clear / S.015 / pH: x  Gluc: x / Ketone: Trace  / Bili: Negative / Urobili: Negative   Blood: x / Protein: 30 mg/dL / Nitrite: Negative   Leuk Esterase: Small / RBC: 0-2 /HPF / WBC 6-10 /HPF   Sq Epi: x / Non Sq Epi: Occasional /HPF / Bacteria: Few /HPF      CAPILLARY BLOOD GLUCOSE            RADIOLOGY & ADDITIONAL TESTS:    Imaging Personally Reviewed:  YES/NO    Consultant(s) Notes Reviewed:   YES/ No    Care Discussed with Consultants :     Plan of care was discussed with patient and /or primary care giver; all questions and concerns were addressed and care was aligned with patient's wishes. NP Note discussed with  Primary Attending    Patient is a 47y old  Female who presents with a chief complaint of Abd pain (07 Dec 2018 11:32).  Pt in bed, nad.  Pt states that she feels better and ready for discharge.  No nausea or vomiting.  Pt tolerating diet.  + chronic pain which pt takes morphine prn.        INTERVAL HPI/OVERNIGHT EVENTS: no new complaints    MEDICATIONS  (STANDING):  amLODIPine   Tablet 2.5 milliGRAM(s) Oral daily  atorvastatin 20 milliGRAM(s) Oral at bedtime  ciprofloxacin     Tablet 500 milliGRAM(s) Oral every 12 hours  clopidogrel Tablet 75 milliGRAM(s) Oral daily  dextrose 5% + sodium chloride 0.9%. 1000 milliLiter(s) (65 mL/Hr) IV Continuous <Continuous>  dronabinol 5 milliGRAM(s) Oral every 12 hours  enoxaparin Injectable 40 milliGRAM(s) SubCutaneous daily  escitalopram 20 milliGRAM(s) Oral daily  influenza   Vaccine 0.5 milliLiter(s) IntraMuscular once  metoprolol tartrate 25 milliGRAM(s) Oral two times a day  nicotine -  14 mG/24Hr(s) Patch 1 patch Transdermal daily  pantoprazole    Tablet 40 milliGRAM(s) Oral before breakfast  predniSONE   Tablet 40 milliGRAM(s) Oral two times a day  pregabalin 300 milliGRAM(s) Oral two times a day    MEDICATIONS  (PRN):  morphine  - Injectable 4 milliGRAM(s) IV Push every 6 hours PRN Severe Pain (7 - 10)  ondansetron Injectable 4 milliGRAM(s) IntraMuscular every 4 hours PRN Nausea and/or Vomiting      __________________________________________________  REVIEW OF SYSTEMS:    CONSTITUTIONAL: No fever,   RESPIRATORY: No cough; No shortness of breath  CARDIOVASCULAR: No chest pain, no palpitations  GASTROINTESTINAL: + abdominal pain  NEUROLOGICAL: No headache or numbness, no tremors  MUSCULOSKELETAL: + back pain, + LE pain  GENITOURINARY: no dysuria, no frequency, no hesitancy          Vital Signs Last 24 Hrs  T(C): 37.1 (07 Dec 2018 05:24), Max: 37.1 (07 Dec 2018 05:24)  T(F): 98.7 (07 Dec 2018 05:24), Max: 98.7 (07 Dec 2018 05:24)  HR: 65 (07 Dec 2018 05:24) (65 - 77)  BP: 94/56 (07 Dec 2018 05:24) (94/56 - 122/78)  BP(mean): --  RR: 17 (07 Dec 2018 05:24) (16 - 17)  SpO2: 98% (07 Dec 2018 05:24) (97% - 99%)    ________________________________________________  PHYSICAL EXAM:  GENERAL: NAD  CHEST/LUNG: Clear to auscultation bilaterally with good air entry   HEART: S1 S2  regular; no murmurs, gallops or rubs  ABDOMEN: + abdominal pain upon palpation  EXTREMITIES: no cyanosis; no edema; no calf tenderness  NERVOUS SYSTEM:  Awake and alert; Oriented  to place, person and time ; no new deficits  MUSCULOSKELETAL: + decreased rom   _________________________________________________  LABS:                        14.7   11.6  )-----------( 268      ( 06 Dec 2018 06:24 )             46.0     12-07    138  |  102  |  13  ----------------------------<  87  3.4<L>   |  27  |  1.51<H>    Ca    8.5      07 Dec 2018 06:22  Phos  4.5     12-07  Mg     1.8     12-07        Urinalysis Basic - ( 06 Dec 2018 12:06 )    Color: Yellow / Appearance: Clear / S.015 / pH: x  Gluc: x / Ketone: Trace  / Bili: Negative / Urobili: Negative   Blood: x / Protein: 30 mg/dL / Nitrite: Negative   Leuk Esterase: Small / RBC: 0-2 /HPF / WBC 6-10 /HPF   Sq Epi: x / Non Sq Epi: Occasional /HPF / Bacteria: Few /HPF      CAPILLARY BLOOD GLUCOSE            RADIOLOGY & ADDITIONAL TESTS:  < from: CT Cervical Spine No Cont (12.05.18 @ 11:36) >  EXAM:  CT CERVICAL SPINE                            PROCEDURE DATE:  2018          INTERPRETATION:  CLINICAL STATEMENT: Trauma.    TECHNIQUE: CT of the cervical spine was performed without contrast. 3-D   MIP images obtained    COMPARISON: None.    FINDINGS:  Mild reversal of cervical lordosis. Mild compression deformities of C4   and C5 vertebral bodies. Sagittal alignment intact    There is no prevertebral soft tissue swelling. The odontoid is intact.     Evaluation of the spinal canal is limited on a CT exam.  Multilevel   degenerative changes noted resulting in multilevel spinal canal stenosis   and neural foraminal narrowing. Impingement of ventral cord at multiple   levels on a degenerative basis.    3.2 cm left thyroid nodule noted with mass effect on the trachea to the   right    IMPRESSION:  No acute fracture cervical spine                DAVE ROSALES M.D., ATTENDING RADIOLOGIST  This document has been electronically signed. Dec  5 2018 12:53PM    < end of copied text >      Imaging Personally Reviewed:  YES/NO    Consultant(s) Notes Reviewed:   YES/ No    Care Discussed with Consultants :     Plan of care was discussed with patient and /or primary care giver; all questions and concerns were addressed and care was aligned with patient's wishes.

## 2018-12-07 NOTE — DISCHARGE NOTE ADULT - HOSPITAL COURSE
Patient is a 46 y/o F with PMHx of cyclical vomiting syndrome, chronic pain, SLE, p/w with complaints of intractable vomiting, abdominal pain, and diarrhea.  Pt remains afebrile w/o leukocytosis.  Remaining labs sig for Hb 17, likely hemoconcentrated from severe dehydration.  K+ 3.1 on admission likely from vomiting.      Pt will be admitted to medicine for management of intractable vomiting      Problem/Plan - 1:  ·  Problem: Cyclic vomiting syndrome, intractability of vomiting not specified, presence of nausea not specified. Plan: Hx of cyclical vomiting syndrome for 4 years. 5-6 episode every year.  c/w pain management and Zofran PRN  Follow up BMP QD. Patient is a 46 y/o F with PMHx of cyclical vomiting syndrome, chronic pain, SLE, p/w with complaints of intractable vomiting, abdominal pain, and diarrhea.  Pt remains afebrile w/o leukocytosis.  Remaining labs sig for Hb 17, likely hemoconcentrated from severe dehydration.  K+ 3.1 on admission likely from vomiting.      Pt will be admitted to medicine for management of intractable vomiting. Patient has a  Hx of cyclical vomiting syndrome for 4 years. 5-6 episode every year. Pain management consulted, symptoms resolved,. Patient was also c/o neck pain, CT C spine revealed C4-C5 impingement, evaluated by Dr Espino who recommended conservative management w/ neck collar and decadron treatment. Patient will f/up in clinic. Given patient's improved clinical status and current hemodynamic stability, decision was made to discharge.Please refer to patient's complete medical chart with documents for a full hospital course, for this is only a brief summary.

## 2018-12-07 NOTE — DISCHARGE NOTE ADULT - PLAN OF CARE
resolution Continue with home medications Continue with home medications. Follow up with your PCP upon discharge. You were evaluated by orthopedic surgeon, who recommended conservative management with neck collar. You will continue with prednisone as instructed, follow  up with Dr Doyle clinic upon discharge., Surgeon clinic info attached below. You will continue with Cipro x 7 days as instructed. Follow up with Dr Doyle clinic upon discharge.

## 2018-12-07 NOTE — CONSULT NOTE ADULT - PROBLEM SELECTOR PROBLEM 2
Cyclic vomiting syndrome, intractability of vomiting not specified, presence of nausea not specified
Cyclic vomiting syndrome, intractability of vomiting not specified, presence of nausea not specified
UTI (urinary tract infection)

## 2018-12-07 NOTE — DISCHARGE NOTE ADULT - CARE PLAN
Principal Discharge DX:	Cyclical vomiting  Secondary Diagnosis:	Atrial fibrillation  Secondary Diagnosis:	Neck pain  Secondary Diagnosis:	UTI (urinary tract infection) Principal Discharge DX:	Cyclical vomiting  Goal:	resolution  Assessment and plan of treatment:	Continue with home medications  Secondary Diagnosis:	Atrial fibrillation  Secondary Diagnosis:	Neck pain  Secondary Diagnosis:	UTI (urinary tract infection) Principal Discharge DX:	Cyclical vomiting  Goal:	resolution  Assessment and plan of treatment:	Continue with home medications  Secondary Diagnosis:	Atrial fibrillation  Assessment and plan of treatment:	Continue with home medications. Follow up with your PCP upon discharge.  Secondary Diagnosis:	Neck pain  Secondary Diagnosis:	UTI (urinary tract infection) Principal Discharge DX:	Cyclical vomiting  Goal:	resolution  Assessment and plan of treatment:	Continue with home medications  Secondary Diagnosis:	Atrial fibrillation  Assessment and plan of treatment:	Continue with home medications. Follow up with your PCP upon discharge.  Secondary Diagnosis:	Neck pain  Assessment and plan of treatment:	You were evaluated by orthopedic surgeon, who recommended conservative management with neck collar. You will continue with prednisone as instructed, follow  up with Dr Vivek logan upon discharge., Surgeon clinic info attached below.  Secondary Diagnosis:	UTI (urinary tract infection)  Assessment and plan of treatment:	You will continue with Cipro x 7 days as instructed. Follow up with Dr Vivek logan upon discharge.

## 2018-12-07 NOTE — CONSULT NOTE ADULT - PROBLEM SELECTOR RECOMMENDATION 9
- pt on ms contin 60mg po q 12 hours and percocet for breakthrough pain at home.  - would put pt on morphine 4mg ivp q 6 hours prn for now.  When pt tolerating po, will transition back to home meds.    - marinol 5mg po q 12 hours  - no nsaids due to allergy  - lyrica 300mg po q 12 hours  - zofran 4mg ivp prn  - ortho consult for cervical deformities
- pt on ms contin 60mg po q 12 hours and percocet for breakthrough pain at home.  - would put pt on morphine 4mg ivp q 6 hours prn for now.  When pt tolerating po, will transition back to home meds.    - marinol 5mg po q 12 hours  - no nsaids due to allergy  - lyrica 300mg po q 12 hours  - zofran 4mg ivp prn  -
incidental nodule  s/p hemithyroidectomy  ultrasound as out pt  followed  by [facility]  d/w pt at length

## 2018-12-07 NOTE — PROGRESS NOTE ADULT - SUBJECTIVE AND OBJECTIVE BOX
Patient is a 47y old  Female who presents with a chief complaint of Abd pain (06 Dec 2018 16:06)    pt seen in icu [  ], reg med floor [ x  ], bed [x  ], chair at bedside [   ], a+o x3 [ x ], lethargic [  ],  nad [x  ]    Allergies    aspirin (Unknown)  cortisone (Other)  latex (Unknown)  sulfa drugs (Unknown)        Vitals    T(F): 98.7 (12-07-18 @ 05:24), Max: 98.7 (12-07-18 @ 05:24)  HR: 65 (12-07-18 @ 05:24) (65 - 77)  BP: 94/56 (12-07-18 @ 05:24) (94/56 - 122/78)  RR: 17 (12-07-18 @ 05:24) (16 - 17)  SpO2: 98% (12-07-18 @ 05:24) (97% - 99%)  Wt(kg): --  CAPILLARY BLOOD GLUCOSE      POCT Blood Glucose.: 108 mg/dL (06 Dec 2018 07:52)      Labs                          14.7   11.6  )-----------( 268      ( 06 Dec 2018 06:24 )             46.0       12-07    138  |  102  |  13  ----------------------------<  87  3.4<L>   |  27  |  1.51<H>    Ca    8.5      07 Dec 2018 06:22  Phos  4.5     12-07  Mg     1.8     12-07              .Urine Clean Catch (Midstream)  12-04 @ 23:59   10,000 - 49,000 CFU/mL Proteus vulgaris  50,000 - 99,000 CFU/mL Enterococcus faecalis  <10,000 CFU/ml Normal Urogenital britni present  --  Proteus vulgaris  Enterococcus faecalis          Radiology Results      Meds    MEDICATIONS  (STANDING):  amLODIPine   Tablet 2.5 milliGRAM(s) Oral daily  atorvastatin 20 milliGRAM(s) Oral at bedtime  clopidogrel Tablet 75 milliGRAM(s) Oral daily  dextrose 5% + sodium chloride 0.9%. 1000 milliLiter(s) (65 mL/Hr) IV Continuous <Continuous>  dronabinol 5 milliGRAM(s) Oral every 12 hours  enoxaparin Injectable 40 milliGRAM(s) SubCutaneous daily  escitalopram 20 milliGRAM(s) Oral daily  influenza   Vaccine 0.5 milliLiter(s) IntraMuscular once  metoprolol tartrate 25 milliGRAM(s) Oral two times a day  nicotine -  14 mG/24Hr(s) Patch 1 patch Transdermal daily  pantoprazole    Tablet 40 milliGRAM(s) Oral before breakfast  pregabalin 300 milliGRAM(s) Oral two times a day      MEDICATIONS  (PRN):  morphine  - Injectable 4 milliGRAM(s) IV Push every 6 hours PRN Severe Pain (7 - 10)  ondansetron Injectable 4 milliGRAM(s) IntraMuscular every 4 hours PRN Nausea and/or Vomiting          Physical Exam    Neuro :  no focal deficits  Respiratory: CTA B/L  CV: RRR, S1S2, no murmurs,   Abdominal: Soft, NT, ND +BS,  Extremities: No edema, + peripheral pulses    ASSESSMENT    exacerbation of cyclic vomiting synd,   neck pain poss 2nd to c4 c5 compression deformity vs. degenerative disease  cervical cord impingement   h/o left thyroid nodule,   cyclic vomiting syndrome,   colitis,   Reflex sympathetic dystrophy s/p spinal stimulator,   frequent falls,   SLE (not on rx),   Afib (not on AC due to frequent falls),   HTN,   depression,   Hemorrhoids (EGD / colonoscopy 3-4 years ago),   diverticulosis,   partial right thyroidectomy    PLAN    cont clears  vomiting resolved   cont reglan, zofran PRN   ct abd neg noted above  gi cons  ct c-spine with C4 C5 compression deformity, degenerative changes and cord impingement noted above  ortho spine cons  cont cervical collar   pain mgmt cons noted  would put pt on morphine 4mg ivp q 6 hours prn for now.  When pt tolerating po, will transition back to home meds.    marinol 5mg po q 12 hours  no nsaids due to allergy  lyrica 300mg po q 12 hours  zofran 4mg ivp prn  endo cons for thyroid nodule  cont current meds Patient is a 47y old  Female who presents with a chief complaint of Abd pain (06 Dec 2018 16:06)    pt seen in icu [  ], reg med floor [ x  ], bed [x  ], chair at bedside [   ], a+o x3 [ x ], lethargic [  ],  nad [x  ]    Allergies    aspirin (Unknown)  cortisone (Other)  latex (Unknown)  sulfa drugs (Unknown)        Vitals    T(F): 98.7 (12-07-18 @ 05:24), Max: 98.7 (12-07-18 @ 05:24)  HR: 65 (12-07-18 @ 05:24) (65 - 77)  BP: 94/56 (12-07-18 @ 05:24) (94/56 - 122/78)  RR: 17 (12-07-18 @ 05:24) (16 - 17)  SpO2: 98% (12-07-18 @ 05:24) (97% - 99%)  Wt(kg): --  CAPILLARY BLOOD GLUCOSE      POCT Blood Glucose.: 108 mg/dL (06 Dec 2018 07:52)      Labs                          14.7   11.6  )-----------( 268      ( 06 Dec 2018 06:24 )             46.0       12-07    138  |  102  |  13  ----------------------------<  87  3.4<L>   |  27  |  1.51<H>    Ca    8.5      07 Dec 2018 06:22  Phos  4.5     12-07  Mg     1.8     12-07              .Urine Clean Catch (Midstream)  12-04 @ 23:59   10,000 - 49,000 CFU/mL Proteus vulgaris  50,000 - 99,000 CFU/mL Enterococcus faecalis  <10,000 CFU/ml Normal Urogenital britni present  --  Proteus vulgaris  Enterococcus faecalis          Radiology Results      Meds    MEDICATIONS  (STANDING):  amLODIPine   Tablet 2.5 milliGRAM(s) Oral daily  atorvastatin 20 milliGRAM(s) Oral at bedtime  clopidogrel Tablet 75 milliGRAM(s) Oral daily  dextrose 5% + sodium chloride 0.9%. 1000 milliLiter(s) (65 mL/Hr) IV Continuous <Continuous>  dronabinol 5 milliGRAM(s) Oral every 12 hours  enoxaparin Injectable 40 milliGRAM(s) SubCutaneous daily  escitalopram 20 milliGRAM(s) Oral daily  influenza   Vaccine 0.5 milliLiter(s) IntraMuscular once  metoprolol tartrate 25 milliGRAM(s) Oral two times a day  nicotine -  14 mG/24Hr(s) Patch 1 patch Transdermal daily  pantoprazole    Tablet 40 milliGRAM(s) Oral before breakfast  pregabalin 300 milliGRAM(s) Oral two times a day      MEDICATIONS  (PRN):  morphine  - Injectable 4 milliGRAM(s) IV Push every 6 hours PRN Severe Pain (7 - 10)  ondansetron Injectable 4 milliGRAM(s) IntraMuscular every 4 hours PRN Nausea and/or Vomiting          Physical Exam    Neuro :  no focal deficits  Respiratory: CTA B/L  CV: RRR, S1S2, no murmurs,   Abdominal: Soft, NT, ND +BS,  Extremities: No edema, + peripheral pulses    ASSESSMENT    exacerbation of cyclic vomiting synd,   neck pain poss 2nd to c4 c5 compression deformity vs. degenerative disease  cervical cord impingement   h/o left thyroid nodule,   cyclic vomiting syndrome,   colitis,   Reflex sympathetic dystrophy s/p spinal stimulator,   frequent falls,   SLE (not on rx),   Afib (not on AC due to frequent falls),   HTN,   depression,   Hemorrhoids (EGD / colonoscopy 3-4 years ago),   diverticulosis,   partial right thyroidectomy    PLAN    pt. tolerating advanced diet  vomiting resolved   cont reglan, zofran PRN   ct abd neg noted   ct c-spine with C4 C5 compression deformity, degenerative changes and cord impingement noted above  ortho spine cons noted  Recommendation: Conservative treatment   Pt. to continue with soft cervical neck collar and start on Decadron 10mg q6  Patient is to follow up with Dr. Espino in office upon discharge at [phone]  Pt. allergic to decadron but not prednisone  start prednisone 40mg BID  pain mgmt f/u  transition back to home meds  marinol 5mg po q 12 hours  no nsaids due to allergy  lyrica 300mg po q 12 hours  zofran 4mg ivp prn  endo cons o/p  ucx + noted above  start cipro 500mg BID x7days  cont current meds  pt. stable for d/c to follow up in office next week

## 2018-12-07 NOTE — DISCHARGE NOTE ADULT - PATIENT PORTAL LINK FT
You can access the [facility] Patient Portal, offered by [facility], by registering with the following website: [email or web address]

## 2018-12-07 NOTE — DISCHARGE NOTE ADULT - MEDICATION SUMMARY - MEDICATIONS TO TAKE
I will START or STAY ON the medications listed below when I get home from the hospital:    predniSONE 20 mg oral tablet  -- 2 tab(s) by mouth 2 times a day  -- Indication: For Neck pain    morphine 15 mg oral tablet  -- 1 tab(s) by mouth once a day in the evening  -- Indication: For Chronic pain syndrome    pregabalin 300 mg oral capsule  -- 1 cap(s) by mouth 2 times a day MDD:2  -- Indication: For Chronic pain syndrome    escitalopram 20 mg oral tablet  -- 1 tab(s) by mouth once a day  -- Indication: For depression    ondansetron 4 mg oral disintegrating strip  -- 1 each by mouth 3 times a day   -- Check with your doctor before becoming pregnant.  Do not chew, break, or crush.  Obtain medical advice before taking any non-prescription drugs as some may affect the action of this medication.    -- Indication: For Nausea    atorvastatin 20 mg oral tablet  -- 1 tab(s) by mouth once a day (at bedtime)  -- Indication: For Cholesterol    clopidogrel 75 mg oral tablet  -- 1 tab(s) by mouth once a day  -- Indication: For Afib    metoprolol tartrate 25 mg oral tablet  -- 1 tab(s) by mouth 2 times a day  -- Indication: For Afib    amLODIPine 2.5 mg oral tablet  -- 1 tab(s) by mouth once a day  -- Indication: For htn    Protonix 40 mg oral delayed release tablet  -- 1 tab(s) by mouth once a day  -- Indication: For gerd    ciprofloxacin 500 mg oral tablet  -- 1 tab(s) by mouth every 12 hours  -- Indication: For UTI (urinary tract infection)    nicotine 14 mg/24 hr transdermal film, extended release  -- 1 patch by transdermal patch once a day  -- Indication: For Smoking cessation

## 2018-12-10 ENCOUNTER — INBOUND DOCUMENT (OUTPATIENT)
Age: 48
End: 2018-12-10

## 2018-12-18 ENCOUNTER — INPATIENT (INPATIENT)
Facility: HOSPITAL | Age: 48
LOS: 5 days | Discharge: HOME | DRG: 552 | End: 2018-12-24
Attending: INTERNAL MEDICINE | Admitting: INTERNAL MEDICINE
Payer: MEDICARE

## 2018-12-18 VITALS — WEIGHT: 190.04 LBS | HEIGHT: 69 IN

## 2018-12-18 DIAGNOSIS — E89.0 POSTPROCEDURAL HYPOTHYROIDISM: Chronic | ICD-10-CM

## 2018-12-18 DIAGNOSIS — R53.1 WEAKNESS: ICD-10-CM

## 2018-12-18 LAB
ALBUMIN SERPL ELPH-MCNC: 3.5 G/DL — SIGNIFICANT CHANGE UP (ref 3.5–5)
ALP SERPL-CCNC: 120 U/L — SIGNIFICANT CHANGE UP (ref 40–120)
ALT FLD-CCNC: 18 U/L DA — SIGNIFICANT CHANGE UP (ref 10–60)
ANION GAP SERPL CALC-SCNC: 5 MMOL/L — SIGNIFICANT CHANGE UP (ref 5–17)
APTT BLD: 25.3 SEC — LOW (ref 27.5–36.3)
AST SERPL-CCNC: 24 U/L — SIGNIFICANT CHANGE UP (ref 10–40)
BASOPHILS # BLD AUTO: 0.1 K/UL — SIGNIFICANT CHANGE UP (ref 0–0.2)
BASOPHILS NFR BLD AUTO: 1.2 % — SIGNIFICANT CHANGE UP (ref 0–2)
BILIRUB SERPL-MCNC: 0.4 MG/DL — SIGNIFICANT CHANGE UP (ref 0.2–1.2)
BUN SERPL-MCNC: 14 MG/DL — SIGNIFICANT CHANGE UP (ref 7–18)
CALCIUM SERPL-MCNC: 8.4 MG/DL — SIGNIFICANT CHANGE UP (ref 8.4–10.5)
CHLORIDE SERPL-SCNC: 108 MMOL/L — SIGNIFICANT CHANGE UP (ref 96–108)
CO2 SERPL-SCNC: 27 MMOL/L — SIGNIFICANT CHANGE UP (ref 22–31)
CREAT SERPL-MCNC: 1.4 MG/DL — HIGH (ref 0.5–1.3)
EOSINOPHIL # BLD AUTO: 0.2 K/UL — SIGNIFICANT CHANGE UP (ref 0–0.5)
EOSINOPHIL NFR BLD AUTO: 1.9 % — SIGNIFICANT CHANGE UP (ref 0–6)
GLUCOSE SERPL-MCNC: 94 MG/DL — SIGNIFICANT CHANGE UP (ref 70–99)
HCT VFR BLD CALC: 42.2 % — SIGNIFICANT CHANGE UP (ref 34.5–45)
HGB BLD-MCNC: 14.1 G/DL — SIGNIFICANT CHANGE UP (ref 11.5–15.5)
INR BLD: 0.99 RATIO — SIGNIFICANT CHANGE UP (ref 0.88–1.16)
LYMPHOCYTES # BLD AUTO: 3.7 K/UL — HIGH (ref 1–3.3)
LYMPHOCYTES # BLD AUTO: 36.8 % — SIGNIFICANT CHANGE UP (ref 13–44)
MCHC RBC-ENTMCNC: 30 PG — SIGNIFICANT CHANGE UP (ref 27–34)
MCHC RBC-ENTMCNC: 33.4 GM/DL — SIGNIFICANT CHANGE UP (ref 32–36)
MCV RBC AUTO: 90.1 FL — SIGNIFICANT CHANGE UP (ref 80–100)
MONOCYTES # BLD AUTO: 0.5 K/UL — SIGNIFICANT CHANGE UP (ref 0–0.9)
MONOCYTES NFR BLD AUTO: 5.1 % — SIGNIFICANT CHANGE UP (ref 2–14)
NEUTROPHILS # BLD AUTO: 5.5 K/UL — SIGNIFICANT CHANGE UP (ref 1.8–7.4)
NEUTROPHILS NFR BLD AUTO: 54.9 % — SIGNIFICANT CHANGE UP (ref 43–77)
PLATELET # BLD AUTO: 304 K/UL — SIGNIFICANT CHANGE UP (ref 150–400)
POTASSIUM SERPL-MCNC: 4.7 MMOL/L — SIGNIFICANT CHANGE UP (ref 3.5–5.3)
POTASSIUM SERPL-SCNC: 4.7 MMOL/L — SIGNIFICANT CHANGE UP (ref 3.5–5.3)
PROT SERPL-MCNC: 7.3 G/DL — SIGNIFICANT CHANGE UP (ref 6–8.3)
PROTHROM AB SERPL-ACNC: 11 SEC — SIGNIFICANT CHANGE UP (ref 10–12.9)
RBC # BLD: 4.69 M/UL — SIGNIFICANT CHANGE UP (ref 3.8–5.2)
RBC # FLD: 13.6 % — SIGNIFICANT CHANGE UP (ref 10.3–14.5)
SODIUM SERPL-SCNC: 140 MMOL/L — SIGNIFICANT CHANGE UP (ref 135–145)
WBC # BLD: 10 K/UL — SIGNIFICANT CHANGE UP (ref 3.8–10.5)
WBC # FLD AUTO: 10 K/UL — SIGNIFICANT CHANGE UP (ref 3.8–10.5)

## 2018-12-18 PROCEDURE — 72125 CT NECK SPINE W/O DYE: CPT | Mod: 26

## 2018-12-18 PROCEDURE — 72131 CT LUMBAR SPINE W/O DYE: CPT | Mod: 26

## 2018-12-18 PROCEDURE — 99285 EMERGENCY DEPT VISIT HI MDM: CPT

## 2018-12-18 PROCEDURE — 72128 CT CHEST SPINE W/O DYE: CPT | Mod: 26

## 2018-12-18 PROCEDURE — 70450 CT HEAD/BRAIN W/O DYE: CPT | Mod: 26

## 2018-12-18 RX ORDER — SODIUM CHLORIDE 9 MG/ML
1000 INJECTION INTRAMUSCULAR; INTRAVENOUS; SUBCUTANEOUS ONCE
Qty: 0 | Refills: 0 | Status: COMPLETED | OUTPATIENT
Start: 2018-12-18 | End: 2018-12-18

## 2018-12-18 RX ORDER — OXYCODONE AND ACETAMINOPHEN 5; 325 MG/1; MG/1
1 TABLET ORAL ONCE
Qty: 0 | Refills: 0 | Status: DISCONTINUED | OUTPATIENT
Start: 2018-12-18 | End: 2018-12-18

## 2018-12-18 RX ADMIN — OXYCODONE AND ACETAMINOPHEN 1 TABLET(S): 5; 325 TABLET ORAL at 19:44

## 2018-12-18 RX ADMIN — OXYCODONE AND ACETAMINOPHEN 1 TABLET(S): 5; 325 TABLET ORAL at 23:32

## 2018-12-18 RX ADMIN — SODIUM CHLORIDE 1000 MILLILITER(S): 9 INJECTION INTRAMUSCULAR; INTRAVENOUS; SUBCUTANEOUS at 23:32

## 2018-12-18 NOTE — ED ADULT NURSE REASSESSMENT NOTE - NS ED NURSE REASSESS COMMENT FT1
Pt axox3. Pt ambulatory with assist and has her walker. Pt received from previous RN Suzanne. pt not in distress.

## 2018-12-18 NOTE — ED ADULT NURSE NOTE - NSIMPLEMENTINTERV_GEN_ALL_ED
Implemented All Fall Risk Interventions:  [place] to call system. Call bell, personal items and telephone within reach. Instruct patient to call for assistance. Room bathroom lighting operational. Non-slip footwear when patient is off stretcher. Physically safe environment: no spills, clutter or unnecessary equipment. Stretcher in lowest position, wheels locked, appropriate side rails in place. Provide visual cue, wrist band, yellow gown, etc. Monitor gait and stability. Monitor for mental status changes and reorient to person, place, and time. Review medications for side effects contributing to fall risk. Reinforce activity limits and safety measures with patient and family.

## 2018-12-18 NOTE — ED ADULT TRIAGE NOTE - CHIEF COMPLAINT QUOTE
States " I have neck pain following a fall in which I hyperextended my neck 3 weeks ago, which is not relieved by my prescribed pain medication, was told to return to ER if pain increases ".

## 2018-12-18 NOTE — ED ADULT NURSE NOTE - CHPI ED NUR SYMPTOMS NEG
no fatigue/no anorexia/no bladder dysfunction/no bowel dysfunction/no difficulty bearing weight/no constipation/no motor function loss/no numbness/no tingling

## 2018-12-18 NOTE — ED PROVIDER NOTE - MEDICAL DECISION MAKING DETAILS
49 y/o F presents s/p fall with spinal pain and L sided weakness with decreased sensation. Will obtain CT head, c-spine, t-spine and L-spine. Obtain basic labs and reassess.

## 2018-12-18 NOTE — ED PROVIDER NOTE - PROGRESS NOTE DETAILS
patient ct negative. still endorses left arm numbness, concern for stroke, admitted for stroke r.o under dr pelletier

## 2018-12-18 NOTE — ED PROVIDER NOTE - PHYSICAL EXAMINATION
No chaudhary sign. No raccoon eye. Discomfort with palpation of C-spine and T-spine. Bilateral paraspinal tenderness. Motor intact in bilateral arm. 4/5 strength in left upper extremity. Patient endorses bilateral difficulty lifting up legs. L arm loss of sensation.

## 2018-12-19 DIAGNOSIS — G90.50 COMPLEX REGIONAL PAIN SYNDROME I, UNSPECIFIED: ICD-10-CM

## 2018-12-19 DIAGNOSIS — I10 ESSENTIAL (PRIMARY) HYPERTENSION: ICD-10-CM

## 2018-12-19 DIAGNOSIS — M54.2 CERVICALGIA: ICD-10-CM

## 2018-12-19 DIAGNOSIS — R55 SYNCOPE AND COLLAPSE: ICD-10-CM

## 2018-12-19 DIAGNOSIS — Z29.9 ENCOUNTER FOR PROPHYLACTIC MEASURES, UNSPECIFIED: ICD-10-CM

## 2018-12-19 DIAGNOSIS — I48.91 UNSPECIFIED ATRIAL FIBRILLATION: ICD-10-CM

## 2018-12-19 LAB
ANION GAP SERPL CALC-SCNC: 5 MMOL/L — SIGNIFICANT CHANGE UP (ref 5–17)
BASOPHILS # BLD AUTO: 0 K/UL — SIGNIFICANT CHANGE UP (ref 0–0.2)
BASOPHILS NFR BLD AUTO: 0.5 % — SIGNIFICANT CHANGE UP (ref 0–2)
BUN SERPL-MCNC: 13 MG/DL — SIGNIFICANT CHANGE UP (ref 7–18)
CALCIUM SERPL-MCNC: 8.3 MG/DL — LOW (ref 8.4–10.5)
CHLORIDE SERPL-SCNC: 109 MMOL/L — HIGH (ref 96–108)
CK MB BLD-MCNC: <0.7 % — SIGNIFICANT CHANGE UP (ref 0–3.5)
CK MB CFR SERPL CALC: <1 NG/ML — SIGNIFICANT CHANGE UP (ref 0–3.6)
CK SERPL-CCNC: 136 U/L — SIGNIFICANT CHANGE UP (ref 21–215)
CO2 SERPL-SCNC: 28 MMOL/L — SIGNIFICANT CHANGE UP (ref 22–31)
CREAT SERPL-MCNC: 1.22 MG/DL — SIGNIFICANT CHANGE UP (ref 0.5–1.3)
EOSINOPHIL # BLD AUTO: 0.2 K/UL — SIGNIFICANT CHANGE UP (ref 0–0.5)
EOSINOPHIL NFR BLD AUTO: 2.5 % — SIGNIFICANT CHANGE UP (ref 0–6)
FOLATE SERPL-MCNC: 8 NG/ML — SIGNIFICANT CHANGE UP
GLUCOSE SERPL-MCNC: 92 MG/DL — SIGNIFICANT CHANGE UP (ref 70–99)
HCT VFR BLD CALC: 37.7 % — SIGNIFICANT CHANGE UP (ref 34.5–45)
HGB BLD-MCNC: 11.9 G/DL — SIGNIFICANT CHANGE UP (ref 11.5–15.5)
LYMPHOCYTES # BLD AUTO: 3.5 K/UL — HIGH (ref 1–3.3)
LYMPHOCYTES # BLD AUTO: 48.3 % — HIGH (ref 13–44)
MAGNESIUM SERPL-MCNC: 2.2 MG/DL — SIGNIFICANT CHANGE UP (ref 1.6–2.6)
MCHC RBC-ENTMCNC: 29.4 PG — SIGNIFICANT CHANGE UP (ref 27–34)
MCHC RBC-ENTMCNC: 31.5 GM/DL — LOW (ref 32–36)
MCV RBC AUTO: 93.4 FL — SIGNIFICANT CHANGE UP (ref 80–100)
MONOCYTES # BLD AUTO: 0.5 K/UL — SIGNIFICANT CHANGE UP (ref 0–0.9)
MONOCYTES NFR BLD AUTO: 7.5 % — SIGNIFICANT CHANGE UP (ref 2–14)
NEUTROPHILS # BLD AUTO: 3 K/UL — SIGNIFICANT CHANGE UP (ref 1.8–7.4)
NEUTROPHILS NFR BLD AUTO: 41.2 % — LOW (ref 43–77)
PHOSPHATE SERPL-MCNC: 2.8 MG/DL — SIGNIFICANT CHANGE UP (ref 2.5–4.5)
PLATELET # BLD AUTO: 262 K/UL — SIGNIFICANT CHANGE UP (ref 150–400)
POTASSIUM SERPL-MCNC: 4.3 MMOL/L — SIGNIFICANT CHANGE UP (ref 3.5–5.3)
POTASSIUM SERPL-SCNC: 4.3 MMOL/L — SIGNIFICANT CHANGE UP (ref 3.5–5.3)
RBC # BLD: 4.04 M/UL — SIGNIFICANT CHANGE UP (ref 3.8–5.2)
RBC # FLD: 13.4 % — SIGNIFICANT CHANGE UP (ref 10.3–14.5)
SODIUM SERPL-SCNC: 142 MMOL/L — SIGNIFICANT CHANGE UP (ref 135–145)
TROPONIN I SERPL-MCNC: <0.015 NG/ML — SIGNIFICANT CHANGE UP (ref 0–0.04)
WBC # BLD: 7.3 K/UL — SIGNIFICANT CHANGE UP (ref 3.8–10.5)
WBC # FLD AUTO: 7.3 K/UL — SIGNIFICANT CHANGE UP (ref 3.8–10.5)

## 2018-12-19 PROCEDURE — 93880 EXTRACRANIAL BILAT STUDY: CPT | Mod: 26

## 2018-12-19 PROCEDURE — 99223 1ST HOSP IP/OBS HIGH 75: CPT

## 2018-12-19 RX ORDER — NICOTINE POLACRILEX 2 MG
1 GUM BUCCAL DAILY
Qty: 0 | Refills: 0 | Status: DISCONTINUED | OUTPATIENT
Start: 2018-12-19 | End: 2018-12-24

## 2018-12-19 RX ORDER — ONDANSETRON 8 MG/1
4 TABLET, FILM COATED ORAL EVERY 4 HOURS
Qty: 0 | Refills: 0 | Status: DISCONTINUED | OUTPATIENT
Start: 2018-12-19 | End: 2018-12-24

## 2018-12-19 RX ORDER — CYCLOBENZAPRINE HYDROCHLORIDE 10 MG/1
5 TABLET, FILM COATED ORAL EVERY 8 HOURS
Qty: 0 | Refills: 0 | Status: DISCONTINUED | OUTPATIENT
Start: 2018-12-19 | End: 2018-12-24

## 2018-12-19 RX ORDER — PANTOPRAZOLE SODIUM 20 MG/1
40 TABLET, DELAYED RELEASE ORAL
Qty: 0 | Refills: 0 | Status: DISCONTINUED | OUTPATIENT
Start: 2018-12-19 | End: 2018-12-24

## 2018-12-19 RX ORDER — ATORVASTATIN CALCIUM 80 MG/1
20 TABLET, FILM COATED ORAL AT BEDTIME
Qty: 0 | Refills: 0 | Status: DISCONTINUED | OUTPATIENT
Start: 2018-12-19 | End: 2018-12-19

## 2018-12-19 RX ORDER — MORPHINE SULFATE 50 MG/1
4 CAPSULE, EXTENDED RELEASE ORAL EVERY 6 HOURS
Qty: 0 | Refills: 0 | Status: DISCONTINUED | OUTPATIENT
Start: 2018-12-19 | End: 2018-12-20

## 2018-12-19 RX ORDER — ATORVASTATIN CALCIUM 80 MG/1
40 TABLET, FILM COATED ORAL AT BEDTIME
Qty: 0 | Refills: 0 | Status: DISCONTINUED | OUTPATIENT
Start: 2018-12-19 | End: 2018-12-24

## 2018-12-19 RX ORDER — AMLODIPINE BESYLATE 2.5 MG/1
2.5 TABLET ORAL DAILY
Qty: 0 | Refills: 0 | Status: DISCONTINUED | OUTPATIENT
Start: 2018-12-19 | End: 2018-12-19

## 2018-12-19 RX ORDER — ESCITALOPRAM OXALATE 10 MG/1
20 TABLET, FILM COATED ORAL DAILY
Qty: 0 | Refills: 0 | Status: DISCONTINUED | OUTPATIENT
Start: 2018-12-19 | End: 2018-12-24

## 2018-12-19 RX ORDER — HEPARIN SODIUM 5000 [USP'U]/ML
5000 INJECTION INTRAVENOUS; SUBCUTANEOUS EVERY 12 HOURS
Qty: 0 | Refills: 0 | Status: DISCONTINUED | OUTPATIENT
Start: 2018-12-19 | End: 2018-12-24

## 2018-12-19 RX ORDER — METOPROLOL TARTRATE 50 MG
25 TABLET ORAL
Qty: 0 | Refills: 0 | Status: DISCONTINUED | OUTPATIENT
Start: 2018-12-19 | End: 2018-12-24

## 2018-12-19 RX ORDER — MORPHINE SULFATE 50 MG/1
4 CAPSULE, EXTENDED RELEASE ORAL ONCE
Qty: 0 | Refills: 0 | Status: DISCONTINUED | OUTPATIENT
Start: 2018-12-19 | End: 2018-12-19

## 2018-12-19 RX ORDER — ASPIRIN/CALCIUM CARB/MAGNESIUM 324 MG
81 TABLET ORAL DAILY
Qty: 0 | Refills: 0 | Status: DISCONTINUED | OUTPATIENT
Start: 2018-12-19 | End: 2018-12-20

## 2018-12-19 RX ORDER — CLOPIDOGREL BISULFATE 75 MG/1
75 TABLET, FILM COATED ORAL DAILY
Qty: 0 | Refills: 0 | Status: DISCONTINUED | OUTPATIENT
Start: 2018-12-19 | End: 2018-12-24

## 2018-12-19 RX ADMIN — Medication 200 MILLIGRAM(S): at 18:39

## 2018-12-19 RX ADMIN — CYCLOBENZAPRINE HYDROCHLORIDE 5 MILLIGRAM(S): 10 TABLET, FILM COATED ORAL at 21:16

## 2018-12-19 RX ADMIN — Medication 300 MILLIGRAM(S): at 18:39

## 2018-12-19 RX ADMIN — Medication 25 MILLIGRAM(S): at 18:40

## 2018-12-19 RX ADMIN — ONDANSETRON 4 MILLIGRAM(S): 8 TABLET, FILM COATED ORAL at 05:50

## 2018-12-19 RX ADMIN — Medication 81 MILLIGRAM(S): at 11:15

## 2018-12-19 RX ADMIN — CYCLOBENZAPRINE HYDROCHLORIDE 5 MILLIGRAM(S): 10 TABLET, FILM COATED ORAL at 14:07

## 2018-12-19 RX ADMIN — MORPHINE SULFATE 4 MILLIGRAM(S): 50 CAPSULE, EXTENDED RELEASE ORAL at 06:11

## 2018-12-19 RX ADMIN — PANTOPRAZOLE SODIUM 40 MILLIGRAM(S): 20 TABLET, DELAYED RELEASE ORAL at 07:47

## 2018-12-19 RX ADMIN — ATORVASTATIN CALCIUM 40 MILLIGRAM(S): 80 TABLET, FILM COATED ORAL at 21:16

## 2018-12-19 RX ADMIN — Medication 300 MILLIGRAM(S): at 05:51

## 2018-12-19 RX ADMIN — CYCLOBENZAPRINE HYDROCHLORIDE 5 MILLIGRAM(S): 10 TABLET, FILM COATED ORAL at 07:47

## 2018-12-19 RX ADMIN — Medication 1 PATCH: at 11:15

## 2018-12-19 RX ADMIN — Medication 1 PATCH: at 19:11

## 2018-12-19 RX ADMIN — MORPHINE SULFATE 4 MILLIGRAM(S): 50 CAPSULE, EXTENDED RELEASE ORAL at 18:00

## 2018-12-19 RX ADMIN — MORPHINE SULFATE 4 MILLIGRAM(S): 50 CAPSULE, EXTENDED RELEASE ORAL at 05:28

## 2018-12-19 RX ADMIN — MORPHINE SULFATE 4 MILLIGRAM(S): 50 CAPSULE, EXTENDED RELEASE ORAL at 17:07

## 2018-12-19 RX ADMIN — MORPHINE SULFATE 4 MILLIGRAM(S): 50 CAPSULE, EXTENDED RELEASE ORAL at 21:54

## 2018-12-19 RX ADMIN — HEPARIN SODIUM 5000 UNIT(S): 5000 INJECTION INTRAVENOUS; SUBCUTANEOUS at 05:52

## 2018-12-19 RX ADMIN — MORPHINE SULFATE 4 MILLIGRAM(S): 50 CAPSULE, EXTENDED RELEASE ORAL at 21:16

## 2018-12-19 RX ADMIN — CLOPIDOGREL BISULFATE 75 MILLIGRAM(S): 75 TABLET, FILM COATED ORAL at 11:15

## 2018-12-19 RX ADMIN — ESCITALOPRAM OXALATE 20 MILLIGRAM(S): 10 TABLET, FILM COATED ORAL at 11:15

## 2018-12-19 NOTE — CONSULT NOTE ADULT - ASSESSMENT
48 yr old Female, lives with  sister, from home, ambulates with walker,  w/ PMHx of cyclic vomiting syndrome,  Reflex sympathetic dystrophy s/p spinal stimulator, frequent falls, SLE (not on tx), Afib (not on AC due to frequent falls), HTN, Depression, Diverticulosis, and partial thyroidectomy on right side, who presents to the ED c/o severe neck pain x 1 week and s/p syncope.  1.Tele monitoring.  2.Orthostatic BP q shift.  3.Afib-fall/blled risk on asa,plavix.  4.Neurology eval.  5.Lipid d/o-statin.  6.Stress test-r/o ischemia.  7.Check am cortisol.  8.GI and DVT prophylaxis.

## 2018-12-19 NOTE — H&P ADULT - HISTORY OF PRESENT ILLNESS
47F, lives with  sister, from home, ambulates with walker,  w/ PMHx of cyclic vomiting syndrome,  Reflex sympathetic dystrophy s/p spinal stimulator, frequent falls, SLE (not on tx), Afib (not on AC due to frequent falls), HTN, Depression, Diverticulosis, and partial thyroidectomy on right side, who presents to the ED c/o severe neck pain x 1 week. Pain is sharp, constant, and radiates down to the lumbar spine. Patient recently diagnosed with C4-C5 stenosis w/ impingement, evaluated by ortho spine surgery and discharged home on steroids w/ neck collar. Patient reports falling at home after being discharged form hospital on December 7th, mentions she passed out in the kitchen in front of sister (after standing up), afterwards, neck pain has been unbearable, to the point pain meds are no longer working, and patient got concerned of overdosing, which prompted her to come to the ED. Patient reports left arm paresthesia and finger numbness, which was present from previous admission. Denies any chest pain, palpitations, SOB or any other complaint 48F, lives with  sister, from home, ambulates with walker,  w/ PMHx of cyclic vomiting syndrome,  Reflex sympathetic dystrophy s/p spinal stimulator, frequent falls, SLE (not on tx), Afib (not on AC due to frequent falls), HTN, Depression, Diverticulosis, and partial thyroidectomy on right side, who presents to the ED c/o severe neck pain x 1 week. Pain is sharp, constant, and radiates down to the lumbar spine. Patient recently diagnosed with C4-C5 stenosis w/ impingement, evaluated by ortho spine surgery and discharged home on steroids w/ neck collar. Patient reports falling at home after being discharged form hospital on December 7th, mentions she passed out in the kitchen in front of sister (after standing up), afterwards, neck pain has been unbearable, to the point pain meds are no longer working, and patient got concerned of overdosing, which prompted her to come to the ED. Patient reports left arm paresthesia and finger numbness, which was present from previous admission. Denies any chest pain, palpitations, SOB or any other complaint

## 2018-12-19 NOTE — CONSULT NOTE ADULT - SUBJECTIVE AND OBJECTIVE BOX
CHIEF COMPLAINT:Patient is a 48y old  Female who presents with a chief complaint of neck pain,s/p syncope.      HPI:  48 yr old Female, lives with  sister, from home, ambulates with walker,  w/ PMHx of cyclic vomiting syndrome,  Reflex sympathetic dystrophy s/p spinal stimulator, frequent falls, SLE (not on tx), Afib (not on AC due to frequent falls), HTN, Depression, Diverticulosis, and partial thyroidectomy on right side, who presents to the ED c/o severe neck pain x 1 week. Pain is sharp, constant, and radiates down to the lumbar spine. Patient recently diagnosed with C4-C5 stenosis w/ impingement, evaluated by ortho spine surgery and discharged home on steroids w/ neck collar. Patient reports falling at home after being discharged form hospital on December 7th, mentions she passed out in the kitchen in front of sister (after standing up), afterwards, neck pain has been unbearable, to the point pain meds are no longer working, and patient got concerned of overdosing, which prompted her to come to the ED. Patient reports left arm paresthesia and finger numbness, which was present from previous admission. Denies any chest pain, palpitations, SOB or any other complaint (19 Dec 2018 04:54)      PAST MEDICAL & SURGICAL HISTORY:  Renal failure, chronic, stage 3 (moderate)  HTN (hypertension)  Cyclical vomiting  Atrial fibrillation  SLE (systemic lupus erythematosus)  Reflex sympathetic dystrophy  H/O partial thyroidectomy  S/P partial hysterectomy      MEDICATIONS  (STANDING):  aspirin  chewable 81 milliGRAM(s) Oral daily  atorvastatin 40 milliGRAM(s) Oral at bedtime  clopidogrel Tablet 75 milliGRAM(s) Oral daily  cyclobenzaprine 5 milliGRAM(s) Oral every 8 hours  escitalopram 20 milliGRAM(s) Oral daily  heparin  Injectable 5000 Unit(s) SubCutaneous every 12 hours  metoprolol tartrate 25 milliGRAM(s) Oral two times a day  nicotine -  14 mG/24Hr(s) Patch 1 patch Transdermal daily  pantoprazole    Tablet 40 milliGRAM(s) Oral before breakfast  pregabalin 300 milliGRAM(s) Oral every 12 hours    MEDICATIONS  (PRN):  morphine  - Injectable 4 milliGRAM(s) IV Push every 6 hours PRN Severe Pain (7 - 10)  ondansetron Injectable 4 milliGRAM(s) IV Push every 4 hours PRN Nausea and/or Vomiting      FAMILY HISTORY:  Family history of seizure disorder (Sibling)      SOCIAL HISTORY:    [x ] Active smoker    [x ] Alcohol-denies    Allergies    aspirin (Unknown)  cortisone (Other)  latex (Unknown)  sulfa drugs (Unknown)    Intolerances    	    REVIEW OF SYSTEMS:  CONSTITUTIONAL: No fever, weight loss, or fatigue  EYES: No eye pain, visual disturbances, or discharge  ENT:  No difficulty hearing, tinnitus, vertigo; No sinus or throat pain  NECK: No pain or stiffness  RESPIRATORY: No cough, wheezing, chills or hemoptysis; No Shortness of Breath  CARDIOVASCULAR: No chest pain, palpitations, + passing out  GASTROINTESTINAL: No abdominal or epigastric pain. No nausea, vomiting, or hematemesis; No diarrhea or constipation. No melena or hematochezia.  GENITOURINARY: No dysuria, frequency, hematuria, or incontinence  NEUROLOGICAL: No headaches, memory loss, loss of strength, numbness, or tremors  SKIN: No itching, burning, rashes, or lesions   LYMPH Nodes: No enlarged glands  ENDOCRINE: No heat or cold intolerance; No hair loss  MUSCULOSKELETAL: No joint pain or swelling; No muscle, back, or extremity pain  PSYCHIATRIC: No depression, anxiety, mood swings, or difficulty sleeping  HEME/LYMPH: No easy bruising, or bleeding gums  ALLERGY AND IMMUNOLOGIC: No hives or eczema	      PHYSICAL EXAM:  T(C): 36.4 (12-19-18 @ 07:55), Max: 36.6 (12-18-18 @ 16:26)  HR: 58 (12-19-18 @ 07:55) (54 - 83)  BP: 96/68 (12-19-18 @ 07:55) (85/60 - 103/56)  RR: 17 (12-19-18 @ 07:55) (16 - 18)  SpO2: 96% (12-19-18 @ 07:55) (96% - 100%)      Appearance: Normal	  HEENT:   Normal oral mucosa, PERRL, EOMI	  Lymphatic: No lymphadenopathy  Cardiovascular: Normal S1 S2, No JVD, No murmurs, No edema  Respiratory: Lungs clear to auscultation	  Psychiatry: A & O x 3, Mood & affect appropriate  Gastrointestinal:  Soft, Non-tender, + BS	  Skin: No rashes, No ecchymoses, No cyanosis	  Neurologic: Non-focal  Extremities: Normal range of motion, No clubbing, cyanosis or edema  Vascular: Peripheral pulses palpable 2+ bilaterally    	    ECG: Atrial fibrillation  Left axis deviation  Nonspecific ST abnormality   	    LABS:	 	    CARDIAC MARKERS:  CARDIAC MARKERS ( 19 Dec 2018 12:21 )  <0.015 ng/mL / x     / x     / x     / x                             11.9   7.3   )-----------( 262      ( 19 Dec 2018 06:01 )             37.7     12-19    142  |  109<H>  |  13  ----------------------------<  92  4.3   |  28  |  1.22    Ca    8.3<L>      19 Dec 2018 06:01  Phos  2.8     12-19  Mg     2.2     12-19    TPro  7.3  /  Alb  3.5  /  TBili  0.4  /  DBili  x   /  AST  24  /  ALT  18  /  AlkPhos  120  12-18        OBSERVATIONS:  Mitral Valve: Normal mitral valve.  Aortic Root: Aortic Root: 3.4 cm.  Aortic Valve: Normal trileaflet aortic valve.  Left Ventricle: Endocardium not well visualized; grossly  normal left ventricular systolicfunction. Normal left  ventricular internal dimensions and wall thicknesses. Grade  II diastolic dysfunction.  Right Heart: Normal right atrium. Normal right ventricular  size and function. There is trace tricuspid regurgitation.  Normal pulmonic valve.  Pericardium/PleuraNormal pericardium with no pericardial  effusion.  Hemodynamic: RV systolic pressure is normal at  33 mm Hg.

## 2018-12-19 NOTE — H&P ADULT - PROBLEM SELECTOR PLAN 4
Chronic Afib: Takes Plavix for AC, currently rate controlled  c/w Lopressor 25mg BID s/p spinal stimulator  c/w home dose of Lyrica  Pain management consult

## 2018-12-19 NOTE — H&P ADULT - PROBLEM SELECTOR PLAN 5
IMPROVE VTE Individual Risk Assessment          RISK                                                          Points  [  ] Previous VTE                                                3  [  ] Thrombophilia                                             2  [ x ] Lower limb paralysis                                   2        (unable to hold up >15 seconds)    [  ] Current Cancer                                             2         (within 6 months)  [ x ] Immobilization > 24 hrs                              1  [  ] ICU/CCU stay > 24 hours                             1  [  ] Age > 60                                                         1    IMPROVE VTE Score: 3  HSQ for DVT chemoppx Chronic Afib: Takes Plavix for AC, currently rate controlled  c/w Lopressor 25mg BID

## 2018-12-19 NOTE — H&P ADULT - PROBLEM SELECTOR PLAN 2
c/w Norvasc 2.5 mg QD + Lopressor 25 mg BID  Monitor BP and adjust medications PRN Patient had an episode a week ago, after standing up; denies any symptoms prior to event. Could be vasovagal or due to orthostatic hypotension. Patient on multiple pain medications, including opioids. Hx recurrent falls.  ECG: afib VR:79bpm no acute STTW changes; f/up T1, T2; HEART score: 4  Telemonitoring  CT brain: no acute pathology  Check orthostatics  c/w ASA 81 mg + Lipitor, will hold off from BB as BP low due to Morphine  Follow up TSH, Lipid profile, HbA1C  Follow up Carotid Doppler and TTE Patient had an episode a week ago, after standing up; denies any symptoms prior to event. Could be vasovagal or due to orthostatic hypotension. Patient on multiple pain medications, including opioids. Hx recurrent falls.  ECG: afib VR:79bpm no acute STTW changes; f/up T1, T2; HEART score: 4  Telemonitoring  CT brain: no acute pathology  Check orthostatics  c/w ASA 81 mg + Lipitor + Lopressor 25 mg BID  Follow up TSH, Lipid profile, HbA1C  Follow up Carotid Doppler and TTE

## 2018-12-19 NOTE — H&P ADULT - PROBLEM SELECTOR PLAN 1
Patient recently diagnosed with C4-C5 stenosis w/ impingement, evaluated by ortho spine, managed conservatively w/ steroids and neck collar. Patient fell after being discharged, and pain worsened. Repeat CT in ED show no changes, no acute Fx or dislocation  c/w pain management  PT evaluation Patient recently diagnosed with radiculopathy 2/2 C4-C5 stenosis w/ impingement, evaluated by ortho spine, managed conservatively w/ steroids and neck collar. Patient fell after being discharged, and pain worsened. Repeat CT in ED show no changes, no acute Fx or dislocation  c/w pain management  PT evaluation  Neuro consulted

## 2018-12-19 NOTE — H&P ADULT - PROBLEM SELECTOR PLAN 3
s/p spinal stimulator  c/w home dose of Lyrica  Pain management consult c/w Norvasc 2.5 mg QD + Lopressor 25 mg BID  Monitor BP and adjust medications PRN c/w Lopressor 25 mg BID  Monitor BP and adjust medications PRN

## 2018-12-19 NOTE — CONSULT NOTE ADULT - SUBJECTIVE AND OBJECTIVE BOX
TO BE COMPLETED    Neurology Consult    Patient is a 48y old  Female who presents with a chief complaint of neck pain (19 Dec 2018 04:54)      HPI:  48F, lives with  sister, from home, ambulates with walker,  w/ PMHx of cyclic vomiting syndrome,  Reflex sympathetic dystrophy s/p spinal stimulator, frequent falls, SLE (not on tx), Afib (not on AC due to frequent falls), HTN, Depression, Diverticulosis, and partial thyroidectomy on right side, who presents to the ED c/o severe neck pain x 1 week. Pain is sharp, constant, and radiates down to the lumbar spine. Patient recently diagnosed with C4-C5 stenosis w/ impingement, evaluated by ortho spine surgery and discharged home on steroids w/ neck collar. Patient reports falling at home after being discharged form hospital on December 7th, mentions she passed out in the kitchen in front of sister (after standing up), afterwards, neck pain has been unbearable, to the point pain meds are no longer working, and patient got concerned of overdosing, which prompted her to come to the ED. Patient reports left arm paresthesia and finger numbness, which was present from previous admission. Denies any chest pain, palpitations, SOB or any other complaint (19 Dec 2018 04:54)      PAST MEDICAL & SURGICAL HISTORY:  Renal failure, chronic, stage 3 (moderate)  HTN (hypertension)  Cyclical vomiting  Atrial fibrillation  SLE (systemic lupus erythematosus)  Reflex sympathetic dystrophy  H/O partial thyroidectomy  S/P partial hysterectomy      FAMILY HISTORY:  Family history of seizure disorder (Sibling)      Social History: (-) x 3    Allergies    aspirin (Unknown)  cortisone (Other)  latex (Unknown)  sulfa drugs (Unknown)    Intolerances        MEDICATIONS  (STANDING):  aspirin  chewable 81 milliGRAM(s) Oral daily  atorvastatin 40 milliGRAM(s) Oral at bedtime  clopidogrel Tablet 75 milliGRAM(s) Oral daily  cyclobenzaprine 5 milliGRAM(s) Oral every 8 hours  escitalopram 20 milliGRAM(s) Oral daily  heparin  Injectable 5000 Unit(s) SubCutaneous every 12 hours  metoprolol tartrate 25 milliGRAM(s) Oral two times a day  nicotine -  14 mG/24Hr(s) Patch 1 patch Transdermal daily  pantoprazole    Tablet 40 milliGRAM(s) Oral before breakfast  pregabalin 300 milliGRAM(s) Oral every 12 hours    MEDICATIONS  (PRN):  morphine  - Injectable 4 milliGRAM(s) IV Push every 6 hours PRN Severe Pain (7 - 10)  ondansetron Injectable 4 milliGRAM(s) IV Push every 4 hours PRN Nausea and/or Vomiting      Review of systems:    Constitutional: No fever, weight loss or fatigue    Eyes: No eye pain or discharge  ENMT:  No difficulty hearing; No sinus or throat pain  Neck: No pain or stiffness  Respiratory: No cough, wheezing, chills or hemoptysis  Cardiovascular: No chest pain, palpitations, shortness of breath, dyspnea on exertion  Gastrointestinal: No abdominal pain, nausea, vomiting or hematemesis; No diarrhea or constipation.   Genitourinary: No dysuria, frequency, hematuria or incontinence  Neurological: As per HPI  Skin: No rashes or lesions   Endocrine: No heat or cold intolerance; No hair loss  Musculoskeletal: No joint pain or swelling  Psychiatric: No depression, anxiety, mood swings  Heme/Lymph: No easy bruising or bleeding gums    Vital Signs Last 24 Hrs  T(C): 36.4 (19 Dec 2018 07:55), Max: 36.6 (18 Dec 2018 16:26)  T(F): 97.5 (19 Dec 2018 07:55), Max: 97.9 (18 Dec 2018 16:26)  HR: 58 (19 Dec 2018 07:55) (54 - 83)  BP: 96/68 (19 Dec 2018 07:55) (85/60 - 103/56)  BP(mean): --  RR: 17 (19 Dec 2018 07:55) (16 - 18)  SpO2: 96% (19 Dec 2018 07:55) (96% - 100%)    Neurologic Examination:  General:  Appearance is consistent with chronologic age.  No abnormal facies.   General: The patient is oriented to person, place, time and date.  Recent and remote memory intact.  Fund of knowledge is intact and normal.  Language with normal repetition, comprehension and naming.  Nondysarthric.    Cranial nerves: intact VA, VFF.  EOMI w/o nystagmus, skew or reported double vision.  PERRL.  No ptosis/weakness of eyelid closure.  Facial sensation is normal with normal bite.  No facial asymmetry.  Hearing grossly intact b/l.  Palate elevates midline.  Tongue midline.  Motor examination:   Normal tone, bulk and range of motion.  No tenderness, twitching, tremors or involuntary movements.  Formal Muscle Strength Testing: (MRC grade R/L) 5/5 UE; 5/5 LE.  No observable drift.  Reflexes:   2+ b/l pectoralis, biceps, triceps, brachioradialis, patella and Achilles.  Plantar response downgoing b/l.  Jaw jerk, Cristiano, clonus absent.  Sensory examination:   Intact to light touch and pinprick, pain, temperature and proprioception and vibration in all extremities.  Cerebellum:   FTN/HKS intact with normal BELEM in all limbs.  No dysmetria or dysdiadokinesia.  Gait narrow based and normal.    Labs:   CBC Full  -  ( 19 Dec 2018 06:01 )  WBC Count : 7.3 K/uL  Hemoglobin : 11.9 g/dL  Hematocrit : 37.7 %  Platelet Count - Automated : 262 K/uL  Mean Cell Volume : 93.4 fl  Mean Cell Hemoglobin : 29.4 pg  Mean Cell Hemoglobin Concentration : 31.5 gm/dL  Auto Neutrophil # : 3.0 K/uL  Auto Lymphocyte # : 3.5 K/uL  Auto Monocyte # : 0.5 K/uL  Auto Eosinophil # : 0.2 K/uL  Auto Basophil # : 0.0 K/uL  Auto Neutrophil % : 41.2 %  Auto Lymphocyte % : 48.3 %  Auto Monocyte % : 7.5 %  Auto Eosinophil % : 2.5 %  Auto Basophil % : 0.5 %    12-19    142  |  109<H>  |  13  ----------------------------<  92  4.3   |  28  |  1.22    Ca    8.3<L>      19 Dec 2018 06:01  Phos  2.8     12-19  Mg     2.2     12-19    TPro  7.3  /  Alb  3.5  /  TBili  0.4  /  DBili  x   /  AST  24  /  ALT  18  /  AlkPhos  120  12-18    LIVER FUNCTIONS - ( 18 Dec 2018 18:29 )  Alb: 3.5 g/dL / Pro: 7.3 g/dL / ALK PHOS: 120 U/L / ALT: 18 U/L DA / AST: 24 U/L / GGT: x           PT/INR - ( 18 Dec 2018 18:29 )   PT: 11.0 sec;   INR: 0.99 ratio         PTT - ( 18 Dec 2018 18:29 )  PTT:25.3 sec        Neuroimaging:  NCHCT: CT Head No Cont:   EXAM:  CT BRAIN                            PROCEDURE DATE:  12/18/2018          INTERPRETATION:  HISTORY: Injury    Evaluation demonstrates no evidence of mass-effect or midline shift. No   intraparenchymal mass lesions or hemorrhage is identified.    There is   no evidence of hydrocephalus. No extra-axial collections are noted.    The bone windows demonstrate no gross osseous abnormalities.        Impression:  1. Unremarkable noncontrast CT scan of the brain.                          ANGELO STALEY M.D., ATTENDING RADIOLOGIST  This document has been electronically signed. Dec 18 2018  8:30PM             (12-18-18 @ 20:38)    CT Angiography/Perfusion:  MRI Brain NC:  MRA Head/Neck:  EEG:    Assessment:  This is a 48y Female with h/o     Plan:   -   12-19-18 @ 11:19          Please contact the Neurology consult service with any questions.    Horacio Hopkins MD  Neurology Attending  [facility] TO BE COMPLETED    Neurology Consult    Patient is a 48y old  Female who presents with a chief complaint of neck pain (19 Dec 2018 04:54)    HPI:  This is a 48-year-old ambidextrous woman who has a history of migraine, reflex sympathetic dystrophy s/p spinal stimulator, and cyclic vomiting syndrome, who presented earlier this month with breakthrough migraine and neck pain earlier this month. Neck imaging revealed cervical stenosis with nerve root impingement, and the patient was prescribed oral steroids and a cervical collar as per orthopedic surgery consult recommendations. The patient returns stating that her headache and neck pain have not resolved or subsided. The headache is rated as 8/10, and described as throbbing, located behind both eyes, sensitive to bright lights and loud sounds, not associated with nausea or vomiting, and not radiating. The neck pain is rated as 9-10/10, and described as sharp, constant, and radiating down her spine to the lower back. She also has numbness and tingling in her left arm, which have predated these pain symptoms.    PAST MEDICAL & SURGICAL HISTORY:  Renal failure, chronic, stage 3 (moderate)  HTN (hypertension)  Cyclical vomiting  Atrial fibrillation  SLE (systemic lupus erythematosus)  Reflex sympathetic dystrophy  Migraine  H/O partial thyroidectomy  S/P partial hysterectomy    FAMILY HISTORY:  Family history of seizure disorder (Sibling)    Social History: Denies alcohol, tobacco, and illicit drug use.    Allergies:  aspirin (Unknown)  cortisone (Other)  latex (Unknown)  sulfa drugs (Unknown)    MEDICATIONS  (STANDING):  aspirin  chewable 81 milliGRAM(s) Oral daily  atorvastatin 40 milliGRAM(s) Oral at bedtime  clopidogrel Tablet 75 milliGRAM(s) Oral daily  cyclobenzaprine 5 milliGRAM(s) Oral every 8 hours  escitalopram 20 milliGRAM(s) Oral daily  heparin  Injectable 5000 Unit(s) SubCutaneous every 12 hours  metoprolol tartrate 25 milliGRAM(s) Oral two times a day  nicotine -  14 mG/24Hr(s) Patch 1 patch Transdermal daily  pantoprazole    Tablet 40 milliGRAM(s) Oral before breakfast  pregabalin 300 milliGRAM(s) Oral every 12 hours    MEDICATIONS  (PRN):  morphine  - Injectable 4 milliGRAM(s) IV Push every 6 hours PRN Severe Pain (7 - 10)  ondansetron Injectable 4 milliGRAM(s) IV Push every 4 hours PRN Nausea and/or Vomiting    Review of systems:    Constitutional: No fever, weight loss or fatigue    Eyes: Pain behind both eyes as in HPI; No visio nchanges  ENMT:  No difficulty hearing; No sinus or throat pain  Neck: Pain and stiffness as in HPI  Respiratory: No cough or wheezing, chills  Cardiovascular: No chest pain or palpitations  Gastrointestinal: No abdominal pain, nausea, or vomiting  Genitourinary: No changes in urinary frequency  Neurological: As per HPI  Skin: No rashes or lesions   Endocrine: No heat or cold intolerance  Musculoskeletal: Severe back pain limiting leg movements  Psychiatric: Depressed mood present  Heme/Lymph: No easy bruising or bleeding    Vital Signs Last 24 Hrs  T(C): 36.4 (19 Dec 2018 07:55), Max: 36.6 (18 Dec 2018 16:26)  T(F): 97.5 (19 Dec 2018 07:55), Max: 97.9 (18 Dec 2018 16:26)  HR: 58 (19 Dec 2018 07:55) (54 - 83)  BP: 96/68 (19 Dec 2018 07:55) (85/60 - 103/56)  BP(mean): --  RR: 17 (19 Dec 2018 07:55) (16 - 18)  SpO2: 96% (19 Dec 2018 07:55) (96% - 100%)    General:  Appearance is consistent with chronologic age.  No abnormal facies.     Neurologic Examination:  General: The patient is oriented to person, place, time and date.  Recent and remote memory intact.  Fund of knowledge is intact and normal.  Language with normal repetition, comprehension and naming.  Nondysarthric.    Cranial nerves: intact VA, VFF.  EOMI w/o nystagmus, skew or reported double vision.  PERRL.  No ptosis/weakness of eyelid closure.  Facial sensation is normal with normal bite.  No facial asymmetry.  Hearing grossly intact b/l.  Palate elevates midline.  Tongue midline.  Motor examination:   Normal tone, bulk and range of motion.  No tenderness, twitching, tremors or involuntary movements.  Formal Muscle Strength Testing: (MRC grade R/L) 5/5 UE; 5/5 LE.  No observable drift.  Reflexes:   2+ b/l pectoralis, biceps, triceps, brachioradialis, patella and Achilles.  Plantar response downgoing b/l.  Jaw jerk, Cristiano, clonus absent.  Sensory examination:   Intact to light touch and pinprick, pain, temperature and proprioception and vibration in all extremities.  Cerebellum:   FTN/HKS intact with normal BELEM in all limbs.  No dysmetria or dysdiadokinesia.  Gait narrow based and normal.    Labs:   CBC Full  -  ( 19 Dec 2018 06:01 )  WBC Count : 7.3 K/uL  Hemoglobin : 11.9 g/dL  Hematocrit : 37.7 %  Platelet Count - Automated : 262 K/uL  Mean Cell Volume : 93.4 fl  Mean Cell Hemoglobin : 29.4 pg  Mean Cell Hemoglobin Concentration : 31.5 gm/dL  Auto Neutrophil # : 3.0 K/uL  Auto Lymphocyte # : 3.5 K/uL  Auto Monocyte # : 0.5 K/uL  Auto Eosinophil # : 0.2 K/uL  Auto Basophil # : 0.0 K/uL  Auto Neutrophil % : 41.2 %  Auto Lymphocyte % : 48.3 %  Auto Monocyte % : 7.5 %  Auto Eosinophil % : 2.5 %  Auto Basophil % : 0.5 %    12-19    142  |  109<H>  |  13  ----------------------------<  92  4.3   |  28  |  1.22    Ca    8.3<L>      19 Dec 2018 06:01  Phos  2.8     12-19  Mg     2.2     12-19    TPro  7.3  /  Alb  3.5  /  TBili  0.4  /  DBili  x   /  AST  24  /  ALT  18  /  AlkPhos  120  12-18    LIVER FUNCTIONS - ( 18 Dec 2018 18:29 )  Alb: 3.5 g/dL / Pro: 7.3 g/dL / ALK PHOS: 120 U/L / ALT: 18 U/L DA / AST: 24 U/L / GGT: x           PT/INR - ( 18 Dec 2018 18:29 )   PT: 11.0 sec;   INR: 0.99 ratio         PTT - ( 18 Dec 2018 18:29 )  PTT:25.3 sec        Neuroimaging:  NCHCT: CT Head No Cont:   EXAM:  CT BRAIN                            PROCEDURE DATE:  12/18/2018          INTERPRETATION:  HISTORY: Injury    Evaluation demonstrates no evidence of mass-effect or midline shift. No   intraparenchymal mass lesions or hemorrhage is identified.    There is   no evidence of hydrocephalus. No extra-axial collections are noted.    The bone windows demonstrate no gross osseous abnormalities.        Impression:  1. Unremarkable noncontrast CT scan of the brain.                          ANGELO STALEY M.D., ATTENDING RADIOLOGIST  This document has been electronically signed. Dec 18 2018  8:30PM             (12-18-18 @ 20:38)    CT Angiography/Perfusion:  MRI Brain NC:  MRA Head/Neck:  EEG:    Assessment:  This is a 48y Female with h/o     Plan:   -   12-19-18 @ 11:19          Please contact the Neurology consult service with any questions.    Horacio Hopkins MD  Neurology Attending  [facility] Neurology Consult    Patient is a 48y old  Female who presents with a chief complaint of neck pain (19 Dec 2018 04:54)    HPI:  This is a 48-year-old ambidextrous woman who has a history of migraine, reflex sympathetic dystrophy s/p spinal stimulator, and cyclic vomiting syndrome, who presented earlier this month with breakthrough migraine and neck pain earlier this month. Neck imaging revealed cervical stenosis with nerve root impingement, and the patient was prescribed oral steroids and a cervical collar as per orthopedic surgery consult recommendations. The patient returns stating that her headache and neck pain have not resolved or subsided. The headache is rated as 8/10, and described as throbbing, located behind both eyes, sensitive to bright lights and loud sounds, not associated with nausea or vomiting, and not radiating. The neck pain is rated as 9-10/10, and described as sharp, constant, and radiating down her spine to the lower back. She also has numbness and tingling in her left arm, which have predated these pain symptoms.    PAST MEDICAL & SURGICAL HISTORY:  Renal failure, chronic, stage 3 (moderate)  HTN (hypertension)  Cyclical vomiting  Atrial fibrillation  SLE (systemic lupus erythematosus)  Reflex sympathetic dystrophy  Migraine  H/O partial thyroidectomy  S/P partial hysterectomy    FAMILY HISTORY:  Family history of seizure disorder (Sibling)    Social History: Denies alcohol, tobacco, and illicit drug use.    Allergies:  aspirin (Unknown)  cortisone (Other)  latex (Unknown)  sulfa drugs (Unknown)    MEDICATIONS  (STANDING):  aspirin  chewable 81 milliGRAM(s) Oral daily  atorvastatin 40 milliGRAM(s) Oral at bedtime  clopidogrel Tablet 75 milliGRAM(s) Oral daily  cyclobenzaprine 5 milliGRAM(s) Oral every 8 hours  escitalopram 20 milliGRAM(s) Oral daily  heparin  Injectable 5000 Unit(s) SubCutaneous every 12 hours  metoprolol tartrate 25 milliGRAM(s) Oral two times a day  nicotine -  14 mG/24Hr(s) Patch 1 patch Transdermal daily  pantoprazole    Tablet 40 milliGRAM(s) Oral before breakfast  pregabalin 300 milliGRAM(s) Oral every 12 hours    MEDICATIONS  (PRN):  morphine  - Injectable 4 milliGRAM(s) IV Push every 6 hours PRN Severe Pain (7 - 10)  ondansetron Injectable 4 milliGRAM(s) IV Push every 4 hours PRN Nausea and/or Vomiting    Review of systems:    Constitutional: No fever, weight loss or fatigue    Eyes: Pain behind both eyes as in HPI; No visio nchanges  ENMT:  No difficulty hearing; No sinus or throat pain  Neck: Pain and stiffness as in HPI  Respiratory: No cough or wheezing, chills  Cardiovascular: No chest pain or palpitations  Gastrointestinal: No abdominal pain, nausea, or vomiting  Genitourinary: No changes in urinary frequency  Neurological: As per HPI  Skin: No rashes or lesions   Endocrine: No heat or cold intolerance  Musculoskeletal: Severe back pain limiting leg movements  Psychiatric: Depressed mood present  Heme/Lymph: No easy bruising or bleeding    Vital Signs Last 24 Hrs  T(C): 36.4 (19 Dec 2018 07:55), Max: 36.6 (18 Dec 2018 16:26)  T(F): 97.5 (19 Dec 2018 07:55), Max: 97.9 (18 Dec 2018 16:26)  HR: 58 (19 Dec 2018 07:55) (54 - 83)  BP: 96/68 (19 Dec 2018 07:55) (85/60 - 103/56)  BP(mean): --  RR: 17 (19 Dec 2018 07:55) (16 - 18)  SpO2: 96% (19 Dec 2018 07:55) (96% - 100%)    General:  Appearance is consistent with chronologic age.  No abnormal facies.  HEENT: Mild proptosis present b/l  Respiratory: Diminished breath sounds b/l; No crackles  Cardiovascular: Irregularly irregular rhythm; No murmurs; Full b/l radial and pedal pulses    Neurologic Examination:  General: The patient is oriented to person, place, and date.  Naming and repetition intact.  No dysarthria or aphasia.  Cranial nerves: VFF x 4.  EOMI x 2 w/o nystagmus.  Pupils constricted at baseline, and do not further constrict in response to light.  No ptosis/weakness of eyelid closure.  Intact to light touch and pinprick at b/l V1-V3.  No facial asymmetry.  Hearing grossly intact to finger rub b/l.  Palate elevates midline.  Tongue protrudes midline.  Unable to test SCM and trapezius accurately due to severe neck pain.  Motor examination:   Normal bulk x 4, Normal tone in b/l UE, Diminished tone in b/l LE.  5/5 strength throughout RUE, At least 4/5 strength throughout LUE (limited by neck pain), At least 1/5 strength throughout b/l LE (limited by lower back pain).  No spasticity or rigidity x 4.  Reflexes:   2+ and symmetric at b/l biceps.  1+ and symmetric at b/l triceps, brachioradialis, patellae and Achilles.  Plantar response mute b/l.  Sensory examination:   Diminished to light touch and pinprick in LUE, both proximally and distally. Intact to light touch and pinprick in RUE and b/l LE.  Coordination:   No dysmetria with b/l finger-to-nose testing.  Gait: Narrow-based, Antalgic, requires walker to ambulate.  Lower extremity coordination, Specialized Gait, and Romberg testing unable to be completed because of severe lower back pain.      Labs:    CBC Full  -  ( 19 Dec 2018 06:01 )  WBC Count : 7.3 K/uL  Hemoglobin : 11.9 g/dL  Hematocrit : 37.7 %  Platelet Count - Automated : 262 K/uL  Mean Cell Volume : 93.4 fl  Mean Cell Hemoglobin : 29.4 pg  Mean Cell Hemoglobin Concentration : 31.5 gm/dL  Auto Neutrophil # : 3.0 K/uL  Auto Lymphocyte # : 3.5 K/uL  Auto Monocyte # : 0.5 K/uL  Auto Eosinophil # : 0.2 K/uL  Auto Basophil # : 0.0 K/uL  Auto Neutrophil % : 41.2 %  Auto Lymphocyte % : 48.3 %  Auto Monocyte % : 7.5 %  Auto Eosinophil % : 2.5 %  Auto Basophil % : 0.5 %    12-19    142  |  109<H>  |  13  ----------------------------<  92  4.3   |  28  |  1.22    Ca    8.3<L>      19 Dec 2018 06:01  Phos  2.8     12-19  Mg     2.2     12-19    TPro  7.3  /  Alb  3.5  /  TBili  0.4  /  DBili  x   /  AST  24  /  ALT  18  /  AlkPhos  120  12-18          PT/INR - ( 18 Dec 2018 18:29 )   PT: 11.0 sec;   INR: 0.99 ratio  PTT - ( 18 Dec 2018 18:29 )  PTT:25.3 sec      Neuroimaging:    CT BRAIN (12/18/2018): No acute intracranial abnormality.    CT CERVICAL SPINE (12/18/2018): Mild degenerative changes a C6-C7, Stenosis at C5-C6, with possible nerve root impingement.    Assessment:  47 y/o ambidextrous woman with status migrainosus and cervical pain in the setting of cervical spinal stenosis and degenerative disease.    Recommendations:    1. Status migrainosus:  - Treat patient with methylprednisolone 500mg IV BID x 3-5 days to help resolve current migraine.  - Hold prednisone as recommended by ortho consult while methylprednisolone is administered; resume once methylprednisolone course is complete; no need for taper due to short duration of high-dose steroids  - Maintain GI prophylaxis while steroids are administered    2. Chronic neuropathic pain  - Continue pregabalin 300mg PO BID - Already at a high dose, do not increase  - Recommend replicating patient's home pain medication regimen as best as possible (e.g., MS Contin at home)    3. No further imaging indicated; decision on surgical intervention as per ortho consult.    4. Consult PT/OT.      Please contact the Neurology consult service with any questions.    Horacio Hopkins MD  Neurology Attending  [facility]

## 2018-12-19 NOTE — ED ADULT NURSE REASSESSMENT NOTE - NS ED NURSE REASSESS COMMENT FT1
Dr Parish Cadena texted via [employer] for pt's vital signs and medications held for lower BP and HR.

## 2018-12-19 NOTE — PATIENT PROFILE ADULT - [id number]_GEN_[id number]_NUR
The procedure was performed independently Learning behavioral activities to cope with urges.  For example, distraction and changing routines

## 2018-12-19 NOTE — H&P ADULT - ATTENDING COMMENTS
48F, lives with  sister, from home, ambulates with walker,  w/ PMHx of cyclic vomiting syndrome,  Reflex sympathetic dystrophy s/p spinal stimulator, frequent falls, SLE (not on tx), Afib (not on AC due to frequent falls), HTN, Depression, Diverticulosis, and partial thyroidectomy on right side, who presents to the ED c/o severe neck pain x 1 week. Pain is sharp, constant, and radiates down to the lumbar spine. Patient recently diagnosed with C4-C5 stenosis w/ impingement, evaluated by ortho spine surgery and discharged home on steroids w/ neck collar. Patient reports falling at home after being discharged form hospital on December 7th, mentions she passed out in the kitchen in front of sister (after standing up), afterwards, neck pain has been unbearable, to the point pain meds are no longer working, and patient got concerned of overdosing, which prompted her to come to the ED. Patient reports left arm paresthesia and finger numbness, which was present from previous admission. Denies any chest pain, palpitations, SOB or any other complaint     pt seen in bed, a+o x3, nad, vitals stable except for episodes of hypotension, physical exam reveals no focal motor deficit, clear lungs, regular s1s2, abd soft nd, nt, bs+. labs and diagnostic test result reviewed.    assessment   --- syncope, r/o arythmia, r/o cns patho, cervical radiculopathy, h/o C4-C5 stenosis w/ impingement, cyclic vomiting syndrome,  Reflex sympathetic dystrophy s/p spinal stimulator, frequent falls, SLE (not on tx), Afib (not on AC due to frequent falls), HTN, Depression, Diverticulosis, partial right thyroidectomy    plan  --  adm to tele, aspirin, statin, pain control cont preadmit home meds, gi and dvt profilaxis  cbc, bmp, mg, phos, lipid, tsh, ce q8 x3    echo  carotid duplex    cardio cons  neuro cons.  pain mgmt 48F, lives with  sister, from home, ambulates with walker,  w/ PMHx of cyclic vomiting syndrome,  Reflex sympathetic dystrophy s/p spinal stimulator, frequent falls, SLE (not on tx), Afib (not on AC due to frequent falls), HTN, Depression, Diverticulosis, and partial thyroidectomy on right side, who presents to the ED c/o severe neck pain x 1 week. Pain is sharp, constant, and radiates down to the lumbar spine. Patient recently diagnosed with C4-C5 stenosis w/ impingement, evaluated by ortho spine surgery and discharged home on steroids w/ neck collar. Patient reports falling at home after being discharged form hospital on December 7th, mentions she passed out in the kitchen in front of sister (after standing up), afterwards, neck pain has been unbearable, to the point pain meds are no longer working, and patient got concerned of overdosing, which prompted her to come to the ED. Patient reports left arm paresthesia and finger numbness, which was present from previous admission. Denies any chest pain, palpitations, SOB or any other complaint     pt seen in bed, a+o x3, nad, vitals stable except for episodes of hypotension, physical exam reveals no focal motor deficit, clear lungs, regular s1s2, abd soft nd, nt, bs+. labs and diagnostic test result reviewed.    assessment   --- syncope, r/o arythmia, r/o cns patho, cervical radiculopathy, h/o C4-C5 stenosis w/ impingement, cyclic vomiting syndrome,  Reflex sympathetic dystrophy s/p spinal stimulator, frequent falls, SLE (not on tx), Afib (not on AC due to frequent falls), HTN, Depression, Diverticulosis, partial right thyroidectomy    plan  --  adm to tele, aspirin, statin, pain control cont preadmit home meds, gi and dvt profilaxis  cbc, bmp, mg, phos, lipid, tsh, ce q8 x3    orthoststic bp q shift  echo  carotid duplex    cardio cons  neuro cons.  pain mgmt

## 2018-12-19 NOTE — H&P ADULT - PROBLEM SELECTOR PLAN 6
IMPROVE VTE Individual Risk Assessment          RISK                                                          Points  [  ] Previous VTE                                                3  [  ] Thrombophilia                                             2  [ x ] Lower limb paralysis                                   2        (unable to hold up >15 seconds)    [  ] Current Cancer                                             2         (within 6 months)  [ x ] Immobilization > 24 hrs                              1  [  ] ICU/CCU stay > 24 hours                             1  [  ] Age > 60                                                         1    IMPROVE VTE Score: 3  HSQ for DVT chemoppx

## 2018-12-20 DIAGNOSIS — M79.2 NEURALGIA AND NEURITIS, UNSPECIFIED: ICD-10-CM

## 2018-12-20 DIAGNOSIS — G43.901 MIGRAINE, UNSPECIFIED, NOT INTRACTABLE, WITH STATUS MIGRAINOSUS: ICD-10-CM

## 2018-12-20 LAB
ALBUMIN SERPL ELPH-MCNC: 3.3 G/DL — LOW (ref 3.5–5)
ALP SERPL-CCNC: 139 U/L — HIGH (ref 40–120)
ALT FLD-CCNC: 22 U/L DA — SIGNIFICANT CHANGE UP (ref 10–60)
ANION GAP SERPL CALC-SCNC: 8 MMOL/L — SIGNIFICANT CHANGE UP (ref 5–17)
AST SERPL-CCNC: 25 U/L — SIGNIFICANT CHANGE UP (ref 10–40)
BILIRUB SERPL-MCNC: 0.4 MG/DL — SIGNIFICANT CHANGE UP (ref 0.2–1.2)
BUN SERPL-MCNC: 14 MG/DL — SIGNIFICANT CHANGE UP (ref 7–18)
CALCIUM SERPL-MCNC: 8.9 MG/DL — SIGNIFICANT CHANGE UP (ref 8.4–10.5)
CHLORIDE SERPL-SCNC: 105 MMOL/L — SIGNIFICANT CHANGE UP (ref 96–108)
CO2 SERPL-SCNC: 27 MMOL/L — SIGNIFICANT CHANGE UP (ref 22–31)
CORTIS AM PEAK SERPL-MCNC: 18.1 UG/DL — SIGNIFICANT CHANGE UP (ref 6–18.4)
CREAT SERPL-MCNC: 1.16 MG/DL — SIGNIFICANT CHANGE UP (ref 0.5–1.3)
GLUCOSE SERPL-MCNC: 129 MG/DL — HIGH (ref 70–99)
HCT VFR BLD CALC: 47.4 % — HIGH (ref 34.5–45)
HGB BLD-MCNC: 14.8 G/DL — SIGNIFICANT CHANGE UP (ref 11.5–15.5)
MCHC RBC-ENTMCNC: 29 PG — SIGNIFICANT CHANGE UP (ref 27–34)
MCHC RBC-ENTMCNC: 31.2 GM/DL — LOW (ref 32–36)
MCV RBC AUTO: 93.1 FL — SIGNIFICANT CHANGE UP (ref 80–100)
PCP SPEC-MCNC: SIGNIFICANT CHANGE UP
PLATELET # BLD AUTO: 300 K/UL — SIGNIFICANT CHANGE UP (ref 150–400)
POTASSIUM SERPL-MCNC: 4.2 MMOL/L — SIGNIFICANT CHANGE UP (ref 3.5–5.3)
POTASSIUM SERPL-SCNC: 4.2 MMOL/L — SIGNIFICANT CHANGE UP (ref 3.5–5.3)
PROT SERPL-MCNC: 7.8 G/DL — SIGNIFICANT CHANGE UP (ref 6–8.3)
RBC # BLD: 5.09 M/UL — SIGNIFICANT CHANGE UP (ref 3.8–5.2)
RBC # FLD: 13.1 % — SIGNIFICANT CHANGE UP (ref 10.3–14.5)
SODIUM SERPL-SCNC: 140 MMOL/L — SIGNIFICANT CHANGE UP (ref 135–145)
WBC # BLD: 10.4 K/UL — SIGNIFICANT CHANGE UP (ref 3.8–10.5)
WBC # FLD AUTO: 10.4 K/UL — SIGNIFICANT CHANGE UP (ref 3.8–10.5)

## 2018-12-20 PROCEDURE — 99231 SBSQ HOSP IP/OBS SF/LOW 25: CPT

## 2018-12-20 PROCEDURE — 93306 TTE W/DOPPLER COMPLETE: CPT | Mod: 26

## 2018-12-20 RX ORDER — OXYCODONE AND ACETAMINOPHEN 5; 325 MG/1; MG/1
1 TABLET ORAL ONCE
Qty: 0 | Refills: 0 | Status: DISCONTINUED | OUTPATIENT
Start: 2018-12-20 | End: 2018-12-20

## 2018-12-20 RX ORDER — INSULIN LISPRO 100/ML
VIAL (ML) SUBCUTANEOUS
Qty: 0 | Refills: 0 | Status: DISCONTINUED | OUTPATIENT
Start: 2018-12-20 | End: 2018-12-24

## 2018-12-20 RX ORDER — MORPHINE SULFATE 50 MG/1
2 CAPSULE, EXTENDED RELEASE ORAL EVERY 4 HOURS
Qty: 0 | Refills: 0 | Status: DISCONTINUED | OUTPATIENT
Start: 2018-12-20 | End: 2018-12-21

## 2018-12-20 RX ORDER — OXYCODONE AND ACETAMINOPHEN 5; 325 MG/1; MG/1
2 TABLET ORAL ONCE
Qty: 0 | Refills: 0 | Status: DISCONTINUED | OUTPATIENT
Start: 2018-12-20 | End: 2018-12-20

## 2018-12-20 RX ORDER — MORPHINE SULFATE 50 MG/1
2 CAPSULE, EXTENDED RELEASE ORAL ONCE
Qty: 0 | Refills: 0 | Status: DISCONTINUED | OUTPATIENT
Start: 2018-12-20 | End: 2018-12-20

## 2018-12-20 RX ADMIN — PANTOPRAZOLE SODIUM 40 MILLIGRAM(S): 20 TABLET, DELAYED RELEASE ORAL at 06:36

## 2018-12-20 RX ADMIN — Medication 1 PATCH: at 23:59

## 2018-12-20 RX ADMIN — CYCLOBENZAPRINE HYDROCHLORIDE 5 MILLIGRAM(S): 10 TABLET, FILM COATED ORAL at 06:36

## 2018-12-20 RX ADMIN — MORPHINE SULFATE 2 MILLIGRAM(S): 50 CAPSULE, EXTENDED RELEASE ORAL at 17:35

## 2018-12-20 RX ADMIN — MORPHINE SULFATE 4 MILLIGRAM(S): 50 CAPSULE, EXTENDED RELEASE ORAL at 00:36

## 2018-12-20 RX ADMIN — Medication 1 PATCH: at 07:34

## 2018-12-20 RX ADMIN — OXYCODONE AND ACETAMINOPHEN 1 TABLET(S): 5; 325 TABLET ORAL at 17:31

## 2018-12-20 RX ADMIN — CYCLOBENZAPRINE HYDROCHLORIDE 5 MILLIGRAM(S): 10 TABLET, FILM COATED ORAL at 21:36

## 2018-12-20 RX ADMIN — MORPHINE SULFATE 2 MILLIGRAM(S): 50 CAPSULE, EXTENDED RELEASE ORAL at 23:58

## 2018-12-20 RX ADMIN — MORPHINE SULFATE 4 MILLIGRAM(S): 50 CAPSULE, EXTENDED RELEASE ORAL at 01:20

## 2018-12-20 RX ADMIN — MORPHINE SULFATE 2 MILLIGRAM(S): 50 CAPSULE, EXTENDED RELEASE ORAL at 14:54

## 2018-12-20 RX ADMIN — ESCITALOPRAM OXALATE 20 MILLIGRAM(S): 10 TABLET, FILM COATED ORAL at 12:47

## 2018-12-20 RX ADMIN — MORPHINE SULFATE 2 MILLIGRAM(S): 50 CAPSULE, EXTENDED RELEASE ORAL at 21:36

## 2018-12-20 RX ADMIN — MORPHINE SULFATE 4 MILLIGRAM(S): 50 CAPSULE, EXTENDED RELEASE ORAL at 12:06

## 2018-12-20 RX ADMIN — CYCLOBENZAPRINE HYDROCHLORIDE 5 MILLIGRAM(S): 10 TABLET, FILM COATED ORAL at 13:03

## 2018-12-20 RX ADMIN — Medication 1 PATCH: at 12:48

## 2018-12-20 RX ADMIN — Medication 200 MILLIGRAM(S): at 06:37

## 2018-12-20 RX ADMIN — MORPHINE SULFATE 2 MILLIGRAM(S): 50 CAPSULE, EXTENDED RELEASE ORAL at 13:48

## 2018-12-20 RX ADMIN — MORPHINE SULFATE 2 MILLIGRAM(S): 50 CAPSULE, EXTENDED RELEASE ORAL at 23:00

## 2018-12-20 RX ADMIN — Medication 200 MILLIGRAM(S): at 17:34

## 2018-12-20 RX ADMIN — OXYCODONE AND ACETAMINOPHEN 2 TABLET(S): 5; 325 TABLET ORAL at 04:27

## 2018-12-20 RX ADMIN — MORPHINE SULFATE 2 MILLIGRAM(S): 50 CAPSULE, EXTENDED RELEASE ORAL at 19:00

## 2018-12-20 RX ADMIN — OXYCODONE AND ACETAMINOPHEN 2 TABLET(S): 5; 325 TABLET ORAL at 03:19

## 2018-12-20 RX ADMIN — Medication 25 MILLIGRAM(S): at 06:37

## 2018-12-20 RX ADMIN — Medication 300 MILLIGRAM(S): at 17:35

## 2018-12-20 RX ADMIN — Medication 1 PATCH: at 12:47

## 2018-12-20 RX ADMIN — Medication 25 MILLIGRAM(S): at 17:34

## 2018-12-20 RX ADMIN — MORPHINE SULFATE 4 MILLIGRAM(S): 50 CAPSULE, EXTENDED RELEASE ORAL at 10:51

## 2018-12-20 RX ADMIN — CLOPIDOGREL BISULFATE 75 MILLIGRAM(S): 75 TABLET, FILM COATED ORAL at 12:47

## 2018-12-20 RX ADMIN — OXYCODONE AND ACETAMINOPHEN 1 TABLET(S): 5; 325 TABLET ORAL at 16:07

## 2018-12-20 RX ADMIN — Medication 300 MILLIGRAM(S): at 06:37

## 2018-12-20 NOTE — PROGRESS NOTE ADULT - PROBLEM SELECTOR PLAN 1
CT spine negative for fracture  ortho consulted for spinal stenosis  pain control prn CT spine negative for fracture  ortho consulted for spinal stenosis  pain control prn  neuro - Dr. Hopkins

## 2018-12-20 NOTE — PROGRESS NOTE ADULT - SUBJECTIVE AND OBJECTIVE BOX
PGY1 Note discussed with supervising resident and primary attending.    Patient is a 48y old  Female who presents with a chief complaint of neck pain (20 Dec 2018 09:52)      INTERVAL HPI/OVERNIGHT EVENTS:    Ms. Barahona is seen at the bedside. No new complaints.    MEDICATIONS  (STANDING):  aspirin  chewable 81 milliGRAM(s) Oral daily  atorvastatin 40 milliGRAM(s) Oral at bedtime  clopidogrel Tablet 75 milliGRAM(s) Oral daily  cyclobenzaprine 5 milliGRAM(s) Oral every 8 hours  escitalopram 20 milliGRAM(s) Oral daily  heparin  Injectable 5000 Unit(s) SubCutaneous every 12 hours  methylPREDNISolone sodium succinate IVPB 500 milliGRAM(s) IV Intermittent every 12 hours  metoprolol tartrate 25 milliGRAM(s) Oral two times a day  nicotine -  14 mG/24Hr(s) Patch 1 patch Transdermal daily  pantoprazole    Tablet 40 milliGRAM(s) Oral before breakfast  pregabalin 300 milliGRAM(s) Oral every 12 hours    MEDICATIONS  (PRN):  morphine  - Injectable 4 milliGRAM(s) IV Push every 6 hours PRN Severe Pain (7 - 10)  ondansetron Injectable 4 milliGRAM(s) IV Push every 4 hours PRN Nausea and/or Vomiting      Allergies    aspirin (Unknown)  cortisone (Other)  latex (Unknown)  sulfa drugs (Unknown)    Intolerances      REVIEW OF SYSTEMS:  CONSTITUTIONAL: No fever, weight loss, or fatigue; + neck pain  RESPIRATORY: No cough, wheezing, chills or hemoptysis; No shortness of breath  CARDIOVASCULAR: No chest pain, palpitations, dizziness, or leg swelling  GASTROINTESTINAL: No abdominal or epigastric pain. No nausea, vomiting, or hematemesis; No diarrhea or constipation. No melena or hematochezia.  NEUROLOGICAL: No headaches, memory loss, loss of strength, numbness, or tremors  SKIN: No itching, burning, rashes, or lesions     Vital Signs Last 24 Hrs  T(C): 36.8 (20 Dec 2018 07:49), Max: 36.8 (19 Dec 2018 20:24)  T(F): 98.3 (20 Dec 2018 07:49), Max: 98.3 (19 Dec 2018 20:24)  HR: 61 (20 Dec 2018 06:21) (51 - 65)  BP: 125/81 (20 Dec 2018 06:21) (116/81 - 138/89)  BP(mean): --  RR: 18 (20 Dec 2018 07:49) (17 - 18)  SpO2: 96% (20 Dec 2018 07:49) (96% - 97%)    PHYSICAL EXAM:  GENERAL: NAD  HEAD:  Atraumatic, Normocephalic  EYES: EOMI, PERRLA, conjunctiva and sclera clear  NECK: Supple, No JVD, Normal thyroid  CHEST/LUNG: Clear to percussion bilaterally; No rales, rhonchi, wheezing, or rubs  HEART: Regular rate and rhythm; No murmurs, rubs, or gallops  ABDOMEN: Soft, Nontender, Nondistended; Bowel sounds present  NERVOUS SYSTEM:  Alert & Oriented X3, Good concentration; Motor Strength 5/5 B/L   EXTREMITIES:  2+ Peripheral Pulses, No clubbing, cyanosis, or edema    LABS:                        14.8   10.4  )-----------( 300      ( 20 Dec 2018 06:21 )             47.4     12-20    140  |  105  |  14  ----------------------------<  129<H>  4.2   |  27  |  1.16    Ca    8.9      20 Dec 2018 06:21  Phos  2.8     12-19  Mg     2.2     12-19    TPro  7.8  /  Alb  3.3<L>  /  TBili  0.4  /  DBili  x   /  AST  25  /  ALT  22  /  AlkPhos  139<H>  12-20    PT/INR - ( 18 Dec 2018 18:29 )   PT: 11.0 sec;   INR: 0.99 ratio         PTT - ( 18 Dec 2018 18:29 )  PTT:25.3 sec    CAPILLARY BLOOD GLUCOSE      RADIOLOGY & ADDITIONAL TESTS:    Imaging Personally Reviewed:  [X] YES  [ ] NO    Consultant(s) Notes Reviewed:  [X] YES  [ ] NO

## 2018-12-20 NOTE — PROGRESS NOTE ADULT - SUBJECTIVE AND OBJECTIVE BOX
Patient is a 48y old  Female who presents with a chief complaint of neck pain (20 Dec 2018 09:09)    pt seen in tele [x  ], reg med floor [   ], bed [x  ], chair at bedside [   ], a+o x3 [ x ], lethargic [  ],  nad [x  ]        Allergies    aspirin (Unknown)  cortisone (Other)  latex (Unknown)  sulfa drugs (Unknown)        Vitals    T(F): 98.3 (12-20-18 @ 07:49), Max: 98.3 (12-19-18 @ 20:24)  HR: 61 (12-20-18 @ 06:21) (51 - 65)  BP: 125/81 (12-20-18 @ 06:21) (116/81 - 138/89)  RR: 18 (12-20-18 @ 07:49) (17 - 18)  SpO2: 96% (12-20-18 @ 07:49) (96% - 97%)  Wt(kg): --  CAPILLARY BLOOD GLUCOSE      POCT Blood Glucose.: 151 mg/dL (18 Dec 2018 21:21)      Labs                          14.8   10.4  )-----------( 300      ( 20 Dec 2018 06:21 )             47.4       12-20    140  |  105  |  14  ----------------------------<  129<H>  4.2   |  27  |  1.16    Ca    8.9      20 Dec 2018 06:21  Phos  2.8     12-19  Mg     2.2     12-19    TPro  7.8  /  Alb  3.3<L>  /  TBili  0.4  /  DBili  x   /  AST  25  /  ALT  22  /  AlkPhos  139<H>  12-20        CARDIAC MARKERS ( 19 Dec 2018 12:21 )  <0.015 ng/mL / x     / 136 U/L / x     / <1.0 ng/mL      < from: Transthoracic Echocardiogram (05.06.18 @ 07:18) >  CONCLUSIONS:  1. Endocardium not well visualized; grossly normal left  ventricular systolic function.  2. Grade II diastolic dysfunction.  3. RV systolic pressure is normal at  33 mm Hg.    < end of copied text >        Radiology Results      Meds    MEDICATIONS  (STANDING):  aspirin  chewable 81 milliGRAM(s) Oral daily  atorvastatin 40 milliGRAM(s) Oral at bedtime  clopidogrel Tablet 75 milliGRAM(s) Oral daily  cyclobenzaprine 5 milliGRAM(s) Oral every 8 hours  escitalopram 20 milliGRAM(s) Oral daily  heparin  Injectable 5000 Unit(s) SubCutaneous every 12 hours  methylPREDNISolone sodium succinate IVPB 500 milliGRAM(s) IV Intermittent every 12 hours  metoprolol tartrate 25 milliGRAM(s) Oral two times a day  nicotine -  14 mG/24Hr(s) Patch 1 patch Transdermal daily  pantoprazole    Tablet 40 milliGRAM(s) Oral before breakfast  pregabalin 300 milliGRAM(s) Oral every 12 hours      MEDICATIONS  (PRN):  morphine  - Injectable 4 milliGRAM(s) IV Push every 6 hours PRN Severe Pain (7 - 10)  ondansetron Injectable 4 milliGRAM(s) IV Push every 4 hours PRN Nausea and/or Vomiting      Physical Exam    Neuro :  no focal deficits  Respiratory: CTA B/L  CV: RRR, S1S2, no murmurs,   Abdominal: Soft, NT, ND +BS,  Extremities: No edema, + peripheral pulses    ASSESSMENT      syncope,   r/o arythmia,   r/o cns patho,   cervical radiculopathy,   h/o C4-C5 stenosis w/ impingement,   cyclic vomiting syndrome,    Reflex sympathetic dystrophy s/p spinal stimulator,   frequent falls,   SLE (not on tx),   Afib (not on AC due to frequent falls),   HTN,   Depression,   Diverticulosis,   partial right thyroidectomy  Renal failure, chronic, stage 3 (moderate)  s/p partial hysterectomy      PLAN      cont tele,   cont aspirin, statin,  cardio cons  f/u stress test  echo 5/16/18 with grossly normal left ventricular systolic function.  Grade II diastolic dysfunction noted above.  f/u am cortisol  cont pain control  ce q8 x 1 neg noted above  orthoststic bp neg  neuro cons noted  pt with Status migrainosus:  Treat patient with methylprednisolone 500mg IV BID x 3-5 days to help resolve current migraine.  Hold prednisone as recommended by ortho consult while methylprednisolone is administered;   resume prednisone once methylprednisolone course is complete; no need for taper due to short duration of high-dose steroids  for Chronic neuropathic pain  Continue pregabalin 300mg PO BID - Already at a high dose, do not increase  No further imaging indicated;   decision on surgical intervention as per ortho consult  ortho spine cons  pain mgmt eval  cont current meds

## 2018-12-20 NOTE — PROGRESS NOTE ADULT - PROBLEM SELECTOR PLAN 2
orthostatics negative  carotid doppler negative  echo pending  NST report pending  cardio - Dr. Benton orthostatics technically positive now by change in diastolic bp  carotid doppler negative  echo pending  NST report pending  cardio - Dr. Benton

## 2018-12-20 NOTE — PROGRESS NOTE ADULT - PROBLEM SELECTOR PLAN 2
Surgical decision differed to Ortho regarding sx intervention for neck pain and removal of spinal stimulator  Should increase one dose of Pregabalin to 10mg timed for time of maximum pain.  Continue remaining doses of Pregabalin 5mg. (pt's receiving 3 dosed of Pregabalin change one to 10mg & the other two continue @ 5mg). Surgical decision deferred to Ortho regarding sx intervention for neck pain and removal of spinal stimulator  Should increase one dose of Pregabalin to 10mg timed for time of maximum pain.  Continue remaining doses of Pregabalin 5mg. (pt's receiving 3 dosed of Pregabalin change one to 10mg & the other two continue @ 5mg). Surgical decision deferred to Ortho regarding sx intervention for neck pain and removal of spinal stimulator  Should increase one dose of Pregabalin to 10mg timed for time of maximum pain.  Continue remaining doses of Pregabalin 5mg. (As q8h pt's receiving 3 doses of Pregabalin change one to 10mg & the other two continue @ 5mg). Surgical decision deferred to Ortho regarding sx intervention for neck pain and removal of spinal stimulator  May increase one dose of cyclobenzaprine to 10mg timed for time of maximum pain.  May continue remaining doses of cyclobenzaprine 5mg. (As q8h pt's receiving 3 doses of cyclobenzaprine change one to 10mg & the other two continue @ 5mg).

## 2018-12-20 NOTE — PROGRESS NOTE ADULT - ASSESSMENT
48F, lives with  sister, from home, ambulates with walker,  w/ PMHx of cyclic vomiting syndrome,  Reflex sympathetic dystrophy s/p spinal stimulator, frequent falls, SLE (not on tx), Afib (not on AC due to frequent falls), HTN, Depression, Diverticulosis, and partial thyroidectomy on right side, who presents to the ED c/o severe neck pain x 1 week. Pain is sharp, constant, and radiates down to the lumbar spine. Patient recently diagnosed with C4-C5 stenosis w/ impingement, evaluated by ortho spine surgery and discharged home on steroids w/ neck collar. Patient reports falling at home after being discharged form hospital on December 7th, mentions she passed out in the kitchen in front of sister (after standing up), afterwards, neck pain has been unbearable, to the point pain meds are no longer working, and patient got concerned of overdosing, which prompted her to come to the ED. Patient reports left arm paresthesia and finger numbness, which was present from previous admission. Denies any chest pain, palpitations, SOB or any other complaint. Patient was told to follow up with surgeon upon discharge, however, she's unable to afford a taxi to go to [place]. Currently has no transportation.     Ortho consulted.

## 2018-12-20 NOTE — PROGRESS NOTE ADULT - ASSESSMENT
48 yr old Female, lives with  sister, from home, ambulates with walker,  w/ PMHx of cyclic vomiting syndrome,  Reflex sympathetic dystrophy s/p spinal stimulator, frequent falls, SLE (not on tx), Parox Afib (not on AC due to frequent falls), HTN, Depression, Diverticulosis, and partial thyroidectomy on right side, who presents to the ED c/o severe neck pain x 1 week and s/p syncope.  1.Tele monitoring.  2.Orthostatic-.  3.Parox Afib-fall/bleed risk on asa,plavix.  4.Neurology eval.  5.Lipid d/o-statin.  6.Stress test-r/o ischemia.  7.Check am cortisol.  8.GI and DVT prophylaxis.

## 2018-12-20 NOTE — PROGRESS NOTE ADULT - PROBLEM SELECTOR PLAN 1
Continue to treat w/ Methylprednisolone 500mg IV BID x 5days, today D2/5.    Continue GI ppx Continue to treat w/ Methylprednisolone 500mg IV BID x 5days, today D2/5.    Continue GI ppx  Consider medication for insomnia, which can be exacerbated by methylprednisolone use

## 2018-12-20 NOTE — PROGRESS NOTE ADULT - SUBJECTIVE AND OBJECTIVE BOX
CHIEF COMPLAINT:Patient is a 48y old  Female who presents with a chief complaint of neck pain (19 Dec 2018 13:06)    	  REVIEW OF SYSTEMS:  CONSTITUTIONAL: No fever, weight loss, or fatigue  EYES: No eye pain, visual disturbances, or discharge  ENT:  No difficulty hearing, tinnitus, vertigo; No sinus or throat pain  NECK: No pain or stiffness  RESPIRATORY: No cough, wheezing, chills or hemoptysis; No Shortness of Breath  CARDIOVASCULAR: No chest pain, palpitations, passing out, dizziness, or leg swelling  GASTROINTESTINAL: No abdominal or epigastric pain. No nausea, vomiting, or hematemesis; No diarrhea or constipation. No melena or hematochezia.  GENITOURINARY: No dysuria, frequency, hematuria, or incontinence  NEUROLOGICAL: No headaches, memory loss, loss of strength, numbness, or tremors  SKIN: No itching, burning, rashes, or lesions   LYMPH Nodes: No enlarged glands  ENDOCRINE: No heat or cold intolerance; No hair loss  MUSCULOSKELETAL: No joint pain or swelling; No muscle, back, or extremity pain  PSYCHIATRIC: No depression, anxiety, mood swings, or difficulty sleeping  HEME/LYMPH: No easy bruising, or bleeding gums  ALLERGY AND IMMUNOLOGIC: No hives or eczema	    [ ] All others negative	  [ ] Unable to obtain    PHYSICAL EXAM:  T(C): 36.8 (12-20-18 @ 07:49), Max: 36.8 (12-19-18 @ 20:24)  HR: 61 (12-20-18 @ 06:21) (51 - 65)  BP: 125/81 (12-20-18 @ 06:21) (116/81 - 138/89)  RR: 18 (12-20-18 @ 07:49) (17 - 18)  SpO2: 96% (12-20-18 @ 07:49) (96% - 97%)  Wt(kg): --  I&O's Summary    19 Dec 2018 07:01  -  20 Dec 2018 07:00  --------------------------------------------------------  IN: 300 mL / OUT: 0 mL / NET: 300 mL        Appearance: Normal	  HEENT:   Normal oral mucosa, PERRL, EOMI	  Lymphatic: No lymphadenopathy  Cardiovascular: Normal S1 S2, No JVD, No murmurs, No edema  Respiratory: Lungs clear to auscultation	  Psychiatry: A & O x 3, Mood & affect appropriate  Gastrointestinal:  Soft, Non-tender, + BS	  Skin: No rashes, No ecchymoses, No cyanosis	  Neurologic: Non-focal  Extremities: Normal range of motion, No clubbing, cyanosis or edema  Vascular: Peripheral pulses palpable 2+ bilaterally    MEDICATIONS  (STANDING):  aspirin  chewable 81 milliGRAM(s) Oral daily  atorvastatin 40 milliGRAM(s) Oral at bedtime  clopidogrel Tablet 75 milliGRAM(s) Oral daily  cyclobenzaprine 5 milliGRAM(s) Oral every 8 hours  escitalopram 20 milliGRAM(s) Oral daily  heparin  Injectable 5000 Unit(s) SubCutaneous every 12 hours  methylPREDNISolone sodium succinate IVPB 500 milliGRAM(s) IV Intermittent every 12 hours  metoprolol tartrate 25 milliGRAM(s) Oral two times a day  nicotine -  14 mG/24Hr(s) Patch 1 patch Transdermal daily  pantoprazole    Tablet 40 milliGRAM(s) Oral before breakfast  pregabalin 300 milliGRAM(s) Oral every 12 hours      LABS:	 	    CARDIAC MARKERS:  CARDIAC MARKERS ( 19 Dec 2018 12:21 )  <0.015 ng/mL / x     / 136 U/L / x     / <1.0 ng/mL                         14.8   10.4  )-----------( 300      ( 20 Dec 2018 06:21 )             47.4     12-20    140  |  105  |  14  ----------------------------<  129<H>  4.2   |  27  |  1.16    Ca    8.9      20 Dec 2018 06:21  Phos  2.8     12-19  Mg     2.2     12-19    TPro  7.8  /  Alb  3.3<L>  /  TBili  0.4  /  DBili  x   /  AST  25  /  ALT  22  /  AlkPhos  139<H>  12-20    proBNP:   Lipid Profile: Cholesterol 158  LDL 93  HDL 48  TG 83    HgA1c: Hemoglobin A1C, Whole Blood: 5.6 % (12-05 @ 13:36)    TSH: Thyroid Stimulating Hormone, Serum: 0.57 uU/mL (12-05 @ 10:43)

## 2018-12-20 NOTE — PROGRESS NOTE ADULT - SUBJECTIVE AND OBJECTIVE BOX
Neurology Follow up note    Interval History -Pt has insomnia and has not slept overnight      Subjective:  Reports dull, throbbing HA from back of head to top of head (occipital to parietal), rated 8/10.  Denies photophobia.  Continued neck pain, rated 8/10.  Reports dizziness when sitting or standing up.  Denies lightheadedness.  Ambulating at baseline w/ rollator.        MEDICATIONS  (STANDING):  atorvastatin 40 milliGRAM(s) Oral at bedtime  clopidogrel Tablet 75 milliGRAM(s) Oral daily  cyclobenzaprine 5 milliGRAM(s) Oral every 8 hours  escitalopram 20 milliGRAM(s) Oral daily  heparin  Injectable 5000 Unit(s) SubCutaneous every 12 hours  methylPREDNISolone sodium succinate IVPB 500 milliGRAM(s) IV Intermittent every 12 hours  metoprolol tartrate 25 milliGRAM(s) Oral two times a day  nicotine -  14 mG/24Hr(s) Patch 1 patch Transdermal daily  oxyCODONE    5 mG/acetaminophen 325 mG 1 Tablet(s) Oral once  pantoprazole    Tablet 40 milliGRAM(s) Oral before breakfast  pregabalin 300 milliGRAM(s) Oral every 12 hours    MEDICATIONS  (PRN):  morphine  - Injectable 2 milliGRAM(s) IV Push every 4 hours PRN Severe Pain (7 - 10)  ondansetron Injectable 4 milliGRAM(s) IV Push every 4 hours PRN Nausea and/or Vomiting      Allergies    aspirin (Unknown)  cortisone (Other)  latex (Unknown)  sulfa drugs (Unknown)    Intolerances        Review of Systems:  General: [X ] None, [ ] chills, [ ]fatigue, [ ] fevers  Skin: [X ] None, [ ] rash   HEENT: [X ] None, [ ] head injury, [ ] blurred vision, [ ] double vision, [ ] eye pain, [ ] visual loss, [ ] hearing loss, [ ] deafness, [ ] ear pain, [ ] ringing in the ears, [ ] vertigo, [ ] sinus pain, [ ] voice changes  Neck: [] None, [ ] neck stiffness, [X] Neck pain  Respiratory: [X] None, [ ] cough, [ ] difficulty breathing  Cardiovascular: [X ] None, [ ] calf cramps, [ ] chest pain, [ ] leg pain, [ ] swelling, [ ] rapid heart rate, [ ] shortness of breath  Gastrointestinal: [X ] None, [ ] abdominal pain, [ ] nausea, [ ] vomiting  Musculoskeletal: [ ] None, [X ] back pain, [X ] joint pain, [ ] joint stiffness, [ ] leg cramps, [ ] muscle atrophy, [ ] muscle cramps, [ ] muscle weakness, [ ] swelling of extremities  Neurological: [ ] None, [ ] Dizziness, [ ] decreased memory, [ ] fainting, [ ] focal neurological symptoms, [X ] headaches, [ ] incontinence of stool, [ ] incontinence of urine, [ ] loss of consciousness, [ ] numbness, [ ] seizures, [ ] spinning sensation, [ ] stroke, [ ] trouble walking, [ ] unsteadiness, [ ] visual changes, [ ] weakness  Psychiatric: [X ] None,  [ ] depression, [ ] anxiety, [ ] hallucinations, [ ] inability to concentrate, [ ] mood changes, [ ] panic attacks  Hematology: [X ] None,  [ ] blood clots, [ ] spontaneous bleeding      Objective:   Vital Signs Last 24 Hrs  T(C): 36.4 (20 Dec 2018 11:12), Max: 36.8 (19 Dec 2018 20:24)  HR: 77 (20 Dec 2018 11:12) (51 - 77)  BP: 130/89 (20 Dec 2018 11:12) (116/81 - 138/89)  RR: 16 (20 Dec 2018 11:12) (16 - 18)  SpO2: 99% (20 Dec 2018 11:12) (96% - 99%)    General Exam:   General appearance: No acute distress                 Cardiovascular: Pedal dorsalis pulses intact bilaterally    Neurological Exam:  Mental Status: Orientated to self, date and place.  Attention intact.  No dysarthria, aphasia or neglect.    Cranial Nerves: CN I - not tested.  PERRL, EOMI, VFF. Right eye disconjugate movement to right but no nystagmus.  CN V1-3 intact to light touch and pinprick.  No facial asymmetry.  Hearing intact to finger rub bilaterally.  Tongue, uvula and palate midline.  Sternocleidomastoid unable to check d/t pain and cervical collar.  Trapezius R>L, R 5/5, L 3/5.  Motor:  Normal bulk.  Tone normal in b/l UE.  Tone decreased in b/l LE.  Strength RUE 5/5, LUE 3/5 w/ pain.  B/l LE 1/5.  Tremor: No resting, postural or action tremor.  No myoclonus.  Sensation: LUE diminished to light touch and pinprick.  RUE & b/l LE sensation intact to light touch and pinprick.  Deep Tendon Reflexes: 2+ bilateral biceps.  1+  triceps, brachioradialis, knee and ankle  Toes flexor bilaterally  Gait: normal and stable.      Other:    12-20    140  |  105  |  14  ----------------------------<  129<H>  4.2   |  27  |  1.16    Ca    8.9      20 Dec 2018 06:21  Phos  2.8     12-19  Mg     2.2     12-19    TPro  7.8  /  Alb  3.3<L>  /  TBili  0.4  /  DBili  x   /  AST  25  /  ALT  22  /  AlkPhos  139<H>  12-20 12-20    140  |  105  |  14  ----------------------------<  129<H>  4.2   |  27  |  1.16    Ca    8.9      20 Dec 2018 06:21  Phos  2.8     12-19  Mg     2.2     12-19    TPro  7.8  /  Alb  3.3<L>  /  TBili  0.4  /  DBili  x   /  AST  25  /  ALT  22  /  AlkPhos  139<H>  12-20    LIVER FUNCTIONS - ( 20 Dec 2018 06:21 )  Alb: 3.3 g/dL / Pro: 7.8 g/dL / ALK PHOS: 139 U/L / ALT: 22 U/L DA / AST: 25 U/L / GGT: x Neurology Follow up note    Interval History -Pt has insomnia and has not slept overnight      Subjective:  Reports dull, throbbing HA from back of head to top of head (occipital to parietal), rated 8/10.  Denies photophobia.  Continued neck pain, rated 8/10.  Reports dizziness when sitting or standing up.  Denies lightheadedness.  Ambulating at baseline w/ rollator.        MEDICATIONS  (STANDING):  atorvastatin 40 milliGRAM(s) Oral at bedtime  clopidogrel Tablet 75 milliGRAM(s) Oral daily  cyclobenzaprine 5 milliGRAM(s) Oral every 8 hours  escitalopram 20 milliGRAM(s) Oral daily  heparin  Injectable 5000 Unit(s) SubCutaneous every 12 hours  methylPREDNISolone sodium succinate IVPB 500 milliGRAM(s) IV Intermittent every 12 hours  metoprolol tartrate 25 milliGRAM(s) Oral two times a day  nicotine -  14 mG/24Hr(s) Patch 1 patch Transdermal daily  oxyCODONE    5 mG/acetaminophen 325 mG 1 Tablet(s) Oral once  pantoprazole    Tablet 40 milliGRAM(s) Oral before breakfast  pregabalin 300 milliGRAM(s) Oral every 12 hours    MEDICATIONS  (PRN):  morphine  - Injectable 2 milliGRAM(s) IV Push every 4 hours PRN Severe Pain (7 - 10)  ondansetron Injectable 4 milliGRAM(s) IV Push every 4 hours PRN Nausea and/or Vomiting      Allergies    aspirin (Unknown)  cortisone (Other)  latex (Unknown)  sulfa drugs (Unknown)      Review of Systems:  General: [X ] None, [ ] chills, [ ]fatigue, [ ] fevers  Skin: [X ] None, [ ] rash   HEENT: [X ] None, [ ] head injury, [ ] blurred vision, [ ] double vision, [ ] eye pain, [ ] visual loss, [ ] hearing loss, [ ] deafness, [ ] ear pain, [ ] ringing in the ears, [ ] vertigo, [ ] sinus pain, [ ] voice changes  Neck: [] None, [ ] neck stiffness, [X] Neck pain  Respiratory: [X] None, [ ] cough, [ ] difficulty breathing  Cardiovascular: [X ] None, [ ] calf cramps, [ ] chest pain, [ ] leg pain, [ ] swelling, [ ] rapid heart rate, [ ] shortness of breath  Gastrointestinal: [X ] None, [ ] abdominal pain, [ ] nausea, [ ] vomiting  Musculoskeletal: [ ] None, [X ] back pain, [X ] joint pain, [ ] joint stiffness, [ ] leg cramps, [ ] muscle atrophy, [ ] muscle cramps, [ ] muscle weakness, [ ] swelling of extremities  Neurological: [ ] None, [ ] Dizziness, [ ] decreased memory, [ ] fainting, [ ] focal neurological symptoms, [X ] headaches, [ ] incontinence of stool, [ ] incontinence of urine, [ ] loss of consciousness, [ ] numbness, [ ] seizures, [ ] spinning sensation, [ ] stroke, [ ] trouble walking, [ ] unsteadiness, [ ] visual changes, [ ] weakness  Psychiatric: [X ] None,  [ ] depression, [ ] anxiety, [ ] hallucinations, [ ] inability to concentrate, [ ] mood changes, [ ] panic attacks  Hematology: [X ] None,  [ ] blood clots, [ ] spontaneous bleeding      Objective:   Vital Signs Last 24 Hrs  T(C): 36.4 (20 Dec 2018 11:12), Max: 36.8 (19 Dec 2018 20:24)  HR: 77 (20 Dec 2018 11:12) (51 - 77)  BP: 130/89 (20 Dec 2018 11:12) (116/81 - 138/89)  RR: 16 (20 Dec 2018 11:12) (16 - 18)  SpO2: 99% (20 Dec 2018 11:12) (96% - 99%)    General Exam:   General appearance: No acute distress                 Cardiovascular: Pedal dorsalis pulses intact bilaterally    Neurological Exam:  Mental Status: Orientated to self, date and place.  Attention intact.  No dysarthria, aphasia or neglect.    Cranial Nerves: CN I - not tested.  PERRL, EOMI, VFF. Right eye disconjugate movement to right but no nystagmus.  CN V1-3 intact to light touch and pinprick.  No facial asymmetry.  Hearing intact to finger rub bilaterally.  Tongue, uvula and palate midline.  Sternocleidomastoid unable to check d/t pain and cervical collar.  Trapezius R>L, R 5/5, L 3/5.  Motor:  Normal bulk.  Tone normal in b/l UE.  Tone decreased in b/l LE.  Strength RUE 5/5, LUE 3/5 w/ pain.  B/l LE 1/5.  Tremor: No resting, postural or action tremor.  No myoclonus.  Sensation: LUE diminished to light touch and pinprick.  RUE & b/l LE sensation intact to light touch and pinprick.  Deep Tendon Reflexes: 2+ bilateral biceps.  1+  triceps, brachioradialis, knee and ankle  Toes flexor bilaterally  Gait: not able to be assessed    Labs:    12-20    140  |  105  |  14  ----------------------------<  129<H>  4.2   |  27  |  1.16    Ca    8.9      20 Dec 2018 06:21  Phos  2.8     12-19  Mg     2.2     12-19    TPro  7.8  /  Alb  3.3<L>  /  TBili  0.4  /  DBili  x   /  AST  25  /  ALT  22  /  AlkPhos  139<H>  12-20

## 2018-12-21 LAB
ANION GAP SERPL CALC-SCNC: 8 MMOL/L — SIGNIFICANT CHANGE UP (ref 5–17)
BUN SERPL-MCNC: 23 MG/DL — HIGH (ref 7–18)
CALCIUM SERPL-MCNC: 9 MG/DL — SIGNIFICANT CHANGE UP (ref 8.4–10.5)
CHLORIDE SERPL-SCNC: 103 MMOL/L — SIGNIFICANT CHANGE UP (ref 96–108)
CO2 SERPL-SCNC: 26 MMOL/L — SIGNIFICANT CHANGE UP (ref 22–31)
CREAT SERPL-MCNC: 1.31 MG/DL — HIGH (ref 0.5–1.3)
GLUCOSE BLDC GLUCOMTR-MCNC: 133 MG/DL — HIGH (ref 70–99)
GLUCOSE BLDC GLUCOMTR-MCNC: 140 MG/DL — HIGH (ref 70–99)
GLUCOSE BLDC GLUCOMTR-MCNC: 148 MG/DL — HIGH (ref 70–99)
GLUCOSE BLDC GLUCOMTR-MCNC: 150 MG/DL — HIGH (ref 70–99)
GLUCOSE SERPL-MCNC: 166 MG/DL — HIGH (ref 70–99)
HCT VFR BLD CALC: 41.5 % — SIGNIFICANT CHANGE UP (ref 34.5–45)
HGB BLD-MCNC: 13.2 G/DL — SIGNIFICANT CHANGE UP (ref 11.5–15.5)
MCHC RBC-ENTMCNC: 29.2 PG — SIGNIFICANT CHANGE UP (ref 27–34)
MCHC RBC-ENTMCNC: 31.7 GM/DL — LOW (ref 32–36)
MCV RBC AUTO: 92 FL — SIGNIFICANT CHANGE UP (ref 80–100)
PLATELET # BLD AUTO: 279 K/UL — SIGNIFICANT CHANGE UP (ref 150–400)
POTASSIUM SERPL-MCNC: 3.9 MMOL/L — SIGNIFICANT CHANGE UP (ref 3.5–5.3)
POTASSIUM SERPL-SCNC: 3.9 MMOL/L — SIGNIFICANT CHANGE UP (ref 3.5–5.3)
RBC # BLD: 4.52 M/UL — SIGNIFICANT CHANGE UP (ref 3.8–5.2)
RBC # FLD: 13 % — SIGNIFICANT CHANGE UP (ref 10.3–14.5)
SODIUM SERPL-SCNC: 137 MMOL/L — SIGNIFICANT CHANGE UP (ref 135–145)
WBC # BLD: 16.5 K/UL — HIGH (ref 3.8–10.5)
WBC # FLD AUTO: 16.5 K/UL — HIGH (ref 3.8–10.5)

## 2018-12-21 PROCEDURE — 99231 SBSQ HOSP IP/OBS SF/LOW 25: CPT

## 2018-12-21 RX ORDER — SENNA PLUS 8.6 MG/1
2 TABLET ORAL AT BEDTIME
Qty: 0 | Refills: 0 | Status: DISCONTINUED | OUTPATIENT
Start: 2018-12-21 | End: 2018-12-24

## 2018-12-21 RX ORDER — OXYCODONE AND ACETAMINOPHEN 5; 325 MG/1; MG/1
2 TABLET ORAL EVERY 6 HOURS
Qty: 0 | Refills: 0 | Status: DISCONTINUED | OUTPATIENT
Start: 2018-12-21 | End: 2018-12-24

## 2018-12-21 RX ORDER — SODIUM CHLORIDE 9 MG/ML
1000 INJECTION, SOLUTION INTRAVENOUS
Qty: 0 | Refills: 0 | Status: COMPLETED | OUTPATIENT
Start: 2018-12-21 | End: 2018-12-21

## 2018-12-21 RX ORDER — DOCUSATE SODIUM 100 MG
100 CAPSULE ORAL
Qty: 0 | Refills: 0 | Status: DISCONTINUED | OUTPATIENT
Start: 2018-12-21 | End: 2018-12-24

## 2018-12-21 RX ORDER — MORPHINE SULFATE 50 MG/1
60 CAPSULE, EXTENDED RELEASE ORAL ONCE
Qty: 0 | Refills: 0 | Status: DISCONTINUED | OUTPATIENT
Start: 2018-12-21 | End: 2018-12-21

## 2018-12-21 RX ORDER — MORPHINE SULFATE 50 MG/1
60 CAPSULE, EXTENDED RELEASE ORAL
Qty: 0 | Refills: 0 | Status: DISCONTINUED | OUTPATIENT
Start: 2018-12-21 | End: 2018-12-24

## 2018-12-21 RX ADMIN — Medication 1 PATCH: at 14:54

## 2018-12-21 RX ADMIN — SENNA PLUS 2 TABLET(S): 8.6 TABLET ORAL at 17:49

## 2018-12-21 RX ADMIN — ESCITALOPRAM OXALATE 20 MILLIGRAM(S): 10 TABLET, FILM COATED ORAL at 12:24

## 2018-12-21 RX ADMIN — CYCLOBENZAPRINE HYDROCHLORIDE 5 MILLIGRAM(S): 10 TABLET, FILM COATED ORAL at 21:58

## 2018-12-21 RX ADMIN — OXYCODONE AND ACETAMINOPHEN 2 TABLET(S): 5; 325 TABLET ORAL at 21:58

## 2018-12-21 RX ADMIN — CYCLOBENZAPRINE HYDROCHLORIDE 5 MILLIGRAM(S): 10 TABLET, FILM COATED ORAL at 06:09

## 2018-12-21 RX ADMIN — Medication 100 MILLIGRAM(S): at 17:49

## 2018-12-21 RX ADMIN — MORPHINE SULFATE 2 MILLIGRAM(S): 50 CAPSULE, EXTENDED RELEASE ORAL at 04:54

## 2018-12-21 RX ADMIN — MORPHINE SULFATE 60 MILLIGRAM(S): 50 CAPSULE, EXTENDED RELEASE ORAL at 10:40

## 2018-12-21 RX ADMIN — CLOPIDOGREL BISULFATE 75 MILLIGRAM(S): 75 TABLET, FILM COATED ORAL at 12:24

## 2018-12-21 RX ADMIN — Medication 300 MILLIGRAM(S): at 17:50

## 2018-12-21 RX ADMIN — Medication 300 MILLIGRAM(S): at 06:09

## 2018-12-21 RX ADMIN — CYCLOBENZAPRINE HYDROCHLORIDE 5 MILLIGRAM(S): 10 TABLET, FILM COATED ORAL at 14:54

## 2018-12-21 RX ADMIN — OXYCODONE AND ACETAMINOPHEN 2 TABLET(S): 5; 325 TABLET ORAL at 22:30

## 2018-12-21 RX ADMIN — Medication 1 PATCH: at 12:40

## 2018-12-21 RX ADMIN — PANTOPRAZOLE SODIUM 40 MILLIGRAM(S): 20 TABLET, DELAYED RELEASE ORAL at 06:09

## 2018-12-21 RX ADMIN — Medication 1 PATCH: at 05:51

## 2018-12-21 RX ADMIN — Medication 25 MILLIGRAM(S): at 06:09

## 2018-12-21 RX ADMIN — MORPHINE SULFATE 2 MILLIGRAM(S): 50 CAPSULE, EXTENDED RELEASE ORAL at 01:22

## 2018-12-21 RX ADMIN — MORPHINE SULFATE 2 MILLIGRAM(S): 50 CAPSULE, EXTENDED RELEASE ORAL at 05:43

## 2018-12-21 RX ADMIN — MORPHINE SULFATE 60 MILLIGRAM(S): 50 CAPSULE, EXTENDED RELEASE ORAL at 17:40

## 2018-12-21 RX ADMIN — Medication 25 MILLIGRAM(S): at 17:49

## 2018-12-21 RX ADMIN — MORPHINE SULFATE 60 MILLIGRAM(S): 50 CAPSULE, EXTENDED RELEASE ORAL at 09:57

## 2018-12-21 RX ADMIN — Medication 200 MILLIGRAM(S): at 06:09

## 2018-12-21 RX ADMIN — Medication 1 PATCH: at 10:43

## 2018-12-21 RX ADMIN — SODIUM CHLORIDE 100 MILLILITER(S): 9 INJECTION, SOLUTION INTRAVENOUS at 09:28

## 2018-12-21 NOTE — PROGRESS NOTE ADULT - PROBLEM SELECTOR PLAN 1
Continue to treat w/ Methylprednisolone 500mg IV BID x 5days, today D3/5.    Continue GI ppx Continue to treat w/ Methylprednisolone 500mg IV BID x 5days, today D3/5.    Continue GI ppx  Pain mgmt

## 2018-12-21 NOTE — PHYSICAL THERAPY INITIAL EVALUATION ADULT - IMPAIRED TRANSFERS: SIT/STAND, REHAB EVAL
decreased flexibility/abnormal muscle tone/decreased strength/impaired motor control/impaired balance/pain

## 2018-12-21 NOTE — CONSULT NOTE ADULT - SUBJECTIVE AND OBJECTIVE BOX
Pt Name: JOSE VILLALBA  MRN: [id number]    ORTHOPEDIC CONSULT:    Orthopedic diagnosis:    HPI: Patient is a 48y Female presenting with neck and back pain increased over the past 1 week. Patient states she was hospitalized for same pain and was discharge 12/7/18. When she had went home she states she had an episode of LOC and woke up on her kitchen floor. Since then pain has been worse. Patient has pain management regimen and states she has not been on original regimen and pain is not controlled well. Denies any CP, SOB. Notes paresthesias to LUE fingers.     PAST MEDICAL & SURGICAL HISTORY:  Renal failure, chronic, stage 3 (moderate)  HTN (hypertension)  Cyclical vomiting  Atrial fibrillation  SLE (systemic lupus erythematosus)  Reflex sympathetic dystrophy  H/O partial thyroidectomy  S/P partial hysterectomy      ALLERGIES: aspirin (Unknown)  cortisone (Other)  latex (Unknown)  sulfa drugs (Unknown)      MEDICATIONS: atorvastatin 40 milliGRAM(s) Oral at bedtime  clopidogrel Tablet 75 milliGRAM(s) Oral daily  cyclobenzaprine 5 milliGRAM(s) Oral every 8 hours  docusate sodium 100 milliGRAM(s) Oral two times a day  escitalopram 20 milliGRAM(s) Oral daily  heparin  Injectable 5000 Unit(s) SubCutaneous every 12 hours  insulin lispro (HumaLOG) corrective regimen sliding scale   SubCutaneous three times a day before meals  methylPREDNISolone sodium succinate IVPB 500 milliGRAM(s) IV Intermittent every 12 hours  metoprolol tartrate 25 milliGRAM(s) Oral two times a day  morphine ER Tablet 60 milliGRAM(s) Oral two times a day  nicotine -  14 mG/24Hr(s) Patch 1 patch Transdermal daily  ondansetron Injectable 4 milliGRAM(s) IV Push every 4 hours PRN  pantoprazole    Tablet 40 milliGRAM(s) Oral before breakfast  pregabalin 300 milliGRAM(s) Oral every 12 hours  senna 2 Tablet(s) Oral at bedtime PRN      PHYSICAL EXAM:    Vital Signs Last 24 Hrs  T(C): 36.8 (21 Dec 2018 15:35), Max: 37.2 (21 Dec 2018 00:25)  T(F): 98.3 (21 Dec 2018 15:35), Max: 98.9 (21 Dec 2018 00:25)  HR: 60 (21 Dec 2018 15:35) (60 - 75)  BP: 140/86 (21 Dec 2018 15:35) (117/80 - 141/96)  BP(mean): --  RR: 16 (21 Dec 2018 15:35) (16 - 17)  SpO2: 97% (21 Dec 2018 15:35) (97% - 100%)    Cervical Spine: Soft collar in place. Skin intact. Tenderness. Compartments soft and NT B/l UE. sensation intact. 5/5  strength.   Lumbar Spine: Sensation intact in LE B/L. Calves soft and NT B/L. 2+ pulses distally.       LABS:                        13.2   16.5  )-----------( 279      ( 21 Dec 2018 08:00 )             41.5     12-21    137  |  103  |  23<H>  ----------------------------<  166<H>  3.9   |  26  |  1.31<H>    Ca    9.0      21 Dec 2018 08:00    TPro  7.8  /  Alb  3.3<L>  /  TBili  0.4  /  DBili  x   /  AST  25  /  ALT  22  /  AlkPhos  139<H>  12-20        RADIOLOGY:   < from: CT Lumbar Spine No Cont (12.18.18 @ 20:38) >  EXAM:  CT LUMBAR SPINE                          EXAM:  CT THORACIC SPINE                            PROCEDURE DATE:  12/18/2018          INTERPRETATION:  CT thoracic and lumbar spine without contrast    History pain after injury    A spinal stimulating device is noted with the superior tip in the mid   thoracic spine. There is no vertebral compression deformity or   subluxation or osseous destruction. There is no pars defect. There is   mild ventral disc space narrowing with small syndesmophytes in the mid   thoracic levels. Other disc levels are seen. There is no CT evidence of   epidural mass or collection. Ligamentous hypertrophy results in mild   spinal stenosis at the L4-5 level and to a lesser degree L3-L4.. The   spinal canal is patent elsewhere.    Note is made of a nodule in the left lobe of the thyroid measuring 3 cm   in long axis dimension.    IMPRESSION: No acute findings                  ANGELO STALEY M.D., ATTENDING RADIOLOGIST  This document has been electronically signed. Dec 18 2018  8:56PM    < end of copied text >  < from: CT Thoracic Spine No Cont (12.18.18 @ 20:38) >  EXAM:  CT LUMBAR SPINE                          EXAM:  CT THORACIC SPINE                            PROCEDURE DATE:  12/18/2018          INTERPRETATION:  CT thoracic and lumbar spine without contrast    History pain after injury    A spinal stimulating device is noted with the superior tip in the mid   thoracic spine. There is no vertebral compression deformity or   subluxation or osseous destruction. There is no pars defect. There is   mild ventral disc space narrowing with small syndesmophytes in the mid   thoracic levels. Other disc levels are seen. There is no CT evidence of   epidural mass or collection. Ligamentous hypertrophy results in mild   spinal stenosis at the L4-5 level and to a lesser degree L3-L4.. The   spinal canal is patent elsewhere.    Note is made of a nodule in the left lobe of the thyroid measuring 3 cm   in long axis dimension.    IMPRESSION: No acute findings                  ANGELO STALEY M.D., ATTENDING RADIOLOGIST  This document has been electronically signed. Dec 18 2018  8:56PM    < end of copied text >    < from: CT Cervical Spine No Cont (12.18.18 @ 20:37) >  EXAM:  CT CERVICAL SPINE                            PROCEDURE DATE:  12/18/2018          INTERPRETATION:  CLINICAL STATEMENT: Trauma.    TECHNIQUE: CT of the cervical spine was performed without IV contrast.   Sagittal and coronal reformats were obtained.    COMPARISON: 12/5/2018    FINDINGS:    The vertebral body heights and alignment are maintained. There is no   fracture. There is mild disc degenerative change again noted at C6-C7   There is no prevertebral soft tissue swelling. The odontoidis intact.   Straightening of the cervical lordosis may indicate spasm.    Evaluation of the spinal canal is limited on a CT exam.     The lung apices are unremarkable. There is enlargement and heterogeneity   of the left thyroid lobe. A right thyroid lobe is not identified.    IMPRESSION:     No evidence of fracture or malalignment  Enlarged heterogeneous left thyroid lobe is again noted. Please note that   ultrasound of the thyroid of 9/15/2017 did demonstrate left thyroid   nodule and right thyroidectomy.                CHRISTOPHER MARC M.D.,ATTENDING RADIOLOGIST  This document has been electronically signed. Dec 18 2018  8:44PM    < end of copied text >    IMPRESSION: Pt is a  48y Female with disc degenerative change C6-7, L3-4 Spinal Stenosis    PLAN:  -  Pain management consult  -  DVT prophylaxis  -  Daily PT  -  Conservative management at this time  -  No orthopedics surgical intervention  -  Continue care as per medicine  -  Case discussed with Dr. Espino

## 2018-12-21 NOTE — PROGRESS NOTE ADULT - ASSESSMENT
48 yr old Female, lives with  sister, from home, ambulates with walker,  w/ PMHx of cyclic vomiting syndrome,  Reflex sympathetic dystrophy s/p spinal stimulator, frequent falls, SLE (not on tx), Parox Afib (not on AC due to frequent falls), HTN, Depression, Diverticulosis, and partial thyroidectomy on right side, who presents to the ED c/o severe neck pain x 1 week and s/p syncope.  1.Tele monitoring.  2.Orthostatic-.  3.Parox Afib-fall/bleed risk on asa,plavix.  4.Lipid d/o-statin.  5.GI and DVT prophylaxis.

## 2018-12-21 NOTE — PROGRESS NOTE ADULT - SUBJECTIVE AND OBJECTIVE BOX
CHIEF COMPLAINT:Patient is a 48y old  Female who presents with a chief complaint of neck pain.Pt appears comfortable.    	  REVIEW OF SYSTEMS:  CONSTITUTIONAL: No fever, weight loss, or fatigue  EYES: No eye pain, visual disturbances, or discharge  ENT:  No difficulty hearing, tinnitus, vertigo; No sinus or throat pain  NECK: No pain or stiffness  RESPIRATORY: No cough, wheezing, chills or hemoptysis; No Shortness of Breath  CARDIOVASCULAR: No chest pain, palpitations, passing out, dizziness, or leg swelling  GASTROINTESTINAL: No abdominal or epigastric pain. No nausea, vomiting, or hematemesis; No diarrhea or constipation. No melena or hematochezia.  GENITOURINARY: No dysuria, frequency, hematuria, or incontinence  NEUROLOGICAL: No headaches, memory loss, loss of strength, numbness, or tremors  SKIN: No itching, burning, rashes, or lesions   LYMPH Nodes: No enlarged glands  ENDOCRINE: No heat or cold intolerance; No hair loss  MUSCULOSKELETAL: No joint pain or swelling; No muscle, back, or extremity pain  PSYCHIATRIC: No depression, anxiety, mood swings, or difficulty sleeping  HEME/LYMPH: No easy bruising, or bleeding gums  ALLERGY AND IMMUNOLOGIC: No hives or eczema	      PHYSICAL EXAM:  T(C): 36.6 (12-21-18 @ 08:00), Max: 37.2 (12-21-18 @ 00:25)  HR: 68 (12-21-18 @ 08:00) (63 - 75)  BP: 141/96 (12-21-18 @ 08:00) (117/80 - 141/96)  RR: 16 (12-21-18 @ 08:00) (16 - 17)  SpO2: 98% (12-21-18 @ 08:00) (96% - 100%)    I&O's Summary    20 Dec 2018 07:01  -  21 Dec 2018 07:00  --------------------------------------------------------  IN: 450 mL / OUT: 0 mL / NET: 450 mL        Appearance: Normal	  HEENT:   Normal oral mucosa, PERRL, EOMI	  Lymphatic: No lymphadenopathy  Cardiovascular: Normal S1 S2, No JVD, No murmurs, No edema  Respiratory: Lungs clear to auscultation	  Psychiatry: A & O x 3, Mood & affect appropriate  Gastrointestinal:  Soft, Non-tender, + BS	  Skin: No rashes, No ecchymoses, No cyanosis	  Neurologic: Non-focal  Extremities: Normal range of motion, No clubbing, cyanosis or edema  Vascular: Peripheral pulses palpable 2+ bilaterally    MEDICATIONS  (STANDING):  atorvastatin 40 milliGRAM(s) Oral at bedtime  clopidogrel Tablet 75 milliGRAM(s) Oral daily  cyclobenzaprine 5 milliGRAM(s) Oral every 8 hours  docusate sodium 100 milliGRAM(s) Oral two times a day  escitalopram 20 milliGRAM(s) Oral daily  heparin  Injectable 5000 Unit(s) SubCutaneous every 12 hours  insulin lispro (HumaLOG) corrective regimen sliding scale   SubCutaneous three times a day before meals  methylPREDNISolone sodium succinate IVPB 500 milliGRAM(s) IV Intermittent every 12 hours  metoprolol tartrate 25 milliGRAM(s) Oral two times a day  morphine ER Tablet 60 milliGRAM(s) Oral two times a day  nicotine -  14 mG/24Hr(s) Patch 1 patch Transdermal daily  pantoprazole    Tablet 40 milliGRAM(s) Oral before breakfast  pregabalin 300 milliGRAM(s) Oral every 12 hours      	  LABS:	 	    CARDIAC MARKERS:  CARDIAC MARKERS ( 19 Dec 2018 12:21 )  <0.015 ng/mL / x     / 136 U/L / x     / <1.0 ng/mL                         13.2   16.5  )-----------( 279      ( 21 Dec 2018 08:00 )             41.5     12-21    137  |  103  |  23<H>  ----------------------------<  166<H>  3.9   |  26  |  1.31<H>    Ca    9.0      21 Dec 2018 08:00    TPro  7.8  /  Alb  3.3<L>  /  TBili  0.4  /  DBili  x   /  AST  25  /  ALT  22  /  AlkPhos  139<H>  12-20      Lipid Profile: Cholesterol 158  LDL 93  HDL 48  TG 83    HgA1c: Hemoglobin A1C, Whole Blood: 5.6 % (12-05 @ 13:36)    TSH: Thyroid Stimulating Hormone, Serum: 0.57 uU/mL (12-05 @ 10:43)      OBSERVATIONS:  Mitral Valve: Normal mitral valve. No mitral regurgitation  is noted.  Aortic Root: Normal aortic root.  Aortic Valve: Aortic valve not well seen. No aortic  stenosis. No aortic valve regurgitation seen.  Left Atrium: Normal left atrium.  LA volume index = 22  cc/m2.  LeftVentricle: Normal Left Ventricular Systolic Function,  (EF = 60 to 65% by visual estimation) Not all LV wall  segments were seen. Mild left ventricular enlargement.  Grade II diastolic dysfunction.  Right Heart: Right atrium not well visualized. Right  ventricle not well visualized. Normal RV systolic function  (TAPSE 3.3 cm). Tricuspid valve not well seen. Trace  tricuspid regurgitation. Pulmonic valve not well seen.  Pericardium/PleuraNo pericardial effusion.  Hemodynamic: RA Pressure is 8 mm Hg. RV systolic pressure  is normal at  31 mm Hg.    	  IMPRESSIONS:Normal Study  * Negative ECG evidence of ischemia after IV of Lexiscan.  * Review of raw data shows: Breast attenuation artifact.  * The left ventricle was normal in size. There is a small,  severe defect in apical wall that is fixed consistent with  breast attenuation artifact.  * Post-stress resting myocardial perfusion gated SPECT  imaging was performed (LVEF> 70%)      Cortisol AM, Serum (12.20.18 @ 10:06)    Cortisol AM, Serum: 18.1: Note reference range change for CORTAM and CORTPM. ug/dL

## 2018-12-21 NOTE — PROGRESS NOTE ADULT - SUBJECTIVE AND OBJECTIVE BOX
Patient is a 48y old  Female who presents with a chief complaint of neck pain (20 Dec 2018 15:52)      pt seen in tele [x  ], reg med floor [   ], bed [x  ], chair at bedside [   ], a+o x3 [ x ], lethargic [  ],  nad [x  ]      Allergies    aspirin (Unknown)  cortisone (Other)  latex (Unknown)  sulfa drugs (Unknown)        Vitals    T(F): 97.8 (12-21-18 @ 08:00), Max: 98.9 (12-21-18 @ 00:25)  HR: 68 (12-21-18 @ 08:00) (63 - 77)  BP: 141/96 (12-21-18 @ 08:00) (117/80 - 141/96)  RR: 16 (12-21-18 @ 08:00) (16 - 17)  SpO2: 98% (12-21-18 @ 08:00) (96% - 100%)  Wt(kg): --  CAPILLARY BLOOD GLUCOSE      POCT Blood Glucose.: 150 mg/dL (21 Dec 2018 07:52)      Labs                          13.2   x     )-----------( 279      ( 21 Dec 2018 08:00 )             41.5       12-20    140  |  105  |  14  ----------------------------<  129<H>  4.2   |  27  |  1.16    Ca    8.9      20 Dec 2018 06:21    TPro  7.8  /  Alb  3.3<L>  /  TBili  0.4  /  DBili  x   /  AST  25  /  ALT  22  /  AlkPhos  139<H>  12-20    Cortisol AM, Serum: 18.1: Note reference range change for CORTAM and CORTPM. ug/dL (12.20.18 @ 10:06)          CARDIAC MARKERS ( 19 Dec 2018 12:21 )  <0.015 ng/mL / x     / 136 U/L / x     / <1.0 ng/mL      < from: Transthoracic Echocardiogram (12.20.18 @ 10:54) >  CONCLUSIONS:  1. Normal mitral valve. No mitral regurgitation is noted.  2. Aortic valve not well seen. No aortic stenosis. No  aortic valve regurgitation seen.  3. Normal aortic root.  4. Normal left atrium.  5. Mild left ventricular enlargement.  6. Normal Left Ventricular Systolic Function,  (EF = 60 to  65% by visual estimation)  7. Grade II diastolic dysfunction.  8. Right ventricle not well visualized. Normal RV systolic  function (TAPSE 3.3 cm).  9. Tricuspid valve not well seen. Trace tricuspid  regurgitation.  10. No pericardial effusion.      < end of copied text >        < from: Nuclear Stress Test-Pharmacologic (12.20.18 @ 03:49) >  IMPRESSIONS:Normal Study  * Negative ECG evidence of ischemia after IV of Lexiscan.  * Review of raw data shows: Breast attenuation artifact.  * The left ventricle was normal in size. There is a small,  severe defect in apical wall that is fixed consistent with  breast attenuation artifact.  * Post-stress resting myocardial perfusion gated SPECT  imaging was performed (LVEF> 70%)      < end of copied text >        Radiology Results      Meds    MEDICATIONS  (STANDING):  atorvastatin 40 milliGRAM(s) Oral at bedtime  clopidogrel Tablet 75 milliGRAM(s) Oral daily  cyclobenzaprine 5 milliGRAM(s) Oral every 8 hours  escitalopram 20 milliGRAM(s) Oral daily  heparin  Injectable 5000 Unit(s) SubCutaneous every 12 hours  insulin lispro (HumaLOG) corrective regimen sliding scale   SubCutaneous three times a day before meals  methylPREDNISolone sodium succinate IVPB 500 milliGRAM(s) IV Intermittent every 12 hours  metoprolol tartrate 25 milliGRAM(s) Oral two times a day  nicotine -  14 mG/24Hr(s) Patch 1 patch Transdermal daily  pantoprazole    Tablet 40 milliGRAM(s) Oral before breakfast  pregabalin 300 milliGRAM(s) Oral every 12 hours      MEDICATIONS  (PRN):  morphine ER Tablet 60 milliGRAM(s) Oral two times a day PRN severe pain 7-10  ondansetron Injectable 4 milliGRAM(s) IV Push every 4 hours PRN Nausea and/or Vomiting      Physical Exam      Neuro :  no focal deficits  Respiratory: CTA B/L  CV: RRR, S1S2, no murmurs,   Abdominal: Soft, NT, ND +BS,  Extremities: No edema, + peripheral pulses    ASSESSMENT      syncope,   r/o arythmia,   r/o cns patho,   cervical radiculopathy,   h/o C4-C5 stenosis w/ impingement,   cyclic vomiting syndrome,    Reflex sympathetic dystrophy s/p spinal stimulator,   frequent falls,   SLE (not on tx),   Afib (not on AC due to frequent falls),   HTN,   Depression,   Diverticulosis,   partial right thyroidectomy  Renal failure, chronic, stage 3 (moderate)  s/p partial hysterectomy      PLAN      cont tele,   cont aspirin, statin,  cardio cons  stress test neg noted above  echo with EF = 60 to65% by visual estimation). Grade II diastolic dysfunction noted above.  am cortisol wnl noted above  cont pain control  ce q8 x 1 neg noted above  orthoststic bp neg  neuro f/u noted  pt with Status migrainosus:  Treat patient with methylprednisolone 500mg IV BID 3/ 5 days to help resolve current migraine.  Hold prednisone as recommended by ortho consult while methylprednisolone is administered;   resume prednisone once methylprednisolone course is complete; no need for taper due to short duration of high-dose steroids  for Chronic neuropathic pain  Continue pregabalin 300mg PO BID - Already at a high dose, do not increase  No further imaging indicated;   decision on surgical intervention as per ortho consult  ortho spine cons  pain mgmt eval  d/c morphine  resume outpt ms contin 60mg bid prn pain 7-10  cont percocet prn breakthrough pain  cont current meds

## 2018-12-21 NOTE — PROGRESS NOTE ADULT - SUBJECTIVE AND OBJECTIVE BOX
PGY1 Note discussed with supervising resident and primary attending.    Patient is a 48y old  Female who presents with a chief complaint of neck pain (20 Dec 2018 09:52)      INTERVAL HPI/OVERNIGHT EVENTS:    Ms. Barahona is seen at the bedside. No new complaints.    MEDICATIONS  (STANDING):  atorvastatin 40 milliGRAM(s) Oral at bedtime  clopidogrel Tablet 75 milliGRAM(s) Oral daily  cyclobenzaprine 5 milliGRAM(s) Oral every 8 hours  docusate sodium 100 milliGRAM(s) Oral two times a day  escitalopram 20 milliGRAM(s) Oral daily  heparin  Injectable 5000 Unit(s) SubCutaneous every 12 hours  insulin lispro (HumaLOG) corrective regimen sliding scale   SubCutaneous three times a day before meals  methylPREDNISolone sodium succinate IVPB 500 milliGRAM(s) IV Intermittent every 12 hours  metoprolol tartrate 25 milliGRAM(s) Oral two times a day  morphine ER Tablet 60 milliGRAM(s) Oral two times a day  nicotine -  14 mG/24Hr(s) Patch 1 patch Transdermal daily  pantoprazole    Tablet 40 milliGRAM(s) Oral before breakfast  pregabalin 300 milliGRAM(s) Oral every 12 hours    MEDICATIONS  (PRN):  ondansetron Injectable 4 milliGRAM(s) IV Push every 4 hours PRN Nausea and/or Vomiting  senna 2 Tablet(s) Oral at bedtime PRN Constipation      Allergies    aspirin (Unknown)  cortisone (Other)  latex (Unknown)  sulfa drugs (Unknown)    Intolerances      REVIEW OF SYSTEMS:  CONSTITUTIONAL: No fever, weight loss, or fatigue; + neck pain  RESPIRATORY: No cough, wheezing, chills or hemoptysis; No shortness of breath  CARDIOVASCULAR: No chest pain, palpitations, dizziness, or leg swelling  GASTROINTESTINAL: No abdominal or epigastric pain. No nausea, vomiting, or hematemesis; No diarrhea or constipation. No melena or hematochezia.  NEUROLOGICAL: No headaches, memory loss, loss of strength, numbness, or tremors  SKIN: No itching, burning, rashes, or lesions     Vital Signs Last 24 Hrs  T(C): 36.6 (21 Dec 2018 08:00), Max: 37.2 (21 Dec 2018 00:25)  T(F): 97.8 (21 Dec 2018 08:00), Max: 98.9 (21 Dec 2018 00:25)  HR: 68 (21 Dec 2018 08:00) (63 - 75)  BP: 141/96 (21 Dec 2018 08:00) (117/80 - 141/96)  BP(mean): --  RR: 16 (21 Dec 2018 08:00) (16 - 17)  SpO2: 98% (21 Dec 2018 08:00) (96% - 100%)    PHYSICAL EXAM:  GENERAL: NAD  HEAD:  Atraumatic, Normocephalic  EYES: EOMI, PERRLA, conjunctiva and sclera clear  NECK: Supple, No JVD, Normal thyroid  CHEST/LUNG: Clear to percussion bilaterally; No rales, rhonchi, wheezing, or rubs  HEART: Regular rate and rhythm; No murmurs, rubs, or gallops  ABDOMEN: Soft, Nontender, Nondistended; Bowel sounds present  NERVOUS SYSTEM:  Alert & Oriented X3, Good concentration; Motor Strength 5/5 B/L   EXTREMITIES:  2+ Peripheral Pulses, No clubbing, cyanosis, or edema    LABS:                                   13.2   16.5  )-----------( 279      ( 21 Dec 2018 08:00 )             41.5     12-21    137  |  103  |  23<H>  ----------------------------<  166<H>  3.9   |  26  |  1.31<H>    Ca    9.0      21 Dec 2018 08:00    TPro  7.8  /  Alb  3.3<L>  /  TBili  0.4  /  DBili  x   /  AST  25  /  ALT  22  /  AlkPhos  139<H>  12-20      RADIOLOGY & ADDITIONAL TESTS:    Imaging Personally Reviewed:  [X] YES  [ ] NO    Consultant(s) Notes Reviewed:  [X] YES  [ ] NO

## 2018-12-21 NOTE — PROGRESS NOTE ADULT - SUBJECTIVE AND OBJECTIVE BOX
Neurology Follow up note    Interval History - Continued to have pain overnight and would like more pain mgmt        Subjective:  States to "coming along."  Reports continued pain 7-10.  States that Cyclobenzaprine does not help w/ her pain.        MEDICATIONS  (STANDING):  atorvastatin 40 milliGRAM(s) Oral at bedtime  clopidogrel Tablet 75 milliGRAM(s) Oral daily  cyclobenzaprine 5 milliGRAM(s) Oral every 8 hours  docusate sodium 100 milliGRAM(s) Oral two times a day  escitalopram 20 milliGRAM(s) Oral daily  heparin  Injectable 5000 Unit(s) SubCutaneous every 12 hours  insulin lispro (HumaLOG) corrective regimen sliding scale   SubCutaneous three times a day before meals  methylPREDNISolone sodium succinate IVPB 500 milliGRAM(s) IV Intermittent every 12 hours  metoprolol tartrate 25 milliGRAM(s) Oral two times a day  morphine ER Tablet 60 milliGRAM(s) Oral two times a day  nicotine -  14 mG/24Hr(s) Patch 1 patch Transdermal daily  pantoprazole    Tablet 40 milliGRAM(s) Oral before breakfast  pregabalin 300 milliGRAM(s) Oral every 12 hours    MEDICATIONS  (PRN):  ondansetron Injectable 4 milliGRAM(s) IV Push every 4 hours PRN Nausea and/or Vomiting  senna 2 Tablet(s) Oral at bedtime PRN Constipation      Allergies    aspirin (Unknown)  cortisone (Other)  latex (Unknown)  sulfa drugs (Unknown)    Intolerances        Review of Systems:  Fourteen systems reviewed and negative except as listed in interval history    Objective:   Vital Signs Last 24 Hrs  T(C): 36.8 (21 Dec 2018 15:35), Max: 37.2 (21 Dec 2018 00:25)  HR: 60 (21 Dec 2018 15:35) (60 - 75)  BP: 140/86 (21 Dec 2018 15:35) (117/80 - 141/96)  RR: 16 (21 Dec 2018 15:35) (16 - 17)  SpO2: 97% (21 Dec 2018 15:35) (97% - 100%)    General Exam:   General appearance: No acute distress                 Cardiovascular: Pedal dorsalis pulses intact bilaterally    Neurological Exam:  Unchanged from previous exam.    Other:    12-21    137  |  103  |  23<H>  ----------------------------<  166<H>  3.9   |  26  |  1.31<H>    Ca    9.0      21 Dec 2018 08:00    TPro  7.8  /  Alb  3.3<L>  /  TBili  0.4  /  DBili  x   /  AST  25  /  ALT  22  /  AlkPhos  139<H>  12-20 12-21    137  |  103  |  23<H>  ----------------------------<  166<H>  3.9   |  26  |  1.31<H>    Ca    9.0      21 Dec 2018 08:00    TPro  7.8  /  Alb  3.3<L>  /  TBili  0.4  /  DBili  x   /  AST  25  /  ALT  22  /  AlkPhos  139<H>  12-20    LIVER FUNCTIONS - ( 20 Dec 2018 06:21 )  Alb: 3.3 g/dL / Pro: 7.8 g/dL / ALK PHOS: 139 U/L / ALT: 22 U/L DA / AST: 25 U/L / GGT: x Neurology Follow up note    Interval History - Continued to have pain overnight and would like more pain mgmt    Subjective:  States she is "coming along."  Reports continued pain 7-10.  States that Cyclobenzaprine does not help w/ her pain.    MEDICATIONS  (STANDING):  atorvastatin 40 milliGRAM(s) Oral at bedtime  clopidogrel Tablet 75 milliGRAM(s) Oral daily  cyclobenzaprine 5 milliGRAM(s) Oral every 8 hours  docusate sodium 100 milliGRAM(s) Oral two times a day  escitalopram 20 milliGRAM(s) Oral daily  heparin  Injectable 5000 Unit(s) SubCutaneous every 12 hours  insulin lispro (HumaLOG) corrective regimen sliding scale   SubCutaneous three times a day before meals  methylPREDNISolone sodium succinate IVPB 500 milliGRAM(s) IV Intermittent every 12 hours  metoprolol tartrate 25 milliGRAM(s) Oral two times a day  morphine ER Tablet 60 milliGRAM(s) Oral two times a day  nicotine -  14 mG/24Hr(s) Patch 1 patch Transdermal daily  pantoprazole    Tablet 40 milliGRAM(s) Oral before breakfast  pregabalin 300 milliGRAM(s) Oral every 12 hours    MEDICATIONS  (PRN):  ondansetron Injectable 4 milliGRAM(s) IV Push every 4 hours PRN Nausea and/or Vomiting  senna 2 Tablet(s) Oral at bedtime PRN Constipation      Allergies:  aspirin (Unknown)  cortisone (Other)  latex (Unknown)  sulfa drugs (Unknown)    Review of Systems:  Fourteen systems reviewed and negative except as listed in interval history    Objective:   Vital Signs Last 24 Hrs  T(C): 36.8 (21 Dec 2018 15:35), Max: 37.2 (21 Dec 2018 00:25)  HR: 60 (21 Dec 2018 15:35) (60 - 75)  BP: 140/86 (21 Dec 2018 15:35) (117/80 - 141/96)  RR: 16 (21 Dec 2018 15:35) (16 - 17)  SpO2: 97% (21 Dec 2018 15:35) (97% - 100%)    General Exam:   General appearance: No acute distress                 Cardiovascular: Pedal dorsalis pulses intact bilaterally    Neurological Exam:  Unchanged from previous exam.    Other:    12-21    137  |  103  |  23<H>  ----------------------------<  166<H>  3.9   |  26  |  1.31<H>    Ca    9.0      21 Dec 2018 08:00    TPro  7.8  /  Alb  3.3<L>  /  TBili  0.4  /  DBili  x   /  AST  25  /  ALT  22  /  AlkPhos  139<H>  12-20

## 2018-12-21 NOTE — PHYSICAL THERAPY INITIAL EVALUATION ADULT - ADDITIONAL COMMENTS
Pt. has her own rollator, however the wheels and brakes are so worn out.  notified that patient will need one.

## 2018-12-21 NOTE — PHYSICAL THERAPY INITIAL EVALUATION ADULT - DIAGNOSIS, PT EVAL
Pt. presents w/ chronic pain on both hips/knees and most recently at [facility]. Pt. w/ h/o RSD, SLE, Freq. Falls, pain on Cervical, both knees and hips.

## 2018-12-21 NOTE — PHYSICAL THERAPY INITIAL EVALUATION ADULT - GAIT DEVIATIONS NOTED, PT EVAL
decreased weight-shifting ability/footdrop/decreased swing-to-stance ratio/decreased antonia/decreased velocity of limb motion/decreased step length

## 2018-12-21 NOTE — PROGRESS NOTE ADULT - PROBLEM SELECTOR PLAN 2
orthostatics technically positive now by change in diastolic bp  carotid doppler negative  echo pending  NST report pending  cardio - Dr. Benton orthostatics technically positive now by change in diastolic bp  carotid doppler negative  echo shows g2dd with pEF  NST negative  cardio - Dr. Benton

## 2018-12-21 NOTE — PHYSICAL THERAPY INITIAL EVALUATION ADULT - CRITERIA FOR SKILLED THERAPEUTIC INTERVENTIONS
therapy frequency/functional limitations in following categories/impairments found/risk reduction/prevention

## 2018-12-21 NOTE — PROGRESS NOTE ADULT - PROBLEM SELECTOR PLAN 2
Surgical decision deferred to Ortho regarding sx intervention for neck pain and removal of spinal stimulator  May increase one dose of cyclobenzaprine to 10mg timed for time of maximum pain.  May continue remaining doses of cyclobenzaprine 5mg. (As q8h pt's receiving 3 doses of cyclobenzaprine change one to 10mg & the other two continue @ 5mg). Surgical decision deferred to Ortho regarding sx intervention for neck pain and removal of spinal stimulator  May increase one dose of cyclobenzaprine to 10mg timed for time of maximum pain.  May continue remaining doses of cyclobenzaprine 5mg. (As q8h pt's receiving 3 doses of cyclobenzaprine change one to 10mg & the other two continue @ 5mg).  Pain mgmt Surgical decision deferred to Ortho regarding sx intervention for neck pain and ml of spinal stimulator  May increase one dose of cyclobenzaprine to 10mg timed for time of maximum pain.  May continue remaining doses of cyclobenzaprine 5mg. (As q8h pt's receiving 3 doses of cyclobenzaprine change one to 10mg & the other two continue @ 5mg).  Pain mgmt

## 2018-12-21 NOTE — PHYSICAL THERAPY INITIAL EVALUATION ADULT - PERTINENT HX OF CURRENT PROBLEM, REHAB EVAL
Pt. admitted from home due to a fall; Pt.  hit her forehead on her closed door. Pt.  has been falling a lot lately at home. Pt. w/ h/o falls. Pt. w/ h/o RSD, SLE, Essential HTN, DVT (RLE

## 2018-12-21 NOTE — PROGRESS NOTE ADULT - ASSESSMENT
48F, lives with  sister, from home, ambulates with walker,  w/ PMHx of cyclic vomiting syndrome,  Reflex sympathetic dystrophy s/p spinal stimulator, frequent falls, SLE (not on tx), Afib (not on AC due to frequent falls), HTN, Depression, Diverticulosis, and partial thyroidectomy on right side, who presents to the ED c/o severe neck pain x 1 week. Pain is sharp, constant, and radiates down to the lumbar spine. Patient recently diagnosed with C4-C5 stenosis w/ impingement, evaluated by ortho spine surgery and discharged home on steroids w/ neck collar. Patient reports falling at home after being discharged form hospital on December 7th, mentions she passed out in the kitchen in front of sister (after standing up), afterwards, neck pain has been unbearable, to the point pain meds are no longer working, and patient got concerned of overdosing, which prompted her to come to the ED. Patient reports left arm paresthesia and finger numbness, which was present from previous admission. Denies any chest pain, palpitations, SOB or any other complaint. Patient was told to follow up with surgeon upon discharge, however, she's unable to afford a taxi to go to [place]. Currently has no transportation.     Ortho consulted.

## 2018-12-22 LAB
ANION GAP SERPL CALC-SCNC: 7 MMOL/L — SIGNIFICANT CHANGE UP (ref 5–17)
BUN SERPL-MCNC: 25 MG/DL — HIGH (ref 7–18)
CALCIUM SERPL-MCNC: 8.9 MG/DL — SIGNIFICANT CHANGE UP (ref 8.4–10.5)
CHLORIDE SERPL-SCNC: 104 MMOL/L — SIGNIFICANT CHANGE UP (ref 96–108)
CO2 SERPL-SCNC: 28 MMOL/L — SIGNIFICANT CHANGE UP (ref 22–31)
CREAT SERPL-MCNC: 1.17 MG/DL — SIGNIFICANT CHANGE UP (ref 0.5–1.3)
GLUCOSE BLDC GLUCOMTR-MCNC: 108 MG/DL — HIGH (ref 70–99)
GLUCOSE BLDC GLUCOMTR-MCNC: 118 MG/DL — HIGH (ref 70–99)
GLUCOSE BLDC GLUCOMTR-MCNC: 132 MG/DL — HIGH (ref 70–99)
GLUCOSE BLDC GLUCOMTR-MCNC: 153 MG/DL — HIGH (ref 70–99)
GLUCOSE SERPL-MCNC: 116 MG/DL — HIGH (ref 70–99)
HCT VFR BLD CALC: 42.8 % — SIGNIFICANT CHANGE UP (ref 34.5–45)
HGB BLD-MCNC: 13.9 G/DL — SIGNIFICANT CHANGE UP (ref 11.5–15.5)
MCHC RBC-ENTMCNC: 29.7 PG — SIGNIFICANT CHANGE UP (ref 27–34)
MCHC RBC-ENTMCNC: 32.4 GM/DL — SIGNIFICANT CHANGE UP (ref 32–36)
MCV RBC AUTO: 91.8 FL — SIGNIFICANT CHANGE UP (ref 80–100)
PLATELET # BLD AUTO: 276 K/UL — SIGNIFICANT CHANGE UP (ref 150–400)
POTASSIUM SERPL-MCNC: 4 MMOL/L — SIGNIFICANT CHANGE UP (ref 3.5–5.3)
POTASSIUM SERPL-SCNC: 4 MMOL/L — SIGNIFICANT CHANGE UP (ref 3.5–5.3)
RBC # BLD: 4.66 M/UL — SIGNIFICANT CHANGE UP (ref 3.8–5.2)
RBC # FLD: 12.9 % — SIGNIFICANT CHANGE UP (ref 10.3–14.5)
SODIUM SERPL-SCNC: 139 MMOL/L — SIGNIFICANT CHANGE UP (ref 135–145)
WBC # BLD: 16.7 K/UL — HIGH (ref 3.8–10.5)
WBC # FLD AUTO: 16.7 K/UL — HIGH (ref 3.8–10.5)

## 2018-12-22 PROCEDURE — 99223 1ST HOSP IP/OBS HIGH 75: CPT

## 2018-12-22 PROCEDURE — 99233 SBSQ HOSP IP/OBS HIGH 50: CPT

## 2018-12-22 RX ORDER — ZOLPIDEM TARTRATE 10 MG/1
5 TABLET ORAL ONCE
Qty: 0 | Refills: 0 | Status: DISCONTINUED | OUTPATIENT
Start: 2018-12-22 | End: 2018-12-22

## 2018-12-22 RX ORDER — LANOLIN ALCOHOL/MO/W.PET/CERES
3 CREAM (GRAM) TOPICAL AT BEDTIME
Qty: 0 | Refills: 0 | Status: DISCONTINUED | OUTPATIENT
Start: 2018-12-22 | End: 2018-12-24

## 2018-12-22 RX ORDER — CYCLOBENZAPRINE HYDROCHLORIDE 10 MG/1
1 TABLET, FILM COATED ORAL
Qty: 42 | Refills: 0 | OUTPATIENT
Start: 2018-12-22 | End: 2019-01-04

## 2018-12-22 RX ORDER — TRAMADOL HYDROCHLORIDE 50 MG/1
25 TABLET ORAL ONCE
Qty: 0 | Refills: 0 | Status: DISCONTINUED | OUTPATIENT
Start: 2018-12-22 | End: 2018-12-22

## 2018-12-22 RX ADMIN — Medication 200 MILLIGRAM(S): at 17:25

## 2018-12-22 RX ADMIN — Medication 1 PATCH: at 11:09

## 2018-12-22 RX ADMIN — Medication 300 MILLIGRAM(S): at 17:36

## 2018-12-22 RX ADMIN — OXYCODONE AND ACETAMINOPHEN 2 TABLET(S): 5; 325 TABLET ORAL at 17:00

## 2018-12-22 RX ADMIN — OXYCODONE AND ACETAMINOPHEN 2 TABLET(S): 5; 325 TABLET ORAL at 22:39

## 2018-12-22 RX ADMIN — Medication 1 PATCH: at 07:43

## 2018-12-22 RX ADMIN — ESCITALOPRAM OXALATE 20 MILLIGRAM(S): 10 TABLET, FILM COATED ORAL at 11:09

## 2018-12-22 RX ADMIN — CYCLOBENZAPRINE HYDROCHLORIDE 5 MILLIGRAM(S): 10 TABLET, FILM COATED ORAL at 14:19

## 2018-12-22 RX ADMIN — CYCLOBENZAPRINE HYDROCHLORIDE 5 MILLIGRAM(S): 10 TABLET, FILM COATED ORAL at 21:56

## 2018-12-22 RX ADMIN — Medication 200 MILLIGRAM(S): at 08:36

## 2018-12-22 RX ADMIN — CYCLOBENZAPRINE HYDROCHLORIDE 5 MILLIGRAM(S): 10 TABLET, FILM COATED ORAL at 07:01

## 2018-12-22 RX ADMIN — Medication 25 MILLIGRAM(S): at 07:01

## 2018-12-22 RX ADMIN — Medication 100 MILLIGRAM(S): at 17:26

## 2018-12-22 RX ADMIN — MORPHINE SULFATE 60 MILLIGRAM(S): 50 CAPSULE, EXTENDED RELEASE ORAL at 07:41

## 2018-12-22 RX ADMIN — Medication 3 MILLIGRAM(S): at 02:37

## 2018-12-22 RX ADMIN — OXYCODONE AND ACETAMINOPHEN 2 TABLET(S): 5; 325 TABLET ORAL at 12:00

## 2018-12-22 RX ADMIN — OXYCODONE AND ACETAMINOPHEN 2 TABLET(S): 5; 325 TABLET ORAL at 16:27

## 2018-12-22 RX ADMIN — MORPHINE SULFATE 60 MILLIGRAM(S): 50 CAPSULE, EXTENDED RELEASE ORAL at 07:09

## 2018-12-22 RX ADMIN — PANTOPRAZOLE SODIUM 40 MILLIGRAM(S): 20 TABLET, DELAYED RELEASE ORAL at 07:01

## 2018-12-22 RX ADMIN — CLOPIDOGREL BISULFATE 75 MILLIGRAM(S): 75 TABLET, FILM COATED ORAL at 11:09

## 2018-12-22 RX ADMIN — ZOLPIDEM TARTRATE 5 MILLIGRAM(S): 10 TABLET ORAL at 03:14

## 2018-12-22 RX ADMIN — MORPHINE SULFATE 60 MILLIGRAM(S): 50 CAPSULE, EXTENDED RELEASE ORAL at 17:35

## 2018-12-22 RX ADMIN — Medication 1 PATCH: at 19:54

## 2018-12-22 RX ADMIN — Medication 1 PATCH: at 14:17

## 2018-12-22 RX ADMIN — HEPARIN SODIUM 5000 UNIT(S): 5000 INJECTION INTRAVENOUS; SUBCUTANEOUS at 17:26

## 2018-12-22 RX ADMIN — OXYCODONE AND ACETAMINOPHEN 2 TABLET(S): 5; 325 TABLET ORAL at 11:08

## 2018-12-22 RX ADMIN — Medication 25 MILLIGRAM(S): at 17:27

## 2018-12-22 RX ADMIN — Medication 300 MILLIGRAM(S): at 07:09

## 2018-12-22 NOTE — CONSULT NOTE ADULT - SUBJECTIVE AND OBJECTIVE BOX
JOSE VILLALBA  48y  Female      Patient is a 48y old  Female who presents with a chief complaint of neck pain (21 Dec 2018 18:58)        PAST MEDICAL/SURGICAL HISTORY  PAST MEDICAL & SURGICAL HISTORY:  Renal failure, chronic, stage 3 (moderate)  HTN (hypertension)  Cyclical vomiting  Atrial fibrillation  SLE (systemic lupus erythematosus)  Reflex sympathetic dystrophy  H/O partial thyroidectomy  S/P partial hysterectomy      REVIEW OF SYSTEMS:  CONSTITUTIONAL: No fever, weight loss, or fatigue  RESPIRATORY: No cough, wheezing, chills or hemoptysis; No shortness of breath  CARDIOVASCULAR: No chest pain, palpitations, dizziness, or leg swelling  GASTROINTESTINAL: No abdominal or epigastric pain. No nausea, vomiting, or hematemesis; No diarrhea or constipation  GENITOURINARY: No dysuria, frequency, hematuria, or incontinence  NEUROLOGICAL: No headaches, memory loss, loss of strength, numbness, or tremors  SKIN: No itching, burning, rashes, or lesions   MUSCULOSKELETAL: No joint pain or swelling; No muscle, back, or extremity pain  PSYCHIATRIC: No depression, anxiety, mood swings, or difficulty sleeping    T(C): 36.5 (12-22-18 @ 08:01), Max: 37 (12-21-18 @ 11:34)  HR: 52 (12-22-18 @ 08:01) (52 - 63)  BP: 124/91 (12-22-18 @ 05:31) (111/62 - 140/86)  RR: 18 (12-22-18 @ 08:01) (16 - 18)  SpO2: 100% (12-22-18 @ 08:01) (95% - 100%)  Wt(kg): --Vital Signs Last 24 Hrs  T(C): 36.5 (22 Dec 2018 08:01), Max: 37 (21 Dec 2018 11:34)  T(F): 97.7 (22 Dec 2018 08:01), Max: 98.6 (21 Dec 2018 11:34)  HR: 52 (22 Dec 2018 08:01) (52 - 63)  BP: 124/91 (22 Dec 2018 05:31) (111/62 - 140/86)  BP(mean): --  RR: 18 (22 Dec 2018 08:01) (16 - 18)  SpO2: 100% (22 Dec 2018 08:01) (95% - 100%)    PHYSICAL EXAM:  GENERAL: NAD, well-groomed, well-developed  HEAD:  Atraumatic, Normocephalic  NECK: Supple, No JVD, Normal thyroid  NERVOUS SYSTEM:  Alert & Oriented X3, Good concentration;   CHEST/LUNG: Clear to percussion bilaterally; No rales, rhonchi, wheezing, or rubs  HEART: Regular rate and rhythm; No murmurs, rubs, or gallops  ABDOMEN: Soft, Nontender, Nondistended; Bowel sounds present  EXTREMITIES:  2+ Peripheral Pulses, No clubbing, cyanosis, or edema  MUSCULOSKELETAL:  SKIN: No rashes or lesions    Consultant(s) Notes Reviewed:  [x ] YES  [ ] NO  Care Discussed with Consultants/Other Providers [ x] YES  [ ] NO  ISTOP [ ] Yes  [  ] No      LABS:  CBC   12-22-18 @ 07:17  Hematcorit 42.8  Hemoglobin 13.9  Mean Cell Hemoglobin 29.7  Platelet Count-Automated 276  RBC Count 4.66  Red Cell Distrib Width 12.9  Wbc Count 16.7      BMP  12-22-18 @ 07:17  Anion Gap. Serum 7  Blood Urea Nitrogen,Serm 25  Calcium, Total Serum 8.9  Carbon Dioxide, Serum 28  Chloride, Serum 104  Creatinine, Serum 1.17  eGFR in  63  eGFR in Non Afican American 54  Gloucose, serum 116  Potassium, Serum 4.0  Sodium, Serum 139              12-21-18 @ 08:00  Anion Gap. Serum 8  Blood Urea Nitrogen,Serm 23  Calcium, Total Serum 9.0  Carbon Dioxide, Serum 26  Chloride, Serum 103  Creatinine, Serum 1.31  eGFR in  55  eGFR in Non Afican American 47  Gloucose, serum 166  Potassium, Serum 3.9  Sodium, Serum 137              12-20-18 @ 06:21  Anion Gap. Serum 8  Blood Urea Nitrogen,Serm 14  Calcium, Total Serum 8.9  Carbon Dioxide, Serum 27  Chloride, Serum 105  Creatinine, Serum 1.16  eGFR in  64  eGFR in Non Afican American 55  Gloucose, serum 129  Potassium, Serum 4.2  Sodium, Serum 140                  CMP  12-22-18 @ 07:17  Macy Aminotransferase(ALT/SGPT)--  Albumin, Serum --  Alkaline Phosphatase, Serum --  Anion Gap, Serum 7  Aspartate Aminotransferase (AST/SGOT)--  Bilirubin Total, Serum --  Blood Urea Nitrogen, Serum 25  Calcium,Total Serum 8.9  Carbon Dioxide, Serum 28  Chloride, Serum 104  Creatinine, Serum 1.17  eGFR if  63  eGFR if Non African American 54  Glucose, Serum 116  Potassium, Serum 4.0  Protein Total, Serum --  Sodium, Serum 139                      12-21-18 @ 08:00  Macy Aminotransferase(ALT/SGPT)--  Albumin, Serum --  Alkaline Phosphatase, Serum --  Anion Gap, Serum 8  Aspartate Aminotransferase (AST/SGOT)--  Bilirubin Total, Serum --  Blood Urea Nitrogen, Serum 23  Calcium,Total Serum 9.0  Carbon Dioxide, Serum 26  Chloride, Serum 103  Creatinine, Serum 1.31  eGFR if  55  eGFR if Non African American 47  Glucose, Serum 166  Potassium, Serum 3.9  Protein Total, Serum --  Sodium, Serum 137                      12-20-18 @ 06:21  Macy Aminotransferase(ALT/SGPT)22  Albumin, Serum 3.3  Alkaline Phosphatase, Serum 139  Anion Gap, Serum 8  Aspartate Aminotransferase (AST/SGOT)25  Bilirubin Total, Serum 0.4  Blood Urea Nitrogen, Serum 14  Calcium,Total Serum 8.9  Carbon Dioxide, Serum 27  Chloride, Serum 105  Creatinine, Serum 1.16  eGFR if  64  eGFR if Non African American 55  Glucose, Serum 129  Potassium, Serum 4.2  Protein Total, Serum 7.8  Sodium, Serum 140                          PT/INR      Amylase/Lipase            RADIOLOGY & ADDITIONAL TESTS:    Imaging Personally Reviewed:  [ ] YES  [ ] NO DEEJAY JOSE  48y  Female      Patient is a 48y old  Female who presents with a chief complaint of neck pain (21 Dec 2018 18:58).  48F, lives with  sister, from home, ambulates with walker,  w/ PMHx of cyclic vomiting syndrome,  Reflex sympathetic dystrophy s/p spinal stimulator, frequent falls, SLE (not on tx), Afib (not on AC due to frequent falls), HTN, Depression, Diverticulosis, and partial thyroidectomy on right side, who presents to the ED c/o severe neck pain x 1 week. Pain is sharp, constant, and radiates down to the lumbar spine. Patient recently diagnosed with C4-C5 stenosis w/ impingement, evaluated by ortho spine surgery and discharged home on steroids w/ neck collar. Patient reports falling at home after being discharged form hospital on December 7th, mentions she passed out in the kitchen in front of sister (after standing up), afterwards, neck pain has been unbearable, to the point pain meds are no longer working, and patient got concerned of overdosing, which prompted her to come to the ED. Patient reports left arm paresthesia and finger numbness, which was present from previous admission. Denies any chest pain, palpitations, SOB or any other complaint.    48 year female with a cc of neck pain s/p syncopal episode  Pt with history RSD, CVS, and below  Pt had spinal cord stimulator placed but is currently not active.  Pt recently was diagnosed with cervical deformities which is now longer seen on ct. Pt had a syncopal episode and hit head.  CT neg.  Pain radiates down left arm.  Pt's pain meds were not restarted most likely due to syncopal episode.  Pt is on ms contin 60mg po q 12 hours and percocet po prn.  Pt recently started on flexeril on last admission.  Pt is on max dose of lyrica.  Pt started on solumedrol iv by neuro for migraines.  Last dose - tomorrow.          PAST MEDICAL/SURGICAL HISTORY  PAST MEDICAL & SURGICAL HISTORY:  Renal failure, chronic, stage 3 (moderate)  HTN (hypertension)  Cyclical vomiting  Atrial fibrillation  SLE (systemic lupus erythematosus)  Reflex sympathetic dystrophy  H/O partial thyroidectomy  S/P partial hysterectomy      REVIEW OF SYSTEMS:  CONSTITUTIONAL: No fever, weight loss, or fatigue  RESPIRATORY: No cough, wheezing, chills or hemoptysis; No shortness of breath  CARDIOVASCULAR: No chest pain, palpitations, dizziness, or leg swelling  GASTROINTESTINAL: No abdominal or epigastric pain. No nausea, vomiting, or hematemesis; No diarrhea or constipation  GENITOURINARY: No dysuria, frequency, hematuria, or incontinence  NEUROLOGICAL: + occipital headache, + weakness - legs   MUSCULOSKELETAL: + lower back pain, + neck pain, + left sided neck pain that radiates to shoulder.        T(C): 36.5 (12-22-18 @ 08:01), Max: 37 (12-21-18 @ 11:34)  HR: 52 (12-22-18 @ 08:01) (52 - 63)  BP: 124/91 (12-22-18 @ 05:31) (111/62 - 140/86)  RR: 18 (12-22-18 @ 08:01) (16 - 18)  SpO2: 100% (12-22-18 @ 08:01) (95% - 100%)  Wt(kg): --Vital Signs Last 24 Hrs  T(C): 36.5 (22 Dec 2018 08:01), Max: 37 (21 Dec 2018 11:34)  T(F): 97.7 (22 Dec 2018 08:01), Max: 98.6 (21 Dec 2018 11:34)  HR: 52 (22 Dec 2018 08:01) (52 - 63)  BP: 124/91 (22 Dec 2018 05:31) (111/62 - 140/86)  BP(mean): --  RR: 18 (22 Dec 2018 08:01) (16 - 18)  SpO2: 100% (22 Dec 2018 08:01) (95% - 100%)    PHYSICAL EXAM:  GENERAL: NAD, well-groomed, well-developed  NERVOUS SYSTEM:  Alert & Oriented X3, Good concentration;   CHEST/LUNG: Clear to percussion bilaterally; No rales, rhonchi, wheezing, or rubs  HEART: Regular rate and rhythm; No murmurs, rubs, or gallops  ABDOMEN: Soft, Nontender, Nondistended; Bowel sounds present  EXTREMITIES:  2+ Peripheral Pulses, No clubbing, cyanosis, or edema  MUSCULOSKELETAL: + lumbar spine tenderness,  + neck pain  decreased rom + tenderness - left side.          Consultant(s) Notes Reviewed:  [x ] YES  [ ] NO  Care Discussed with Consultants/Other Providers [ x] YES  [ ] NO  ISTOP [ ] Yes  [  ] No      LABS:  CBC   12-22-18 @ 07:17  Hematcorit 42.8  Hemoglobin 13.9  Mean Cell Hemoglobin 29.7  Platelet Count-Automated 276  RBC Count 4.66  Red Cell Distrib Width 12.9  Wbc Count 16.7      BMP  12-22-18 @ 07:17  Anion Gap. Serum 7  Blood Urea Nitrogen,Serm 25  Calcium, Total Serum 8.9  Carbon Dioxide, Serum 28  Chloride, Serum 104  Creatinine, Serum 1.17  eGFR in  63  eGFR in Non Afican American 54  Gloucose, serum 116  Potassium, Serum 4.0  Sodium, Serum 139              12-21-18 @ 08:00  Anion Gap. Serum 8  Blood Urea Nitrogen,Serm 23  Calcium, Total Serum 9.0  Carbon Dioxide, Serum 26  Chloride, Serum 103  Creatinine, Serum 1.31  eGFR in  55  eGFR in Non Afican American 47  Gloucose, serum 166  Potassium, Serum 3.9  Sodium, Serum 137              12-20-18 @ 06:21  Anion Gap. Serum 8  Blood Urea Nitrogen,Serm 14  Calcium, Total Serum 8.9  Carbon Dioxide, Serum 27  Chloride, Serum 105  Creatinine, Serum 1.16  eGFR in  64  eGFR in Non Afican American 55  Gloucose, serum 129  Potassium, Serum 4.2  Sodium, Serum 140                  CMP  12-22-18 @ 07:17  Macy Aminotransferase(ALT/SGPT)--  Albumin, Serum --  Alkaline Phosphatase, Serum --  Anion Gap, Serum 7  Aspartate Aminotransferase (AST/SGOT)--  Bilirubin Total, Serum --  Blood Urea Nitrogen, Serum 25  Calcium,Total Serum 8.9  Carbon Dioxide, Serum 28  Chloride, Serum 104  Creatinine, Serum 1.17  eGFR if  63  eGFR if Non African American 54  Glucose, Serum 116  Potassium, Serum 4.0  Protein Total, Serum --  Sodium, Serum 139                      12-21-18 @ 08:00  Macy Aminotransferase(ALT/SGPT)--  Albumin, Serum --  Alkaline Phosphatase, Serum --  Anion Gap, Serum 8  Aspartate Aminotransferase (AST/SGOT)--  Bilirubin Total, Serum --  Blood Urea Nitrogen, Serum 23  Calcium,Total Serum 9.0  Carbon Dioxide, Serum 26  Chloride, Serum 103  Creatinine, Serum 1.31  eGFR if  55  eGFR if Non African American 47  Glucose, Serum 166  Potassium, Serum 3.9  Protein Total, Serum --  Sodium, Serum 137                      12-20-18 @ 06:21  Macy Aminotransferase(ALT/SGPT)22  Albumin, Serum 3.3  Alkaline Phosphatase, Serum 139  Anion Gap, Serum 8  Aspartate Aminotransferase (AST/SGOT)25  Bilirubin Total, Serum 0.4  Blood Urea Nitrogen, Serum 14  Calcium,Total Serum 8.9  Carbon Dioxide, Serum 27  Chloride, Serum 105  Creatinine, Serum 1.16  eGFR if  64  eGFR if Non African American 55  Glucose, Serum 129  Potassium, Serum 4.2  Protein Total, Serum 7.8  Sodium, Serum 140                          PT/INR      Amylase/Lipase            RADIOLOGY & ADDITIONAL TESTS:  < from: US Duplex Carotid Arteries Complete, Bilateral (12.19.18 @ 13:00) >    EXAM:  US DPLX CAROTIDS COMPL BI                            PROCEDURE DATE:  12/19/2018          INTERPRETATION:  EXAM: US DPLX CAROTIDS COMPL BI   INDICATION: 48 years old. Female. Syncope r/o stenosis.  COMPARISON: None available.    TECHNIQUE: Multiple static images from duplex sonography of the   extracranial carotid system, including color and spectral Doppler   interrogation, were submitted for evaluation.      FINDINGS:    RIGHT:  Atheromatous plaque in the proximal right ICA: mild.  ICAPSV: 50 cm/s.  ICA EDV: 21 cm/s.  ICA/CCA ratio: 0.9.  Vertebral Artery: Antegrade flow.  ECA: No significant stenosis.    LEFT:  Atheromatous plaque in the proximal left ICA: mild.  ICA PSV: 85 cm/s.  ICA EDV: 41 cm/s.  ICA/CCA ratio: 1.3.  Vertebral Artery: Antegrade flow.  ECA: No significant stenosis.    IMPRESSION:  No velocity evidence of hemodynamically significant stenosis in either   cervical internal carotid artery by NASCET velocity criteria.    < end of copied text >      < from: CT Lumbar Spine No Cont (12.18.18 @ 20:38) >    EXAM:  CT LUMBAR SPINE                          EXAM:  CT THORACIC SPINE                            PROCEDURE DATE:  12/18/2018          INTERPRETATION:  CT thoracic and lumbar spine without contrast    History pain after injury    A spinal stimulating device is noted with the superior tip in the mid   thoracic spine. There is no vertebral compression deformity or   subluxation or osseous destruction. There is no pars defect. There is   mild ventral disc space narrowing with small syndesmophytes in the mid   thoracic levels. Other disc levels are seen. There is no CT evidence of   epidural mass or collection. Ligamentous hypertrophy results in mild   spinal stenosis at the L4-5 level and to a lesser degree L3-L4.. The   spinal canal is patent elsewhere.    Note is made of a nodule in the left lobe of the thyroid measuring 3 cm   in long axis dimension.    IMPRESSION: No acute findings    < end of copied text >  Imaging Personally Reviewed:  [ ] YES  [ ] NO

## 2018-12-22 NOTE — CHART NOTE - NSCHARTNOTEFT_GEN_A_CORE
Pt c/o breakthrough chronic jaw pain not managed with previously administered pain meds. Gave one dose tramadol 25 mg PO. D/w senior resident.

## 2018-12-22 NOTE — PROGRESS NOTE ADULT - SUBJECTIVE AND OBJECTIVE BOX
CHIEF COMPLAINT:Patient is a 48y old  Female who presents with a chief complaint of neck pain.Pt appears comfortable.    	  REVIEW OF SYSTEMS:  CONSTITUTIONAL: No fever, weight loss, or fatigue  EYES: No eye pain, visual disturbances, or discharge  ENT:  No difficulty hearing, tinnitus, vertigo; No sinus or throat pain  NECK: No pain or stiffness  RESPIRATORY: No cough, wheezing, chills or hemoptysis; No Shortness of Breath  CARDIOVASCULAR: No chest pain, palpitations, passing out, dizziness, or leg swelling  GASTROINTESTINAL: No abdominal or epigastric pain. No nausea, vomiting, or hematemesis; No diarrhea or constipation. No melena or hematochezia.  GENITOURINARY: No dysuria, frequency, hematuria, or incontinence  NEUROLOGICAL: No headaches, memory loss, loss of strength, numbness, or tremors  SKIN: No itching, burning, rashes, or lesions   LYMPH Nodes: No enlarged glands  ENDOCRINE: No heat or cold intolerance; No hair loss  MUSCULOSKELETAL: No joint pain or swelling; No muscle, back, or extremity pain  PSYCHIATRIC: No depression, anxiety, mood swings, or difficulty sleeping  HEME/LYMPH: No easy bruising, or bleeding gums  ALLERGY AND IMMUNOLOGIC: No hives or eczema	      PHYSICAL EXAM:  T(C): 36.5 (12-22-18 @ 08:01), Max: 37 (12-21-18 @ 11:34)  HR: 52 (12-22-18 @ 08:01) (52 - 63)  BP: 124/91 (12-22-18 @ 05:31) (111/62 - 140/86)  RR: 18 (12-22-18 @ 08:01) (16 - 18)  SpO2: 100% (12-22-18 @ 08:01) (95% - 100%)      Appearance: Normal	  HEENT:   Normal oral mucosa, PERRL, EOMI	  Lymphatic: No lymphadenopathy  Cardiovascular: Normal S1 S2, No JVD, No murmurs, No edema  Respiratory: Lungs clear to auscultation	  Psychiatry: A & O x 3, Mood & affect appropriate  Gastrointestinal:  Soft, Non-tender, + BS	  Skin: No rashes, No ecchymoses, No cyanosis	  Neurologic: Non-focal  Extremities: Normal range of motion, No clubbing, cyanosis or edema  Vascular: Peripheral pulses palpable 2+ bilaterally    MEDICATIONS  (STANDING):  atorvastatin 40 milliGRAM(s) Oral at bedtime  clopidogrel Tablet 75 milliGRAM(s) Oral daily  cyclobenzaprine 5 milliGRAM(s) Oral every 8 hours  docusate sodium 100 milliGRAM(s) Oral two times a day  escitalopram 20 milliGRAM(s) Oral daily  heparin  Injectable 5000 Unit(s) SubCutaneous every 12 hours  insulin lispro (HumaLOG) corrective regimen sliding scale   SubCutaneous three times a day before meals  melatonin 3 milliGRAM(s) Oral at bedtime  methylPREDNISolone sodium succinate IVPB 500 milliGRAM(s) IV Intermittent every 12 hours  metoprolol tartrate 25 milliGRAM(s) Oral two times a day  morphine ER Tablet 60 milliGRAM(s) Oral two times a day  nicotine -  14 mG/24Hr(s) Patch 1 patch Transdermal daily  pantoprazole    Tablet 40 milliGRAM(s) Oral before breakfast  pregabalin 300 milliGRAM(s) Oral every 12 hours        	  LABS:	 	                        13.9   16.7  )-----------( 276      ( 22 Dec 2018 07:17 )             42.8     12-22    139  |  104  |  25<H>  ----------------------------<  116<H>  4.0   |  28  |  1.17    Ca    8.9      22 Dec 2018 07:17        Lipid Profile: Cholesterol 158  LDL 93  HDL 48  TG 83    HgA1c: Hemoglobin A1C, Whole Blood: 5.6 % (12-05 @ 13:36)    TSH: Thyroid Stimulating Hormone, Serum: 0.57 uU/mL (12-05 @ 10:43)

## 2018-12-22 NOTE — PROGRESS NOTE ADULT - SUBJECTIVE AND OBJECTIVE BOX
Patient is a 48y old  Female who presents with a chief complaint of neck pain (22 Dec 2018 08:28)    pt seen in tele [x  ], reg med floor [   ], bed [x  ], chair at bedside [   ], a+o x3 [ x ], lethargic [  ],  nad [x  ]      Allergies    aspirin (Unknown)  cortisone (Other)  latex (Unknown)  sulfa drugs (Unknown)        Vitals    T(F): 97.7 (12-22-18 @ 08:01), Max: 98.6 (12-21-18 @ 11:34)  HR: 52 (12-22-18 @ 08:01) (52 - 63)  BP: 124/91 (12-22-18 @ 05:31) (111/62 - 140/86)  RR: 18 (12-22-18 @ 08:01) (16 - 18)  SpO2: 100% (12-22-18 @ 08:01) (95% - 100%)  Wt(kg): --  CAPILLARY BLOOD GLUCOSE      POCT Blood Glucose.: 108 mg/dL (22 Dec 2018 07:51)      Labs                          13.9   16.7  )-----------( 276      ( 22 Dec 2018 07:17 )             42.8       12-22    139  |  104  |  25<H>  ----------------------------<  116<H>  4.0   |  28  |  1.17    Ca    8.9      22 Dec 2018 07:17          Radiology Results      Meds    MEDICATIONS  (STANDING):  atorvastatin 40 milliGRAM(s) Oral at bedtime  clopidogrel Tablet 75 milliGRAM(s) Oral daily  cyclobenzaprine 5 milliGRAM(s) Oral every 8 hours  docusate sodium 100 milliGRAM(s) Oral two times a day  escitalopram 20 milliGRAM(s) Oral daily  heparin  Injectable 5000 Unit(s) SubCutaneous every 12 hours  insulin lispro (HumaLOG) corrective regimen sliding scale   SubCutaneous three times a day before meals  melatonin 3 milliGRAM(s) Oral at bedtime  methylPREDNISolone sodium succinate IVPB 500 milliGRAM(s) IV Intermittent every 12 hours  metoprolol tartrate 25 milliGRAM(s) Oral two times a day  morphine ER Tablet 60 milliGRAM(s) Oral two times a day  nicotine -  14 mG/24Hr(s) Patch 1 patch Transdermal daily  pantoprazole    Tablet 40 milliGRAM(s) Oral before breakfast  pregabalin 300 milliGRAM(s) Oral every 12 hours      MEDICATIONS  (PRN):  ondansetron Injectable 4 milliGRAM(s) IV Push every 4 hours PRN Nausea and/or Vomiting  oxyCODONE    5 mG/acetaminophen 325 mG 2 Tablet(s) Oral every 6 hours PRN Moderate Pain (4 - 6)  senna 2 Tablet(s) Oral at bedtime PRN Constipation      Physical Exam      Neuro :  no focal deficits  Respiratory: CTA B/L  CV: RRR, S1S2, no murmurs,   Abdominal: Soft, NT, ND +BS,  Extremities: No edema, + peripheral pulses    ASSESSMENT      syncope,   r/o arythmia,   r/o cns patho,   cervical radiculopathy,   h/o C4-C5 stenosis w/ impingement,   cyclic vomiting syndrome,    Reflex sympathetic dystrophy s/p spinal stimulator,   frequent falls,   SLE (not on tx),   Afib (not on AC due to frequent falls),   HTN,   Depression,   Diverticulosis,   partial right thyroidectomy  Renal failure, chronic, stage 3 (moderate)  s/p partial hysterectomy      PLAN      cont tele,   cont aspirin, statin,  cardio cons  stress test neg noted  echo with EF = 60 to65% by visual estimation). Grade II diastolic dysfunction noted  am cortisol wnl noted   cont pain control  orthoststic bp neg  neuro f/u noted  pt with Status migrainosus:  Treat patient with methylprednisolone 500mg IV BID 4/ 5 days to help resolve current migraine.  Hold prednisone as recommended by ortho consult while methylprednisolone is administered;   resume prednisone once methylprednisolone course is complete; no need for taper due to short duration of high-dose steroids  for Chronic neuropathic pain  Continue pregabalin 300mg PO BID - Already at a high dose, do not increase  No further imaging indicated;   decision on surgical intervention as per ortho consult  ortho spine cons noted  Daily PT  Conservative management at this time   No orthopedics surgical intervention  pain mgmt eval noted  d/c morphine  cont ms contin 60mg bid prn pain 7-10  cont percocet prn breakthrough pain  cont current meds   d/c plan for am

## 2018-12-22 NOTE — PROGRESS NOTE ADULT - SUBJECTIVE AND OBJECTIVE BOX
STILL FEELS HEADACHE AND NECK PAIN, BUT ABLE TO WALK WITH WALKER AND WITHOUT STAFF MEMBER ASSISTANCE  COMPLETE METHYLPREDNISOLONE 500MG IV BID X 5 DAYS (TODAY IS DAY 4/5)  FOLLOW PAIN MGMT RECS FOR REST OF PAIN CONTROL    TO BE COMPLETED    Neurology Follow up note    Name  JOSE VILLALBA    HPI:  48F, lives with  sister, from home, ambulates with walker,  w/ PMHx of cyclic vomiting syndrome,  Reflex sympathetic dystrophy s/p spinal stimulator, frequent falls, SLE (not on tx), Afib (not on AC due to frequent falls), HTN, Depression, Diverticulosis, and partial thyroidectomy on right side, who presents to the ED c/o severe neck pain x 1 week. Pain is sharp, constant, and radiates down to the lumbar spine. Patient recently diagnosed with C4-C5 stenosis w/ impingement, evaluated by ortho spine surgery and discharged home on steroids w/ neck collar. Patient reports falling at home after being discharged form hospital on December 7th, mentions she passed out in the kitchen in front of sister (after standing up), afterwards, neck pain has been unbearable, to the point pain meds are no longer working, and patient got concerned of overdosing, which prompted her to come to the ED. Patient reports left arm paresthesia and finger numbness, which was present from previous admission. Denies any chest pain, palpitations, SOB or any other complaint (19 Dec 2018 04:54)      Interval History -        Subjective:        MEDICATIONS  (STANDING):  atorvastatin 40 milliGRAM(s) Oral at bedtime  clopidogrel Tablet 75 milliGRAM(s) Oral daily  cyclobenzaprine 5 milliGRAM(s) Oral every 8 hours  docusate sodium 100 milliGRAM(s) Oral two times a day  escitalopram 20 milliGRAM(s) Oral daily  heparin  Injectable 5000 Unit(s) SubCutaneous every 12 hours  insulin lispro (HumaLOG) corrective regimen sliding scale   SubCutaneous three times a day before meals  melatonin 3 milliGRAM(s) Oral at bedtime  methylPREDNISolone sodium succinate IVPB 500 milliGRAM(s) IV Intermittent every 12 hours  metoprolol tartrate 25 milliGRAM(s) Oral two times a day  morphine ER Tablet 60 milliGRAM(s) Oral two times a day  nicotine -  14 mG/24Hr(s) Patch 1 patch Transdermal daily  pantoprazole    Tablet 40 milliGRAM(s) Oral before breakfast  pregabalin 300 milliGRAM(s) Oral every 12 hours    MEDICATIONS  (PRN):  ondansetron Injectable 4 milliGRAM(s) IV Push every 4 hours PRN Nausea and/or Vomiting  oxyCODONE    5 mG/acetaminophen 325 mG 2 Tablet(s) Oral every 6 hours PRN Moderate Pain (4 - 6)  senna 2 Tablet(s) Oral at bedtime PRN Constipation      Allergies    aspirin (Unknown)  cortisone (Other)  latex (Unknown)  sulfa drugs (Unknown)    Intolerances        Review of Systems:  General: [ ] None, [ ] chills, [ ]fatigue, [ ] fevers  Skin: [ ] None, [ ] rash   HEENT: [ ] None, [ ] head injury, [ ] blurred vision, [ ] double vision, [ ] eye pain, [ ] visual loss, [ ] hearing loss, [ ] deafness, [ ] ear pain, [ ] ringing in the ears, [ ] vertigo, [ ] sinus pain, [ ] voice changes  Neck: [ ] None, [ ] neck stiffness  Respiratory: [ ] None, [ ] cough, [ ] difficulty breathing  Cardiovascular: [ ] None, [ ] calf cramps, [ ] chest pain, [ ] leg pain, [ ] swelling, [ ] rapid heart rate, [ ] shortness of breath  Gastrointestinal: [ ] None, [ ] abdominal pain, [ ] nausea, [ ] vomiting  Musculoskeletal: [ ] None, [ ] back pain, [ ] joint pain, [ ] joint stiffness, [ ] leg cramps, [ ] muscle atrophy, [ ] muscle cramps, [ ] muscle weakness, [ ] swelling of extremities  Neurological: [ ] None, [ ] Dizziness, [ ] decreased memory, [ ] fainting, [ ] focal neurological symptoms, [ ] headaches, [ ] incontinence of stool, [ ] incontinence of urine, [ ] loss of consciousness, [ ] numbness, [ ] seizures, [ ] spinning sensation, [ ] stroke, [ ] trouble walking, [ ] unsteadiness, [ ] visual changes, [ ] weakness  Psychiatric: [ ] None,  [ ] depression, [ ] anxiety, [ ] hallucinations, [ ] inability to concentrate, [ ] mood changes, [ ] panic attacks  Hematology: [ ] None,  [ ] blood clots, [ ] spontaneous bleeding      Objective:   Vital Signs Last 24 Hrs  T(C): 36.7 (22 Dec 2018 11:50), Max: 36.8 (21 Dec 2018 15:35)  T(F): 98 (22 Dec 2018 11:50), Max: 98.3 (21 Dec 2018 15:35)  HR: 64 (22 Dec 2018 11:50) (52 - 64)  BP: 157/97 (22 Dec 2018 11:50) (111/62 - 157/97)  BP(mean): --  RR: 16 (22 Dec 2018 11:50) (16 - 18)  SpO2: 100% (22 Dec 2018 11:50) (95% - 100%)    General Exam:   General appearance: No acute distress                 Cardiovascular: Pedal dorsalis pulses intact bilaterally    Neurological Exam:  Mental Status: Orientated to self, date and place.  Attention intact.  No dysarthria, aphasia or neglect.  Knowledge intact.  Registration intact.  Short and long term memory grossly intact.      Cranial Nerves: CN I - not tested.  PERRL, EOMI, VFF, no nystagmus or diplopia.  No APD.  Fundi not visualized bilaterally.  CN V1-3 intact to light touch and pinprick.  No facial asymmetry.  Hearing intact to finger rub bilaterally.  Tongue, uvula and palate midline.  Sternocleidomastoid and Trapezius intact bilaterally.    Motor:   Tone: normal.                  Strength: intact throughout  Pronator drift: none                 Dysmeria: None to finger-nose-finger or heel-shin-heel  No truncal ataxia.    Tremor: No resting, postural or action tremor.  No myoclonus.    Sensation: intact to light touch, pinprick, vibration and proprioception    Deep Tendon Reflexes: 1+ bilateral biceps, triceps, brachioradialis, knee and ankle  Toes flexor bilaterally    Gait: normal and stable.      Other:    12-22    139  |  104  |  25<H>  ----------------------------<  116<H>  4.0   |  28  |  1.17    Ca    8.9      22 Dec 2018 07:17      12-22    139  |  104  |  25<H>  ----------------------------<  116<H>  4.0   |  28  |  1.17    Ca    8.9      22 Dec 2018 07:17          Radiology    EKG:  tele:  TTE:  EEG:                 Please contact the Neurology consult service with any questions.    Horacio Hopkins MD  Neurology Attending  [facility] Neurology Follow up note    Name  JOSE VILLALBA    INTERVAL HISTORY:  The patient states that her migraine has resolved, and that her neck pain has improved. She has slightly improved range of motion with her neck, but still requires a soft cervical collar for comfort. She has been able to walk on the unit today without requiring a walker. She has not had new fever, cough, shortness of breath, palpitations, chest/abdominal pain, nausea, vomiting, or changes in urinary or bowel habits.    MEDICATIONS  (STANDING):  atorvastatin 40 milliGRAM(s) Oral at bedtime  clopidogrel Tablet 75 milliGRAM(s) Oral daily  cyclobenzaprine 5 milliGRAM(s) Oral every 8 hours  docusate sodium 100 milliGRAM(s) Oral two times a day  escitalopram 20 milliGRAM(s) Oral daily  heparin  Injectable 5000 Unit(s) SubCutaneous every 12 hours  insulin lispro (HumaLOG) corrective regimen sliding scale   SubCutaneous three times a day before meals  melatonin 3 milliGRAM(s) Oral at bedtime  methylPREDNISolone sodium succinate IVPB 500 milliGRAM(s) IV Intermittent every 12 hours  metoprolol tartrate 25 milliGRAM(s) Oral two times a day  morphine ER Tablet 60 milliGRAM(s) Oral two times a day  nicotine -  14 mG/24Hr(s) Patch 1 patch Transdermal daily  pantoprazole    Tablet 40 milliGRAM(s) Oral before breakfast  pregabalin 300 milliGRAM(s) Oral every 12 hours    MEDICATIONS  (PRN):  ondansetron Injectable 4 milliGRAM(s) IV Push every 4 hours PRN Nausea and/or Vomiting  oxyCODONE    5 mG/acetaminophen 325 mG 2 Tablet(s) Oral every 6 hours PRN Moderate Pain (4 - 6)  senna 2 Tablet(s) Oral at bedtime PRN Constipation    Allergies:  aspirin (Unknown)  cortisone (Other)  latex (Unknown)  sulfa drugs (Unknown)    Review of Systems: Fourteen systems reviewed and negative except as in Interval History.    Objective:   Vital Signs Last 24 Hrs  T(C): 36.7 (22 Dec 2018 11:50), Max: 36.8 (21 Dec 2018 15:35)  T(F): 98 (22 Dec 2018 11:50), Max: 98.3 (21 Dec 2018 15:35)  HR: 64 (22 Dec 2018 11:50) (52 - 64)  BP: 157/97 (22 Dec 2018 11:50) (111/62 - 157/97)  BP(mean): --  RR: 16 (22 Dec 2018 11:50) (16 - 18)  SpO2: 100% (22 Dec 2018 11:50) (95% - 100%)    General:  Appearance is consistent with chronologic age.  No abnormal facies.  HEENT: Mild proptosis present b/l  Respiratory: Diminished breath sounds b/l; No crackles  Cardiovascular: Irregularly irregular rhythm; No murmurs; Full b/l radial and pedal pulses    Neurologic Examination:  General: The patient is oriented to person, place, and date.  Naming and repetition intact.  No dysarthria or aphasia.  Cranial nerves: VFF x 4.  EOMI x 2, No nystagmus.  Pupils constricted at baseline, and do not further constrict in response to light.  No ptosis/weakness of eyelid closure.  Intact to light touch and pinprick at b/l V1-V3.  No facial asymmetry.  Hearing grossly intact to finger rub b/l.  Palate elevates midline.  Tongue protrudes midline.  Able to turn neck to right up to 60 degrees and to left up to 30 degrees. At least 3/5 strength with b/l trapezius.  Motor examination:   Normal bulk x 4, Normal tone in b/l UE, Diminished tone in b/l LE.  5/5 strength throughout RUE, At least 4/5 strength throughout LUE (limited by neck pain), At least 1/5 strength throughout b/l LE (limited by lower back pain).  No spasticity or rigidity x 4.  Reflexes:   2+ and symmetric at b/l biceps.  1+ and symmetric at b/l triceps, brachioradialis, patellae and Achilles.  Plantar response mute b/l.  Sensory examination:   Diminished to light touch and pinprick in LUE. Intact to light touch and pinprick in RUE and b/l LE.  Negative Romberg.  Coordination:   No dysmetria with b/l finger-to-nose testing and heel raise testing.  Gait: Narrow-based, Antalgic.  Can ambulate without walker, but has difficulty initiating tiptoe, heel, and tandem gaits.    Labs:    12-22    139  |  104  |  25<H>  ----------------------------<  116<H>  4.0   |  28  |  1.17    Ca    8.9      22 Dec 2018 07:17      No new neuroimaging.      Assessment:  49 y/o ambidextrous woman with status migrainosus and cervical pain in the setting of cervical spinal stenosis and degenerative disease.    Recommendations:    1. Status migrainosus:  - Complete methylprednisolone 500mg IV BID today (Day 5/5).  - Starting tomorrow, resume daily prednisone as recommended by ortho consult, with plan to taper off later.  - Maintain GI prophylaxis and sleep aids while steroids are administered    2. Chronic neuropathic pain  - Continue pregabalin 300mg PO BID  - Continue to replicate patient's home medication list as best as possible while inpatient.  - Follow up further recommendations by Pain Management consultation.    3. Continue PT/OT.      Please contact the Neurology consult service with any questions.    Horacio Hopkins MD  Neurology Attending  [facility]

## 2018-12-22 NOTE — CONSULT NOTE ADULT - PROBLEM SELECTOR RECOMMENDATION 9
- ms contin 60mg po q 12 hours  - percocet po prn  - lyrica 300mg po q 12 hours  - solumedrol as per ID  -  follow up with dr. borges and dr. arias as oupt  - oob  - neck collar prn  - stool softeners  - flexeril 5mg po q 8 hours

## 2018-12-22 NOTE — PROGRESS NOTE ADULT - ASSESSMENT
48 yr old Female, lives with  sister, from home, ambulates with walker,  w/ PMHx of cyclic vomiting syndrome,  Reflex sympathetic dystrophy s/p spinal stimulator, frequent falls, SLE (not on tx), Parox Afib (not on AC due to frequent falls), HTN, Depression, Diverticulosis, and partial thyroidectomy on right side, who presents to the ED c/o severe neck pain x 1 week and s/p syncope.  1.Tele monitoring.  2.Ortho spine eval noted.  3.Parox Afib-fall/bleed risk on asa,plavix.  4.Lipid d/o-statin.  5.GI and DVT prophylaxis.

## 2018-12-23 LAB
ANION GAP SERPL CALC-SCNC: 5 MMOL/L — SIGNIFICANT CHANGE UP (ref 5–17)
BUN SERPL-MCNC: 26 MG/DL — HIGH (ref 7–18)
CALCIUM SERPL-MCNC: 8.5 MG/DL — SIGNIFICANT CHANGE UP (ref 8.4–10.5)
CHLORIDE SERPL-SCNC: 103 MMOL/L — SIGNIFICANT CHANGE UP (ref 96–108)
CO2 SERPL-SCNC: 30 MMOL/L — SIGNIFICANT CHANGE UP (ref 22–31)
CREAT SERPL-MCNC: 1.1 MG/DL — SIGNIFICANT CHANGE UP (ref 0.5–1.3)
GLUCOSE BLDC GLUCOMTR-MCNC: 117 MG/DL — HIGH (ref 70–99)
GLUCOSE BLDC GLUCOMTR-MCNC: 122 MG/DL — HIGH (ref 70–99)
GLUCOSE BLDC GLUCOMTR-MCNC: 145 MG/DL — HIGH (ref 70–99)
GLUCOSE BLDC GLUCOMTR-MCNC: 151 MG/DL — HIGH (ref 70–99)
GLUCOSE SERPL-MCNC: 128 MG/DL — HIGH (ref 70–99)
HCT VFR BLD CALC: 42.7 % — SIGNIFICANT CHANGE UP (ref 34.5–45)
HGB BLD-MCNC: 13.7 G/DL — SIGNIFICANT CHANGE UP (ref 11.5–15.5)
MCHC RBC-ENTMCNC: 29.3 PG — SIGNIFICANT CHANGE UP (ref 27–34)
MCHC RBC-ENTMCNC: 32 GM/DL — SIGNIFICANT CHANGE UP (ref 32–36)
MCV RBC AUTO: 91.5 FL — SIGNIFICANT CHANGE UP (ref 80–100)
PLATELET # BLD AUTO: 269 K/UL — SIGNIFICANT CHANGE UP (ref 150–400)
POTASSIUM SERPL-MCNC: 4 MMOL/L — SIGNIFICANT CHANGE UP (ref 3.5–5.3)
POTASSIUM SERPL-SCNC: 4 MMOL/L — SIGNIFICANT CHANGE UP (ref 3.5–5.3)
RBC # BLD: 4.66 M/UL — SIGNIFICANT CHANGE UP (ref 3.8–5.2)
RBC # FLD: 13.1 % — SIGNIFICANT CHANGE UP (ref 10.3–14.5)
SODIUM SERPL-SCNC: 138 MMOL/L — SIGNIFICANT CHANGE UP (ref 135–145)
WBC # BLD: 11.4 K/UL — HIGH (ref 3.8–10.5)
WBC # FLD AUTO: 11.4 K/UL — HIGH (ref 3.8–10.5)

## 2018-12-23 RX ORDER — ZOLPIDEM TARTRATE 10 MG/1
5 TABLET ORAL ONCE
Qty: 0 | Refills: 0 | Status: DISCONTINUED | OUTPATIENT
Start: 2018-12-23 | End: 2018-12-23

## 2018-12-23 RX ADMIN — CYCLOBENZAPRINE HYDROCHLORIDE 5 MILLIGRAM(S): 10 TABLET, FILM COATED ORAL at 21:43

## 2018-12-23 RX ADMIN — Medication 1 PATCH: at 21:41

## 2018-12-23 RX ADMIN — ZOLPIDEM TARTRATE 5 MILLIGRAM(S): 10 TABLET ORAL at 22:30

## 2018-12-23 RX ADMIN — MORPHINE SULFATE 60 MILLIGRAM(S): 50 CAPSULE, EXTENDED RELEASE ORAL at 06:02

## 2018-12-23 RX ADMIN — OXYCODONE AND ACETAMINOPHEN 2 TABLET(S): 5; 325 TABLET ORAL at 10:15

## 2018-12-23 RX ADMIN — Medication 300 MILLIGRAM(S): at 06:02

## 2018-12-23 RX ADMIN — OXYCODONE AND ACETAMINOPHEN 2 TABLET(S): 5; 325 TABLET ORAL at 09:27

## 2018-12-23 RX ADMIN — OXYCODONE AND ACETAMINOPHEN 2 TABLET(S): 5; 325 TABLET ORAL at 00:16

## 2018-12-23 RX ADMIN — PANTOPRAZOLE SODIUM 40 MILLIGRAM(S): 20 TABLET, DELAYED RELEASE ORAL at 06:02

## 2018-12-23 RX ADMIN — OXYCODONE AND ACETAMINOPHEN 2 TABLET(S): 5; 325 TABLET ORAL at 15:37

## 2018-12-23 RX ADMIN — Medication 25 MILLIGRAM(S): at 06:02

## 2018-12-23 RX ADMIN — Medication 200 MILLIGRAM(S): at 06:02

## 2018-12-23 RX ADMIN — Medication 100 MILLIGRAM(S): at 06:02

## 2018-12-23 RX ADMIN — Medication 200 MILLIGRAM(S): at 17:27

## 2018-12-23 RX ADMIN — CYCLOBENZAPRINE HYDROCHLORIDE 5 MILLIGRAM(S): 10 TABLET, FILM COATED ORAL at 06:02

## 2018-12-23 RX ADMIN — HEPARIN SODIUM 5000 UNIT(S): 5000 INJECTION INTRAVENOUS; SUBCUTANEOUS at 17:28

## 2018-12-23 RX ADMIN — ESCITALOPRAM OXALATE 20 MILLIGRAM(S): 10 TABLET, FILM COATED ORAL at 11:42

## 2018-12-23 RX ADMIN — CLOPIDOGREL BISULFATE 75 MILLIGRAM(S): 75 TABLET, FILM COATED ORAL at 11:42

## 2018-12-23 RX ADMIN — Medication 25 MILLIGRAM(S): at 17:30

## 2018-12-23 RX ADMIN — Medication 1 PATCH: at 09:24

## 2018-12-23 RX ADMIN — Medication 1: at 12:35

## 2018-12-23 RX ADMIN — MORPHINE SULFATE 60 MILLIGRAM(S): 50 CAPSULE, EXTENDED RELEASE ORAL at 07:11

## 2018-12-23 RX ADMIN — Medication 300 MILLIGRAM(S): at 18:11

## 2018-12-23 RX ADMIN — OXYCODONE AND ACETAMINOPHEN 2 TABLET(S): 5; 325 TABLET ORAL at 22:31

## 2018-12-23 RX ADMIN — MORPHINE SULFATE 60 MILLIGRAM(S): 50 CAPSULE, EXTENDED RELEASE ORAL at 18:11

## 2018-12-23 RX ADMIN — Medication 1 PATCH: at 11:42

## 2018-12-23 RX ADMIN — Medication 1 PATCH: at 11:43

## 2018-12-23 RX ADMIN — Medication 100 MILLIGRAM(S): at 17:30

## 2018-12-23 RX ADMIN — OXYCODONE AND ACETAMINOPHEN 2 TABLET(S): 5; 325 TABLET ORAL at 21:50

## 2018-12-23 RX ADMIN — CYCLOBENZAPRINE HYDROCHLORIDE 5 MILLIGRAM(S): 10 TABLET, FILM COATED ORAL at 13:49

## 2018-12-23 RX ADMIN — ZOLPIDEM TARTRATE 5 MILLIGRAM(S): 10 TABLET ORAL at 02:18

## 2018-12-23 NOTE — PROGRESS NOTE ADULT - SUBJECTIVE AND OBJECTIVE BOX
CHIEF COMPLAINT:Patient is a 48y old  Female who presents with a chief complaint of neck pain .Pt appears comfortable.    	  REVIEW OF SYSTEMS:  CONSTITUTIONAL: No fever, weight loss, or fatigue  EYES: No eye pain, visual disturbances, or discharge  ENT:  No difficulty hearing, tinnitus, vertigo; No sinus or throat pain  NECK: No pain or stiffness  RESPIRATORY: No cough, wheezing, chills or hemoptysis; No Shortness of Breath  CARDIOVASCULAR: No chest pain, palpitations, passing out, dizziness, or leg swelling  GASTROINTESTINAL: No abdominal or epigastric pain. No nausea, vomiting, or hematemesis; No diarrhea or constipation. No melena or hematochezia.  GENITOURINARY: No dysuria, frequency, hematuria, or incontinence  NEUROLOGICAL: No headaches, memory loss, loss of strength, numbness, or tremors  SKIN: No itching, burning, rashes, or lesions   LYMPH Nodes: No enlarged glands  ENDOCRINE: No heat or cold intolerance; No hair loss  MUSCULOSKELETAL: No joint pain or swelling; No muscle, back, or extremity pain  PSYCHIATRIC: No depression, anxiety, mood swings, or difficulty sleeping  HEME/LYMPH: No easy bruising, or bleeding gums  ALLERGY AND IMMUNOLOGIC: No hives or eczema	      PHYSICAL EXAM:  T(C): 37 (12-23-18 @ 08:23), Max: 37 (12-23-18 @ 08:23)  HR: 63 (12-23-18 @ 08:23) (53 - 70)  BP: 134/107 (12-23-18 @ 08:23) (131/95 - 157/97)  RR: 17 (12-23-18 @ 08:23) (16 - 18)  SpO2: 99% (12-23-18 @ 08:23) (98% - 100%)      22 Dec 2018 07:01  -  23 Dec 2018 07:00  --------------------------------------------------------  IN: 450 mL / OUT: 0 mL / NET: 450 mL        Appearance: Normal	  HEENT:   Normal oral mucosa, PERRL, EOMI	  Lymphatic: No lymphadenopathy  Cardiovascular: Normal S1 S2, No JVD, No murmurs, No edema  Respiratory: Lungs clear to auscultation	  Psychiatry: A & O x 3, Mood & affect appropriate  Gastrointestinal:  Soft, Non-tender, + BS	  Skin: No rashes, No ecchymoses, No cyanosis	  Neurologic: Non-focal  Extremities: Normal range of motion, No clubbing, cyanosis or edema  Vascular: Peripheral pulses palpable 2+ bilaterally    MEDICATIONS  (STANDING):  atorvastatin 40 milliGRAM(s) Oral at bedtime  clopidogrel Tablet 75 milliGRAM(s) Oral daily  cyclobenzaprine 5 milliGRAM(s) Oral every 8 hours  docusate sodium 100 milliGRAM(s) Oral two times a day  escitalopram 20 milliGRAM(s) Oral daily  heparin  Injectable 5000 Unit(s) SubCutaneous every 12 hours  insulin lispro (HumaLOG) corrective regimen sliding scale   SubCutaneous three times a day before meals  melatonin 3 milliGRAM(s) Oral at bedtime  methylPREDNISolone sodium succinate IVPB 500 milliGRAM(s) IV Intermittent every 12 hours  metoprolol tartrate 25 milliGRAM(s) Oral two times a day  morphine ER Tablet 60 milliGRAM(s) Oral two times a day  nicotine -  14 mG/24Hr(s) Patch 1 patch Transdermal daily  pantoprazole    Tablet 40 milliGRAM(s) Oral before breakfast  pregabalin 300 milliGRAM(s) Oral every 12 hours      LABS:	 	                       13.7   11.4  )-----------( 269      ( 23 Dec 2018 05:44 )             42.7     12-23    138  |  103  |  26<H>  ----------------------------<  128<H>  4.0   |  30  |  1.10    Ca    8.5      23 Dec 2018 05:44        Lipid Profile: Cholesterol 158  LDL 93  HDL 48  TG 83    HgA1c: Hemoglobin A1C, Whole Blood: 5.6 % (12-05 @ 13:36)    TSH: Thyroid Stimulating Hormone, Serum: 0.57 uU/mL (12-05 @ 10:43)

## 2018-12-23 NOTE — PROGRESS NOTE ADULT - PROBLEM SELECTOR PLAN 1
CT spine negative for fracture  ortho on board  pain control prn  medical management at this time  d/c planning

## 2018-12-23 NOTE — PROGRESS NOTE ADULT - PROBLEM SELECTOR PLAN 2
orthostatics technically positive now by change in diastolic bp  carotid doppler negative  echo shows g2dd with pEF  NST negative  cardio - Dr. Benton

## 2018-12-23 NOTE — PROGRESS NOTE ADULT - SUBJECTIVE AND OBJECTIVE BOX
Patient is a 48y old  Female who presents with a chief complaint of neck pain (22 Dec 2018 15:01)    pt seen in tele [x  ], reg med floor [   ], bed [x  ], chair at bedside [   ], a+o x3 [ x ], lethargic [  ],  nad [x  ]      Allergies    aspirin (Unknown)  cortisone (Other)  latex (Unknown)  sulfa drugs (Unknown)        Vitals    T(F): 98.6 (12-23-18 @ 08:23), Max: 98.6 (12-23-18 @ 08:23)  HR: 63 (12-23-18 @ 08:23) (53 - 70)  BP: 134/107 (12-23-18 @ 08:23) (131/95 - 157/97)  RR: 17 (12-23-18 @ 08:23) (16 - 18)  SpO2: 99% (12-23-18 @ 08:23) (98% - 100%)  Wt(kg): --  CAPILLARY BLOOD GLUCOSE      POCT Blood Glucose.: 122 mg/dL (23 Dec 2018 08:09)      Labs                          13.7   11.4  )-----------( 269      ( 23 Dec 2018 05:44 )             42.7       12-23    138  |  103  |  26<H>  ----------------------------<  128<H>  4.0   |  30  |  1.10    Ca    8.5      23 Dec 2018 05:44              .Blood Blood  12-06 @ 14:03   No growth at 5 days.  --  --      .Urine Clean Catch (Midstream)  12-04 @ 23:59   10,000 - 49,000 CFU/mL Proteus vulgaris  50,000 - 99,000 CFU/mL Enterococcus faecalis  <10,000 CFU/ml Normal Urogenital britni present  --  Proteus vulgaris  Enterococcus faecalis          Radiology Results      Meds    MEDICATIONS  (STANDING):  atorvastatin 40 milliGRAM(s) Oral at bedtime  clopidogrel Tablet 75 milliGRAM(s) Oral daily  cyclobenzaprine 5 milliGRAM(s) Oral every 8 hours  docusate sodium 100 milliGRAM(s) Oral two times a day  escitalopram 20 milliGRAM(s) Oral daily  heparin  Injectable 5000 Unit(s) SubCutaneous every 12 hours  insulin lispro (HumaLOG) corrective regimen sliding scale   SubCutaneous three times a day before meals  melatonin 3 milliGRAM(s) Oral at bedtime  methylPREDNISolone sodium succinate IVPB 500 milliGRAM(s) IV Intermittent every 12 hours  metoprolol tartrate 25 milliGRAM(s) Oral two times a day  morphine ER Tablet 60 milliGRAM(s) Oral two times a day  nicotine -  14 mG/24Hr(s) Patch 1 patch Transdermal daily  pantoprazole    Tablet 40 milliGRAM(s) Oral before breakfast  pregabalin 300 milliGRAM(s) Oral every 12 hours      MEDICATIONS  (PRN):  ondansetron Injectable 4 milliGRAM(s) IV Push every 4 hours PRN Nausea and/or Vomiting  oxyCODONE    5 mG/acetaminophen 325 mG 2 Tablet(s) Oral every 6 hours PRN Moderate Pain (4 - 6)  senna 2 Tablet(s) Oral at bedtime PRN Constipation      Physical Exam        Neuro :  no focal deficits  Respiratory: CTA B/L  CV: RRR, S1S2, no murmurs,   Abdominal: Soft, NT, ND +BS,  Extremities: No edema, + peripheral pulses    ASSESSMENT      syncope,   r/o arythmia,   r/o cns patho,   cervical radiculopathy,   h/o C4-C5 stenosis w/ impingement,   cyclic vomiting syndrome,    Reflex sympathetic dystrophy s/p spinal stimulator,   frequent falls,   SLE (not on tx),   Afib (not on AC due to frequent falls),   HTN,   Depression,   Diverticulosis,   partial right thyroidectomy  Renal failure, chronic, stage 3 (moderate)  s/p partial hysterectomy      PLAN      cont tele,   cont aspirin, statin,  cardio cons  stress test neg noted  echo with EF = 60 to65% by visual estimation). Grade II diastolic dysfunction noted  am cortisol wnl noted   cont pain control  orthoststic bp neg  neuro f/u noted  pt with Status migrainosus:  Treat patient with methylprednisolone 500mg IV BID 5/ 5 days to help resolve current migraine.  Hold prednisone as recommended by ortho consult while methylprednisolone is administered;   resume prednisone once methylprednisolone course is complete; no need for taper due to short duration of high-dose steroids  for Chronic neuropathic pain  Continue pregabalin 300mg PO BID - Already at a high dose, do not increase  No further imaging indicated;   decision on surgical intervention as per ortho consult  ortho spine cons noted  Daily PT  Conservative management at this time   No orthopedics surgical intervention  pain mgmt eval noted  cont ms contin 60mg bid prn pain 7-10  cont percocet prn breakthrough pain  cont current meds   d/c plan for am

## 2018-12-23 NOTE — PROGRESS NOTE ADULT - SUBJECTIVE AND OBJECTIVE BOX
PGY1 Note discussed with supervising resident and primary attending.    Patient is a 48y old  Female who presents with a chief complaint of neck pain (20 Dec 2018 09:52)      INTERVAL HPI/OVERNIGHT EVENTS:    Ms. Barahona is seen at the bedside. No new complaints.    MEDICATIONS  (STANDING):  atorvastatin 40 milliGRAM(s) Oral at bedtime  clopidogrel Tablet 75 milliGRAM(s) Oral daily  cyclobenzaprine 5 milliGRAM(s) Oral every 8 hours  docusate sodium 100 milliGRAM(s) Oral two times a day  escitalopram 20 milliGRAM(s) Oral daily  heparin  Injectable 5000 Unit(s) SubCutaneous every 12 hours  insulin lispro (HumaLOG) corrective regimen sliding scale   SubCutaneous three times a day before meals  methylPREDNISolone sodium succinate IVPB 500 milliGRAM(s) IV Intermittent every 12 hours  metoprolol tartrate 25 milliGRAM(s) Oral two times a day  morphine ER Tablet 60 milliGRAM(s) Oral two times a day  nicotine -  14 mG/24Hr(s) Patch 1 patch Transdermal daily  pantoprazole    Tablet 40 milliGRAM(s) Oral before breakfast  pregabalin 300 milliGRAM(s) Oral every 12 hours    MEDICATIONS  (PRN):  ondansetron Injectable 4 milliGRAM(s) IV Push every 4 hours PRN Nausea and/or Vomiting  senna 2 Tablet(s) Oral at bedtime PRN Constipation      Allergies    aspirin (Unknown)  cortisone (Other)  latex (Unknown)  sulfa drugs (Unknown)    Intolerances      REVIEW OF SYSTEMS:  CONSTITUTIONAL: No fever, weight loss, or fatigue; + neck pain  RESPIRATORY: No cough, wheezing, chills or hemoptysis; No shortness of breath  CARDIOVASCULAR: No chest pain, palpitations, dizziness, or leg swelling  GASTROINTESTINAL: No abdominal or epigastric pain. No nausea, vomiting, or hematemesis; No diarrhea or constipation. No melena or hematochezia.  NEUROLOGICAL: No headaches, memory loss, loss of strength, numbness, or tremors  SKIN: No itching, burning, rashes, or lesions     Vital Signs Last 24 Hrs  T(C): 37 (23 Dec 2018 08:23), Max: 37 (23 Dec 2018 08:23)  T(F): 98.6 (23 Dec 2018 08:23), Max: 98.6 (23 Dec 2018 08:23)  HR: 63 (23 Dec 2018 08:23) (53 - 70)  BP: 134/107 (23 Dec 2018 08:23) (131/95 - 157/97)  BP(mean): --  RR: 17 (23 Dec 2018 08:23) (16 - 18)  SpO2: 99% (23 Dec 2018 08:23) (98% - 100%)    PHYSICAL EXAM:  GENERAL: NAD  HEAD:  Atraumatic, Normocephalic  EYES: EOMI, PERRLA, conjunctiva and sclera clear  NECK: Supple, No JVD, Normal thyroid  CHEST/LUNG: Clear to percussion bilaterally; No rales, rhonchi, wheezing, or rubs  HEART: Regular rate and rhythm; No murmurs, rubs, or gallops  ABDOMEN: Soft, Nontender, Nondistended; Bowel sounds present  NERVOUS SYSTEM:  Alert & Oriented X3, Good concentration; Motor Strength 5/5 B/L   EXTREMITIES:  2+ Peripheral Pulses, No clubbing, cyanosis, or edema    LABS:                                   13.7   11.4  )-----------( 269      ( 23 Dec 2018 05:44 )             42.7     12-23    138  |  103  |  26<H>  ----------------------------<  128<H>  4.0   |  30  |  1.10    Ca    8.5      23 Dec 2018 05:44          RADIOLOGY & ADDITIONAL TESTS:    Imaging Personally Reviewed:  [X] YES  [ ] NO    Consultant(s) Notes Reviewed:  [X] YES  [ ] NO

## 2018-12-23 NOTE — PROGRESS NOTE ADULT - ASSESSMENT
48 yr old Female, lives with  sister, from home, ambulates with walker,  w/ PMHx of cyclic vomiting syndrome,  Reflex sympathetic dystrophy s/p spinal stimulator, frequent falls, SLE (not on tx), Parox Afib (not on AC due to frequent falls), HTN, Depression, Diverticulosis, and partial thyroidectomy on right side, who presents to the ED c/o severe neck pain x 1 week and s/p syncope.  1.D/C Tele monitoring.  2.Ortho spine eval noted.  3.Parox Afib-fall/bleed risk on asa,plavix.  4.Lipid d/o-statin.  5.GI and DVT prophylaxis.

## 2018-12-23 NOTE — PROGRESS NOTE ADULT - ASSESSMENT
48F, lives with  sister, from home, ambulates with walker,  w/ PMHx of cyclic vomiting syndrome,  Reflex sympathetic dystrophy s/p spinal stimulator, frequent falls, SLE (not on tx), Afib (not on AC due to frequent falls), HTN, Depression, Diverticulosis, and partial thyroidectomy on right side, who presents to the ED c/o severe neck pain x 1 week. Pain is sharp, constant, and radiates down to the lumbar spine. Patient recently diagnosed with C4-C5 stenosis w/ impingement, evaluated by ortho spine surgery and discharged home on steroids w/ neck collar. Patient reports falling at home after being discharged form hospital on December 7th, mentions she passed out in the kitchen in front of sister (after standing up), afterwards, neck pain has been unbearable, to the point pain meds are no longer working, and patient got concerned of overdosing, which prompted her to come to the ED. Patient reports left arm paresthesia and finger numbness, which was present from previous admission. Denies any chest pain, palpitations, SOB or any other complaint. Patient was told to follow up with surgeon upon discharge, however, she's unable to afford a taxi to go to [place]. Currently has no transportation.     Ortho following. No surgical intervention at this time. D/C planning in AM.

## 2018-12-24 ENCOUNTER — TRANSCRIPTION ENCOUNTER (OUTPATIENT)
Age: 48
End: 2018-12-24

## 2018-12-24 VITALS — WEIGHT: 144.84 LBS

## 2018-12-24 LAB
ANION GAP SERPL CALC-SCNC: 7 MMOL/L — SIGNIFICANT CHANGE UP (ref 5–17)
BUN SERPL-MCNC: 28 MG/DL — HIGH (ref 7–18)
CALCIUM SERPL-MCNC: 8.4 MG/DL — SIGNIFICANT CHANGE UP (ref 8.4–10.5)
CHLORIDE SERPL-SCNC: 102 MMOL/L — SIGNIFICANT CHANGE UP (ref 96–108)
CO2 SERPL-SCNC: 28 MMOL/L — SIGNIFICANT CHANGE UP (ref 22–31)
CREAT SERPL-MCNC: 1.25 MG/DL — SIGNIFICANT CHANGE UP (ref 0.5–1.3)
GLUCOSE BLDC GLUCOMTR-MCNC: 155 MG/DL — HIGH (ref 70–99)
GLUCOSE BLDC GLUCOMTR-MCNC: 166 MG/DL — HIGH (ref 70–99)
GLUCOSE SERPL-MCNC: 136 MG/DL — HIGH (ref 70–99)
HCT VFR BLD CALC: 42.6 % — SIGNIFICANT CHANGE UP (ref 34.5–45)
HGB BLD-MCNC: 13.5 G/DL — SIGNIFICANT CHANGE UP (ref 11.5–15.5)
MCHC RBC-ENTMCNC: 29.4 PG — SIGNIFICANT CHANGE UP (ref 27–34)
MCHC RBC-ENTMCNC: 31.8 GM/DL — LOW (ref 32–36)
MCV RBC AUTO: 92.4 FL — SIGNIFICANT CHANGE UP (ref 80–100)
PLATELET # BLD AUTO: 261 K/UL — SIGNIFICANT CHANGE UP (ref 150–400)
POTASSIUM SERPL-MCNC: 3.8 MMOL/L — SIGNIFICANT CHANGE UP (ref 3.5–5.3)
POTASSIUM SERPL-SCNC: 3.8 MMOL/L — SIGNIFICANT CHANGE UP (ref 3.5–5.3)
RBC # BLD: 4.61 M/UL — SIGNIFICANT CHANGE UP (ref 3.8–5.2)
RBC # FLD: 12.9 % — SIGNIFICANT CHANGE UP (ref 10.3–14.5)
SODIUM SERPL-SCNC: 137 MMOL/L — SIGNIFICANT CHANGE UP (ref 135–145)
WBC # BLD: 10.4 K/UL — SIGNIFICANT CHANGE UP (ref 3.8–10.5)
WBC # FLD AUTO: 10.4 K/UL — SIGNIFICANT CHANGE UP (ref 3.8–10.5)

## 2018-12-24 PROCEDURE — 84100 ASSAY OF PHOSPHORUS: CPT

## 2018-12-24 PROCEDURE — 85610 PROTHROMBIN TIME: CPT

## 2018-12-24 PROCEDURE — A9502: CPT

## 2018-12-24 PROCEDURE — 70450 CT HEAD/BRAIN W/O DYE: CPT

## 2018-12-24 PROCEDURE — 85027 COMPLETE CBC AUTOMATED: CPT

## 2018-12-24 PROCEDURE — 82550 ASSAY OF CK (CPK): CPT

## 2018-12-24 PROCEDURE — 99232 SBSQ HOSP IP/OBS MODERATE 35: CPT

## 2018-12-24 PROCEDURE — 72128 CT CHEST SPINE W/O DYE: CPT

## 2018-12-24 PROCEDURE — 83735 ASSAY OF MAGNESIUM: CPT

## 2018-12-24 PROCEDURE — 93005 ELECTROCARDIOGRAM TRACING: CPT

## 2018-12-24 PROCEDURE — 80307 DRUG TEST PRSMV CHEM ANLYZR: CPT

## 2018-12-24 PROCEDURE — 82962 GLUCOSE BLOOD TEST: CPT

## 2018-12-24 PROCEDURE — 84484 ASSAY OF TROPONIN QUANT: CPT

## 2018-12-24 PROCEDURE — 93306 TTE W/DOPPLER COMPLETE: CPT

## 2018-12-24 PROCEDURE — 93880 EXTRACRANIAL BILAT STUDY: CPT

## 2018-12-24 PROCEDURE — 82533 TOTAL CORTISOL: CPT

## 2018-12-24 PROCEDURE — 72131 CT LUMBAR SPINE W/O DYE: CPT

## 2018-12-24 PROCEDURE — 82746 ASSAY OF FOLIC ACID SERUM: CPT

## 2018-12-24 PROCEDURE — 82553 CREATINE MB FRACTION: CPT

## 2018-12-24 PROCEDURE — 72125 CT NECK SPINE W/O DYE: CPT

## 2018-12-24 PROCEDURE — 78452 HT MUSCLE IMAGE SPECT MULT: CPT

## 2018-12-24 PROCEDURE — 93017 CV STRESS TEST TRACING ONLY: CPT

## 2018-12-24 PROCEDURE — 85730 THROMBOPLASTIN TIME PARTIAL: CPT

## 2018-12-24 PROCEDURE — 99285 EMERGENCY DEPT VISIT HI MDM: CPT | Mod: 25

## 2018-12-24 PROCEDURE — 80053 COMPREHEN METABOLIC PANEL: CPT

## 2018-12-24 PROCEDURE — 80048 BASIC METABOLIC PNL TOTAL CA: CPT

## 2018-12-24 PROCEDURE — 97162 PT EVAL MOD COMPLEX 30 MIN: CPT

## 2018-12-24 RX ORDER — CYCLOBENZAPRINE HYDROCHLORIDE 10 MG/1
1 TABLET, FILM COATED ORAL
Qty: 42 | Refills: 0
Start: 2018-12-24 | End: 2019-01-06

## 2018-12-24 RX ORDER — PROPRANOLOL HCL 160 MG
120 CAPSULE, EXTENDED RELEASE 24HR ORAL AT BEDTIME
Qty: 0 | Refills: 0 | Status: DISCONTINUED | OUTPATIENT
Start: 2018-12-24 | End: 2018-12-24

## 2018-12-24 RX ADMIN — ESCITALOPRAM OXALATE 20 MILLIGRAM(S): 10 TABLET, FILM COATED ORAL at 12:40

## 2018-12-24 RX ADMIN — Medication 1 PATCH: at 06:05

## 2018-12-24 RX ADMIN — CYCLOBENZAPRINE HYDROCHLORIDE 5 MILLIGRAM(S): 10 TABLET, FILM COATED ORAL at 05:31

## 2018-12-24 RX ADMIN — PANTOPRAZOLE SODIUM 40 MILLIGRAM(S): 20 TABLET, DELAYED RELEASE ORAL at 05:32

## 2018-12-24 RX ADMIN — Medication 1: at 12:40

## 2018-12-24 RX ADMIN — MORPHINE SULFATE 60 MILLIGRAM(S): 50 CAPSULE, EXTENDED RELEASE ORAL at 05:45

## 2018-12-24 RX ADMIN — Medication 1 PATCH: at 11:50

## 2018-12-24 RX ADMIN — Medication 1: at 08:29

## 2018-12-24 RX ADMIN — Medication 200 MILLIGRAM(S): at 05:32

## 2018-12-24 RX ADMIN — Medication 25 MILLIGRAM(S): at 05:31

## 2018-12-24 RX ADMIN — Medication 1 PATCH: at 12:40

## 2018-12-24 RX ADMIN — OXYCODONE AND ACETAMINOPHEN 2 TABLET(S): 5; 325 TABLET ORAL at 12:15

## 2018-12-24 RX ADMIN — OXYCODONE AND ACETAMINOPHEN 2 TABLET(S): 5; 325 TABLET ORAL at 05:44

## 2018-12-24 RX ADMIN — Medication 100 MILLIGRAM(S): at 05:31

## 2018-12-24 RX ADMIN — Medication 300 MILLIGRAM(S): at 05:31

## 2018-12-24 RX ADMIN — MORPHINE SULFATE 60 MILLIGRAM(S): 50 CAPSULE, EXTENDED RELEASE ORAL at 05:31

## 2018-12-24 RX ADMIN — CLOPIDOGREL BISULFATE 75 MILLIGRAM(S): 75 TABLET, FILM COATED ORAL at 12:40

## 2018-12-24 RX ADMIN — OXYCODONE AND ACETAMINOPHEN 2 TABLET(S): 5; 325 TABLET ORAL at 04:08

## 2018-12-24 RX ADMIN — OXYCODONE AND ACETAMINOPHEN 2 TABLET(S): 5; 325 TABLET ORAL at 11:27

## 2018-12-24 NOTE — DISCHARGE NOTE ADULT - MEDICATION SUMMARY - MEDICATIONS TO TAKE
I will START or STAY ON the medications listed below when I get home from the hospital:    predniSONE 10 mg oral tablet  -- 4 tab(s) once/day for 3 days, THEN  3 tabs once/day for 3 days, THEN  2 tabs once/day for 3 days, THEN  1 tab once/day for 3 days, THEN STOP  -- It is very important that you take or use this exactly as directed.  Do not skip doses or discontinue unless directed by your doctor.  Obtain medical advice before taking any non-prescription drugs as some may affect the action of this medication.  Take with food or milk.    -- Indication: For Steroid taper    oxycodone-acetaminophen 5 mg-325 mg oral tablet  -- 2 tab(s) by mouth every 6 hours, As needed, Moderate Pain (4 - 6) MDD:8  -- Indication: For Cervicalgia    morphine 15 mg oral tablet  -- 1 tab(s) by mouth once a day in the evening  -- Indication: For Cervicalgia    pregabalin 300 mg oral capsule  -- 1 cap(s) by mouth 2 times a day MDD:2  -- Indication: For Cervicalgia    escitalopram 20 mg oral tablet  -- 1 tab(s) by mouth once a day  -- Indication: For Depression    ondansetron 4 mg oral disintegrating strip  -- 1 each by mouth 3 times a day   -- Check with your doctor before becoming pregnant.  Do not chew, break, or crush.  Obtain medical advice before taking any non-prescription drugs as some may affect the action of this medication.    -- Indication: For Nausea    atorvastatin 20 mg oral tablet  -- 1 tab(s) by mouth once a day (at bedtime)  -- Indication: For Hyperlipidemia    clopidogrel 75 mg oral tablet  -- 1 tab(s) by mouth once a day  -- Indication: For Atrial fibrillation    metoprolol tartrate 25 mg oral tablet  -- 1 tab(s) by mouth 2 times a day  -- Indication: For Atrial fibrillation    cyclobenzaprine 5 mg oral tablet  -- 1 tab(s) by mouth every 8 hours  -- Indication: For Cervicalgia    Protonix 40 mg oral delayed release tablet  -- 1 tab(s) by mouth once a day  -- Indication: For Gastritis    nicotine 14 mg/24 hr transdermal film, extended release  -- 1 patch by transdermal patch once a day  -- Indication: For Smoking cessation

## 2018-12-24 NOTE — DIETITIAN INITIAL EVALUATION ADULT. - PROBLEM SELECTOR PLAN 2
Patient had an episode a week ago, after standing up; denies any symptoms prior to event. Could be vasovagal or due to orthostatic hypotension. Patient on multiple pain medications, including opioids. Hx recurrent falls.  ECG: afib VR:79bpm no acute STTW changes; f/up T1, T2; HEART score: 4  Telemonitoring  CT brain: no acute pathology  Check orthostatics  c/w ASA 81 mg + Lipitor + Lopressor 25 mg BID  Follow up TSH, Lipid profile, HbA1C  Follow up Carotid Doppler and TTE per MD

## 2018-12-24 NOTE — PROGRESS NOTE ADULT - SUBJECTIVE AND OBJECTIVE BOX
CHIEF COMPLAINT:Patient is a 48y old  Female who presents with a chief complaint of neck pain.Pt appears comfortable.    	  REVIEW OF SYSTEMS:  CONSTITUTIONAL: No fever, weight loss, or fatigue  EYES: No eye pain, visual disturbances, or discharge  ENT:  No difficulty hearing, tinnitus, vertigo; No sinus or throat pain  NECK: No pain or stiffness  RESPIRATORY: No cough, wheezing, chills or hemoptysis; No Shortness of Breath  CARDIOVASCULAR: No chest pain, palpitations, passing out, dizziness, or leg swelling  GASTROINTESTINAL: No abdominal or epigastric pain. No nausea, vomiting, or hematemesis; No diarrhea or constipation. No melena or hematochezia.  GENITOURINARY: No dysuria, frequency, hematuria, or incontinence  NEUROLOGICAL: No headaches, memory loss, loss of strength, numbness, or tremors  SKIN: No itching, burning, rashes, or lesions   LYMPH Nodes: No enlarged glands  ENDOCRINE: No heat or cold intolerance; No hair loss  MUSCULOSKELETAL: No joint pain or swelling; No muscle, back, or extremity pain  PSYCHIATRIC: No depression, anxiety, mood swings, or difficulty sleeping  HEME/LYMPH: No easy bruising, or bleeding gums  ALLERGY AND IMMUNOLOGIC: No hives or eczema	      PHYSICAL EXAM:  T(C): 37.2 (12-24-18 @ 07:46), Max: 37.2 (12-24-18 @ 07:46)  HR: 67 (12-24-18 @ 07:46) (54 - 72)  BP: 142/99 (12-24-18 @ 07:46) (128/92 - 169/106)  RR: 16 (12-24-18 @ 07:46) (16 - 18)  SpO2: 97% (12-24-18 @ 07:46) (96% - 100%)    Appearance: Normal	  HEENT:   Normal oral mucosa, PERRL, EOMI	  Lymphatic: No lymphadenopathy  Cardiovascular: Normal S1 S2, No JVD, No murmurs, No edema  Respiratory: Lungs clear to auscultation	  Psychiatry: A & O x 3, Mood & affect appropriate  Gastrointestinal:  Soft, Non-tender, + BS	  Skin: No rashes, No ecchymoses, No cyanosis	  Neurologic: Non-focal  Extremities: Normal range of motion, No clubbing, cyanosis or edema  Vascular: Peripheral pulses palpable 2+ bilaterally    MEDICATIONS  (STANDING):  atorvastatin 40 milliGRAM(s) Oral at bedtime  clopidogrel Tablet 75 milliGRAM(s) Oral daily  cyclobenzaprine 5 milliGRAM(s) Oral every 8 hours  docusate sodium 100 milliGRAM(s) Oral two times a day  escitalopram 20 milliGRAM(s) Oral daily  heparin  Injectable 5000 Unit(s) SubCutaneous every 12 hours  insulin lispro (HumaLOG) corrective regimen sliding scale   SubCutaneous three times a day before meals  melatonin 3 milliGRAM(s) Oral at bedtime  metoprolol tartrate 25 milliGRAM(s) Oral two times a day  morphine ER Tablet 60 milliGRAM(s) Oral two times a day  nicotine -  14 mG/24Hr(s) Patch 1 patch Transdermal daily  pantoprazole    Tablet 40 milliGRAM(s) Oral before breakfast  pregabalin 300 milliGRAM(s) Oral every 12 hours        	  LABS:	 	                        13.5   10.4  )-----------( 261      ( 24 Dec 2018 07:45 )             42.6     12-24    137  |  102  |  28<H>  ----------------------------<  136<H>  3.8   |  28  |  1.25    Ca    8.4      24 Dec 2018 07:45        Lipid Profile: Cholesterol 158  LDL 93  HDL 48  TG 83    HgA1c: Hemoglobin A1C, Whole Blood: 5.6 % (12-05 @ 13:36)    TSH: Thyroid Stimulating Hormone, Serum: 0.57 uU/mL (12-05 @ 10:43)

## 2018-12-24 NOTE — PROGRESS NOTE ADULT - SUBJECTIVE AND OBJECTIVE BOX
Patient is a 48y old  Female who presents with a chief complaint of neck pain (24 Dec 2018 09:35)    pt seen in tele [x  ], reg med floor [   ], bed [x  ], chair at bedside [   ], a+o x3 [ x ], lethargic [  ],  nad [x  ]      Allergies    aspirin (Unknown)  cortisone (Other)  latex (Unknown)  sulfa drugs (Unknown)        Vitals    T(F): 99 (12-24-18 @ 07:46), Max: 99 (12-24-18 @ 07:46)  HR: 67 (12-24-18 @ 07:46) (54 - 72)  BP: 142/99 (12-24-18 @ 07:46) (128/92 - 169/106)  RR: 16 (12-24-18 @ 07:46) (16 - 18)  SpO2: 97% (12-24-18 @ 07:46) (96% - 100%)  Wt(kg): --  CAPILLARY BLOOD GLUCOSE      POCT Blood Glucose.: 155 mg/dL (24 Dec 2018 08:00)      Labs                          13.5   10.4  )-----------( 261      ( 24 Dec 2018 07:45 )             42.6       12-24    137  |  102  |  28<H>  ----------------------------<  136<H>  3.8   |  28  |  1.25    Ca    8.4      24 Dec 2018 07:45            Radiology Results      Meds    MEDICATIONS  (STANDING):  atorvastatin 40 milliGRAM(s) Oral at bedtime  clopidogrel Tablet 75 milliGRAM(s) Oral daily  cyclobenzaprine 5 milliGRAM(s) Oral every 8 hours  docusate sodium 100 milliGRAM(s) Oral two times a day  escitalopram 20 milliGRAM(s) Oral daily  heparin  Injectable 5000 Unit(s) SubCutaneous every 12 hours  insulin lispro (HumaLOG) corrective regimen sliding scale   SubCutaneous three times a day before meals  melatonin 3 milliGRAM(s) Oral at bedtime  metoprolol tartrate 25 milliGRAM(s) Oral two times a day  morphine ER Tablet 60 milliGRAM(s) Oral two times a day  nicotine -  14 mG/24Hr(s) Patch 1 patch Transdermal daily  pantoprazole    Tablet 40 milliGRAM(s) Oral before breakfast  pregabalin 300 milliGRAM(s) Oral every 12 hours      MEDICATIONS  (PRN):  ondansetron Injectable 4 milliGRAM(s) IV Push every 4 hours PRN Nausea and/or Vomiting  oxyCODONE    5 mG/acetaminophen 325 mG 2 Tablet(s) Oral every 6 hours PRN Moderate Pain (4 - 6)  senna 2 Tablet(s) Oral at bedtime PRN Constipation      Physical Exam      Neuro :  no focal deficits  Respiratory: CTA B/L  CV: RRR, S1S2, no murmurs,   Abdominal: Soft, NT, ND +BS,  Extremities: No edema, + peripheral pulses    ASSESSMENT      syncope,   r/o arythmia,   r/o cns patho,   cervical radiculopathy,   h/o C4-C5 stenosis w/ impingement,   cyclic vomiting syndrome,    Reflex sympathetic dystrophy s/p spinal stimulator,   frequent falls,   SLE (not on tx),   Afib (not on AC due to frequent falls),   HTN,   Depression,   Diverticulosis,   partial right thyroidectomy  Renal failure, chronic, stage 3 (moderate)  s/p partial hysterectomy      PLAN      cont tele,   cont aspirin, statin,  cardio cons  stress test neg noted  echo with EF = 60 to65% by visual estimation). Grade II diastolic dysfunction noted  am cortisol wnl noted   cont pain control  orthoststic bp neg  neuro f/u noted  pt with Status migrainosus:  Treat patient with methylprednisolone 500mg IV BID 5/ 5 days to help resolve current migraine.  Hold prednisone as recommended by ortho consult while methylprednisolone is administered;   resume prednisone once methylprednisolone course is complete; no need for taper due to short duration of high-dose steroids  for Chronic neuropathic pain  Continue pregabalin 300mg PO BID - Already at a high dose, do not increase  No further imaging indicated;   decision on surgical intervention as per ortho consult  ortho spine cons noted  Daily PT  Conservative management at this time   No orthopedics surgical intervention  pain mgmt eval noted  cont ms contin 60mg bid prn pain 7-10  cont percocet prn breakthrough pain  cont current meds   pt stable for d/c

## 2018-12-24 NOTE — DISCHARGE NOTE ADULT - PLAN OF CARE
Medical management You were admitted to the hospital with severe neck pain after a fall at home. CT scan of your spine rule out acute fracture. Our orthopedics team was consulted, and medical management without surgical intervention was advised. Continue pain management as you were prior to admission. You have a diagnosis of atrial fibrillation. Continue Plavix for anticoagulation. Because of your fall risk, we are not advising any further anticoagulation at this time. BP Control Continue antihypertensives as prescribed. IV steroid therapy You were treated with IV steroids inpatient for 5 days as recommended by our neurologist. No further treatment needed at this time. Hydration Ultrasound of your carotid arteries was normal. Your stress test was also normal. Your orthostatic blood pressure was noted to drop when standing as compared to sitting. We advise increasing your fluid intake to prevent future syncope in the future.

## 2018-12-24 NOTE — DISCHARGE NOTE ADULT - CARE PLAN
Principal Discharge DX:	Cervicalgia  Goal:	Medical management  Assessment and plan of treatment:	You were admitted to the hospital with severe neck pain after a fall at home. CT scan of your spine rule out acute fracture. Our orthopedics team was consulted, and medical management without surgical intervention was advised. Continue pain management as you were prior to admission.  Secondary Diagnosis:	Atrial fibrillation  Assessment and plan of treatment:	You have a diagnosis of atrial fibrillation. Continue Plavix for anticoagulation. Because of your fall risk, we are not advising any further anticoagulation at this time.  Secondary Diagnosis:	HTN (hypertension)  Goal:	BP Control  Assessment and plan of treatment:	Continue antihypertensives as prescribed.  Secondary Diagnosis:	Status migrainosus  Goal:	IV steroid therapy  Assessment and plan of treatment:	You were treated with IV steroids inpatient for 5 days as recommended by our neurologist. No further treatment needed at this time.  Secondary Diagnosis:	Syncope  Goal:	Hydration  Assessment and plan of treatment:	Ultrasound of your carotid arteries was normal. Your stress test was also normal. Your orthostatic blood pressure was noted to drop when standing as compared to sitting. We advise increasing your fluid intake to prevent future syncope in the future.

## 2018-12-24 NOTE — DISCHARGE NOTE ADULT - MEDICATION SUMMARY - MEDICATIONS TO STOP TAKING
I will STOP taking the medications listed below when I get home from the hospital:    amLODIPine 2.5 mg oral tablet  -- 1 tab(s) by mouth once a day    ciprofloxacin 500 mg oral tablet  -- 1 tab(s) by mouth every 12 hours

## 2018-12-24 NOTE — DIETITIAN INITIAL EVALUATION ADULT. - PROBLEM SELECTOR PLAN 1
Patient recently diagnosed with radiculopathy 2/2 C4-C5 stenosis w/ impingement, evaluated by ortho spine, managed conservatively w/ steroids and neck collar. Patient fell after being discharged, and pain worsened. Repeat CT in ED show no changes, no acute Fx or dislocation  c/w pain management  PT evaluation  Neuro consulted per MD

## 2018-12-24 NOTE — PROGRESS NOTE ADULT - ASSESSMENT
49yo female w/ status migrainosus and cervical pain in setting of spinal stenosis and degenerative disease 49yo female w/ status migrainosus and cervical pain in setting of spinal stenosis and degenerative disease. Recommend propranolol  mg in place of metoprolol to preventive migraine and topiramate 50 mg BID in place of pregabalin also to prevent migraine followed by botox or CGRP receptor binding antibody treatment for intractable migraine in place of narcotics

## 2018-12-24 NOTE — PROGRESS NOTE ADULT - PROBLEM SELECTOR PLAN 2
Continue Pregabalin 300mg PO BID  PT/OT As discussed w/ Dr. Matthews, recommend changing Pregabalin to Topiramate 50mg PO BID   PT/OT As discussed, recommend changing Pregabalin to Topiramate 50mg PO BID   PT/OT

## 2018-12-24 NOTE — PROGRESS NOTE ADULT - ASSESSMENT
48 yr old Female, lives with  sister, from home, ambulates with walker,  w/ PMHx of cyclic vomiting syndrome,  Reflex sympathetic dystrophy s/p spinal stimulator, frequent falls, SLE (not on tx), Parox Afib (not on AC due to frequent falls), HTN, Depression, Diverticulosis, and partial thyroidectomy on right side, who presents to the ED c/o severe neck pain x 1 week and s/p syncope.  1.PT.  2.Ortho spine eval noted.  3.Parox Afib-fall/bleed risk on asa,plavix.  4.Lipid d/o-statin.  5.GI and DVT prophylaxis.

## 2018-12-24 NOTE — DISCHARGE NOTE ADULT - HOSPITAL COURSE
48F, lives with  sister, from home, ambulates with walker,  w/ PMHx of cyclic vomiting syndrome,  Reflex sympathetic dystrophy s/p spinal stimulator, frequent falls, SLE (not on tx), Afib (not on AC due to frequent falls), HTN, Depression, Diverticulosis, and partial thyroidectomy on right side, who presents to the ED c/o severe neck pain x 1 week. Pain is sharp, constant, and radiates down to the lumbar spine. Patient recently diagnosed with C4-C5 stenosis w/ impingement, evaluated by ortho spine surgery and discharged home on steroids w/ neck collar. Patient reports falling at home after being discharged form hospital on December 7th, mentions she passed out in the kitchen in front of sister (after standing up), afterwards, neck pain has been unbearable, to the point pain meds are no longer working, and patient got concerned of overdosing, which prompted her to come to the ED. Patient reports left arm paresthesia and finger numbness, which was present from previous admission. Denies any chest pain, palpitations, SOB or any other complaint.    Admitted for syncope and neck pain. CT spine negative for fracture. Ortho recommends medical management. Orthostatics technically positive by diastolic standard - advised to increase hydration. Carotid doppler negative. NST negative. tele unremarkable. No ac for afib because of fall risk. IV steroids x5 days inpatient for status migrainosus. Pain control achieved inpatient. Medically stable for discharge.

## 2018-12-24 NOTE — DISCHARGE NOTE ADULT - CARE PROVIDER_API CALL
Stevan Doyle), Internal Medicine  [address] [place] [address]  Phone: [phone]  Fax: [phone]

## 2018-12-24 NOTE — DISCHARGE NOTE ADULT - MEDICATION SUMMARY - MEDICATIONS TO CHANGE
I will SWITCH the dose or number of times a day I take the medications listed below when I get home from the hospital:    predniSONE 20 mg oral tablet  -- 2 tab(s) by mouth 2 times a day

## 2018-12-24 NOTE — PROGRESS NOTE ADULT - REASON FOR ADMISSION
neck pain

## 2018-12-24 NOTE — DIETITIAN INITIAL EVALUATION ADULT. - OTHER INFO
nutrition assessment for length of stay; pt from home; skin intact; seen by RD 12/20/18 for food/nutrition related issues, food choices obtained and forwarded to dietary; pt visited again today, still not happy with food served at times ( mainly due to quality of food served, and prefers menu to fill if available), but on discharge planning today noted, not interested in more other nutrition concerns; tolerating 100% of meals if food likes per flow sheets

## 2018-12-24 NOTE — PROGRESS NOTE ADULT - SUBJECTIVE AND OBJECTIVE BOX
HPI:  Patient is a 48y old  Female who presents with a chief complaint of neck pain (24 Dec 2018 12:12)    Subjective:  States to have continued neck tension but does have improvement, now able to raise left arm and wave w/ left hand, pain 6-7/10.  Ambulating in room and states that she was not able to do that when she came to hospital.        PAST MEDICAL & SURGICAL HISTORY:  Renal failure, chronic, stage 3 (moderate)  HTN (hypertension)  Cyclical vomiting  Atrial fibrillation  SLE (systemic lupus erythematosus)  Reflex sympathetic dystrophy  H/O partial thyroidectomy  S/P partial hysterectomy      FAMILY HISTORY:  Family history of seizure disorder (Sibling)        Social Hx:  Nonsmoker, no drug or alcohol use    MEDICATIONS  (STANDING):  atorvastatin 40 milliGRAM(s) Oral at bedtime  clopidogrel Tablet 75 milliGRAM(s) Oral daily  cyclobenzaprine 5 milliGRAM(s) Oral every 8 hours  docusate sodium 100 milliGRAM(s) Oral two times a day  escitalopram 20 milliGRAM(s) Oral daily  heparin  Injectable 5000 Unit(s) SubCutaneous every 12 hours  insulin lispro (HumaLOG) corrective regimen sliding scale   SubCutaneous three times a day before meals  melatonin 3 milliGRAM(s) Oral at bedtime  metoprolol tartrate 25 milliGRAM(s) Oral two times a day  morphine ER Tablet 60 milliGRAM(s) Oral two times a day  nicotine -  14 mG/24Hr(s) Patch 1 patch Transdermal daily  pantoprazole    Tablet 40 milliGRAM(s) Oral before breakfast  pregabalin 300 milliGRAM(s) Oral every 12 hours       Allergies    aspirin (Unknown)  cortisone (Other)  latex (Unknown)  sulfa drugs (Unknown)    Intolerances    Vital Signs Last 24 Hrs  T(C): 36.9 (24 Dec 2018 11:42), Max: 37.2 (24 Dec 2018 07:46)  T(F): 98.5 (24 Dec 2018 11:42), Max: 99 (24 Dec 2018 07:46)  HR: 78 (24 Dec 2018 11:42) (54 - 78)  BP: 130/87 (24 Dec 2018 11:42) (130/87 - 169/106)  BP(mean): --  RR: 16 (24 Dec 2018 11:42) (16 - 18)  SpO2: 99% (24 Dec 2018 11:42) (96% - 99%)        Constitutional: awake and alert.  HEENT: PERRLA, EOMI,   Neck: Supple.  Respiratory: Breath sounds are clear bilaterally  Cardiovascular: S1 and S2, regular / irregular rhythm  Gastrointestinal: soft, nontender  Extremities:  no edema  Vascular: Caritid Bruit - no  Musculoskeletal: no joint swelling/tenderness, no abnormal movements  Skin: No rashes    Neurological Exam:  Mental Status: Orientated to self, date and place.  Attention intact.  No dysarthria, aphasia or neglect.    Cranial Nerves: CN I - not tested.  PERRL, EOMI, VFF. Right eye disconjugate movement to right but no nystagmus.  CN V1-3 intact to light touch and pinprick.  No facial asymmetry.  Hearing intact to finger rub bilaterally.  Tongue, uvula and palate midline.  Sternocleidomastoid unable to check d/t pain and cervical collar.  Trapezius R>L, R 5/5, L 4/5.  Motor:  Normal bulk.  Tone normal in b/l UE.  Tone decreased in b/l LE.  Strength RUE 5/5, LUE 4/5 w/ pain.  B/l LE 3/5.  Tremor: No resting, postural or action tremor.  No myoclonus.  Sensation: LUE diminished to light touch and pinprick.  RUE & b/l LE sensation intact to light touch and pinprick.  Deep Tendon Reflexes: 2+ bilateral biceps.  1+  triceps, brachioradialis, knee and ankle  Toes flexor bilaterally  Gait: At baseline-ambulating w/ rollator    Labs:                        13.5   10.4  )-----------( 261      ( 24 Dec 2018 07:45 )             42.6     12-24    137  |  102  |  28<H>  ----------------------------<  136<H>  3.8   |  28  |  1.25    Ca    8.4      24 Dec 2018 07:45      12-05 [id number] 5.6    12-05 Chol 158 LDL 93 HDL 48<L> Trig 83      R HPI:  Patient is a 48y old  Female who presents with a chief complaint of neck pain (24 Dec 2018 12:12)    Subjective:  States to have continued neck tension but does have improvement, now able to raise left arm and wave w/ left hand, pain 6-7/10.  Ambulating in room and states that she was not able to do that when she came to hospital.        PAST MEDICAL & SURGICAL HISTORY:  Renal failure, chronic, stage 3 (moderate)  HTN (hypertension)  Cyclical vomiting  Atrial fibrillation  SLE (systemic lupus erythematosus)  Reflex sympathetic dystrophy  H/O partial thyroidectomy  S/P partial hysterectomy      FAMILY HISTORY:  Family history of seizure disorder (Sibling)        Social Hx:  Nonsmoker, no drug or alcohol use    MEDICATIONS  (STANDING):  atorvastatin 40 milliGRAM(s) Oral at bedtime  clopidogrel Tablet 75 milliGRAM(s) Oral daily  cyclobenzaprine 5 milliGRAM(s) Oral every 8 hours  docusate sodium 100 milliGRAM(s) Oral two times a day  escitalopram 20 milliGRAM(s) Oral daily  heparin  Injectable 5000 Unit(s) SubCutaneous every 12 hours  insulin lispro (HumaLOG) corrective regimen sliding scale   SubCutaneous three times a day before meals  melatonin 3 milliGRAM(s) Oral at bedtime  metoprolol tartrate 25 milliGRAM(s) Oral two times a day  morphine ER Tablet 60 milliGRAM(s) Oral two times a day  nicotine -  14 mG/24Hr(s) Patch 1 patch Transdermal daily  pantoprazole    Tablet 40 milliGRAM(s) Oral before breakfast  pregabalin 300 milliGRAM(s) Oral every 12 hours       Allergies    aspirin (Unknown)  cortisone (Other)  latex (Unknown)  sulfa drugs (Unknown)    Intolerances    Vital Signs Last 24 Hrs  T(C): 36.9 (24 Dec 2018 11:42), Max: 37.2 (24 Dec 2018 07:46)  T(F): 98.5 (24 Dec 2018 11:42), Max: 99 (24 Dec 2018 07:46)  HR: 78 (24 Dec 2018 11:42) (54 - 78)  BP: 130/87 (24 Dec 2018 11:42) (130/87 - 169/106)  BP(mean): --  RR: 16 (24 Dec 2018 11:42) (16 - 18)  SpO2: 99% (24 Dec 2018 11:42) (96% - 99%)        Constitutional: awake and alert.  HEENT: PERRLA, EOMI  Neck: Supple  Respiratory: Breath sounds are clear bilaterally  Cardiovascular: S1 and S2, regular   Gastrointestinal: Soft, nontender  Extremities:  No edema  Vascular: No Carotid Bruit  Musculoskeletal: no joint swelling/tenderness, no abnormal movements  Skin: No rashes    Neurological Exam:  Mental Status: Orientated to self, date and place.  Attention intact.  No dysarthria, aphasia or neglect.    Cranial Nerves: CN I - not tested.  PERRL, EOMI, VFF. Right eye disconjugate movement to right but no nystagmus.  CN V1-3 intact to light touch and pinprick.  No facial asymmetry.  Hearing intact to finger rub bilaterally.  Tongue, uvula and palate midline.  Sternocleidomastoid unable to check d/t pain and cervical collar.  Trapezius R>L, R 5/5, L 4/5.  Motor:  Normal bulk.  Tone normal in b/l UE.  Tone decreased in b/l LE.  Strength RUE 5/5, LUE 4/5 w/ pain.  B/l LE 3/5.  Tremor: No resting, postural or action tremor.  No myoclonus.  Sensation: LUE diminished to light touch and pinprick.  RUE & b/l LE sensation intact to light touch and pinprick.  Deep Tendon Reflexes: 2+ bilateral biceps.  1+  triceps, brachioradialis, knee and ankle  Toes flexor bilaterally  Gait: At baseline-ambulating w/ rollator    Labs:                        13.5   10.4  )-----------( 261      ( 24 Dec 2018 07:45 )             42.6     12-24    137  |  102  |  28<H>  ----------------------------<  136<H>  3.8   |  28  |  1.25    Ca    8.4      24 Dec 2018 07:45      12-05 [id number] 5.6    12-05 Chol 158 LDL 93 HDL 48<L> Trig 83

## 2018-12-24 NOTE — DIETITIAN INITIAL EVALUATION ADULT. - PERTINENT MEDS FT
reviewed   MEDICATIONS  (STANDING):  atorvastatin 40 milliGRAM(s) Oral at bedtime  clopidogrel Tablet 75 milliGRAM(s) Oral daily  cyclobenzaprine 5 milliGRAM(s) Oral every 8 hours  docusate sodium 100 milliGRAM(s) Oral two times a day  escitalopram 20 milliGRAM(s) Oral daily  heparin  Injectable 5000 Unit(s) SubCutaneous every 12 hours  insulin lispro (HumaLOG) corrective regimen sliding scale   SubCutaneous three times a day before meals  melatonin 3 milliGRAM(s) Oral at bedtime  metoprolol tartrate 25 milliGRAM(s) Oral two times a day  morphine ER Tablet 60 milliGRAM(s) Oral two times a day  nicotine -  14 mG/24Hr(s) Patch 1 patch Transdermal daily  pantoprazole    Tablet 40 milliGRAM(s) Oral before breakfast  pregabalin 300 milliGRAM(s) Oral every 12 hours    MEDICATIONS  (PRN):  ondansetron Injectable 4 milliGRAM(s) IV Push every 4 hours PRN Nausea and/or Vomiting  oxyCODONE    5 mG/acetaminophen 325 mG 2 Tablet(s) Oral every 6 hours PRN Moderate Pain (4 - 6)  senna 2 Tablet(s) Oral at bedtime PRN Constipation

## 2018-12-24 NOTE — DISCHARGE NOTE ADULT - PATIENT PORTAL LINK FT
You can access the [facility] Patient Portal, offered by [facility], by registering with the following website: [email or web address]

## 2018-12-24 NOTE — PROGRESS NOTE ADULT - PROBLEM SELECTOR PLAN 1
Completed Methylprednisolone course w/ improvement in sxms.  Taper deferred to medical team. Completed Methylprednisolone course w/ improvement in sxms.  Taper deferred to medical team.  Recommend to change Metoprolol to Propranolol ER 120mg PO QHS for better BP control and pain mgmt Completed Methylprednisolone course w/ improvement in symptoms.  Taper deferred to medical team. Recommend propranolol LA 1120 mg in place of metoprolol for control of migraine and high BP

## 2019-05-04 ENCOUNTER — INPATIENT (INPATIENT)
Facility: HOSPITAL | Age: 49
LOS: 4 days | Discharge: HOME | DRG: 103 | End: 2019-05-09
Attending: INTERNAL MEDICINE | Admitting: INTERNAL MEDICINE
Payer: MEDICARE

## 2019-05-04 DIAGNOSIS — E89.0 POSTPROCEDURAL HYPOTHYROIDISM: Chronic | ICD-10-CM

## 2019-05-05 VITALS
OXYGEN SATURATION: 98 % | HEIGHT: 69 IN | WEIGHT: 179.9 LBS | DIASTOLIC BLOOD PRESSURE: 100 MMHG | TEMPERATURE: 99 F | RESPIRATION RATE: 16 BRPM | SYSTOLIC BLOOD PRESSURE: 160 MMHG | HEART RATE: 70 BPM

## 2019-05-05 DIAGNOSIS — Z29.9 ENCOUNTER FOR PROPHYLACTIC MEASURES, UNSPECIFIED: ICD-10-CM

## 2019-05-05 DIAGNOSIS — G43.A1 CYCLICAL VOMITING, IN MIGRAINE, INTRACTABLE: ICD-10-CM

## 2019-05-05 DIAGNOSIS — I48.91 UNSPECIFIED ATRIAL FIBRILLATION: ICD-10-CM

## 2019-05-05 DIAGNOSIS — I10 ESSENTIAL (PRIMARY) HYPERTENSION: ICD-10-CM

## 2019-05-05 DIAGNOSIS — M32.9 SYSTEMIC LUPUS ERYTHEMATOSUS, UNSPECIFIED: ICD-10-CM

## 2019-05-05 DIAGNOSIS — N18.3 CHRONIC KIDNEY DISEASE, STAGE 3 (MODERATE): ICD-10-CM

## 2019-05-05 LAB
ALBUMIN SERPL ELPH-MCNC: 3.4 G/DL — LOW (ref 3.5–5)
ALP SERPL-CCNC: 140 U/L — HIGH (ref 40–120)
ALT FLD-CCNC: <6 U/L DA — LOW (ref 10–60)
ANION GAP SERPL CALC-SCNC: 7 MMOL/L — SIGNIFICANT CHANGE UP (ref 5–17)
ANION GAP SERPL CALC-SCNC: 9 MMOL/L — SIGNIFICANT CHANGE UP (ref 5–17)
APPEARANCE UR: CLEAR — SIGNIFICANT CHANGE UP
AST SERPL-CCNC: 55 U/L — HIGH (ref 10–40)
BASOPHILS # BLD AUTO: 0.03 K/UL — SIGNIFICANT CHANGE UP (ref 0–0.2)
BASOPHILS NFR BLD AUTO: 0.3 % — SIGNIFICANT CHANGE UP (ref 0–2)
BILIRUB SERPL-MCNC: 1 MG/DL — SIGNIFICANT CHANGE UP (ref 0.2–1.2)
BILIRUB UR-MCNC: NEGATIVE — SIGNIFICANT CHANGE UP
BUN SERPL-MCNC: 8 MG/DL — SIGNIFICANT CHANGE UP (ref 7–18)
BUN SERPL-MCNC: 9 MG/DL — SIGNIFICANT CHANGE UP (ref 7–18)
CALCIUM SERPL-MCNC: 8.8 MG/DL — SIGNIFICANT CHANGE UP (ref 8.4–10.5)
CALCIUM SERPL-MCNC: 9.6 MG/DL — SIGNIFICANT CHANGE UP (ref 8.4–10.5)
CHLORIDE SERPL-SCNC: 110 MMOL/L — HIGH (ref 96–108)
CHLORIDE SERPL-SCNC: 112 MMOL/L — HIGH (ref 96–108)
CHOLEST SERPL-MCNC: 143 MG/DL — SIGNIFICANT CHANGE UP (ref 10–199)
CO2 SERPL-SCNC: 18 MMOL/L — LOW (ref 22–31)
CO2 SERPL-SCNC: 25 MMOL/L — SIGNIFICANT CHANGE UP (ref 22–31)
COLOR SPEC: YELLOW — SIGNIFICANT CHANGE UP
CREAT SERPL-MCNC: 1.08 MG/DL — SIGNIFICANT CHANGE UP (ref 0.5–1.3)
CREAT SERPL-MCNC: 1.28 MG/DL — SIGNIFICANT CHANGE UP (ref 0.5–1.3)
DIFF PNL FLD: NEGATIVE — SIGNIFICANT CHANGE UP
EOSINOPHIL # BLD AUTO: 0 K/UL — SIGNIFICANT CHANGE UP (ref 0–0.5)
EOSINOPHIL NFR BLD AUTO: 0 % — SIGNIFICANT CHANGE UP (ref 0–6)
GLUCOSE SERPL-MCNC: 102 MG/DL — HIGH (ref 70–99)
GLUCOSE SERPL-MCNC: 118 MG/DL — HIGH (ref 70–99)
GLUCOSE UR QL: NEGATIVE — SIGNIFICANT CHANGE UP
HBA1C BLD-MCNC: 5.7 % — HIGH (ref 4–5.6)
HCG SERPL-ACNC: <1 MIU/ML — SIGNIFICANT CHANGE UP
HCT VFR BLD CALC: 44.4 % — SIGNIFICANT CHANGE UP (ref 34.5–45)
HCT VFR BLD CALC: 50.5 % — HIGH (ref 34.5–45)
HDLC SERPL-MCNC: 48 MG/DL — LOW
HGB BLD-MCNC: 14.6 G/DL — SIGNIFICANT CHANGE UP (ref 11.5–15.5)
HGB BLD-MCNC: 16.5 G/DL — HIGH (ref 11.5–15.5)
IMM GRANULOCYTES NFR BLD AUTO: 0.4 % — SIGNIFICANT CHANGE UP (ref 0–1.5)
KETONES UR-MCNC: ABNORMAL
LEUKOCYTE ESTERASE UR-ACNC: NEGATIVE — SIGNIFICANT CHANGE UP
LIDOCAIN IGE QN: <10 U/L — LOW (ref 73–393)
LIPID PNL WITH DIRECT LDL SERPL: 80 MG/DL — SIGNIFICANT CHANGE UP
LYMPHOCYTES # BLD AUTO: 1.44 K/UL — SIGNIFICANT CHANGE UP (ref 1–3.3)
LYMPHOCYTES # BLD AUTO: 13.3 % — SIGNIFICANT CHANGE UP (ref 13–44)
MAGNESIUM SERPL-MCNC: 2 MG/DL — SIGNIFICANT CHANGE UP (ref 1.6–2.6)
MAGNESIUM SERPL-MCNC: 2 MG/DL — SIGNIFICANT CHANGE UP (ref 1.6–2.6)
MCHC RBC-ENTMCNC: 29.1 PG — SIGNIFICANT CHANGE UP (ref 27–34)
MCHC RBC-ENTMCNC: 29.5 PG — SIGNIFICANT CHANGE UP (ref 27–34)
MCHC RBC-ENTMCNC: 32.7 GM/DL — SIGNIFICANT CHANGE UP (ref 32–36)
MCHC RBC-ENTMCNC: 32.9 GM/DL — SIGNIFICANT CHANGE UP (ref 32–36)
MCV RBC AUTO: 88.4 FL — SIGNIFICANT CHANGE UP (ref 80–100)
MCV RBC AUTO: 90.2 FL — SIGNIFICANT CHANGE UP (ref 80–100)
MONOCYTES # BLD AUTO: 0.57 K/UL — SIGNIFICANT CHANGE UP (ref 0–0.9)
MONOCYTES NFR BLD AUTO: 5.3 % — SIGNIFICANT CHANGE UP (ref 2–14)
NEUTROPHILS # BLD AUTO: 8.72 K/UL — HIGH (ref 1.8–7.4)
NEUTROPHILS NFR BLD AUTO: 80.7 % — HIGH (ref 43–77)
NITRITE UR-MCNC: NEGATIVE — SIGNIFICANT CHANGE UP
NRBC # BLD: 0 /100 WBCS — SIGNIFICANT CHANGE UP (ref 0–0)
NRBC # BLD: 0 /100 WBCS — SIGNIFICANT CHANGE UP (ref 0–0)
PCP SPEC-MCNC: SIGNIFICANT CHANGE UP
PH UR: 6 — SIGNIFICANT CHANGE UP (ref 5–8)
PHOSPHATE SERPL-MCNC: 3.3 MG/DL — SIGNIFICANT CHANGE UP (ref 2.5–4.5)
PLATELET # BLD AUTO: 224 K/UL — SIGNIFICANT CHANGE UP (ref 150–400)
PLATELET # BLD AUTO: 225 K/UL — SIGNIFICANT CHANGE UP (ref 150–400)
POTASSIUM SERPL-MCNC: 3.3 MMOL/L — LOW (ref 3.5–5.3)
POTASSIUM SERPL-MCNC: 5.1 MMOL/L — SIGNIFICANT CHANGE UP (ref 3.5–5.3)
POTASSIUM SERPL-SCNC: 3.3 MMOL/L — LOW (ref 3.5–5.3)
POTASSIUM SERPL-SCNC: 5.1 MMOL/L — SIGNIFICANT CHANGE UP (ref 3.5–5.3)
PROT SERPL-MCNC: 8.2 G/DL — SIGNIFICANT CHANGE UP (ref 6–8.3)
PROT UR-MCNC: NEGATIVE — SIGNIFICANT CHANGE UP
RBC # BLD: 5.02 M/UL — SIGNIFICANT CHANGE UP (ref 3.8–5.2)
RBC # BLD: 5.6 M/UL — HIGH (ref 3.8–5.2)
RBC # FLD: 13.7 % — SIGNIFICANT CHANGE UP (ref 10.3–14.5)
RBC # FLD: 13.8 % — SIGNIFICANT CHANGE UP (ref 10.3–14.5)
SODIUM SERPL-SCNC: 139 MMOL/L — SIGNIFICANT CHANGE UP (ref 135–145)
SODIUM SERPL-SCNC: 142 MMOL/L — SIGNIFICANT CHANGE UP (ref 135–145)
SP GR SPEC: 1.01 — SIGNIFICANT CHANGE UP (ref 1.01–1.02)
TOTAL CHOLESTEROL/HDL RATIO MEASUREMENT: 3 RATIO — LOW (ref 3.3–7.1)
TRIGL SERPL-MCNC: 74 MG/DL — SIGNIFICANT CHANGE UP (ref 10–149)
TSH SERPL-MCNC: 0.47 UU/ML — SIGNIFICANT CHANGE UP (ref 0.34–4.82)
UROBILINOGEN FLD QL: NEGATIVE — SIGNIFICANT CHANGE UP
VIT B12 SERPL-MCNC: 545 PG/ML — SIGNIFICANT CHANGE UP (ref 232–1245)
WBC # BLD: 10.49 K/UL — SIGNIFICANT CHANGE UP (ref 3.8–10.5)
WBC # BLD: 10.8 K/UL — HIGH (ref 3.8–10.5)
WBC # FLD AUTO: 10.49 K/UL — SIGNIFICANT CHANGE UP (ref 3.8–10.5)
WBC # FLD AUTO: 10.8 K/UL — HIGH (ref 3.8–10.5)

## 2019-05-05 PROCEDURE — 99285 EMERGENCY DEPT VISIT HI MDM: CPT | Mod: 25

## 2019-05-05 PROCEDURE — 74177 CT ABD & PELVIS W/CONTRAST: CPT | Mod: 26

## 2019-05-05 PROCEDURE — 71045 X-RAY EXAM CHEST 1 VIEW: CPT | Mod: 26

## 2019-05-05 RX ORDER — METOPROLOL TARTRATE 50 MG
25 TABLET ORAL
Qty: 0 | Refills: 0 | Status: DISCONTINUED | OUTPATIENT
Start: 2019-05-05 | End: 2019-05-09

## 2019-05-05 RX ORDER — MORPHINE SULFATE 50 MG/1
2 CAPSULE, EXTENDED RELEASE ORAL EVERY 6 HOURS
Qty: 0 | Refills: 0 | Status: DISCONTINUED | OUTPATIENT
Start: 2019-05-05 | End: 2019-05-05

## 2019-05-05 RX ORDER — NICOTINE POLACRILEX 2 MG
1 GUM BUCCAL DAILY
Qty: 0 | Refills: 0 | Status: DISCONTINUED | OUTPATIENT
Start: 2019-05-05 | End: 2019-05-09

## 2019-05-05 RX ORDER — SODIUM CHLORIDE 9 MG/ML
2000 INJECTION INTRAMUSCULAR; INTRAVENOUS; SUBCUTANEOUS ONCE
Qty: 0 | Refills: 0 | Status: COMPLETED | OUTPATIENT
Start: 2019-05-05 | End: 2019-05-05

## 2019-05-05 RX ORDER — MORPHINE SULFATE 50 MG/1
4 CAPSULE, EXTENDED RELEASE ORAL ONCE
Qty: 0 | Refills: 0 | Status: DISCONTINUED | OUTPATIENT
Start: 2019-05-05 | End: 2019-05-05

## 2019-05-05 RX ORDER — HALOPERIDOL DECANOATE 100 MG/ML
5 INJECTION INTRAMUSCULAR ONCE
Qty: 0 | Refills: 0 | Status: COMPLETED | OUTPATIENT
Start: 2019-05-05 | End: 2019-05-05

## 2019-05-05 RX ORDER — HEPARIN SODIUM 5000 [USP'U]/ML
5000 INJECTION INTRAVENOUS; SUBCUTANEOUS EVERY 8 HOURS
Qty: 0 | Refills: 0 | Status: DISCONTINUED | OUTPATIENT
Start: 2019-05-05 | End: 2019-05-09

## 2019-05-05 RX ORDER — ESCITALOPRAM OXALATE 10 MG/1
20 TABLET, FILM COATED ORAL DAILY
Qty: 0 | Refills: 0 | Status: DISCONTINUED | OUTPATIENT
Start: 2019-05-05 | End: 2019-05-09

## 2019-05-05 RX ORDER — MORPHINE SULFATE 50 MG/1
2 CAPSULE, EXTENDED RELEASE ORAL EVERY 6 HOURS
Qty: 0 | Refills: 0 | Status: DISCONTINUED | OUTPATIENT
Start: 2019-05-05 | End: 2019-05-09

## 2019-05-05 RX ORDER — ACETAMINOPHEN 500 MG
650 TABLET ORAL EVERY 6 HOURS
Qty: 0 | Refills: 0 | Status: DISCONTINUED | OUTPATIENT
Start: 2019-05-05 | End: 2019-05-09

## 2019-05-05 RX ORDER — ATORVASTATIN CALCIUM 80 MG/1
20 TABLET, FILM COATED ORAL AT BEDTIME
Qty: 0 | Refills: 0 | Status: DISCONTINUED | OUTPATIENT
Start: 2019-05-05 | End: 2019-05-09

## 2019-05-05 RX ORDER — CLOPIDOGREL BISULFATE 75 MG/1
75 TABLET, FILM COATED ORAL DAILY
Qty: 0 | Refills: 0 | Status: DISCONTINUED | OUTPATIENT
Start: 2019-05-05 | End: 2019-05-09

## 2019-05-05 RX ORDER — METOCLOPRAMIDE HCL 10 MG
10 TABLET ORAL ONCE
Qty: 0 | Refills: 0 | Status: COMPLETED | OUTPATIENT
Start: 2019-05-05 | End: 2019-05-05

## 2019-05-05 RX ORDER — ONDANSETRON 8 MG/1
4 TABLET, FILM COATED ORAL EVERY 8 HOURS
Qty: 0 | Refills: 0 | Status: DISCONTINUED | OUTPATIENT
Start: 2019-05-05 | End: 2019-05-06

## 2019-05-05 RX ORDER — POTASSIUM CHLORIDE 20 MEQ
10 PACKET (EA) ORAL
Qty: 0 | Refills: 0 | Status: COMPLETED | OUTPATIENT
Start: 2019-05-05 | End: 2019-05-05

## 2019-05-05 RX ORDER — SODIUM CHLORIDE 9 MG/ML
1000 INJECTION, SOLUTION INTRAVENOUS
Qty: 0 | Refills: 0 | Status: DISCONTINUED | OUTPATIENT
Start: 2019-05-05 | End: 2019-05-08

## 2019-05-05 RX ORDER — METOPROLOL TARTRATE 50 MG
2.5 TABLET ORAL EVERY 6 HOURS
Qty: 0 | Refills: 0 | Status: DISCONTINUED | OUTPATIENT
Start: 2019-05-05 | End: 2019-05-09

## 2019-05-05 RX ORDER — SODIUM CHLORIDE 9 MG/ML
1000 INJECTION INTRAMUSCULAR; INTRAVENOUS; SUBCUTANEOUS
Qty: 0 | Refills: 0 | Status: DISCONTINUED | OUTPATIENT
Start: 2019-05-05 | End: 2019-05-08

## 2019-05-05 RX ADMIN — Medication 10 MILLIGRAM(S): at 02:29

## 2019-05-05 RX ADMIN — MORPHINE SULFATE 4 MILLIGRAM(S): 50 CAPSULE, EXTENDED RELEASE ORAL at 03:21

## 2019-05-05 RX ADMIN — Medication 1 PATCH: at 19:00

## 2019-05-05 RX ADMIN — Medication 1 PATCH: at 12:34

## 2019-05-05 RX ADMIN — HALOPERIDOL DECANOATE 5 MILLIGRAM(S): 100 INJECTION INTRAMUSCULAR at 02:29

## 2019-05-05 RX ADMIN — MORPHINE SULFATE 4 MILLIGRAM(S): 50 CAPSULE, EXTENDED RELEASE ORAL at 02:29

## 2019-05-05 RX ADMIN — MORPHINE SULFATE 2 MILLIGRAM(S): 50 CAPSULE, EXTENDED RELEASE ORAL at 17:44

## 2019-05-05 RX ADMIN — Medication 100 MILLIEQUIVALENT(S): at 12:33

## 2019-05-05 RX ADMIN — SODIUM CHLORIDE 2000 MILLILITER(S): 9 INJECTION INTRAMUSCULAR; INTRAVENOUS; SUBCUTANEOUS at 02:28

## 2019-05-05 RX ADMIN — Medication 100 MILLIEQUIVALENT(S): at 14:30

## 2019-05-05 RX ADMIN — Medication 25 MILLIGRAM(S): at 17:51

## 2019-05-05 RX ADMIN — Medication 100 MILLIEQUIVALENT(S): at 15:41

## 2019-05-05 RX ADMIN — SODIUM CHLORIDE 75 MILLILITER(S): 9 INJECTION, SOLUTION INTRAVENOUS at 17:50

## 2019-05-05 RX ADMIN — Medication 650 MILLIGRAM(S): at 18:42

## 2019-05-05 RX ADMIN — Medication 650 MILLIGRAM(S): at 17:40

## 2019-05-05 RX ADMIN — MORPHINE SULFATE 2 MILLIGRAM(S): 50 CAPSULE, EXTENDED RELEASE ORAL at 18:43

## 2019-05-05 RX ADMIN — ONDANSETRON 4 MILLIGRAM(S): 8 TABLET, FILM COATED ORAL at 10:43

## 2019-05-05 NOTE — H&P ADULT - ATTENDING COMMENTS
Pt is a 48F, lives with sister, from home, ambulates with walker w/ PMHx of cyclic vomiting syndrome, Reflex sympathetic dystrophy s/p spinal stimulator, frequent falls, SLE (not on tx), Afib (not on AC due to frequent falls), HTN, Depression, Diverticulosis, and partial thyroidectomy on right side, who presents to the ED with diffuse abdominal pain x 4 days, and intractable N/V x 2 days.  Pt states 4 days prior she developed generalized 10/10 abd pain w/ yellow watery diarrhea.  Pt states she had countless episodes of diarrhea. Two days PTA she developed nausea w/ NBNB emesis.  Pt states she was diagnosed with cyclic vomiting syndrome in the past due to medicinal marijuana use, and has these episodes intermittently requiring hospitalization.  Pt states she has not been able to take PO meds, or consume nutrition due to N/V.  Pt denies CP, palpitations, HA, blurred vision, slurred speech, recent illness, recent travel, recent abx use, fever, or syncope.     In the ED, pt presents in mod painful distress, and vitals WNL      pt seen in bed, vitals stable, physical exam reveals lungs cta b/l, heart s1s2, abd soft nd tender to light palp, bs+, ext no edema. labs and diagnostic test result reviewed.    assessment  --  exacerbation of cyclic vomiting synd, neck pain, h/o left thyroid nodule, cyclic vomiting syndrome, colitis, Reflex sympathetic dystrophy s/p spinal stimulator, frequent falls, SLE (not on rx), Afib (not on AC due to frequent falls), HTN, depression, Hemorrhoids (EGD / colonoscopy 3-4 years ago), diverticulosis, and partial right thyroidectomy    plan  --  admit to med, npo, reglan, zofran, pain contril, cont preadmit home meds, gi and dvt profilaxis,  cbc, bmp, mg, phos, lipids, tsh,     ct c-spine    pain mgmt cons

## 2019-05-05 NOTE — ED ADULT NURSE NOTE - ED STAT RN HANDOFF DETAILS 2
Received patient admitted to medicine in no acute distress sleeping comfortably  no bed assigned as yet continue to monitor patient.

## 2019-05-05 NOTE — H&P ADULT - PROBLEM SELECTOR PLAN 6
[] Previous VTE                                                3  [] Thrombophilia                                             2  [] Lower limb paralysis                                   2    [] Current Cancer                                             2   [x] Immobilization > 24 hrs                              1  [] ICU/CCU stay > 24 hours                             1  [x] Age > 60                                                         1    IMPROVE VTE Score: 2; Heparin subq for DVT prophy

## 2019-05-05 NOTE — ED PROVIDER NOTE - PHYSICAL EXAMINATION
Gen: Alert, looks very uncomfortable   Head: NC, AT   Eyes: PERRL, EOMI, normal lids/conjunctiva  ENT: normal hearing, patent oropharynx without erythema/exudate, uvula midline  Neck: supple, no tenderness, Trachea midline  Pulm: Bilateral BS, normal resp effort, no wheeze/stridor/retractions  CV: RRR, no M/R/G, 2+ radial and dp pulses bl, no edema  Abd: soft, mild periumbilical ttp. +BS, no hepatosplenomegaly  Mskel: extremities x4 with normal ROM and no joint effusions. no ctl spine ttp.   Skin: diaphoresis.   Neuro: AAOx3, no sensory/motor deficits, CN 2-12 intact

## 2019-05-05 NOTE — ED ADULT NURSE NOTE - NSIMPLEMENTINTERV_GEN_ALL_ED
Implemented All Universal Safety Interventions:  [place] to call system. Call bell, personal items and telephone within reach. Instruct patient to call for assistance. Room bathroom lighting operational. Non-slip footwear when patient is off stretcher. Physically safe environment: no spills, clutter or unnecessary equipment. Stretcher in lowest position, wheels locked, appropriate side rails in place.

## 2019-05-05 NOTE — ED PROVIDER NOTE - CLINICAL SUMMARY MEDICAL DECISION MAKING FREE TEXT BOX
intractable vomiting. likely from thc or opiate withdrawal. will provide supportive care and rehydration.  I read ekg as afib rate 77, no st elevation or depression, qtc 418, narrow qrs. intractable vomiting. likely from thc or opiate withdrawal. will provide supportive care and rehydration. needs admit given symptoms are not abating.   I read ekg as afib rate 77, no st elevation or depression, qtc 418, narrow qrs.

## 2019-05-05 NOTE — H&P ADULT - NEUROLOGICAL DETAILS
alert and oriented x 3/responds to pain/responds to verbal commands/sensation intact/cranial nerves intact

## 2019-05-05 NOTE — H&P ADULT - HISTORY OF PRESENT ILLNESS
Pt is a 48F, lives with sister, from home, ambulates with walker,  w/ PMHx of cyclic vomiting syndrome,  Reflex sympathetic dystrophy s/p spinal stimulator, frequent falls, SLE (not on tx), Afib (not on AC due to frequent falls), HTN, Depression, Diverticulosis, and partial thyroidectomy on right side, who presents to the ED Pt is a 48F, lives with sister, from home, ambulates with walker w/ PMHx of cyclic vomiting syndrome, Reflex sympathetic dystrophy s/p spinal stimulator, frequent falls, SLE (not on tx), Afib (not on AC due to frequent falls), HTN, Depression, Diverticulosis, and partial thyroidectomy on right side, who presents to the ED with diffuse abdominal pain x 4 days, and intractable N/V x 2 days.  Pt states 4 days prior she developed generalized 10/10 abd pain w/ yellow watery diarrhea.  Pt states she had countless episodes of diarrhea. Two days PTA she developed nausea w/ NBNB emesis.  Pt states she was diagnosed with cyclic vomiting syndrome in the past due to medicinal marijuana use, and has these episodes intermittently requiring hospitalization.  Pt states she has not been able to take PO meds, or consume nutrition due to N/V.  Pt denies CP, palpitations, HA, blurred vision, slurred speech, recent illness, recent travel, recent abx use, fever, or syncope.     In the ED, pt presents in mod painful distress, and vitals WNL

## 2019-05-05 NOTE — H&P ADULT - NSICDXPASTMEDICALHX_GEN_ALL_CORE_FT
PAST MEDICAL HISTORY:  Atrial fibrillation     Cyclical vomiting     HTN (hypertension)     Reflex sympathetic dystrophy     Renal failure, chronic, stage 3 (moderate)     SLE (systemic lupus erythematosus)

## 2019-05-05 NOTE — H&P ADULT - NSHPPHYSICALEXAM_GEN_ALL_CORE
Vital Signs Last 24 Hrs  T(C): 36.6 (05 May 2019 07:21), Max: 37.3 (05 May 2019 06:35)  T(F): 97.9 (05 May 2019 07:21), Max: 99.1 (05 May 2019 06:35)  HR: 79 (05 May 2019 07:21) (70 - 79)  BP: 130/74 (05 May 2019 07:21) (130/74 - 160/100)  RR: 16 (05 May 2019 07:21) (16 - 16)  SpO2: 98% (05 May 2019 07:21) (98% - 98%)

## 2019-05-05 NOTE — H&P ADULT - PROBLEM SELECTOR PLAN 1
-Abd pain, N/V/D x 4 days, symptoms have persisted for several years   -Pt not tolerating PO at this time  -will attain CT abd/pelvis to r/o obstructive process  -c/w IV morphine for now and convert to home dose MS contin when pt tolerating PO  -NPO for now  -c/w IV hydration  -Monitor BMP and replete electrolytes as appropriate

## 2019-05-05 NOTE — H&P ADULT - ASSESSMENT
Pt is a 48F, lives with sister, from home, ambulates with walker w/ PMHx of cyclic vomiting syndrome, Reflex sympathetic dystrophy s/p spinal stimulator, frequent falls, SLE (not on tx), Afib (not on AC due to frequent falls), HTN, Depression, Diverticulosis, and partial thyroidectomy on right side, who presents to the ED with diffuse abdominal pain x 4 days, and intractable N/V x 2 days.   In the ED, pt presents in mod painful distress, and vitals WNL.  Pt remains afebrile w/ mild leukocytosis likely from dehydrations.  Remaining labs sig for mild transaminitis, and K+ of 3.3.      Pt will be admitted to medicine for management of intractable abd pain, and vomiting

## 2019-05-05 NOTE — ED PROVIDER NOTE - OBJECTIVE STATEMENT
Pertinent PMH/PSH/FHx/SHx and Review of Systems contained within:  48F hx of intractable vomiting, cannabninoid hyperemesis, and lupus pw nausea and vomiting. patient notes 2 days of periumbilical abd pain with associated nbnb emesis. also notes diarrhea. no fever. has chills, notes weakness from not eating. has not had her baseline morphine and percocet because of her symptoms. no cp, sob, ha, vision loss, rhinorrhea, dysuria, bleeding, numbness  Fh and Sh not otherwise contributory  ROS otherwise negative

## 2019-05-05 NOTE — H&P ADULT - NSHPSOCIALHISTORY_GEN_ALL_CORE
-No alcohol use  -Pt uses medicinal marijuana for pain  -Pt is an active tobacco smoker x -No alcohol use  -Pt uses medicinal marijuana for pain  -Pt is an active tobacco smoker

## 2019-05-05 NOTE — H&P ADULT - NSICDXFAMILYHX_GEN_ALL_CORE_FT
FAMILY HISTORY:  Sibling  Still living? Yes, Estimated age: Age Unknown  Family history of seizure disorder, Age at diagnosis: Age Unknown

## 2019-05-06 LAB
ANION GAP SERPL CALC-SCNC: 6 MMOL/L — SIGNIFICANT CHANGE UP (ref 5–17)
BUN SERPL-MCNC: 6 MG/DL — LOW (ref 7–18)
CALCIUM SERPL-MCNC: 9 MG/DL — SIGNIFICANT CHANGE UP (ref 8.4–10.5)
CHLORIDE SERPL-SCNC: 107 MMOL/L — SIGNIFICANT CHANGE UP (ref 96–108)
CHOLEST SERPL-MCNC: 146 MG/DL — SIGNIFICANT CHANGE UP (ref 10–199)
CO2 SERPL-SCNC: 25 MMOL/L — SIGNIFICANT CHANGE UP (ref 22–31)
CREAT SERPL-MCNC: 1.07 MG/DL — SIGNIFICANT CHANGE UP (ref 0.5–1.3)
GLUCOSE SERPL-MCNC: 126 MG/DL — HIGH (ref 70–99)
HBA1C BLD-MCNC: 5.6 % — SIGNIFICANT CHANGE UP (ref 4–5.6)
HCT VFR BLD CALC: 43.2 % — SIGNIFICANT CHANGE UP (ref 34.5–45)
HDLC SERPL-MCNC: 44 MG/DL — LOW
HGB BLD-MCNC: 14.4 G/DL — SIGNIFICANT CHANGE UP (ref 11.5–15.5)
LIPID PNL WITH DIRECT LDL SERPL: 78 MG/DL — SIGNIFICANT CHANGE UP
MAGNESIUM SERPL-MCNC: 1.8 MG/DL — SIGNIFICANT CHANGE UP (ref 1.6–2.6)
MCHC RBC-ENTMCNC: 29.3 PG — SIGNIFICANT CHANGE UP (ref 27–34)
MCHC RBC-ENTMCNC: 33.3 GM/DL — SIGNIFICANT CHANGE UP (ref 32–36)
MCV RBC AUTO: 87.8 FL — SIGNIFICANT CHANGE UP (ref 80–100)
NRBC # BLD: 0 /100 WBCS — SIGNIFICANT CHANGE UP (ref 0–0)
PHOSPHATE SERPL-MCNC: 2.2 MG/DL — LOW (ref 2.5–4.5)
PLATELET # BLD AUTO: 226 K/UL — SIGNIFICANT CHANGE UP (ref 150–400)
POTASSIUM SERPL-MCNC: 3.3 MMOL/L — LOW (ref 3.5–5.3)
POTASSIUM SERPL-SCNC: 3.3 MMOL/L — LOW (ref 3.5–5.3)
RBC # BLD: 4.92 M/UL — SIGNIFICANT CHANGE UP (ref 3.8–5.2)
RBC # FLD: 13.9 % — SIGNIFICANT CHANGE UP (ref 10.3–14.5)
SODIUM SERPL-SCNC: 138 MMOL/L — SIGNIFICANT CHANGE UP (ref 135–145)
TOTAL CHOLESTEROL/HDL RATIO MEASUREMENT: 3.3 RATIO — SIGNIFICANT CHANGE UP (ref 3.3–7.1)
TRIGL SERPL-MCNC: 118 MG/DL — SIGNIFICANT CHANGE UP (ref 10–149)
TSH SERPL-MCNC: 1.69 UU/ML — SIGNIFICANT CHANGE UP (ref 0.34–4.82)
VIT B12 SERPL-MCNC: 524 PG/ML — SIGNIFICANT CHANGE UP (ref 232–1245)
WBC # BLD: 12.11 K/UL — HIGH (ref 3.8–10.5)
WBC # FLD AUTO: 12.11 K/UL — HIGH (ref 3.8–10.5)

## 2019-05-06 RX ORDER — POTASSIUM CHLORIDE 20 MEQ
40 PACKET (EA) ORAL ONCE
Qty: 0 | Refills: 0 | Status: COMPLETED | OUTPATIENT
Start: 2019-05-06 | End: 2019-05-06

## 2019-05-06 RX ORDER — ONDANSETRON 8 MG/1
4 TABLET, FILM COATED ORAL EVERY 8 HOURS
Qty: 0 | Refills: 0 | Status: DISCONTINUED | OUTPATIENT
Start: 2019-05-06 | End: 2019-05-09

## 2019-05-06 RX ADMIN — ONDANSETRON 4 MILLIGRAM(S): 8 TABLET, FILM COATED ORAL at 22:48

## 2019-05-06 RX ADMIN — MORPHINE SULFATE 2 MILLIGRAM(S): 50 CAPSULE, EXTENDED RELEASE ORAL at 11:07

## 2019-05-06 RX ADMIN — Medication 25 MILLIGRAM(S): at 17:32

## 2019-05-06 RX ADMIN — Medication 1 PATCH: at 11:27

## 2019-05-06 RX ADMIN — MORPHINE SULFATE 2 MILLIGRAM(S): 50 CAPSULE, EXTENDED RELEASE ORAL at 00:15

## 2019-05-06 RX ADMIN — HEPARIN SODIUM 5000 UNIT(S): 5000 INJECTION INTRAVENOUS; SUBCUTANEOUS at 14:13

## 2019-05-06 RX ADMIN — MORPHINE SULFATE 2 MILLIGRAM(S): 50 CAPSULE, EXTENDED RELEASE ORAL at 17:32

## 2019-05-06 RX ADMIN — ESCITALOPRAM OXALATE 20 MILLIGRAM(S): 10 TABLET, FILM COATED ORAL at 11:56

## 2019-05-06 RX ADMIN — MORPHINE SULFATE 2 MILLIGRAM(S): 50 CAPSULE, EXTENDED RELEASE ORAL at 08:40

## 2019-05-06 RX ADMIN — Medication 25 MILLIGRAM(S): at 06:06

## 2019-05-06 RX ADMIN — MORPHINE SULFATE 2 MILLIGRAM(S): 50 CAPSULE, EXTENDED RELEASE ORAL at 22:47

## 2019-05-06 RX ADMIN — Medication 40 MILLIEQUIVALENT(S): at 17:33

## 2019-05-06 RX ADMIN — ONDANSETRON 4 MILLIGRAM(S): 8 TABLET, FILM COATED ORAL at 17:32

## 2019-05-06 RX ADMIN — HEPARIN SODIUM 5000 UNIT(S): 5000 INJECTION INTRAVENOUS; SUBCUTANEOUS at 06:06

## 2019-05-06 RX ADMIN — CLOPIDOGREL BISULFATE 75 MILLIGRAM(S): 75 TABLET, FILM COATED ORAL at 11:27

## 2019-05-06 RX ADMIN — MORPHINE SULFATE 2 MILLIGRAM(S): 50 CAPSULE, EXTENDED RELEASE ORAL at 01:03

## 2019-05-06 NOTE — PROGRESS NOTE ADULT - SUBJECTIVE AND OBJECTIVE BOX
Patient is a 48y old  Female who presents with a chief complaint of Vomiting (05 May 2019 08:14)    pt seen in ed [ x ], reg med floor [   ], bed [ x ], chair at bedside [   ], a+o x3 [ x ], lethargic [  ],  nad [x  ]    pt still with n/v and abd pain        Allergies    aspirin (Unknown)  cortisone (Other)  latex (Unknown)  sulfa drugs (Unknown)        Vitals    T(F): 99.5 (05-06-19 @ 07:51), Max: 100.7 (05-05-19 @ 11:16)  HR: 60 (05-06-19 @ 07:51) (60 - 79)  BP: 150/60 (05-06-19 @ 07:51) (141/98 - 157/90)  RR: 18 (05-06-19 @ 07:51) (17 - 19)  SpO2: 98% (05-06-19 @ 07:51) (98% - 99%)  Wt(kg): --  CAPILLARY BLOOD GLUCOSE          Labs                          14.4   12.11 )-----------( 226      ( 06 May 2019 05:38 )             43.2       05-06    138  |  107  |  6<L>  ----------------------------<  126<H>  3.3<L>   |  25  |  1.07    Ca    9.0      06 May 2019 05:38  Phos  2.2     05-06  Mg     1.8     05-06    TPro  8.2  /  Alb  3.4<L>  /  TBili  1.0  /  DBili  x   /  AST  55<H>  /  ALT  <6<L>  /  AlkPhos  140<H>  05-05        Radiology Results      < from: CT Abdomen and Pelvis w/ IV Cont (05.05.19 @ 13:05) >  IMPRESSION:    No bowel obstruction.    Mild hepatomegaly.    Slowly enlarging 7 mm left renal hypodensity, too small to characterize   with certainty. Recommend nephrology or urology consultation.    Neurostimulator device as above.    < end of copied text >      Meds    MEDICATIONS  (STANDING):  atorvastatin 20 milliGRAM(s) Oral at bedtime  clopidogrel Tablet 75 milliGRAM(s) Oral daily  dextrose 5% + sodium chloride 0.9%. 1000 milliLiter(s) (75 mL/Hr) IV Continuous <Continuous>  escitalopram 20 milliGRAM(s) Oral daily  heparin  Injectable 5000 Unit(s) SubCutaneous every 8 hours  metoprolol tartrate 25 milliGRAM(s) Oral two times a day  nicotine -  14 mG/24Hr(s) Patch 1 patch Transdermal daily  ondansetron Injectable 4 milliGRAM(s) IV Push every 8 hours  sodium chloride 0.9%. 1000 milliLiter(s) (100 mL/Hr) IV Continuous <Continuous>      MEDICATIONS  (PRN):  acetaminophen   Tablet .. 650 milliGRAM(s) Oral every 6 hours PRN Temp greater or equal to 38C (100.4F), Mild Pain (1 - 3)  metoprolol tartrate Injectable 2.5 milliGRAM(s) IV Push every 6 hours PRN for HR > 100  morphine  - Injectable 2 milliGRAM(s) IV Push every 6 hours PRN Moderate Pain (4 - 6)      Physical Exam    Neuro :  no focal deficits  Respiratory: CTA B/L  CV: RRR, S1S2, no murmurs,   Abdominal: Soft, tender to light palp,+BS,  Extremities: No edema, + peripheral pulses    ASSESSMENT      exacerbation of cyclic vomiting synd,   abd pain,   h/o left thyroid nodule,   cyclic vomiting syndrome,   colitis,   Reflex sympathetic dystrophy s/p spinal stimulator,   frequent falls,   SLE (not on rx),   Afib (not on AC due to frequent falls),   HTN,   depression,   Hemorrhoids (EGD / colonoscopy 3-4 years ago),   diverticulosis,   Partial right thyroidectomy  Renal failure, chronic, stage 3 (moderate)        PLAN      cont npo,   cont  zofran iv q8,   pain mgmt eval  ct abd-pelv Slowly enlarging 7 mm left renal hypodensity, too small to characterize with certainty, no bowel obstruction noted above.  no further bm since admitted  cont current meds

## 2019-05-06 NOTE — ED ADULT NURSE REASSESSMENT NOTE - NS ED NURSE REASSESS COMMENT FT1
Received pt from [facility], pt is observed laying in bed, breathing room air, in no respiratory distress at time of assessment. Pt is A&O x3, able to make needs known, denies any distress/discomfort at time of assessment. Skin intact, pt ambulates on own. Meds to be administered as ordered. Pt on contact isolation for r/o c-diff, awaiting specimen. Admitted to [facility], awaiting bed, nursing monitoring continues. Received pt from MELIN[facility] Ch[facility], pt is observed laying in bed, breathing room air, in no respiratory distress at time of assessment. Pt is A&O x3, able to make needs known, denies any distress/discomfort at time of assessment. Skin intact, left thumb #[id number] in place, pt ambulates on own. Meds to be administered as ordered. Pt on contact isolation for r/o c-diff, awaiting specimen. Admitted to [facility], awaiting bed, nursing monitoring continues.

## 2019-05-07 LAB
ANION GAP SERPL CALC-SCNC: 7 MMOL/L — SIGNIFICANT CHANGE UP (ref 5–17)
BUN SERPL-MCNC: 11 MG/DL — SIGNIFICANT CHANGE UP (ref 7–18)
CALCIUM SERPL-MCNC: 8.8 MG/DL — SIGNIFICANT CHANGE UP (ref 8.4–10.5)
CHLORIDE SERPL-SCNC: 105 MMOL/L — SIGNIFICANT CHANGE UP (ref 96–108)
CO2 SERPL-SCNC: 28 MMOL/L — SIGNIFICANT CHANGE UP (ref 22–31)
CREAT SERPL-MCNC: 1.24 MG/DL — SIGNIFICANT CHANGE UP (ref 0.5–1.3)
GLUCOSE SERPL-MCNC: 115 MG/DL — HIGH (ref 70–99)
HCT VFR BLD CALC: 46.1 % — HIGH (ref 34.5–45)
HGB BLD-MCNC: 15.2 G/DL — SIGNIFICANT CHANGE UP (ref 11.5–15.5)
MAGNESIUM SERPL-MCNC: 1.8 MG/DL — SIGNIFICANT CHANGE UP (ref 1.6–2.6)
MCHC RBC-ENTMCNC: 29.3 PG — SIGNIFICANT CHANGE UP (ref 27–34)
MCHC RBC-ENTMCNC: 33 GM/DL — SIGNIFICANT CHANGE UP (ref 32–36)
MCV RBC AUTO: 88.8 FL — SIGNIFICANT CHANGE UP (ref 80–100)
NRBC # BLD: 0 /100 WBCS — SIGNIFICANT CHANGE UP (ref 0–0)
PHOSPHATE SERPL-MCNC: 3.5 MG/DL — SIGNIFICANT CHANGE UP (ref 2.5–4.5)
PLATELET # BLD AUTO: 203 K/UL — SIGNIFICANT CHANGE UP (ref 150–400)
POTASSIUM SERPL-MCNC: 3.3 MMOL/L — LOW (ref 3.5–5.3)
POTASSIUM SERPL-SCNC: 3.3 MMOL/L — LOW (ref 3.5–5.3)
RBC # BLD: 5.19 M/UL — SIGNIFICANT CHANGE UP (ref 3.8–5.2)
RBC # FLD: 13.6 % — SIGNIFICANT CHANGE UP (ref 10.3–14.5)
SODIUM SERPL-SCNC: 140 MMOL/L — SIGNIFICANT CHANGE UP (ref 135–145)
WBC # BLD: 12.21 K/UL — HIGH (ref 3.8–10.5)
WBC # FLD AUTO: 12.21 K/UL — HIGH (ref 3.8–10.5)

## 2019-05-07 RX ORDER — PANTOPRAZOLE SODIUM 20 MG/1
40 TABLET, DELAYED RELEASE ORAL
Qty: 0 | Refills: 0 | Status: DISCONTINUED | OUTPATIENT
Start: 2019-05-07 | End: 2019-05-09

## 2019-05-07 RX ORDER — ONDANSETRON 8 MG/1
4 TABLET, FILM COATED ORAL ONCE
Qty: 0 | Refills: 0 | Status: COMPLETED | OUTPATIENT
Start: 2019-05-07 | End: 2019-05-07

## 2019-05-07 RX ORDER — POTASSIUM CHLORIDE 20 MEQ
40 PACKET (EA) ORAL ONCE
Qty: 0 | Refills: 0 | Status: COMPLETED | OUTPATIENT
Start: 2019-05-07 | End: 2019-05-07

## 2019-05-07 RX ORDER — SPIRONOLACTONE 25 MG/1
25 TABLET, FILM COATED ORAL DAILY
Qty: 0 | Refills: 0 | Status: DISCONTINUED | OUTPATIENT
Start: 2019-05-07 | End: 2019-05-09

## 2019-05-07 RX ORDER — ONDANSETRON 8 MG/1
4 TABLET, FILM COATED ORAL ONCE
Qty: 0 | Refills: 0 | Status: DISCONTINUED | OUTPATIENT
Start: 2019-05-07 | End: 2019-05-09

## 2019-05-07 RX ADMIN — Medication 25 MILLIGRAM(S): at 17:49

## 2019-05-07 RX ADMIN — ONDANSETRON 4 MILLIGRAM(S): 8 TABLET, FILM COATED ORAL at 14:06

## 2019-05-07 RX ADMIN — Medication 1 PATCH: at 21:32

## 2019-05-07 RX ADMIN — MORPHINE SULFATE 2 MILLIGRAM(S): 50 CAPSULE, EXTENDED RELEASE ORAL at 07:38

## 2019-05-07 RX ADMIN — Medication 300 MILLIGRAM(S): at 17:48

## 2019-05-07 RX ADMIN — Medication 25 MILLIGRAM(S): at 05:56

## 2019-05-07 RX ADMIN — MORPHINE SULFATE 2 MILLIGRAM(S): 50 CAPSULE, EXTENDED RELEASE ORAL at 21:19

## 2019-05-07 RX ADMIN — Medication 40 MILLIEQUIVALENT(S): at 09:27

## 2019-05-07 RX ADMIN — Medication 1 PATCH: at 11:19

## 2019-05-07 RX ADMIN — ESCITALOPRAM OXALATE 20 MILLIGRAM(S): 10 TABLET, FILM COATED ORAL at 11:20

## 2019-05-07 RX ADMIN — MORPHINE SULFATE 2 MILLIGRAM(S): 50 CAPSULE, EXTENDED RELEASE ORAL at 00:07

## 2019-05-07 RX ADMIN — MORPHINE SULFATE 2 MILLIGRAM(S): 50 CAPSULE, EXTENDED RELEASE ORAL at 09:43

## 2019-05-07 RX ADMIN — CLOPIDOGREL BISULFATE 75 MILLIGRAM(S): 75 TABLET, FILM COATED ORAL at 11:20

## 2019-05-07 RX ADMIN — ONDANSETRON 4 MILLIGRAM(S): 8 TABLET, FILM COATED ORAL at 03:55

## 2019-05-07 RX ADMIN — Medication 1 PATCH: at 09:26

## 2019-05-07 RX ADMIN — MORPHINE SULFATE 2 MILLIGRAM(S): 50 CAPSULE, EXTENDED RELEASE ORAL at 15:37

## 2019-05-07 RX ADMIN — Medication 1 PATCH: at 11:21

## 2019-05-07 RX ADMIN — SPIRONOLACTONE 25 MILLIGRAM(S): 25 TABLET, FILM COATED ORAL at 17:44

## 2019-05-07 RX ADMIN — MORPHINE SULFATE 2 MILLIGRAM(S): 50 CAPSULE, EXTENDED RELEASE ORAL at 21:35

## 2019-05-07 RX ADMIN — ONDANSETRON 4 MILLIGRAM(S): 8 TABLET, FILM COATED ORAL at 21:22

## 2019-05-07 RX ADMIN — MORPHINE SULFATE 2 MILLIGRAM(S): 50 CAPSULE, EXTENDED RELEASE ORAL at 09:24

## 2019-05-07 RX ADMIN — ONDANSETRON 4 MILLIGRAM(S): 8 TABLET, FILM COATED ORAL at 05:56

## 2019-05-07 RX ADMIN — MORPHINE SULFATE 2 MILLIGRAM(S): 50 CAPSULE, EXTENDED RELEASE ORAL at 16:46

## 2019-05-07 NOTE — PROGRESS NOTE ADULT - SUBJECTIVE AND OBJECTIVE BOX
Patient is a 48y old  Female who presents with a chief complaint of Vomiting (06 May 2019 10:16)    pt seen in ed [ x ], reg med floor [   ], bed [ x ], chair at bedside [   ], a+o x3 [ x ], lethargic [  ],  nad [x  ]      pt states feeling better      Allergies    aspirin (Unknown)  cortisone (Other)  latex (Unknown)  sulfa drugs (Unknown)        Vitals    T(F): 98.8 (05-07-19 @ 07:37), Max: 99.3 (05-07-19 @ 05:25)  HR: 63 (05-07-19 @ 07:37) (62 - 74)  BP: 148/97 (05-07-19 @ 07:37) (137/88 - 169/98)  RR: 16 (05-07-19 @ 07:37) (16 - 18)  SpO2: 100% (05-07-19 @ 07:37) (97% - 100%)  Wt(kg): --  CAPILLARY BLOOD GLUCOSE          Labs                          15.2   12.21 )-----------( 203      ( 07 May 2019 07:26 )             46.1       05-07    140  |  105  |  11  ----------------------------<  115<H>  3.3<L>   |  28  |  1.24    Ca    8.8      07 May 2019 07:26  Phos  2.2     05-06  Mg     1.8     05-06                  Radiology Results      Meds    MEDICATIONS  (STANDING):  atorvastatin 20 milliGRAM(s) Oral at bedtime  clopidogrel Tablet 75 milliGRAM(s) Oral daily  dextrose 5% + sodium chloride 0.9%. 1000 milliLiter(s) (75 mL/Hr) IV Continuous <Continuous>  escitalopram 20 milliGRAM(s) Oral daily  heparin  Injectable 5000 Unit(s) SubCutaneous every 8 hours  metoprolol tartrate 25 milliGRAM(s) Oral two times a day  nicotine -  14 mG/24Hr(s) Patch 1 patch Transdermal daily  ondansetron Injectable 4 milliGRAM(s) IV Push every 8 hours  pantoprazole    Tablet 40 milliGRAM(s) Oral before breakfast  potassium chloride    Tablet ER 40 milliEquivalent(s) Oral once  pregabalin 300 milliGRAM(s) Oral two times a day  sodium chloride 0.9%. 1000 milliLiter(s) (100 mL/Hr) IV Continuous <Continuous>  spironolactone 25 milliGRAM(s) Oral daily      MEDICATIONS  (PRN):  acetaminophen   Tablet .. 650 milliGRAM(s) Oral every 6 hours PRN Temp greater or equal to 38C (100.4F), Mild Pain (1 - 3)  metoprolol tartrate Injectable 2.5 milliGRAM(s) IV Push every 6 hours PRN for HR > 100  morphine  - Injectable 2 milliGRAM(s) IV Push every 6 hours PRN Moderate Pain (4 - 6)      Physical Exam      Neuro :  no focal deficits  Respiratory: CTA B/L  CV: RRR, S1S2, no murmurs,   Abdominal: Soft, mild tender to moderate palp,+BS,  Extremities: No edema, + peripheral pulses    ASSESSMENT      exacerbation of cyclic vomiting synd,   abd pain,   h/o left thyroid nodule,   cyclic vomiting syndrome,   colitis,   Reflex sympathetic dystrophy s/p spinal stimulator,   frequent falls,   SLE (not on rx),   Afib (not on AC due to frequent falls),   HTN,   depression,   Hemorrhoids (EGD / colonoscopy 3-4 years ago),   diverticulosis,   Partial right thyroidectomy  Renal failure, chronic, stage 3 (moderate)        PLAN      pt has less nausea and no voiting this am  start clear liquids and adv as tolerated   cont  zofran iv q8,   pain mgmt eval  ct abd-pelv Slowly enlarging 7 mm left renal hypodensity, too small to characterize with certainty, no bowel obstruction noted   pt to f/u renal outpt  no further bm since admitted  cont current meds

## 2019-05-07 NOTE — CHART NOTE - NSCHARTNOTEFT_GEN_A_CORE
EVENT: Nausea  - Pt c/o of feeling very nauseated.    OBJECTIVE:  Vital Signs Last 24 Hrs  T(C): 37.3 (07 May 2019 00:04), Max: 37.5 (06 May 2019 07:51)  T(F): 99.1 (07 May 2019 00:04), Max: 99.5 (06 May 2019 07:51)  HR: 62 (07 May 2019 00:04) (60 - 74)  BP: 169/98 (07 May 2019 00:04) (141/98 - 169/98)  BP(mean): --  RR: 17 (07 May 2019 00:04) (17 - 19)  SpO2: 99% (07 May 2019 00:04) (97% - 99%)      LABS:                        14.4   12.11 )-----------( 226      ( 06 May 2019 05:38 )             43.2     05-06    138  |  107  |  6<L>  ----------------------------<  126<H>  3.3<L>   |  25  |  1.07    Ca    9.0      06 May 2019 05:38  Phos  2.2     05-06  Mg     1.8     05-06      PLAN: Zofran 4mg, IVP x 1 dose ordered

## 2019-05-07 NOTE — CONSULT NOTE ADULT - PROBLEM SELECTOR RECOMMENDATION 9
- pt is on ms contin and percocet at home  - will continue on morphine iv for today only and transition to po tomorrow  - iv fluids  - zofran prn  - neck pain due compression fracture   - can follow up with dr. dickens spine surgeon  [phone]  [address] [facility] [id number]

## 2019-05-07 NOTE — CONSULT NOTE ADULT - SUBJECTIVE AND OBJECTIVE BOX
Chief Complaint:    HPI:  Pt is a 48F, lives with sister, from home, ambulates with walker w/ PMHx of cyclic vomiting syndrome, Reflex sympathetic dystrophy s/p spinal stimulator, frequent falls, SLE (not on tx), Afib (not on AC due to frequent falls), HTN, Depression, Diverticulosis, and partial thyroidectomy on right side, who presents to the ED with diffuse abdominal pain x 4 days, and intractable N/V x 2 days.  Pt states 4 days prior she developed generalized 10/10 abd pain w/ yellow watery diarrhea.  Pt states she had countless episodes of diarrhea. Two days PTA she developed nausea w/ NBNB emesis.  Pt states she was diagnosed with cyclic vomiting syndrome in the past due to medicinal marijuana use, and has these episodes intermittently requiring hospitalization.  Pt states she has not been able to take PO meds, or consume nutrition due to N/V.  Pt denies CP, palpitations, HA, blurred vision, slurred speech, recent illness, recent travel, recent abx use, fever, or syncope.     In the ED, pt presents in mod painful distress, and vitals WNL (05 May 2019 08:14)      PAST MEDICAL & SURGICAL HISTORY:  Renal failure, chronic, stage 3 (moderate)  HTN (hypertension)  Cyclical vomiting  Atrial fibrillation  SLE (systemic lupus erythematosus)  Reflex sympathetic dystrophy  H/O partial thyroidectomy  S/P partial hysterectomy      FAMILY HISTORY:  Family history of seizure disorder (Sibling)      SOCIAL HISTORY:  [ ] Denies Smoking, Alcohol, or Drug Use    Allergies    aspirin (Unknown)  cortisone (Other)  latex (Unknown)  sulfa drugs (Unknown)    Intolerances        PAIN MEDICATIONS:  acetaminophen   Tablet .. 650 milliGRAM(s) Oral every 6 hours PRN  escitalopram 20 milliGRAM(s) Oral daily  morphine  - Injectable 2 milliGRAM(s) IV Push every 6 hours PRN  ondansetron Injectable 4 milliGRAM(s) IV Push every 8 hours  ondansetron Injectable 4 milliGRAM(s) IV Push once  pregabalin 300 milliGRAM(s) Oral two times a day      Heme:  clopidogrel Tablet 75 milliGRAM(s) Oral daily  heparin  Injectable 5000 Unit(s) SubCutaneous every 8 hours    Antibiotics:    Cardiovascular:  metoprolol tartrate 25 milliGRAM(s) Oral two times a day  metoprolol tartrate Injectable 2.5 milliGRAM(s) IV Push every 6 hours PRN  spironolactone 25 milliGRAM(s) Oral daily    GI:  pantoprazole    Tablet 40 milliGRAM(s) Oral before breakfast    Endocrine:  atorvastatin 20 milliGRAM(s) Oral at bedtime    All Other Medications:  dextrose 5% + sodium chloride 0.9%. 1000 milliLiter(s) IV Continuous <Continuous>  nicotine -  14 mG/24Hr(s) Patch 1 patch Transdermal daily  sodium chloride 0.9%. 1000 milliLiter(s) IV Continuous <Continuous>      REVIEW OF SYSTEMS:    CONSTITUTIONAL: No fever, weight loss, or fatigue  EYES: No eye pain, visual disturbances, or discharge  ENMT:  No difficulty hearing, tinnitus, vertigo; No sinus or throat pain  NECK: No pain or stiffness  BREASTS: No pain, masses, or nipple discharge  RESPIRATORY: No cough, wheezing, chills or hemoptysis; No shortness of breath  CARDIOVASCULAR: No chest pain, palpitations, dizziness, or leg swelling  GASTROINTESTINAL: No abdominal or epigastric pain. No nausea, vomiting, or hematemesis; No diarrhea or constipation. No melena or hematochezia.  GENITOURINARY: No dysuria, frequency, hematuria, or incontinence  NEUROLOGICAL: No headaches, memory loss, loss of strength, numbness, or tremors  SKIN: No itching, burning, rashes, or lesions   LYMPH NODES: No enlarged glands  ENDOCRINE: No heat or cold intolerance; No hair loss  MUSCULOSKELETAL: No joint pain or swelling; No muscle, back, or extremity pain  PSYCHIATRIC: No depression, anxiety, mood swings, or difficulty sleeping  HEME/LYMPH: No easy bruising, or bleeding gums  ALLERY AND IMMUNOLOGIC: No hives or eczema      Vital Signs Last 24 Hrs  T(C): 36.8 (07 May 2019 23:21), Max: 37.4 (07 May 2019 05:25)  T(F): 98.2 (07 May 2019 23:21), Max: 99.3 (07 May 2019 05:25)  HR: 75 (07 May 2019 23:21) (62 - 75)  BP: 119/83 (07 May 2019 23:21) (114/82 - 169/98)  BP(mean): --  RR: 17 (07 May 2019 23:21) (16 - 18)  SpO2: 96% (07 May 2019 23:21) (96% - 100%)    PAIN SCORE:         SCALE USED: (1-10 VNRS)             PHYSICAL EXAM:    GENERAL: NAD, well-groomed, well-developed  HEAD:  Atraumatic, Normocephalic  EYES: EOMI, PERRLA, conjunctiva and sclera clear  ENMT: No tonsillar erythema, exudates, or enlargement; Moist mucous membranes, Good dentition, No lesions  NECK: Supple, No JVD, Normal thyroid  NERVOUS SYSTEM:  Alert & Oriented X3, Good concentration; Motor Strength 5/5 B/L upper and lower extremities; DTRs 2+ intact and symmetric  CHEST/LUNG: Clear to percussion bilaterally; No rales, rhonchi, wheezing, or rubs  HEART: Regular rate and rhythm; No murmurs, rubs, or gallops  ABDOMEN: Soft, Nontender, Nondistended; Bowel sounds present  EXTREMITIES:  2+ Peripheral Pulses, No clubbing, cyanosis, or edema  LYMPH: No lymphadenopathy noted  SKIN: No rashes or lesions    Back: No CVA tenderness, No paravetebral tenderness    LABS:                          15.2   12.21 )-----------( 203      ( 07 May 2019 07:26 )             46.1     05-07    140  |  105  |  11  ----------------------------<  115<H>  3.3<L>   |  28  |  1.24    Ca    8.8      07 May 2019 07:26  Phos  3.5     05-07  Mg     1.8     05-07            RADIOLOGY:    Drug Screen:        RECOMMENDATIONS    [ ]  [place]  Reviewed and Copied to Chart Chief Complaint: abdominal pain    HPI:  Pt is a 48F, lives with sister, from home, ambulates with walker w/ PMHx of cyclic vomiting syndrome, Reflex sympathetic dystrophy s/p spinal stimulator, frequent falls, SLE (not on tx), Afib (not on AC due to frequent falls), HTN, Depression, Diverticulosis, and partial thyroidectomy on right side, who presents to the ED with diffuse abdominal pain x 4 days, and intractable N/V x 2 days.  Pt states 4 days prior she developed generalized 10/10 abd pain w/ yellow watery diarrhea.  Pt states she had countless episodes of diarrhea. Two days PTA she developed nausea w/ NBNB emesis.  Pt states she was diagnosed with cyclic vomiting syndrome in the past due to medicinal marijuana use, and has these episodes intermittently requiring hospitalization.  Pt states she has not been able to take PO meds, or consume nutrition due to N/V.  Pt denies CP, palpitations, HA, blurred vision, slurred speech, recent illness, recent travel, recent abx use, fever, or syncope.     48 year old female, well known to me.  Pt with cyclic vomiting.  + history of chronic pain syndrome - rsd.  Pt also has a spinal cord stimulator which is not functioning.   + nausea + vomiting. + abdominal pian.  Pt is on zofran and fluids pt also has chronic back and neck pain.  Pt sees a pain mgt specialist - dr. arias.  She is currently undergoing epidural inj in cervical spine. Pt is on chronic opioid therapy - ms contin and percocet po prn.      PAST MEDICAL & SURGICAL HISTORY:  Renal failure, chronic, stage 3 (moderate)  HTN (hypertension)  Cyclical vomiting  Atrial fibrillation  SLE (systemic lupus erythematosus)  Reflex sympathetic dystrophy  H/O partial thyroidectomy  S/P partial hysterectomy      FAMILY HISTORY:  Family history of seizure disorder (Sibling)      SOCIAL HISTORY:  [x ] Denies Smoking, Alcohol, or Drug Use    Allergies    aspirin (Unknown)  cortisone (Other)  latex (Unknown)  sulfa drugs (Unknown)    Intolerances        PAIN MEDICATIONS:  acetaminophen   Tablet .. 650 milliGRAM(s) Oral every 6 hours PRN  escitalopram 20 milliGRAM(s) Oral daily  morphine  - Injectable 2 milliGRAM(s) IV Push every 6 hours PRN  ondansetron Injectable 4 milliGRAM(s) IV Push every 8 hours  ondansetron Injectable 4 milliGRAM(s) IV Push once  pregabalin 300 milliGRAM(s) Oral two times a day      Heme:  clopidogrel Tablet 75 milliGRAM(s) Oral daily  heparin  Injectable 5000 Unit(s) SubCutaneous every 8 hours    Antibiotics:    Cardiovascular:  metoprolol tartrate 25 milliGRAM(s) Oral two times a day  metoprolol tartrate Injectable 2.5 milliGRAM(s) IV Push every 6 hours PRN  spironolactone 25 milliGRAM(s) Oral daily    GI:  pantoprazole    Tablet 40 milliGRAM(s) Oral before breakfast    Endocrine:  atorvastatin 20 milliGRAM(s) Oral at bedtime    All Other Medications:  dextrose 5% + sodium chloride 0.9%. 1000 milliLiter(s) IV Continuous <Continuous>  nicotine -  14 mG/24Hr(s) Patch 1 patch Transdermal daily  sodium chloride 0.9%. 1000 milliLiter(s) IV Continuous <Continuous>      REVIEW OF SYSTEMS:    CONSTITUTIONAL: No fever, weight loss, or fatigue  RESPIRATORY: No cough, wheezing, chills or hemoptysis; No shortness of breath  CARDIOVASCULAR: No chest pain, palpitations, dizziness, or leg swelling  GASTROINTESTINAL: + abdominal pain, + nausea + vomiting.    GENITOURINARY: No dysuria, frequency, hematuria, or incontinence  NEUROLOGICAL: No headaches, memory loss, l+ hyperalgesia - le, + neuropathy, RSD  MUSCULOSKELETAL: + chronic back and neck  pain + chronic le pain      Vital Signs Last 24 Hrs  T(C): 36.8 (07 May 2019 23:21), Max: 37.4 (07 May 2019 05:25)  T(F): 98.2 (07 May 2019 23:21), Max: 99.3 (07 May 2019 05:25)  HR: 75 (07 May 2019 23:21) (62 - 75)  BP: 119/83 (07 May 2019 23:21) (114/82 - 169/98)  BP(mean): --  RR: 17 (07 May 2019 23:21) (16 - 18)  SpO2: 96% (07 May 2019 23:21) (96% - 100%)    PAIN SCORE:   5/10      SCALE USED: (1-10 VNRS)             PHYSICAL EXAM:    GENERAL: NAD, ambulate with walker  NERVOUS SYSTEM:  Alert & Oriented X3, Good concentration;   CHEST/LUNG: Clear to percussion bilaterally; No rales, rhonchi, wheezing, or rubs  HEART: Regular rate and rhythm; No murmurs, rubs, or gallops  ABDOMEN: Soft, + generalized tenderness   EXTREMITIES:  2+ Peripheral Pulses, No clubbing, cyanosis,   MUSCULOSKELETAL:  + neck pain, + lower back pain + decreased rom - LE due to pain    LABS:                          15.2   12.21 )-----------( 203      ( 07 May 2019 07:26 )             46.1     05-07    140  |  105  |  11  ----------------------------<  115<H>  3.3<L>   |  28  |  1.24    Ca    8.8      07 May 2019 07:26  Phos  3.5     05-07  Mg     1.8     05-07            RADIOLOGY:    Drug Screen:        RECOMMENDATIONS    [ ]  [facility]  Reviewed and Copied to Chart

## 2019-05-08 DIAGNOSIS — N28.9 DISORDER OF KIDNEY AND URETER, UNSPECIFIED: ICD-10-CM

## 2019-05-08 LAB
ANION GAP SERPL CALC-SCNC: 7 MMOL/L — SIGNIFICANT CHANGE UP (ref 5–17)
BUN SERPL-MCNC: 13 MG/DL — SIGNIFICANT CHANGE UP (ref 7–18)
CALCIUM SERPL-MCNC: 8.4 MG/DL — SIGNIFICANT CHANGE UP (ref 8.4–10.5)
CHLORIDE SERPL-SCNC: 105 MMOL/L — SIGNIFICANT CHANGE UP (ref 96–108)
CO2 SERPL-SCNC: 27 MMOL/L — SIGNIFICANT CHANGE UP (ref 22–31)
CREAT SERPL-MCNC: 1.39 MG/DL — HIGH (ref 0.5–1.3)
GLUCOSE SERPL-MCNC: 90 MG/DL — SIGNIFICANT CHANGE UP (ref 70–99)
HCT VFR BLD CALC: 42.9 % — SIGNIFICANT CHANGE UP (ref 34.5–45)
HGB BLD-MCNC: 14 G/DL — SIGNIFICANT CHANGE UP (ref 11.5–15.5)
MAGNESIUM SERPL-MCNC: 2 MG/DL — SIGNIFICANT CHANGE UP (ref 1.6–2.6)
MCHC RBC-ENTMCNC: 29.3 PG — SIGNIFICANT CHANGE UP (ref 27–34)
MCHC RBC-ENTMCNC: 32.6 GM/DL — SIGNIFICANT CHANGE UP (ref 32–36)
MCV RBC AUTO: 89.7 FL — SIGNIFICANT CHANGE UP (ref 80–100)
NRBC # BLD: 0 /100 WBCS — SIGNIFICANT CHANGE UP (ref 0–0)
PHOSPHATE SERPL-MCNC: 4.2 MG/DL — SIGNIFICANT CHANGE UP (ref 2.5–4.5)
PLATELET # BLD AUTO: 188 K/UL — SIGNIFICANT CHANGE UP (ref 150–400)
POTASSIUM SERPL-MCNC: 3.3 MMOL/L — LOW (ref 3.5–5.3)
POTASSIUM SERPL-SCNC: 3.3 MMOL/L — LOW (ref 3.5–5.3)
RBC # BLD: 4.78 M/UL — SIGNIFICANT CHANGE UP (ref 3.8–5.2)
RBC # FLD: 13.6 % — SIGNIFICANT CHANGE UP (ref 10.3–14.5)
SODIUM SERPL-SCNC: 139 MMOL/L — SIGNIFICANT CHANGE UP (ref 135–145)
WBC # BLD: 9.28 K/UL — SIGNIFICANT CHANGE UP (ref 3.8–10.5)
WBC # FLD AUTO: 9.28 K/UL — SIGNIFICANT CHANGE UP (ref 3.8–10.5)

## 2019-05-08 RX ORDER — SODIUM CHLORIDE 9 MG/ML
1000 INJECTION INTRAMUSCULAR; INTRAVENOUS; SUBCUTANEOUS
Qty: 0 | Refills: 0 | Status: DISCONTINUED | OUTPATIENT
Start: 2019-05-08 | End: 2019-05-08

## 2019-05-08 RX ORDER — POTASSIUM CHLORIDE 20 MEQ
40 PACKET (EA) ORAL ONCE
Qty: 0 | Refills: 0 | Status: COMPLETED | OUTPATIENT
Start: 2019-05-08 | End: 2019-05-08

## 2019-05-08 RX ORDER — POTASSIUM CHLORIDE 20 MEQ
10 PACKET (EA) ORAL
Qty: 0 | Refills: 0 | Status: DISCONTINUED | OUTPATIENT
Start: 2019-05-08 | End: 2019-05-08

## 2019-05-08 RX ORDER — SODIUM CHLORIDE 9 MG/ML
1000 INJECTION INTRAMUSCULAR; INTRAVENOUS; SUBCUTANEOUS
Qty: 0 | Refills: 0 | Status: DISCONTINUED | OUTPATIENT
Start: 2019-05-08 | End: 2019-05-09

## 2019-05-08 RX ADMIN — Medication 1 PATCH: at 12:05

## 2019-05-08 RX ADMIN — SODIUM CHLORIDE 70 MILLILITER(S): 9 INJECTION INTRAMUSCULAR; INTRAVENOUS; SUBCUTANEOUS at 22:28

## 2019-05-08 RX ADMIN — MORPHINE SULFATE 2 MILLIGRAM(S): 50 CAPSULE, EXTENDED RELEASE ORAL at 05:45

## 2019-05-08 RX ADMIN — Medication 300 MILLIGRAM(S): at 19:17

## 2019-05-08 RX ADMIN — Medication 1 PATCH: at 18:31

## 2019-05-08 RX ADMIN — Medication 40 MILLIEQUIVALENT(S): at 12:04

## 2019-05-08 RX ADMIN — ONDANSETRON 4 MILLIGRAM(S): 8 TABLET, FILM COATED ORAL at 05:48

## 2019-05-08 RX ADMIN — Medication 1 PATCH: at 08:32

## 2019-05-08 RX ADMIN — Medication 650 MILLIGRAM(S): at 02:15

## 2019-05-08 RX ADMIN — MORPHINE SULFATE 2 MILLIGRAM(S): 50 CAPSULE, EXTENDED RELEASE ORAL at 18:46

## 2019-05-08 RX ADMIN — SPIRONOLACTONE 25 MILLIGRAM(S): 25 TABLET, FILM COATED ORAL at 05:47

## 2019-05-08 RX ADMIN — ESCITALOPRAM OXALATE 20 MILLIGRAM(S): 10 TABLET, FILM COATED ORAL at 12:05

## 2019-05-08 RX ADMIN — MORPHINE SULFATE 2 MILLIGRAM(S): 50 CAPSULE, EXTENDED RELEASE ORAL at 06:00

## 2019-05-08 RX ADMIN — MORPHINE SULFATE 2 MILLIGRAM(S): 50 CAPSULE, EXTENDED RELEASE ORAL at 19:43

## 2019-05-08 RX ADMIN — PANTOPRAZOLE SODIUM 40 MILLIGRAM(S): 20 TABLET, DELAYED RELEASE ORAL at 05:47

## 2019-05-08 RX ADMIN — SODIUM CHLORIDE 70 MILLILITER(S): 9 INJECTION INTRAMUSCULAR; INTRAVENOUS; SUBCUTANEOUS at 07:36

## 2019-05-08 RX ADMIN — CLOPIDOGREL BISULFATE 75 MILLIGRAM(S): 75 TABLET, FILM COATED ORAL at 12:05

## 2019-05-08 RX ADMIN — ONDANSETRON 4 MILLIGRAM(S): 8 TABLET, FILM COATED ORAL at 22:20

## 2019-05-08 RX ADMIN — Medication 300 MILLIGRAM(S): at 05:59

## 2019-05-08 RX ADMIN — Medication 1 PATCH: at 11:59

## 2019-05-08 RX ADMIN — Medication 650 MILLIGRAM(S): at 01:21

## 2019-05-08 NOTE — PROGRESS NOTE ADULT - PROBLEM SELECTOR PLAN 4
-CT abdomen-  Slowly enlarging 7 mm left renal hypodensity, too small to characterize with certainty.   pt to f/u renal out pt upon discharge.

## 2019-05-08 NOTE — PROGRESS NOTE ADULT - SUBJECTIVE AND OBJECTIVE BOX
PGY 1 Note discussed with supervising resident and primary attending    Patient is a 48y old  Female who presents with a chief complaint of Vomiting (08 May 2019 08:15)      INTERVAL HPI/OVERNIGHT EVENTS:  Patient was seen and examined, she feels better. She denies any further diarrhea episodes, she Had her last vomiting episode yesterday afternoon.   She only complains of right abdominal pain which has  been on going for last one year.     MEDICATIONS  (STANDING):  atorvastatin 20 milliGRAM(s) Oral at bedtime  clopidogrel Tablet 75 milliGRAM(s) Oral daily  escitalopram 20 milliGRAM(s) Oral daily  heparin  Injectable 5000 Unit(s) SubCutaneous every 8 hours  metoprolol tartrate 25 milliGRAM(s) Oral two times a day  nicotine -  14 mG/24Hr(s) Patch 1 patch Transdermal daily  ondansetron Injectable 4 milliGRAM(s) IV Push every 8 hours  ondansetron Injectable 4 milliGRAM(s) IV Push once  pantoprazole    Tablet 40 milliGRAM(s) Oral before breakfast  potassium chloride   Powder 40 milliEquivalent(s) Oral once  pregabalin 300 milliGRAM(s) Oral two times a day  sodium chloride 0.9%. 1000 milliLiter(s) (70 mL/Hr) IV Continuous <Continuous>  spironolactone 25 milliGRAM(s) Oral daily    MEDICATIONS  (PRN):  acetaminophen   Tablet .. 650 milliGRAM(s) Oral every 6 hours PRN Temp greater or equal to 38C (100.4F), Mild Pain (1 - 3)  metoprolol tartrate Injectable 2.5 milliGRAM(s) IV Push every 6 hours PRN for HR > 100  morphine  - Injectable 2 milliGRAM(s) IV Push every 6 hours PRN Moderate Pain (4 - 6)      __________________________________________________  REVIEW OF SYSTEMS:    CONSTITUTIONAL: No fever,   EYES: no acute visual disturbances  NECK: No pain or stiffness  RESPIRATORY: No cough; No shortness of breath  CARDIOVASCULAR: No chest pain, no palpitations  GASTROINTESTINAL: Right lower quadrant abdominal pain, . No nausea or vomiting; No diarrhea   NEUROLOGICAL: No headache or numbness, no tremors  MUSCULOSKELETAL: No joint pain, no muscle pain  GENITOURINARY: no dysuria, no frequency, no hesitancy  PSYCHIATRY: no depression , no anxiety  ALL OTHER  ROS negative        Vital Signs Last 24 Hrs  T(C): 36.9 (08 May 2019 07:42), Max: 37.2 (07 May 2019 15:32)  T(F): 98.4 (08 May 2019 07:42), Max: 98.9 (07 May 2019 15:32)  HR: 84 (08 May 2019 07:42) (64 - 84)  BP: 100/83 (08 May 2019 07:42) (95/68 - 119/83)  BP(mean): --  RR: 16 (08 May 2019 07:42) (16 - 17)  SpO2: 100% (08 May 2019 07:42) (96% - 100%)    ________________________________________________  PHYSICAL EXAM:  GENERAL: NAD  HEENT: Normocephalic;  conjunctivae and sclerae clear; moist mucous membranes;   NECK : supple  CHEST/LUNG: Clear to auscultation bilaterally with good air entry   HEART: S1 S2  regular; no murmurs, gallops or rubs  ABDOMEN: Soft, RLQ tenderness,  Nondistended; Bowel sounds present  EXTREMITIES: no cyanosis; no edema; no calf tenderness  SKIN: warm and dry; no rash  NERVOUS SYSTEM:  Awake and alert; Oriented  to place, person and time ; no new deficits    _________________________________________________  LABS:                        14.0   9.28  )-----------( 188      ( 08 May 2019 06:20 )             42.9     05-08    139  |  105  |  13  ----------------------------<  90  3.3<L>   |  27  |  1.39<H>    Ca    8.4      08 May 2019 06:20  Phos  4.2     05-08  Mg     2.0     05-08          CAPILLARY BLOOD GLUCOSE            RADIOLOGY & ADDITIONAL TESTS:    Imaging Personally Reviewed:  YES    Consultant(s) Notes Reviewed:   YES    Care Discussed with Consultants : YES     Plan of care was discussed with patient and /or primary care giver; all questions and concerns were addressed and care was aligned with patient's wishes.

## 2019-05-08 NOTE — PROGRESS NOTE ADULT - SUBJECTIVE AND OBJECTIVE BOX
Patient is a 48y old  Female who presents with a chief complaint of Vomiting (07 May 2019 16:23)    pt seen in ed [  ], reg med floor [ x  ], bed [ x ], chair at bedside [   ], a+o x3 [ x ], lethargic [  ],  nad [x  ]      Allergies    aspirin (Unknown)  cortisone (Other)  latex (Unknown)  sulfa drugs (Unknown)        Vitals    T(F): 98.4 (05-08-19 @ 07:42), Max: 98.9 (05-07-19 @ 15:32)  HR: 84 (05-08-19 @ 07:42) (64 - 84)  BP: 100/83 (05-08-19 @ 07:42) (95/68 - 119/83)  RR: 16 (05-08-19 @ 07:42) (16 - 17)  SpO2: 100% (05-08-19 @ 07:42) (96% - 100%)  Wt(kg): --  CAPILLARY BLOOD GLUCOSE          Labs                          14.0   9.28  )-----------( 188      ( 08 May 2019 06:20 )             42.9       05-08    139  |  105  |  13  ----------------------------<  90  3.3<L>   |  27  |  1.39<H>    Ca    8.4      08 May 2019 06:20  Phos  4.2     05-08  Mg     2.0     05-08                  Radiology Results      Meds    MEDICATIONS  (STANDING):  atorvastatin 20 milliGRAM(s) Oral at bedtime  clopidogrel Tablet 75 milliGRAM(s) Oral daily  escitalopram 20 milliGRAM(s) Oral daily  heparin  Injectable 5000 Unit(s) SubCutaneous every 8 hours  metoprolol tartrate 25 milliGRAM(s) Oral two times a day  nicotine -  14 mG/24Hr(s) Patch 1 patch Transdermal daily  ondansetron Injectable 4 milliGRAM(s) IV Push every 8 hours  ondansetron Injectable 4 milliGRAM(s) IV Push once  pantoprazole    Tablet 40 milliGRAM(s) Oral before breakfast  potassium chloride  10 mEq/100 mL IVPB 10 milliEquivalent(s) IV Intermittent every 1 hour  pregabalin 300 milliGRAM(s) Oral two times a day  sodium chloride 0.9%. 1000 milliLiter(s) (70 mL/Hr) IV Continuous <Continuous>  spironolactone 25 milliGRAM(s) Oral daily      MEDICATIONS  (PRN):  acetaminophen   Tablet .. 650 milliGRAM(s) Oral every 6 hours PRN Temp greater or equal to 38C (100.4F), Mild Pain (1 - 3)  metoprolol tartrate Injectable 2.5 milliGRAM(s) IV Push every 6 hours PRN for HR > 100  morphine  - Injectable 2 milliGRAM(s) IV Push every 6 hours PRN Moderate Pain (4 - 6)      Physical Exam    Neuro :  no focal deficits  Respiratory: CTA B/L  CV: RRR, S1S2, no murmurs,   Abdominal: Soft, mild tender to moderate palp,+BS,  Extremities: No edema, + peripheral pulses    ASSESSMENT      exacerbation of cyclic vomiting synd,   abd pain,   h/o left thyroid nodule,   cyclic vomiting syndrome,   colitis,   Reflex sympathetic dystrophy s/p spinal stimulator,   frequent falls,   SLE (not on rx),   Afib (not on AC due to frequent falls),   HTN,   depression,   Hemorrhoids (EGD / colonoscopy 3-4 years ago),   diverticulosis,   Partial right thyroidectomy  Renal failure, chronic, stage 3 (moderate)        PLAN      pt has less nausea and no voiting this am  start clear liquids and adv as tolerated   cont  zofran iv q8,   pain mgmt eval  ct abd-pelv Slowly enlarging 7 mm left renal hypodensity, too small to characterize with certainty, no bowel obstruction noted   pt to f/u renal outpt  no further bm since admitted  cont current meds Patient is a 48y old  Female who presents with a chief complaint of Vomiting (07 May 2019 16:23)    pt seen in ed [  ], reg med floor [ x  ], bed [ x ], chair at bedside [   ], a+o x3 [ x ], lethargic [  ],  nad [x  ]      Allergies    aspirin (Unknown)  cortisone (Other)  latex (Unknown)  sulfa drugs (Unknown)        Vitals    T(F): 98.4 (05-08-19 @ 07:42), Max: 98.9 (05-07-19 @ 15:32)  HR: 84 (05-08-19 @ 07:42) (64 - 84)  BP: 100/83 (05-08-19 @ 07:42) (95/68 - 119/83)  RR: 16 (05-08-19 @ 07:42) (16 - 17)  SpO2: 100% (05-08-19 @ 07:42) (96% - 100%)  Wt(kg): --  CAPILLARY BLOOD GLUCOSE          Labs                          14.0   9.28  )-----------( 188      ( 08 May 2019 06:20 )             42.9       05-08    139  |  105  |  13  ----------------------------<  90  3.3<L>   |  27  |  1.39<H>    Ca    8.4      08 May 2019 06:20  Phos  4.2     05-08  Mg     2.0     05-08                  Radiology Results      Meds    MEDICATIONS  (STANDING):  atorvastatin 20 milliGRAM(s) Oral at bedtime  clopidogrel Tablet 75 milliGRAM(s) Oral daily  escitalopram 20 milliGRAM(s) Oral daily  heparin  Injectable 5000 Unit(s) SubCutaneous every 8 hours  metoprolol tartrate 25 milliGRAM(s) Oral two times a day  nicotine -  14 mG/24Hr(s) Patch 1 patch Transdermal daily  ondansetron Injectable 4 milliGRAM(s) IV Push every 8 hours  ondansetron Injectable 4 milliGRAM(s) IV Push once  pantoprazole    Tablet 40 milliGRAM(s) Oral before breakfast  potassium chloride  10 mEq/100 mL IVPB 10 milliEquivalent(s) IV Intermittent every 1 hour  pregabalin 300 milliGRAM(s) Oral two times a day  sodium chloride 0.9%. 1000 milliLiter(s) (70 mL/Hr) IV Continuous <Continuous>  spironolactone 25 milliGRAM(s) Oral daily      MEDICATIONS  (PRN):  acetaminophen   Tablet .. 650 milliGRAM(s) Oral every 6 hours PRN Temp greater or equal to 38C (100.4F), Mild Pain (1 - 3)  metoprolol tartrate Injectable 2.5 milliGRAM(s) IV Push every 6 hours PRN for HR > 100  morphine  - Injectable 2 milliGRAM(s) IV Push every 6 hours PRN Moderate Pain (4 - 6)      Physical Exam    Neuro :  no focal deficits  Respiratory: CTA B/L  CV: RRR, S1S2, no murmurs,   Abdominal: Soft, mild tender to moderate palp,+BS,  Extremities: No edema, + peripheral pulses    ASSESSMENT      exacerbation of cyclic vomiting synd,   abd pain,   h/o left thyroid nodule,   cyclic vomiting syndrome,   colitis,   Reflex sympathetic dystrophy s/p spinal stimulator,   frequent falls,   SLE (not on rx),   Afib (not on AC due to frequent falls),   HTN,   depression,   Hemorrhoids (EGD / colonoscopy 3-4 years ago),   diverticulosis,   Partial right thyroidectomy  Renal failure, chronic, stage 3 (moderate)        PLAN      pt has less nausea and no voiting this am  pt tolerating clear liquids   adv to soft diet  cont  zofran iv q8,   pain mgmt f/u  ct abd-pelv Slowly enlarging 7 mm left renal hypodensity, too small to characterize with certainty, no bowel obstruction noted   pt to f/u renal outpt  no further bm since admitted  cont current meds  d/c plan for am if diet is tolerated

## 2019-05-09 ENCOUNTER — TRANSCRIPTION ENCOUNTER (OUTPATIENT)
Age: 49
End: 2019-05-09

## 2019-05-09 VITALS
TEMPERATURE: 98 F | RESPIRATION RATE: 20 BRPM | OXYGEN SATURATION: 99 % | DIASTOLIC BLOOD PRESSURE: 71 MMHG | HEART RATE: 72 BPM | SYSTOLIC BLOOD PRESSURE: 97 MMHG

## 2019-05-09 DIAGNOSIS — G43.A0 CYCLICAL VOMITING, IN MIGRAINE, NOT INTRACTABLE: ICD-10-CM

## 2019-05-09 DIAGNOSIS — G89.29 OTHER CHRONIC PAIN: ICD-10-CM

## 2019-05-09 LAB
ANION GAP SERPL CALC-SCNC: 9 MMOL/L — SIGNIFICANT CHANGE UP (ref 5–17)
BUN SERPL-MCNC: 15 MG/DL — SIGNIFICANT CHANGE UP (ref 7–18)
CALCIUM SERPL-MCNC: 8.7 MG/DL — SIGNIFICANT CHANGE UP (ref 8.4–10.5)
CHLORIDE SERPL-SCNC: 106 MMOL/L — SIGNIFICANT CHANGE UP (ref 96–108)
CO2 SERPL-SCNC: 27 MMOL/L — SIGNIFICANT CHANGE UP (ref 22–31)
CREAT SERPL-MCNC: 1.27 MG/DL — SIGNIFICANT CHANGE UP (ref 0.5–1.3)
GLUCOSE SERPL-MCNC: 88 MG/DL — SIGNIFICANT CHANGE UP (ref 70–99)
HCT VFR BLD CALC: 43.2 % — SIGNIFICANT CHANGE UP (ref 34.5–45)
HGB BLD-MCNC: 14 G/DL — SIGNIFICANT CHANGE UP (ref 11.5–15.5)
MCHC RBC-ENTMCNC: 29.2 PG — SIGNIFICANT CHANGE UP (ref 27–34)
MCHC RBC-ENTMCNC: 32.4 GM/DL — SIGNIFICANT CHANGE UP (ref 32–36)
MCV RBC AUTO: 90.2 FL — SIGNIFICANT CHANGE UP (ref 80–100)
NRBC # BLD: 0 /100 WBCS — SIGNIFICANT CHANGE UP (ref 0–0)
PLATELET # BLD AUTO: 229 K/UL — SIGNIFICANT CHANGE UP (ref 150–400)
POTASSIUM SERPL-MCNC: 3.8 MMOL/L — SIGNIFICANT CHANGE UP (ref 3.5–5.3)
POTASSIUM SERPL-SCNC: 3.8 MMOL/L — SIGNIFICANT CHANGE UP (ref 3.5–5.3)
RBC # BLD: 4.79 M/UL — SIGNIFICANT CHANGE UP (ref 3.8–5.2)
RBC # FLD: 14 % — SIGNIFICANT CHANGE UP (ref 10.3–14.5)
SODIUM SERPL-SCNC: 142 MMOL/L — SIGNIFICANT CHANGE UP (ref 135–145)
WBC # BLD: 8.3 K/UL — SIGNIFICANT CHANGE UP (ref 3.8–10.5)
WBC # FLD AUTO: 8.3 K/UL — SIGNIFICANT CHANGE UP (ref 3.8–10.5)

## 2019-05-09 PROCEDURE — 84443 ASSAY THYROID STIM HORMONE: CPT

## 2019-05-09 PROCEDURE — 83036 HEMOGLOBIN GLYCOSYLATED A1C: CPT

## 2019-05-09 PROCEDURE — 36415 COLL VENOUS BLD VENIPUNCTURE: CPT

## 2019-05-09 PROCEDURE — 83690 ASSAY OF LIPASE: CPT

## 2019-05-09 PROCEDURE — 99285 EMERGENCY DEPT VISIT HI MDM: CPT | Mod: 25

## 2019-05-09 PROCEDURE — 80053 COMPREHEN METABOLIC PANEL: CPT

## 2019-05-09 PROCEDURE — 84702 CHORIONIC GONADOTROPIN TEST: CPT

## 2019-05-09 PROCEDURE — 80307 DRUG TEST PRSMV CHEM ANLYZR: CPT

## 2019-05-09 PROCEDURE — 84100 ASSAY OF PHOSPHORUS: CPT

## 2019-05-09 PROCEDURE — 85027 COMPLETE CBC AUTOMATED: CPT

## 2019-05-09 PROCEDURE — 83735 ASSAY OF MAGNESIUM: CPT

## 2019-05-09 PROCEDURE — 80048 BASIC METABOLIC PNL TOTAL CA: CPT

## 2019-05-09 PROCEDURE — 74177 CT ABD & PELVIS W/CONTRAST: CPT

## 2019-05-09 PROCEDURE — 80061 LIPID PANEL: CPT

## 2019-05-09 PROCEDURE — 81003 URINALYSIS AUTO W/O SCOPE: CPT

## 2019-05-09 PROCEDURE — 93005 ELECTROCARDIOGRAM TRACING: CPT

## 2019-05-09 PROCEDURE — 71045 X-RAY EXAM CHEST 1 VIEW: CPT

## 2019-05-09 PROCEDURE — 82607 VITAMIN B-12: CPT

## 2019-05-09 RX ORDER — OXYCODONE AND ACETAMINOPHEN 5; 325 MG/1; MG/1
1 TABLET ORAL EVERY 4 HOURS
Refills: 0 | Status: DISCONTINUED | OUTPATIENT
Start: 2019-05-09 | End: 2019-05-09

## 2019-05-09 RX ORDER — SPIRONOLACTONE 25 MG/1
1 TABLET, FILM COATED ORAL
Qty: 0 | Refills: 0 | DISCHARGE
Start: 2019-05-09

## 2019-05-09 RX ORDER — NICOTINE POLACRILEX 2 MG
1 GUM BUCCAL
Qty: 15 | Refills: 0
Start: 2019-05-09 | End: 2019-05-23

## 2019-05-09 RX ORDER — PANTOPRAZOLE SODIUM 20 MG/1
1 TABLET, DELAYED RELEASE ORAL
Qty: 30 | Refills: 0
Start: 2019-05-09 | End: 2019-06-07

## 2019-05-09 RX ORDER — MORPHINE SULFATE 50 MG/1
1 CAPSULE, EXTENDED RELEASE ORAL
Qty: 0 | Refills: 0 | DISCHARGE

## 2019-05-09 RX ORDER — MORPHINE SULFATE 50 MG/1
1 CAPSULE, EXTENDED RELEASE ORAL
Qty: 0 | Refills: 0 | DISCHARGE
Start: 2019-05-09

## 2019-05-09 RX ORDER — CLOPIDOGREL BISULFATE 75 MG/1
1 TABLET, FILM COATED ORAL
Qty: 30 | Refills: 0
Start: 2019-05-09 | End: 2019-06-07

## 2019-05-09 RX ORDER — MORPHINE SULFATE 50 MG/1
60 CAPSULE, EXTENDED RELEASE ORAL EVERY 12 HOURS
Refills: 0 | Status: DISCONTINUED | OUTPATIENT
Start: 2019-05-09 | End: 2019-05-09

## 2019-05-09 RX ORDER — CYCLOBENZAPRINE HYDROCHLORIDE 10 MG/1
1 TABLET, FILM COATED ORAL
Qty: 42 | Refills: 0
Start: 2019-05-09 | End: 2019-05-22

## 2019-05-09 RX ADMIN — MORPHINE SULFATE 2 MILLIGRAM(S): 50 CAPSULE, EXTENDED RELEASE ORAL at 08:55

## 2019-05-09 RX ADMIN — ONDANSETRON 4 MILLIGRAM(S): 8 TABLET, FILM COATED ORAL at 13:09

## 2019-05-09 RX ADMIN — ONDANSETRON 4 MILLIGRAM(S): 8 TABLET, FILM COATED ORAL at 06:39

## 2019-05-09 RX ADMIN — PANTOPRAZOLE SODIUM 40 MILLIGRAM(S): 20 TABLET, DELAYED RELEASE ORAL at 06:39

## 2019-05-09 RX ADMIN — SPIRONOLACTONE 25 MILLIGRAM(S): 25 TABLET, FILM COATED ORAL at 06:40

## 2019-05-09 RX ADMIN — CLOPIDOGREL BISULFATE 75 MILLIGRAM(S): 75 TABLET, FILM COATED ORAL at 12:01

## 2019-05-09 RX ADMIN — MORPHINE SULFATE 2 MILLIGRAM(S): 50 CAPSULE, EXTENDED RELEASE ORAL at 09:10

## 2019-05-09 RX ADMIN — Medication 1 PATCH: at 12:02

## 2019-05-09 RX ADMIN — OXYCODONE AND ACETAMINOPHEN 1 TABLET(S): 5; 325 TABLET ORAL at 13:09

## 2019-05-09 RX ADMIN — Medication 1 PATCH: at 12:04

## 2019-05-09 RX ADMIN — Medication 300 MILLIGRAM(S): at 06:39

## 2019-05-09 RX ADMIN — ESCITALOPRAM OXALATE 20 MILLIGRAM(S): 10 TABLET, FILM COATED ORAL at 12:01

## 2019-05-09 RX ADMIN — Medication 1 PATCH: at 08:07

## 2019-05-09 NOTE — DISCHARGE NOTE PROVIDER - HOSPITAL COURSE
Pt is a 48F, lives with sister, from home, ambulates with walker w/ PMHx of cyclic vomiting syndrome, Reflex sympathetic dystrophy s/p spinal stimulator, frequent falls, SLE (not on tx), Afib (not on AC due to frequent falls), HTN, Depression, Diverticulosis, and partial thyroidectomy on right side, who presents to the ED with diffuse abdominal pain x 4 days, and intractable N/V x 2 days.  Pt states 4 days prior she developed generalized 10/10 abd pain w/ yellow watery diarrhea.  Pt states she had countless episodes of diarrhea. Two days PTA she developed nausea w/ NBNB emesis.  Pt states she was diagnosed with cyclic vomiting syndrome in the past due to medicinal marijuana use, and has these episodes intermittently requiring hospitalization.  Pt states she has not been able to take PO meds, or consume nutrition due to N/V.  Pt denies CP, palpitations, HA, blurred vision, slurred speech, recent illness, recent travel, recent abx use, fever, or syncope.         In the ED, pt presents in mod painful distress, and vitals WNL     Patient was admitted for cyclic vomiting syndrome, she was not able to tolerate diet. CT abdomen was done- No bowel obstruction.        Mild hepatomegaly.        Slowly enlarging 7 mm left renal hypodensity, too small to characterize     with certainty. Recommend nephrology or urology consultation.         She also complained of abdominal pain, CT scan was negative. Pain mangement was also following, Pt is a 48F, lives with sister, from home, ambulates with walker w/ PMHx of cyclic vomiting syndrome, Reflex sympathetic dystrophy s/p spinal stimulator, frequent falls, SLE (not on tx), Afib (not on AC due to frequent falls), HTN, Depression, Diverticulosis, and partial thyroidectomy on right side, who presents to the ED with diffuse abdominal pain x 4 days, and intractable N/V x 2 days.  Pt states 4 days prior she developed generalized 10/10 abd pain w/ yellow watery diarrhea.  Pt states she had countless episodes of diarrhea. Two days PTA she developed nausea w/ NBNB emesis.  Pt states she was diagnosed with cyclic vomiting syndrome in the past due to medicinal marijuana use, and has these episodes intermittently requiring hospitalization.  Pt states she has not been able to take PO meds, or consume nutrition due to N/V.  Pt denies CP, palpitations, HA, blurred vision, slurred speech, recent illness, recent travel, recent abx use, fever, or syncope.         In the ED, pt presents in mod painful distress, and vitals WNL     Patient was admitted for cyclic vomiting syndrome, she was not able to tolerate diet. CT abdomen was done- No bowel obstruction.        Mild hepatomegaly.        Slowly enlarging 7 mm left renal hypodensity, too small to characterize     with certainty. Recommend nephrology or urology consultation.         She also complained of abdominal pain, CT scan was negative. Pain mangement was also following,  given her history of reflex sympathetic dystrophy.     IV pain medications and medications for vomiting were given, her symptoms improved.         Given patient's improved clinical status and current hemodynamic stability, decision was made to discharge. Discussed with attending    Please refer to patient's complete medical chart with documents for a full hospital course, for this is only a brief summary.

## 2019-05-09 NOTE — PROGRESS NOTE ADULT - SUBJECTIVE AND OBJECTIVE BOX
NP Note discussed with  Primary Attending    Patient is a 48y old  Female who presents with a chief complaint of Vomiting (09 May 2019 10:44)      INTERVAL HPI/OVERNIGHT EVENTS: no new complaints    MEDICATIONS  (STANDING):  atorvastatin 20 milliGRAM(s) Oral at bedtime  clopidogrel Tablet 75 milliGRAM(s) Oral daily  escitalopram 20 milliGRAM(s) Oral daily  heparin  Injectable 5000 Unit(s) SubCutaneous every 8 hours  metoprolol tartrate 25 milliGRAM(s) Oral two times a day  nicotine -  14 mG/24Hr(s) Patch 1 patch Transdermal daily  ondansetron Injectable 4 milliGRAM(s) IV Push every 8 hours  ondansetron Injectable 4 milliGRAM(s) IV Push once  pantoprazole    Tablet 40 milliGRAM(s) Oral before breakfast  pregabalin 300 milliGRAM(s) Oral two times a day  sodium chloride 0.9%. 1000 milliLiter(s) (70 mL/Hr) IV Continuous <Continuous>  spironolactone 25 milliGRAM(s) Oral daily    MEDICATIONS  (PRN):  acetaminophen   Tablet .. 650 milliGRAM(s) Oral every 6 hours PRN Temp greater or equal to 38C (100.4F), Mild Pain (1 - 3)      __________________________________________________  REVIEW OF SYSTEMS:    CONSTITUTIONAL: No fever,   EYES: no acute visual disturbances  NECK: No pain or stiffness  RESPIRATORY: No cough; No shortness of breath  CARDIOVASCULAR: No chest pain, no palpitations  GASTROINTESTINAL: No pain. No nausea or vomiting; No diarrhea   NEUROLOGICAL: No headache or numbness, no tremors  MUSCULOSKELETAL: No joint pain, no muscle pain  GENITOURINARY: no dysuria, no frequency, no hesitancy  PSYCHIATRY: no depression , no anxiety  ALL OTHER  ROS negative        Vital Signs Last 24 Hrs  T(C): 36.6 (09 May 2019 07:56), Max: 36.8 (08 May 2019 15:59)  T(F): 97.8 (09 May 2019 07:56), Max: 98.3 (08 May 2019 15:59)  HR: 72 (09 May 2019 07:56) (69 - 90)  BP: 97/71 (09 May 2019 07:56) (90/62 - 119/67)  BP(mean): --  RR: 20 (09 May 2019 07:56) (16 - 20)  SpO2: 99% (09 May 2019 07:56) (97% - 99%)    ________________________________________________  PHYSICAL EXAM:  GENERAL: NAD  HEENT: Normocephalic;  conjunctivae and sclerae clear; moist mucous membranes;   NECK : supple  CHEST/LUNG: Clear to auscultation bilaterally with good air entry   HEART: S1 S2  regular; no murmurs, gallops or rubs  ABDOMEN: Soft, Nontender, Nondistended; Bowel sounds present  EXTREMITIES: no cyanosis; no edema; no calf tenderness  SKIN: warm and dry; no rash  NERVOUS SYSTEM:  Awake and alert; Oriented  to place, person and time ; no new deficits  MUSCULOSKELETAL:  _________________________________________________  LABS:                        14.0   8.30  )-----------( 229      ( 09 May 2019 07:03 )             43.2     05-09    142  |  106  |  15  ----------------------------<  88  3.8   |  27  |  1.27    Ca    8.7      09 May 2019 07:03  Phos  4.2     05-08  Mg     2.0     05-08          CAPILLARY BLOOD GLUCOSE            RADIOLOGY & ADDITIONAL TESTS:    Imaging Personally Reviewed:  YES/NO    Consultant(s) Notes Reviewed:   YES/ No    Care Discussed with Consultants :     Plan of care was discussed with patient and /or primary care giver; all questions and concerns were addressed and care was aligned with patient's wishes. NP Note discussed with  Primary Attending    CC: Chronic pain    Patient is a 48y old  Female who presents with a chief complaint of Vomiting (09 May 2019 10:44).  Pt with cyclic vomiting syndrome.   Pt with chronic pain.  Pain worsens with exertion.  + history of rsd, chronic back and neck pain.  Pt follows up with pain mgt as outpt.  Pt has been receiving epidural inj cervical neck for pain mgt.       INTERVAL HPI/OVERNIGHT EVENTS: no new complaints    MEDICATIONS  (STANDING):  atorvastatin 20 milliGRAM(s) Oral at bedtime  clopidogrel Tablet 75 milliGRAM(s) Oral daily  escitalopram 20 milliGRAM(s) Oral daily  heparin  Injectable 5000 Unit(s) SubCutaneous every 8 hours  metoprolol tartrate 25 milliGRAM(s) Oral two times a day  nicotine -  14 mG/24Hr(s) Patch 1 patch Transdermal daily  ondansetron Injectable 4 milliGRAM(s) IV Push every 8 hours  ondansetron Injectable 4 milliGRAM(s) IV Push once  pantoprazole    Tablet 40 milliGRAM(s) Oral before breakfast  pregabalin 300 milliGRAM(s) Oral two times a day  sodium chloride 0.9%. 1000 milliLiter(s) (70 mL/Hr) IV Continuous <Continuous>  spironolactone 25 milliGRAM(s) Oral daily    MEDICATIONS  (PRN):  acetaminophen   Tablet .. 650 milliGRAM(s) Oral every 6 hours PRN Temp greater or equal to 38C (100.4F), Mild Pain (1 - 3)      __________________________________________________  REVIEW OF SYSTEMS:    CONSTITUTIONAL: No fever,   RESPIRATORY: No cough; No shortness of breath  CARDIOVASCULAR: No chest pain, no palpitations  GASTROINTESTINAL: No pain. No nausea or vomiting; No diarrhea   NEUROLOGICAL: No headache  no tremors + neuropathy  MUSCULOSKELETAL: + back pain + bilateral leg pain, + neck pain + hyperalgesia   GENITOURINARY: no dysuria, no frequency, no hesitancy        Vital Signs Last 24 Hrs  T(C): 36.6 (09 May 2019 07:56), Max: 36.8 (08 May 2019 15:59)  T(F): 97.8 (09 May 2019 07:56), Max: 98.3 (08 May 2019 15:59)  HR: 72 (09 May 2019 07:56) (69 - 90)  BP: 97/71 (09 May 2019 07:56) (90/62 - 119/67)  BP(mean): --  RR: 20 (09 May 2019 07:56) (16 - 20)  SpO2: 99% (09 May 2019 07:56) (97% - 99%)    ________________________________________________  PHYSICAL EXAM:  GENERAL: NAD  CHEST/LUNG: Clear to auscultation bilaterally with good air entry   HEART: S1 S2  regular; no murmurs, gallops or rubs  ABDOMEN: Soft, Nontender, Nondistended; Bowel sounds present  EXTREMITIES: no cyanosis; no edema; no calf tenderness  SKIN: warm and dry; no rash  NERVOUS SYSTEM:  Awake and alert; Oriented  to place, person and time ; no new deficits  MUSCULOSKELETAL: + decreased rom + bilateral pain on light palp - bilateral legs + neck pain   _________________________________________________  LABS:                        14.0   8.30  )-----------( 229      ( 09 May 2019 07:03 )             43.2     05-09    142  |  106  |  15  ----------------------------<  88  3.8   |  27  |  1.27    Ca    8.7      09 May 2019 07:03  Phos  4.2     05-08  Mg     2.0     05-08          CAPILLARY BLOOD GLUCOSE            RADIOLOGY & ADDITIONAL TESTS:    Imaging Personally Reviewed:  YES/NO    Consultant(s) Notes Reviewed:   YES/ No    Care Discussed with Consultants :     Plan of care was discussed with patient and /or primary care giver; all questions and concerns were addressed and care was aligned with patient's wishes.

## 2019-05-09 NOTE — PROGRESS NOTE ADULT - PROBLEM SELECTOR PROBLEM 3
SLE (systemic lupus erythematosus)
Cyclic vomiting syndrome, intractability of vomiting not specified, presence of nausea not specified

## 2019-05-09 NOTE — DISCHARGE NOTE PROVIDER - PROVIDER TOKENS
PROVIDER:[TOKEN:[[id number]],FOLLOWUP:[2 weeks]] PROVIDER:[TOKEN:[[id number]],FOLLOWUP:[1 week]],PROVIDER:[TOKEN:[[id number]],FOLLOWUP:[1 week]]

## 2019-05-09 NOTE — DISCHARGE NOTE PROVIDER - CARE PROVIDER_API CALL
Stevan Doyle)  Internal Medicine  [address] [place] [address]  Phone: [phone]  Fax: [phone]  Follow Up Time: 2 weeks Stevan Doyle)  Internal Medicine  [address] [place] [address]  Phone: [phone]  Fax: [phone]  Follow Up Time: 1 week    Stephen Sheppard)  Pain Medicine  [address] [place] [address]  Phone: [phone]  Fax: [phone]  Follow Up Time: 1 week

## 2019-05-09 NOTE — DISCHARGE NOTE NURSING/CASE MANAGEMENT/SOCIAL WORK - NSDCVIVACCINE_GEN_ALL_CORE_FT
Influenza , 2015/2/19 13:41 , Jesus Zaragoza (RN)  Influenza , 2015/10/16 10:47 , Marce Purcell (RN)  Influenza , 2017/9/21 10:59 , Piedad Swift (RN)  Influenza , 2018/12/7 14:39 , Ken House (RN)

## 2019-05-09 NOTE — PROGRESS NOTE ADULT - PROBLEM SELECTOR PLAN 1
-last vomiting episode was yesterday afternoon.   -CT abdomen and pelvis- No bowel obstruction. Mild hepatomegaly.  --Will advance diet today, DC planning tomorrow if tolerates well.
- dc Iv morphine  - ms contin 60mg po q 12 hours  - percocet po prn  - stool softeners  - lyrica 300mg po q 12 hours  - follow up with dr. dickens - cervical spine  - follow up with dr. arias - pain mgt

## 2019-05-09 NOTE — PROGRESS NOTE ADULT - ASSESSMENT
Rx Written	Rx Dispensed	Drug	Quantity	Days Supply	Prescriber Name			  04/11/2019	04/18/2019	lunesta 2 mg tablet 	30	30	Sheppard, Stephen MYLES MD			  04/11/2019	04/16/2019	oxycodone-acetaminophen  mg tab 	60	30	Sheppard, Stephen MYLES MD			  03/05/2019	03/18/2019	oxycodone-acetaminophen  mg tab 	60	30	Sheppard, Stephen MYLES MD			  03/05/2019	03/18/2019	lyrica 300 mg capsule 	60	30	Sheppard, Stephen MYLES MD			  03/05/2019	03/18/2019	morphine sulf er 60 mg tablet 	60	30	Sheppard, Stephen MYLES MD			  03/05/2019	03/18/2019	lunesta 2 mg tablet 	30	30	Sheppard, Stephen MYLES MD			  02/08/2019	02/18/2019	morphine sulf er 60 mg tablet 	60	30	Sheppard, Stephen MYLES MD			  02/08/2019	02/18/2019	lunesta 2 mg tablet 	30	30	Sheppard, Stephen MYLES MD			  02/08/2019	02/18/2019	oxycodone-acetaminophen  mg tab 	60	30	Sheppard, Stephen MYLES MD			  02/08/2019	02/18/2019	lyrica 300 mg capsule 	60	30	Sheppard, Stephen MYLES MD			  01/02/2019	01/22/2019	lunesta 2 mg tablet 	7	7	Sheppard, Stephen MYLES MD			  01/08/2019	01/11/2019	morphine sulf er 60 mg tablet 	60	30	Sheppard, Stephen MYLES MD			  01/08/2019	01/11/2019	lyrica 300 mg capsule 	60	30	Sheppard, Stephen MYLES MD			  01/08/2019	01/11/2019	oxycodone-acetaminophen  mg tab 	60	30	Sheppard, Stephen MYLES MD			  01/08/2019	01/11/2019	lunesta 2 mg tablet 	30	30	Sheppard, Stephen MYLES MD			  01/02/2019	01/02/2019	morphine sulf er 60 mg tablet 	14	7	Sheppard, Stephen MYLES MD			  01/02/2019	01/02/2019	oxycodone-acetaminophen  mg tab 	14	7	Sheppard, Stephen MYLES MD			  12/13/2018	12/14/2018	lyrica 300 mg capsule 	60	30	Sheppard, Stephen MYLES MD			  11/20/2018	12/01/2018	morphine sulf er 60 mg tablet 	60	30	Sheppard, Stephen MYLES MD			  11/20/2018	12/01/2018	oxycodone-acetaminophen  mg tab 	60	30	Sheppard, Stephen MYLES MD			  11/20/2018	12/01/2018	lunesta 2 mg tablet 	30	30	Sheppard, Stephen MYLES MD			  10/26/2018	11/15/2018	lyrica 300 mg capsule 	60	30	Sheppard, Stephen MYLES MD			  10/26/2018	11/02/2018	lunesta 2 mg tablet 	30	30	Sheppard, Stephen MYLES MD			  10/26/2018	11/02/2018	morphine sulf er 60 mg tablet 	60	30	Sheppard, Stephen MYLES MD			  10/26/2018	11/02/2018	oxycodone-acetaminophen  mg tab 	60	30	Sheppard, Stephen MYLES MD			  04/25/2018	10/09/2018	lyrica 300 mg capsule 	60	30	Sheppard, Stephen MYLES MD			  10/03/2018	10/04/2018	oxycodone-acetaminophen  mg tab 	60	30	Sheppard, Stephen MYLES MD			  10/03/2018	10/04/2018	morphine sulf er 60 mg tablet 	60	30	Sheppard, Stephen MYLES MD			  10/03/2018	10/04/2018	lunesta 2 mg tablet 	30	30	Sheppard, Stephen MYLES MD			  09/04/2018	09/05/2018	lunesta 2 mg tablet 	30	30	Sheppard, Stephen MYLES MD			  08/31/2018	08/31/2018	morphine sulf er 60 mg tablet 	60	30	Sheppard, Stephen MYLES MD			  08/31/2018	08/31/2018	lyrica 300 mg capsule 	60	30	Sheppard, Stephen MYLES MD			  08/31/2018	08/31/2018	oxycodone-acetaminophen  mg tab 	60	30	Sheppard, Stephen MYLES MD			  07/25/2018	07/25/2018	morphine sulf er 60 mg tablet 	60	30	Sheppard, Stephen MYLES MD			  07/25/2018	07/25/2018	lunesta 2 mg tablet 	30	30	Sheppard, Stephen MYLES MD			  07/25/2018	07/25/2018	lyrica 300 mg capsule 	60	30	Sheppard, Stephen MYLES MD			  07/25/2018	07/25/2018	oxycodone-acetaminophen  mg tab 	60	30	Sheppard, Stephen MYLES MD			  06/25/2018	07/01/2018	lunesta 2 mg tablet 	30	30	Sheppard, Stephen MYLES MD			  06/25/2018	06/28/2018	lyrica 300 mg capsule 	60	30	Sheppard, Stpehen MLYES MD			  06/25/2018	06/25/2018	morphine sulf er 60 mg tablet 	60	30	Sheppard, Stephen MYLES MD			  06/25/2018	06/25/2018	oxycodone-acetaminophen  mg tab 	60	30	Sheppard, Stephen MYLES MD			  05/24/2018	05/31/2018	lunesta 2 mg tablet 	30	30	Sheppard, Stephen MYLES MD			  05/22/2018	05/23/2018	morphine sulf er 60 mg tablet 	60	30	Sheppard, Stephen MYLES MD			  05/22/2018	05/23/2018	lyrica 200 mg capsule 	60	30	Sheppard, Stephen MYLES MD			  05/22/2018	05/23/2018	oxycodone-acetaminophen  mg tab 	60	30	Shepprad, Stephen MYLES MD			  * - Drugs marked with an asterisk are compound drugs. If the compound drug is made

## 2019-05-09 NOTE — DISCHARGE NOTE NURSING/CASE MANAGEMENT/SOCIAL WORK - NSDCPEWEB_GEN_ALL_CORE
[place] website --- [email or web address] for Tobacco [email or web address] website --- [email or web address]

## 2019-05-09 NOTE — PROGRESS NOTE ADULT - SUBJECTIVE AND OBJECTIVE BOX
Patient is a 48y old  Female who presents with a chief complaint of Vomiting (08 May 2019 11:22)      pt seen in ed [  ], reg med floor [ x  ], bed [ x ], chair at bedside [   ], a+o x3 [ x ], lethargic [  ],  nad [x  ]      Allergies    aspirin (Unknown)  cortisone (Other)  latex (Unknown)  sulfa drugs (Unknown)        Vitals    T(F): 97.8 (05-09-19 @ 07:56), Max: 98.3 (05-08-19 @ 15:59)  HR: 72 (05-09-19 @ 07:56) (69 - 90)  BP: 97/71 (05-09-19 @ 07:56) (90/62 - 119/67)  RR: 20 (05-09-19 @ 07:56) (16 - 20)  SpO2: 99% (05-09-19 @ 07:56) (97% - 99%)  Wt(kg): --  CAPILLARY BLOOD GLUCOSE          Labs                          14.0   8.30  )-----------( 229      ( 09 May 2019 07:03 )             43.2       05-09    142  |  106  |  15  ----------------------------<  88  3.8   |  27  |  1.27    Ca    8.7      09 May 2019 07:03  Phos  4.2     05-08  Mg     2.0     05-08                  Radiology Results      Meds    MEDICATIONS  (STANDING):  atorvastatin 20 milliGRAM(s) Oral at bedtime  clopidogrel Tablet 75 milliGRAM(s) Oral daily  escitalopram 20 milliGRAM(s) Oral daily  heparin  Injectable 5000 Unit(s) SubCutaneous every 8 hours  metoprolol tartrate 25 milliGRAM(s) Oral two times a day  nicotine -  14 mG/24Hr(s) Patch 1 patch Transdermal daily  ondansetron Injectable 4 milliGRAM(s) IV Push every 8 hours  ondansetron Injectable 4 milliGRAM(s) IV Push once  pantoprazole    Tablet 40 milliGRAM(s) Oral before breakfast  pregabalin 300 milliGRAM(s) Oral two times a day  sodium chloride 0.9%. 1000 milliLiter(s) (70 mL/Hr) IV Continuous <Continuous>  spironolactone 25 milliGRAM(s) Oral daily      MEDICATIONS  (PRN):  acetaminophen   Tablet .. 650 milliGRAM(s) Oral every 6 hours PRN Temp greater or equal to 38C (100.4F), Mild Pain (1 - 3)  metoprolol tartrate Injectable 2.5 milliGRAM(s) IV Push every 6 hours PRN for HR > 100  morphine  - Injectable 2 milliGRAM(s) IV Push every 6 hours PRN Moderate Pain (4 - 6)      Physical Exam    Neuro :  no focal deficits  Respiratory: CTA B/L  CV: RRR, S1S2, no murmurs,   Abdominal: Soft, mild tender to moderate palp,+BS,  Extremities: No edema, + peripheral pulses    ASSESSMENT      exacerbation of cyclic vomiting synd,   abd pain,   h/o left thyroid nodule,   cyclic vomiting syndrome,   colitis,   Reflex sympathetic dystrophy s/p spinal stimulator,   frequent falls,   SLE (not on rx),   Afib (not on AC due to frequent falls),   HTN,   depression,   Hemorrhoids (EGD / colonoscopy 3-4 years ago),   diverticulosis,   Partial right thyroidectomy  Renal failure, chronic, stage 3 (moderate)        PLAN      pt has less nausea and no vomiting this am  pt tolerating clear liquids   adv to soft diet  cont  zofran iv q8,   pain mgmt f/u  ct abd-pelv Slowly enlarging 7 mm left renal hypodensity, too small to characterize with certainty, no bowel obstruction noted   pt to f/u renal outpt  no further bm since admitted  cont current meds  d/c plan for am if diet is tolerated Patient is a 48y old  Female who presents with a chief complaint of Vomiting (08 May 2019 11:22)      pt seen in ed [  ], reg med floor [ x  ], bed [ x ], chair at bedside [   ], a+o x3 [ x ], lethargic [  ],  nad [x  ]      Allergies    aspirin (Unknown)  cortisone (Other)  latex (Unknown)  sulfa drugs (Unknown)        Vitals    T(F): 97.8 (05-09-19 @ 07:56), Max: 98.3 (05-08-19 @ 15:59)  HR: 72 (05-09-19 @ 07:56) (69 - 90)  BP: 97/71 (05-09-19 @ 07:56) (90/62 - 119/67)  RR: 20 (05-09-19 @ 07:56) (16 - 20)  SpO2: 99% (05-09-19 @ 07:56) (97% - 99%)  Wt(kg): --  CAPILLARY BLOOD GLUCOSE          Labs                          14.0   8.30  )-----------( 229      ( 09 May 2019 07:03 )             43.2       05-09    142  |  106  |  15  ----------------------------<  88  3.8   |  27  |  1.27    Ca    8.7      09 May 2019 07:03  Phos  4.2     05-08  Mg     2.0     05-08                  Radiology Results      Meds    MEDICATIONS  (STANDING):  atorvastatin 20 milliGRAM(s) Oral at bedtime  clopidogrel Tablet 75 milliGRAM(s) Oral daily  escitalopram 20 milliGRAM(s) Oral daily  heparin  Injectable 5000 Unit(s) SubCutaneous every 8 hours  metoprolol tartrate 25 milliGRAM(s) Oral two times a day  nicotine -  14 mG/24Hr(s) Patch 1 patch Transdermal daily  ondansetron Injectable 4 milliGRAM(s) IV Push every 8 hours  ondansetron Injectable 4 milliGRAM(s) IV Push once  pantoprazole    Tablet 40 milliGRAM(s) Oral before breakfast  pregabalin 300 milliGRAM(s) Oral two times a day  sodium chloride 0.9%. 1000 milliLiter(s) (70 mL/Hr) IV Continuous <Continuous>  spironolactone 25 milliGRAM(s) Oral daily      MEDICATIONS  (PRN):  acetaminophen   Tablet .. 650 milliGRAM(s) Oral every 6 hours PRN Temp greater or equal to 38C (100.4F), Mild Pain (1 - 3)  metoprolol tartrate Injectable 2.5 milliGRAM(s) IV Push every 6 hours PRN for HR > 100  morphine  - Injectable 2 milliGRAM(s) IV Push every 6 hours PRN Moderate Pain (4 - 6)      Physical Exam    Neuro :  no focal deficits  Respiratory: CTA B/L  CV: RRR, S1S2, no murmurs,   Abdominal: Soft, mild tender to moderate palp,+BS,  Extremities: No edema, + peripheral pulses    ASSESSMENT      exacerbation of cyclic vomiting synd,   abd pain,   h/o left thyroid nodule,   cyclic vomiting syndrome,   colitis,   Reflex sympathetic dystrophy s/p spinal stimulator,   frequent falls,   SLE (not on rx),   Afib (not on AC due to frequent falls),   HTN,   depression,   Hemorrhoids (EGD / colonoscopy 3-4 years ago),   diverticulosis,   Partial right thyroidectomy  Renal failure, chronic, stage 3 (moderate)        PLAN      pt has less nausea and no vomiting this am  pt tolerating clear liquids   adv diet as tolerated   cont  zofran iv q8,   pain mgmt f/u  ct abd-pelv Slowly enlarging 7 mm left renal hypodensity, too small to characterize with certainty, no bowel obstruction noted   pt to f/u renal outpt  no further bm since admitted  cont current meds  d/c plan if diet is tolerated

## 2019-05-09 NOTE — DISCHARGE NOTE NURSING/CASE MANAGEMENT/SOCIAL WORK - NSDCDPATPORTLINK_GEN_ALL_CORE
You can access the [facility], offered by [facility], by registering with the following website: [email or web address]

## 2019-05-26 ENCOUNTER — INPATIENT (INPATIENT)
Facility: HOSPITAL | Age: 49
LOS: 3 days | Discharge: HOME | DRG: 392 | End: 2019-05-30
Attending: INTERNAL MEDICINE | Admitting: INTERNAL MEDICINE
Payer: MEDICARE

## 2019-05-26 VITALS
TEMPERATURE: 98 F | SYSTOLIC BLOOD PRESSURE: 171 MMHG | WEIGHT: 179.9 LBS | OXYGEN SATURATION: 98 % | RESPIRATION RATE: 18 BRPM | DIASTOLIC BLOOD PRESSURE: 85 MMHG | HEART RATE: 63 BPM

## 2019-05-26 DIAGNOSIS — E89.0 POSTPROCEDURAL HYPOTHYROIDISM: Chronic | ICD-10-CM

## 2019-05-26 LAB
ALBUMIN SERPL ELPH-MCNC: 4.1 G/DL — SIGNIFICANT CHANGE UP (ref 3.5–5)
ALP SERPL-CCNC: 158 U/L — HIGH (ref 40–120)
ALT FLD-CCNC: 27 U/L DA — SIGNIFICANT CHANGE UP (ref 10–60)
ANION GAP SERPL CALC-SCNC: 9 MMOL/L — SIGNIFICANT CHANGE UP (ref 5–17)
AST SERPL-CCNC: 24 U/L — SIGNIFICANT CHANGE UP (ref 10–40)
BASOPHILS # BLD AUTO: 0.04 K/UL — SIGNIFICANT CHANGE UP (ref 0–0.2)
BASOPHILS NFR BLD AUTO: 0.3 % — SIGNIFICANT CHANGE UP (ref 0–2)
BILIRUB SERPL-MCNC: 0.7 MG/DL — SIGNIFICANT CHANGE UP (ref 0.2–1.2)
BUN SERPL-MCNC: 9 MG/DL — SIGNIFICANT CHANGE UP (ref 7–18)
CALCIUM SERPL-MCNC: 9.7 MG/DL — SIGNIFICANT CHANGE UP (ref 8.4–10.5)
CHLORIDE SERPL-SCNC: 108 MMOL/L — SIGNIFICANT CHANGE UP (ref 96–108)
CO2 SERPL-SCNC: 25 MMOL/L — SIGNIFICANT CHANGE UP (ref 22–31)
CREAT SERPL-MCNC: 1.19 MG/DL — SIGNIFICANT CHANGE UP (ref 0.5–1.3)
EOSINOPHIL # BLD AUTO: 0 K/UL — SIGNIFICANT CHANGE UP (ref 0–0.5)
EOSINOPHIL NFR BLD AUTO: 0 % — SIGNIFICANT CHANGE UP (ref 0–6)
GLUCOSE SERPL-MCNC: 110 MG/DL — HIGH (ref 70–99)
HCG SERPL-ACNC: <1 MIU/ML — SIGNIFICANT CHANGE UP
HCT VFR BLD CALC: 46.4 % — HIGH (ref 34.5–45)
HGB BLD-MCNC: 15.2 G/DL — SIGNIFICANT CHANGE UP (ref 11.5–15.5)
IMM GRANULOCYTES NFR BLD AUTO: 0.3 % — SIGNIFICANT CHANGE UP (ref 0–1.5)
LIDOCAIN IGE QN: 56 U/L — LOW (ref 73–393)
LYMPHOCYTES # BLD AUTO: 0.9 K/UL — LOW (ref 1–3.3)
LYMPHOCYTES # BLD AUTO: 7.1 % — LOW (ref 13–44)
MCHC RBC-ENTMCNC: 29 PG — SIGNIFICANT CHANGE UP (ref 27–34)
MCHC RBC-ENTMCNC: 32.8 GM/DL — SIGNIFICANT CHANGE UP (ref 32–36)
MCV RBC AUTO: 88.5 FL — SIGNIFICANT CHANGE UP (ref 80–100)
MONOCYTES # BLD AUTO: 0.4 K/UL — SIGNIFICANT CHANGE UP (ref 0–0.9)
MONOCYTES NFR BLD AUTO: 3.1 % — SIGNIFICANT CHANGE UP (ref 2–14)
NEUTROPHILS # BLD AUTO: 11.36 K/UL — HIGH (ref 1.8–7.4)
NEUTROPHILS NFR BLD AUTO: 89.2 % — HIGH (ref 43–77)
NRBC # BLD: 0 /100 WBCS — SIGNIFICANT CHANGE UP (ref 0–0)
PLATELET # BLD AUTO: 310 K/UL — SIGNIFICANT CHANGE UP (ref 150–400)
POTASSIUM SERPL-MCNC: 3.8 MMOL/L — SIGNIFICANT CHANGE UP (ref 3.5–5.3)
POTASSIUM SERPL-SCNC: 3.8 MMOL/L — SIGNIFICANT CHANGE UP (ref 3.5–5.3)
PROT SERPL-MCNC: 8.2 G/DL — SIGNIFICANT CHANGE UP (ref 6–8.3)
RBC # BLD: 5.24 M/UL — HIGH (ref 3.8–5.2)
RBC # FLD: 14.1 % — SIGNIFICANT CHANGE UP (ref 10.3–14.5)
SODIUM SERPL-SCNC: 142 MMOL/L — SIGNIFICANT CHANGE UP (ref 135–145)
TROPONIN I SERPL-MCNC: <0.015 NG/ML — SIGNIFICANT CHANGE UP (ref 0–0.04)
WBC # BLD: 12.74 K/UL — HIGH (ref 3.8–10.5)
WBC # FLD AUTO: 12.74 K/UL — HIGH (ref 3.8–10.5)

## 2019-05-26 RX ORDER — HALOPERIDOL DECANOATE 100 MG/ML
5 INJECTION INTRAMUSCULAR ONCE
Refills: 0 | Status: COMPLETED | OUTPATIENT
Start: 2019-05-26 | End: 2019-05-26

## 2019-05-26 RX ORDER — MORPHINE SULFATE 50 MG/1
4 CAPSULE, EXTENDED RELEASE ORAL ONCE
Refills: 0 | Status: DISCONTINUED | OUTPATIENT
Start: 2019-05-26 | End: 2019-05-26

## 2019-05-26 RX ORDER — ONDANSETRON 8 MG/1
4 TABLET, FILM COATED ORAL ONCE
Refills: 0 | Status: COMPLETED | OUTPATIENT
Start: 2019-05-26 | End: 2019-05-26

## 2019-05-26 RX ORDER — SODIUM CHLORIDE 9 MG/ML
1000 INJECTION INTRAMUSCULAR; INTRAVENOUS; SUBCUTANEOUS ONCE
Refills: 0 | Status: COMPLETED | OUTPATIENT
Start: 2019-05-26 | End: 2019-05-26

## 2019-05-26 RX ADMIN — HALOPERIDOL DECANOATE 5 MILLIGRAM(S): 100 INJECTION INTRAMUSCULAR at 21:45

## 2019-05-26 RX ADMIN — MORPHINE SULFATE 4 MILLIGRAM(S): 50 CAPSULE, EXTENDED RELEASE ORAL at 21:45

## 2019-05-26 RX ADMIN — SODIUM CHLORIDE 1000 MILLILITER(S): 9 INJECTION INTRAMUSCULAR; INTRAVENOUS; SUBCUTANEOUS at 20:51

## 2019-05-26 RX ADMIN — ONDANSETRON 4 MILLIGRAM(S): 8 TABLET, FILM COATED ORAL at 20:51

## 2019-05-26 RX ADMIN — MORPHINE SULFATE 4 MILLIGRAM(S): 50 CAPSULE, EXTENDED RELEASE ORAL at 22:06

## 2019-05-26 NOTE — ED ADULT NURSE NOTE - ED STAT RN HANDOFF DETAILS 2
Endorse from MELINDA Byrd who stated pt was d/c since last night. At 7 am pt shaking c/o pain MD Gonzalez aware

## 2019-05-26 NOTE — ED ADULT NURSE NOTE - ED STAT RN HANDOFF DETAILS
report given to MELINDA Griffin, pt not ready to go home noted on and off shaking and weak to get up.

## 2019-05-26 NOTE — ED ADULT NURSE NOTE - NSIMPLEMENTINTERV_GEN_ALL_ED
Implemented All Fall Risk Interventions:  [place] to call system. Call bell, personal items and telephone within reach. Instruct patient to call for assistance. Room bathroom lighting operational. Non-slip footwear when patient is off stretcher. Physically safe environment: no spills, clutter or unnecessary equipment. Stretcher in lowest position, wheels locked, appropriate side rails in place. Provide visual cue, wrist band, yellow gown, etc. Monitor gait and stability. Monitor for mental status changes and reorient to person, place, and time. Review medications for side effects contributing to fall risk. Reinforce activity limits and safety measures with patient and family.

## 2019-05-27 DIAGNOSIS — I10 ESSENTIAL (PRIMARY) HYPERTENSION: ICD-10-CM

## 2019-05-27 DIAGNOSIS — R25.3 FASCICULATION: ICD-10-CM

## 2019-05-27 DIAGNOSIS — Z29.9 ENCOUNTER FOR PROPHYLACTIC MEASURES, UNSPECIFIED: ICD-10-CM

## 2019-05-27 DIAGNOSIS — I48.91 UNSPECIFIED ATRIAL FIBRILLATION: ICD-10-CM

## 2019-05-27 DIAGNOSIS — G43.A0 CYCLICAL VOMITING, IN MIGRAINE, NOT INTRACTABLE: ICD-10-CM

## 2019-05-27 DIAGNOSIS — M32.9 SYSTEMIC LUPUS ERYTHEMATOSUS, UNSPECIFIED: ICD-10-CM

## 2019-05-27 LAB
APPEARANCE UR: CLEAR — SIGNIFICANT CHANGE UP
BILIRUB UR-MCNC: NEGATIVE — SIGNIFICANT CHANGE UP
COLOR SPEC: YELLOW — SIGNIFICANT CHANGE UP
DIFF PNL FLD: ABNORMAL
GLUCOSE BLDC GLUCOMTR-MCNC: 107 MG/DL — HIGH (ref 70–99)
GLUCOSE UR QL: NEGATIVE — SIGNIFICANT CHANGE UP
KETONES UR-MCNC: NEGATIVE — SIGNIFICANT CHANGE UP
LEUKOCYTE ESTERASE UR-ACNC: NEGATIVE — SIGNIFICANT CHANGE UP
NITRITE UR-MCNC: NEGATIVE — SIGNIFICANT CHANGE UP
PH UR: 9 — HIGH (ref 5–8)
PROT UR-MCNC: 30 MG/DL
SP GR SPEC: 1.01 — SIGNIFICANT CHANGE UP (ref 1.01–1.02)
UROBILINOGEN FLD QL: NEGATIVE — SIGNIFICANT CHANGE UP

## 2019-05-27 PROCEDURE — 99285 EMERGENCY DEPT VISIT HI MDM: CPT

## 2019-05-27 RX ORDER — ONDANSETRON 8 MG/1
4 TABLET, FILM COATED ORAL EVERY 6 HOURS
Refills: 0 | Status: DISCONTINUED | OUTPATIENT
Start: 2019-05-27 | End: 2019-05-30

## 2019-05-27 RX ORDER — ONDANSETRON 8 MG/1
1 TABLET, FILM COATED ORAL
Qty: 10 | Refills: 0
Start: 2019-05-27

## 2019-05-27 RX ORDER — CLOPIDOGREL BISULFATE 75 MG/1
75 TABLET, FILM COATED ORAL DAILY
Refills: 0 | Status: DISCONTINUED | OUTPATIENT
Start: 2019-05-27 | End: 2019-05-30

## 2019-05-27 RX ORDER — ATORVASTATIN CALCIUM 80 MG/1
20 TABLET, FILM COATED ORAL AT BEDTIME
Refills: 0 | Status: DISCONTINUED | OUTPATIENT
Start: 2019-05-27 | End: 2019-05-30

## 2019-05-27 RX ORDER — PANTOPRAZOLE SODIUM 20 MG/1
40 TABLET, DELAYED RELEASE ORAL
Refills: 0 | Status: DISCONTINUED | OUTPATIENT
Start: 2019-05-27 | End: 2019-05-30

## 2019-05-27 RX ORDER — DIPHENHYDRAMINE HCL 50 MG
50 CAPSULE ORAL ONCE
Refills: 0 | Status: COMPLETED | OUTPATIENT
Start: 2019-05-27 | End: 2019-05-27

## 2019-05-27 RX ORDER — ACETAMINOPHEN 500 MG
1000 TABLET ORAL ONCE
Refills: 0 | Status: COMPLETED | OUTPATIENT
Start: 2019-05-27 | End: 2019-05-27

## 2019-05-27 RX ORDER — NICOTINE POLACRILEX 2 MG
1 GUM BUCCAL DAILY
Refills: 0 | Status: DISCONTINUED | OUTPATIENT
Start: 2019-05-27 | End: 2019-05-30

## 2019-05-27 RX ORDER — ENOXAPARIN SODIUM 100 MG/ML
40 INJECTION SUBCUTANEOUS DAILY
Refills: 0 | Status: DISCONTINUED | OUTPATIENT
Start: 2019-05-27 | End: 2019-05-30

## 2019-05-27 RX ORDER — ESCITALOPRAM OXALATE 10 MG/1
20 TABLET, FILM COATED ORAL DAILY
Refills: 0 | Status: DISCONTINUED | OUTPATIENT
Start: 2019-05-27 | End: 2019-05-30

## 2019-05-27 RX ORDER — MORPHINE SULFATE 50 MG/1
2 CAPSULE, EXTENDED RELEASE ORAL EVERY 4 HOURS
Refills: 0 | Status: DISCONTINUED | OUTPATIENT
Start: 2019-05-27 | End: 2019-05-30

## 2019-05-27 RX ORDER — METOPROLOL TARTRATE 50 MG
25 TABLET ORAL
Refills: 0 | Status: DISCONTINUED | OUTPATIENT
Start: 2019-05-27 | End: 2019-05-30

## 2019-05-27 RX ORDER — SODIUM CHLORIDE 9 MG/ML
500 INJECTION, SOLUTION INTRAVENOUS ONCE
Refills: 0 | Status: COMPLETED | OUTPATIENT
Start: 2019-05-27 | End: 2019-05-27

## 2019-05-27 RX ORDER — ONDANSETRON 8 MG/1
4 TABLET, FILM COATED ORAL ONCE
Refills: 0 | Status: COMPLETED | OUTPATIENT
Start: 2019-05-27 | End: 2019-05-27

## 2019-05-27 RX ADMIN — Medication 25 MILLIGRAM(S): at 19:27

## 2019-05-27 RX ADMIN — Medication 1000 MILLIGRAM(S): at 10:54

## 2019-05-27 RX ADMIN — SODIUM CHLORIDE 1000 MILLILITER(S): 9 INJECTION, SOLUTION INTRAVENOUS at 19:37

## 2019-05-27 RX ADMIN — ONDANSETRON 4 MILLIGRAM(S): 8 TABLET, FILM COATED ORAL at 08:01

## 2019-05-27 RX ADMIN — ATORVASTATIN CALCIUM 20 MILLIGRAM(S): 80 TABLET, FILM COATED ORAL at 21:52

## 2019-05-27 RX ADMIN — Medication 300 MILLIGRAM(S): at 19:30

## 2019-05-27 RX ADMIN — Medication 1000 MILLIGRAM(S): at 19:31

## 2019-05-27 RX ADMIN — Medication 400 MILLIGRAM(S): at 19:27

## 2019-05-27 RX ADMIN — ESCITALOPRAM OXALATE 20 MILLIGRAM(S): 10 TABLET, FILM COATED ORAL at 13:20

## 2019-05-27 RX ADMIN — Medication 400 MILLIGRAM(S): at 10:24

## 2019-05-27 RX ADMIN — Medication 50 MILLIGRAM(S): at 05:21

## 2019-05-27 RX ADMIN — Medication 1 MILLIGRAM(S): at 15:13

## 2019-05-27 NOTE — H&P ADULT - HISTORY OF PRESENT ILLNESS
Pt is a 48F, lives with sister, from home, ambulates with walker w/ PMHx of cyclic vomiting syndrome, Reflex sympathetic dystrophy s/p spinal stimulator, frequent falls, SLE (not on tx), Afib (not on AC due to frequent falls), HTN, Depression, Diverticulosis, and partial thyroidectomy on right side, who presents to the ED with diffuse abdominal pain x 4 days, and intractable N/V , diarrhea x 2 days.   Patient has history of cyclic vomiting from medicinal marijuana and multiple admissions for the same. Patient was admitted recently and discharged on 5/9.      In Ed, BP: 177/85 mm hg, hr: 63, Temp: 98.2 F  Cbc shows hb of 15, elevated wbc count with left shift  bmp , lipase wnl.  CT wnl. Pt is a 48F, lives with sister, from home, ambulates with walker w/ PMHx of cyclic vomiting syndrome, Reflex sympathetic dystrophy s/p spinal stimulator, frequent falls, SLE (not on tx), Afib (not on AC due to frequent falls), HTN, Depression, Diverticulosis, and partial thyroidectomy on right side, who presents to the ED with diffuse abdominal pain x 4 days, and intractable N/V , diarrhea x 2 days. Reports 10/10 pain all over abdomen, multiple episodes of NBNP vomiting, and loose watery diarrhea. Denies fever, chills, headache, blurry vision, syncope or falls.   Patient has history of cyclic vomiting from medicinal marijuana and multiple admissions for the same. Patient was admitted recently and discharged on 5/9.  Patient does have similar vomiting, however noted to have twitching of mouth, tongue and eyes. Denies use of any additional drugs.     In Ed, BP: 177/85 mm hg, hr: 63, Temp: 98.2 F  Cbc shows hb of 15, elevated wbc count with left shift  bmp , lipase wnl.  CT wnl.

## 2019-05-27 NOTE — ED PROVIDER NOTE - CLINICAL SUMMARY MEDICAL DECISION MAKING FREE TEXT BOX
Pt with h/o cyclical vomiting syndrome, c/o vomiting and upper abdominal pain, diarrhea, no fever--labs, CT A/P, IVF, antiemetics, reassess.

## 2019-05-27 NOTE — H&P ADULT - NSHPPHYSICALEXAM_GEN_ALL_CORE
-No alcohol use  -Pt uses medicinal marijuana for pain  -Pt is an active tobacco smoker PHYSICAL EXAM:  GENERAL: NAD  HEENT: Normocephalic;  conjunctivae and sclerae clear; dry mucous membranes;   NECK : supple  CHEST/LUNG: Clear to auscultation bilaterally with good air entry   HEART: S1 S2  regular; no murmurs, gallops or rubs  ABDOMEN: Soft, tenderness all over   EXTREMITIES: no cyanosis; no edema; no calf tenderness  SKIN: warm and dry; no rash  NERVOUS SYSTEM:  Awake and alert; Oriented  to place, person and time ; no new deficits  continuos twitching of eyes, mouth , tongue+

## 2019-05-27 NOTE — H&P ADULT - PROBLEM SELECTOR PLAN 4
-c/w IV Metoprolol 2.5mg q 6 PRN until pt tolerating PO meds  -c/w Plavix therapy. c/ lopressor   -c/w Plavix therapy.

## 2019-05-27 NOTE — ED PROVIDER NOTE - OBJECTIVE STATEMENT
49 y/o woman, h/o cyclical vomiting syndrome, reflex sympathetic dystrophy, A-fib, HTN, SLE, presents with vomiting x 1-2 days, upper abdominal pain, diarrhea.  No dysuria/hematuria/fever.

## 2019-05-27 NOTE — H&P ADULT - PROBLEM SELECTOR PLAN 1
-Abd pain, N/V/D x 4 days, symptoms have persisted for several years   -Pt not tolerating PO at this time  - CT negative   -c/w IV morphine for now and convert to home dose MS contin when pt tolerating PO  -NPO for now  -c/w IV hydration  -Monitor BMP and replete electrolytes as appropriate.

## 2019-05-27 NOTE — ED PROVIDER NOTE - PROGRESS NOTE DETAILS
Pt given Zofran and morphine without significant relief, then after Haldol IV given her symptoms resolved.  CT A/P and labs with no significant acute abnormalities.  Advised strict return precautions and PMD Dr. Doyle and her pain management doctor. Pt reassessed and saying she does not feel well.  Labs and imaging negative, however patient does not feel well enough to go home.  discharge papers done, however when transportation arrived, pt refused.  Dr. Doyle contacted and came to see patient in ED.  He will admit.

## 2019-05-27 NOTE — H&P ADULT - NSHPLABSRESULTS_GEN_ALL_CORE
15.2    )-----------( 310      ( 26 May 2019 20:53 )             46.4           142  |  108  |  9   ----------------------------<  110<H>  3.8   |  25  |  1.19    Ca    9.7      26 May 2019 20:53    TPro  8.2  /  Alb  4.1  /  TBili  0.7  /  DBili  x   /  AST  24  /  ALT  27  /  AlkPhos  158<H>                Urinalysis Basic - ( 27 May 2019 02:50 )    Color: Yellow / Appearance: Clear / S.015 / pH: x  Gluc: x / Ketone: Negative  / Bili: Negative / Urobili: Negative   Blood: x / Protein: 30 mg/dL / Nitrite: Negative   Leuk Esterase: Negative / RBC: 2-5 /HPF / WBC 0-2 /HPF   Sq Epi: x / Non Sq Epi: Few /HPF / Bacteria: Trace /HPF    Lactate Trend      CARDIAC MARKERS ( 26 May 2019 20:53 )  <0.015 ng/mL / x     / x     / x     / x            CAPILLARY BLOOD GLUCOSE      POCT Blood Glucose.: 115 mg/dL (26 May 2019 18:56)

## 2019-05-27 NOTE — H&P ADULT - PROBLEM SELECTOR PLAN 2
unusual twitching of face noted ( not present at baseline)  differentials could be hypoelectrolytemia vs drug withdrawal   bmp wnl   Utox pending   will start with ativan prn   monitor for any worsening unusual twitching of face noted ( not present at baseline)  differentials could be hypoelectrolytemia vs drug withdrawal   bmp wnl   Utox pending   will start with ativan prn   monitor for any worsening  Reasons could be chronic narcotics that patient is on   No clear infectious etiology identified, CT, UA negative, monitor wbc count , consider abx and further work up if up trending  Can also consider EEG, Brain imaging if symptoms persists

## 2019-05-27 NOTE — H&P ADULT - ASSESSMENT
Pt is a 48F, lives with sister, from home, ambulates with walker w/ PMHx of cyclic vomiting syndrome, Reflex sympathetic dystrophy s/p spinal stimulator, frequent falls, SLE (not on tx), Afib (not on AC due to frequent falls), HTN, Depression, Diverticulosis, and partial thyroidectomy on right side, who presents to the ED with diffuse abdominal pain x 4 days, and intractable N/V , diarrhea x 2 days.   In Ed, BP: 177/85 mm hg, hr: 63, Temp: 98.2 F  Cbc shows hb of 15, elevated wbc count with left shift  bmp , lipase wnl.  CT wnl.     Will admit to medicine for Intractable vomiting. Pt is a 48F, lives with sister, from home, ambulates with walker w/ PMHx of cyclic vomiting syndrome, Reflex sympathetic dystrophy s/p spinal stimulator, frequent falls, SLE (not on tx), Afib (not on AC due to frequent falls), HTN, Depression, Diverticulosis, and partial thyroidectomy on right side, who presents to the ED with diffuse abdominal pain x 4 days, and intractable N/V , diarrhea x 2 days.   In Ed, BP: 177/85 mm hg, hr: 63, Temp: 98.2 F  Cbc shows hb of 15, elevated wbc count with left shift  bmp , lipase wnl.  CT wnl.     Will admit to medicine for Intractable vomiting and unusual twitching.

## 2019-05-27 NOTE — H&P ADULT - PROBLEM SELECTOR PLAN 3
c/w lopressor therapy   -monitor BP. c/w lopressor therapy   -monitor BP.  - Hold aldactone as dehydrated from vomiting, resume when euvolemic

## 2019-05-27 NOTE — ED PROVIDER NOTE - CARE PLAN
Principal Discharge DX:	Non-intractable cyclical vomiting with nausea  Secondary Diagnosis:	Pain of upper abdomen

## 2019-05-28 LAB
ALBUMIN SERPL ELPH-MCNC: 3.6 G/DL — SIGNIFICANT CHANGE UP (ref 3.5–5)
ALP SERPL-CCNC: 128 U/L — HIGH (ref 40–120)
ALT FLD-CCNC: 23 U/L DA — SIGNIFICANT CHANGE UP (ref 10–60)
ANION GAP SERPL CALC-SCNC: 10 MMOL/L — SIGNIFICANT CHANGE UP (ref 5–17)
APPEARANCE UR: CLEAR — SIGNIFICANT CHANGE UP
AST SERPL-CCNC: 20 U/L — SIGNIFICANT CHANGE UP (ref 10–40)
BACTERIA # UR AUTO: ABNORMAL /HPF
BASOPHILS # BLD AUTO: 0.04 K/UL — SIGNIFICANT CHANGE UP (ref 0–0.2)
BASOPHILS NFR BLD AUTO: 0.4 % — SIGNIFICANT CHANGE UP (ref 0–2)
BILIRUB SERPL-MCNC: 1 MG/DL — SIGNIFICANT CHANGE UP (ref 0.2–1.2)
BILIRUB UR-MCNC: NEGATIVE — SIGNIFICANT CHANGE UP
BUN SERPL-MCNC: 13 MG/DL — SIGNIFICANT CHANGE UP (ref 7–18)
CALCIUM SERPL-MCNC: 8.8 MG/DL — SIGNIFICANT CHANGE UP (ref 8.4–10.5)
CHLORIDE SERPL-SCNC: 106 MMOL/L — SIGNIFICANT CHANGE UP (ref 96–108)
CO2 SERPL-SCNC: 24 MMOL/L — SIGNIFICANT CHANGE UP (ref 22–31)
COLOR SPEC: YELLOW — SIGNIFICANT CHANGE UP
CREAT SERPL-MCNC: 1.21 MG/DL — SIGNIFICANT CHANGE UP (ref 0.5–1.3)
DIFF PNL FLD: NEGATIVE — SIGNIFICANT CHANGE UP
EOSINOPHIL # BLD AUTO: 0.02 K/UL — SIGNIFICANT CHANGE UP (ref 0–0.5)
EOSINOPHIL NFR BLD AUTO: 0.2 % — SIGNIFICANT CHANGE UP (ref 0–6)
EPI CELLS # UR: ABNORMAL /HPF
GLUCOSE SERPL-MCNC: 99 MG/DL — SIGNIFICANT CHANGE UP (ref 70–99)
GLUCOSE UR QL: NEGATIVE — SIGNIFICANT CHANGE UP
HCT VFR BLD CALC: 44.4 % — SIGNIFICANT CHANGE UP (ref 34.5–45)
HGB BLD-MCNC: 14.6 G/DL — SIGNIFICANT CHANGE UP (ref 11.5–15.5)
IMM GRANULOCYTES NFR BLD AUTO: 0.3 % — SIGNIFICANT CHANGE UP (ref 0–1.5)
KETONES UR-MCNC: NEGATIVE — SIGNIFICANT CHANGE UP
LEUKOCYTE ESTERASE UR-ACNC: ABNORMAL
LYMPHOCYTES # BLD AUTO: 2.17 K/UL — SIGNIFICANT CHANGE UP (ref 1–3.3)
LYMPHOCYTES # BLD AUTO: 21.3 % — SIGNIFICANT CHANGE UP (ref 13–44)
MAGNESIUM SERPL-MCNC: 1.8 MG/DL — SIGNIFICANT CHANGE UP (ref 1.6–2.6)
MCHC RBC-ENTMCNC: 29 PG — SIGNIFICANT CHANGE UP (ref 27–34)
MCHC RBC-ENTMCNC: 32.9 GM/DL — SIGNIFICANT CHANGE UP (ref 32–36)
MCV RBC AUTO: 88.3 FL — SIGNIFICANT CHANGE UP (ref 80–100)
MONOCYTES # BLD AUTO: 1.09 K/UL — HIGH (ref 0–0.9)
MONOCYTES NFR BLD AUTO: 10.7 % — SIGNIFICANT CHANGE UP (ref 2–14)
NEUTROPHILS # BLD AUTO: 6.84 K/UL — SIGNIFICANT CHANGE UP (ref 1.8–7.4)
NEUTROPHILS NFR BLD AUTO: 67.1 % — SIGNIFICANT CHANGE UP (ref 43–77)
NITRITE UR-MCNC: NEGATIVE — SIGNIFICANT CHANGE UP
NRBC # BLD: 0 /100 WBCS — SIGNIFICANT CHANGE UP (ref 0–0)
PCP SPEC-MCNC: SIGNIFICANT CHANGE UP
PH UR: 5 — SIGNIFICANT CHANGE UP (ref 5–8)
PHOSPHATE SERPL-MCNC: 3.4 MG/DL — SIGNIFICANT CHANGE UP (ref 2.5–4.5)
PLATELET # BLD AUTO: 197 K/UL — SIGNIFICANT CHANGE UP (ref 150–400)
POTASSIUM SERPL-MCNC: 3.6 MMOL/L — SIGNIFICANT CHANGE UP (ref 3.5–5.3)
POTASSIUM SERPL-SCNC: 3.6 MMOL/L — SIGNIFICANT CHANGE UP (ref 3.5–5.3)
PROT SERPL-MCNC: 7.1 G/DL — SIGNIFICANT CHANGE UP (ref 6–8.3)
PROT UR-MCNC: 30 MG/DL
RBC # BLD: 5.03 M/UL — SIGNIFICANT CHANGE UP (ref 3.8–5.2)
RBC # FLD: 14.9 % — HIGH (ref 10.3–14.5)
RBC CASTS # UR COMP ASSIST: SIGNIFICANT CHANGE UP /HPF (ref 0–2)
SODIUM SERPL-SCNC: 140 MMOL/L — SIGNIFICANT CHANGE UP (ref 135–145)
SP GR SPEC: 1.01 — SIGNIFICANT CHANGE UP (ref 1.01–1.02)
UROBILINOGEN FLD QL: NEGATIVE — SIGNIFICANT CHANGE UP
WBC # BLD: 10.19 K/UL — SIGNIFICANT CHANGE UP (ref 3.8–10.5)
WBC # FLD AUTO: 10.19 K/UL — SIGNIFICANT CHANGE UP (ref 3.8–10.5)
WBC UR QL: ABNORMAL /HPF (ref 0–5)

## 2019-05-28 PROCEDURE — 71045 X-RAY EXAM CHEST 1 VIEW: CPT | Mod: 26

## 2019-05-28 RX ORDER — ACETAMINOPHEN 500 MG
650 TABLET ORAL EVERY 6 HOURS
Refills: 0 | Status: DISCONTINUED | OUTPATIENT
Start: 2019-05-28 | End: 2019-05-30

## 2019-05-28 RX ADMIN — Medication 650 MILLIGRAM(S): at 07:17

## 2019-05-28 RX ADMIN — MORPHINE SULFATE 2 MILLIGRAM(S): 50 CAPSULE, EXTENDED RELEASE ORAL at 13:05

## 2019-05-28 RX ADMIN — ESCITALOPRAM OXALATE 20 MILLIGRAM(S): 10 TABLET, FILM COATED ORAL at 13:00

## 2019-05-28 RX ADMIN — ENOXAPARIN SODIUM 40 MILLIGRAM(S): 100 INJECTION SUBCUTANEOUS at 13:02

## 2019-05-28 RX ADMIN — ONDANSETRON 4 MILLIGRAM(S): 8 TABLET, FILM COATED ORAL at 04:48

## 2019-05-28 RX ADMIN — Medication 650 MILLIGRAM(S): at 08:10

## 2019-05-28 RX ADMIN — Medication 25 MILLIGRAM(S): at 04:49

## 2019-05-28 RX ADMIN — ONDANSETRON 4 MILLIGRAM(S): 8 TABLET, FILM COATED ORAL at 18:33

## 2019-05-28 RX ADMIN — Medication 1 PATCH: at 21:04

## 2019-05-28 RX ADMIN — MORPHINE SULFATE 2 MILLIGRAM(S): 50 CAPSULE, EXTENDED RELEASE ORAL at 01:00

## 2019-05-28 RX ADMIN — MORPHINE SULFATE 2 MILLIGRAM(S): 50 CAPSULE, EXTENDED RELEASE ORAL at 13:25

## 2019-05-28 RX ADMIN — Medication 300 MILLIGRAM(S): at 18:27

## 2019-05-28 RX ADMIN — MORPHINE SULFATE 2 MILLIGRAM(S): 50 CAPSULE, EXTENDED RELEASE ORAL at 18:27

## 2019-05-28 RX ADMIN — Medication 25 MILLIGRAM(S): at 18:25

## 2019-05-28 RX ADMIN — MORPHINE SULFATE 2 MILLIGRAM(S): 50 CAPSULE, EXTENDED RELEASE ORAL at 00:23

## 2019-05-28 RX ADMIN — CLOPIDOGREL BISULFATE 75 MILLIGRAM(S): 75 TABLET, FILM COATED ORAL at 12:59

## 2019-05-28 RX ADMIN — PANTOPRAZOLE SODIUM 40 MILLIGRAM(S): 20 TABLET, DELAYED RELEASE ORAL at 07:17

## 2019-05-28 RX ADMIN — Medication 300 MILLIGRAM(S): at 04:49

## 2019-05-28 RX ADMIN — Medication 1 PATCH: at 13:00

## 2019-05-28 RX ADMIN — MORPHINE SULFATE 2 MILLIGRAM(S): 50 CAPSULE, EXTENDED RELEASE ORAL at 22:41

## 2019-05-28 NOTE — ED ADULT NURSE REASSESSMENT NOTE - NS ED NURSE REASSESS COMMENT FT1
Pt d/c home since 0044. Ambulette at bedside pt states she does not want to be d/c MD mckinley aware spoke to pt SL removed. pt refused transport. Nsg ZSup Hermann Mauro aware
pt noted with shaking to legs, fully awake , responsive to verbal command but not talking well. Reevaluated by Dr Tavarez.
received Pt sleeping on stretcher easily arose by verbal stimuli breathing unlabored . Pt care endorsed to Natalie RN safety maintained.
Pt aox4, received in bed, calm, from nurse Jakob. No s/s of any distress noted. IV line in place, IV fluids infusing as per orders, no signs of infiltration noted. VS WNL. Call bell in reach, bed in lowest position. Safety maintained, pt will be monitored for signs of seizures.
Pt remains stable, AOX4, no s/s of any distress noted. Ambulating with a walker.  IV line in place, IV fluids infusing as per orders, no signs of infiltration noted. Tolerating clear liquid diet. VS WNL. Call bell in reach, bed in lowest position. Safety maintained, hourly rounding performed. Endorsed to nurse Suyapa Hart in [facility]

## 2019-05-28 NOTE — CHART NOTE - NSCHARTNOTEFT_GEN_A_CORE
EVENT: Called for a temp of 100.2    OBJECTIVE:  Vital Signs Last 24 Hrs  T(C): 37.3 (28 May 2019 07:08), Max: 37.9 (28 May 2019 06:51)  T(F): 99.2 (28 May 2019 07:08), Max: 100.2 (28 May 2019 06:51)  HR: 84 (28 May 2019 07:08) (79 - 94)  BP: 132/101 (28 May 2019 07:08) (122/77 - 156/101)  BP(mean): --  RR: 18 (28 May 2019 07:08) (17 - 18)  SpO2: 100% (28 May 2019 07:08) (98% - 100%)    FOCUSED PHYSICAL EXAM:  CHEST/LUNG: rhonchi at lung bases  HEART: Regular rate and rhythm; No murmurs, rubs, or gallops  ABDOMEN: Soft, Nontender, Nondistended, Normal bowel sounds  EXTREMITIES:  2+ Peripheral Pulses, No clubbing, cyanosis, or edema  Neuro: A & O X 3  Skin: Hot and dry    LABS:                        15.2   12.74 )-----------( 310      ( 26 May 2019 20:53 )             46.4   CARDIAC MARKERS ( 26 May 2019 20:53 )  <0.015 ng/mL / x     / x     / x     / x            142  |  108  |  9   ----------------------------<  110<H>  3.8   |  25  |  1.19    Ca    9.7      26 May 2019 20:53    TPro  8.2  /  Alb  4.1  /  TBili  0.7  /  DBili  x   /  AST  24  /  ALT  27  /  AlkPhos  158<H>  -26      EK19  Diagnosis Line Atrial fibrillation with premature ventricular or aberrantly conducted complexes  Nonspecific T wave abnormality  Abnormal ECG    IMAGING: CXR (p)  19 CT abd and pelvis  IMPRESSION:   1. No acute findings within the patient's symptoms.  2. Stable findings described above.    ASSESSMENT:  HPI:  Pt is a 48F, lives with sister, from home, ambulates with walker w/ PMHx of cyclic vomiting syndrome, Reflex sympathetic dystrophy s/p spinal stimulator, frequent falls, SLE (not on tx), Afib (not on AC due to frequent falls), HTN, Depression, Diverticulosis, and partial thyroidectomy on right side, who presents to the ED with diffuse abdominal pain x 4 days, and intractable N/V , diarrhea x 2 days. Reports 10/10 pain all over abdomen, multiple episodes of NBNP vomiting, and loose watery diarrhea. Denies fever, chills, headache, blurry vision, syncope or falls.   Patient has history of cyclic vomiting from medicinal marijuana and multiple admissions for the same. Patient was admitted recently and discharged on .  Patient does have similar vomiting, however noted to have twitching of mouth, tongue and eyes. Denies use of any additional drugs.   In Ed, BP: 177/85 mm hg, hr: 63, Temp: 98.2 F  Cbc shows hb of 15, elevated wbc count with left shift  bmp , lipase wnl.  CT wnl. (27 May 2019 11:53)    PLAN:   1. Tylenol 650 mg PRN for temp  2. CXR ordered  3 UA and blood culture X 1    FOLLOW UP / RESULT: F/U culture results EVENT: Called to eval Pt for a temp of 100.2    OBJECTIVE:  Vital Signs Last 24 Hrs  T(C): 37.3 (28 May 2019 07:08), Max: 37.9 (28 May 2019 06:51)  T(F): 99.2 (28 May 2019 07:08), Max: 100.2 (28 May 2019 06:51)  HR: 84 (28 May 2019 07:08) (79 - 94)  BP: 132/101 (28 May 2019 07:08) (122/77 - 156/101)  BP(mean): --  RR: 18 (28 May 2019 07:08) (17 - 18)  SpO2: 100% (28 May 2019 07:08) (98% - 100%)    FOCUSED PHYSICAL EXAM:  CHEST/LUNG: rhonchi at lung bases  HEART: Regular rate and rhythm; No murmurs, rubs, or gallops  ABDOMEN: Soft, Nontender, Nondistended, Normal bowel sounds  EXTREMITIES:  2+ Peripheral Pulses, No clubbing, cyanosis, or edema  Neuro: A & O X 3  Skin: Hot and dry    LABS:                        15.2   12.74 )-----------( 310      ( 26 May 2019 20:53 )             46.4   CARDIAC MARKERS ( 26 May 2019 20:53 )  <0.015 ng/mL / x     / x     / x     / x            142  |  108  |  9   ----------------------------<  110<H>  3.8   |  25  |  1.19    Ca    9.7      26 May 2019 20:53    TPro  8.2  /  Alb  4.1  /  TBili  0.7  /  DBili  x   /  AST  24  /  ALT  27  /  AlkPhos  158<H>        EK19  Diagnosis Line Atrial fibrillation with premature ventricular or aberrantly conducted complexes  Nonspecific T wave abnormality  Abnormal ECG    IMAGING: CXR (p)  19 CT abd and pelvis  IMPRESSION:   1. No acute findings within the patient's symptoms.  2. Stable findings described above.    ASSESSMENT:  HPI:  Pt is a 48F, lives with sister, from home, ambulates with walker w/ PMHx of cyclic vomiting syndrome, Reflex sympathetic dystrophy s/p spinal stimulator, frequent falls, SLE (not on tx), Afib (not on AC due to frequent falls), HTN, Depression, Diverticulosis, and partial thyroidectomy on right side, who presents to the ED with diffuse abdominal pain x 4 days, and intractable N/V , diarrhea x 2 days. Reports 10/10 pain all over abdomen, multiple episodes of NBNP vomiting, and loose watery diarrhea. Denies fever, chills, headache, blurry vision, syncope or falls.   Patient has history of cyclic vomiting from medicinal marijuana and multiple admissions for the same. Patient was admitted recently and discharged on .  Patient does have similar vomiting, however noted to have twitching of mouth, tongue and eyes. Denies use of any additional drugs.   In Ed, BP: 177/85 mm hg, hr: 63, Temp: 98.2 F.Cbc shows hb of 15, elevated wbc count with left shift, bmp , lipase wnl, CT wnl. (27 May 2019 11:53)    PLAN:   1. Tylenol 650 mg PRN for temp  2. CXR ordered  3 UA ordered  4. Blood culture X 1 sent  5. Cooling measures as tolerated    FOLLOW UP / RESULT: F/U Cxr and culture results

## 2019-05-28 NOTE — PROGRESS NOTE ADULT - SUBJECTIVE AND OBJECTIVE BOX
Pt is a 48F, lives with sister, from home, ambulates with walker w/ PMHx of cyclic vomiting syndrome, Reflex sympathetic dystrophy s/p spinal stimulator, frequent falls, SLE (not on tx), Afib (not on AC due to frequent falls), HTN, Depression, Diverticulosis, and partial thyroidectomy on right side, who presents to the ED with diffuse abdominal pain x 4 days, and intractable N/V , diarrhea x 2 days. Reports 10/10 pain all over abdomen, multiple episodes of NBNP vomiting, and loose watery diarrhea. Denies fever, chills, headache, blurry vision, syncope or falls.   Patient has history of cyclic vomiting from medicinal marijuana and multiple admissions for the same. Patient was admitted recently and discharged on 5/9.  Patient does have similar vomiting, however noted to have twitching of mouth, tongue and eyes. Denies use of any additional drugs.     In Ed, BP: 177/85 mm hg, hr: 63, Temp: 98.2 F  Cbc shows hb of 15, elevated wbc count with left shift  bmp , lipase wnl.  CT wnl.       Review of Systems:  Other Review of Systems: All other review of systems negative, except as noted in HPI	      pt seen in ed [ x ], reg med floor [   ], bed [ x ], chair at bedside [   ], a+o x3 [ x ], lethargic [  ],  nad [ x ]    pt with elevated temp overnight, cxr ordered and cultures sent.        Allergies    aspirin (Unknown)  cortisone (Other)  latex (Unknown)  sulfa drugs (Unknown)        Vitals    T(F): 99.2 (05-28-19 @ 07:08), Max: 100.2 (05-28-19 @ 06:51)  HR: 84 (05-28-19 @ 07:08) (79 - 94)  BP: 132/101 (05-28-19 @ 07:08) (122/77 - 156/101)  RR: 18 (05-28-19 @ 07:08) (17 - 18)  SpO2: 100% (05-28-19 @ 07:08) (98% - 100%)  Wt(kg): --  CAPILLARY BLOOD GLUCOSE      POCT Blood Glucose.: 107 mg/dL (27 May 2019 15:01)      Labs                          14.6   10.19 )-----------( 197      ( 28 May 2019 06:56 )             44.4       05-28    140  |  106  |  13  ----------------------------<  99  3.6   |  24  |  1.21    Ca    8.8      28 May 2019 06:56  Phos  3.4     05-28  Mg     1.8     05-28    TPro  7.1  /  Alb  3.6  /  TBili  1.0  /  DBili  x   /  AST  20  /  ALT  23  /  AlkPhos  128<H>  05-28      CARDIAC MARKERS ( 26 May 2019 20:53 )  <0.015 ng/mL / x     / x     / x     / x                Radiology Results    < from: CT Abdomen and Pelvis w/ IV Cont (05.26.19 @ 22:46) >  FINDINGS:    LOWER CHEST: Bibasilar atelectasis. Small hiatal hernia.    LIVER: Subcentimeter hypodensity at the dome too small to characterize.  BILE DUCTS: Normal caliber.  GALLBLADDER: Within normal limits.  SPLEEN: Within normal limits.  PANCREAS: Within normal limits.  ADRENALS: Within normal limits.  KIDNEYS/URETERS: Subcentimeter hypodensities too small to characterize   including a 7 mm left midpole lesion which is larger than in 2017, as   previously reported. No hydronephrosis or obstructing stone.    BLADDER: Within normal limits.  REPRODUCTIVE ORGANS: Hysterectomy.    BOWEL: No bowel obstruction. Appendix within normal limits.  PERITONEUM: No ascites.  VESSELS:  Within normal limits.  RETROPERITONEUM: No lymphadenopathy.    ABDOMINAL WALL: Spinal stimulator device in the right posterior abdominal   wall with leads entering the spinal canal at T9-T10 and tip not included   in the field-of-view.  BONES: . Degenerative changes of the spine and hips.    IMPRESSION:       1. No acute findings within the patient's symptoms.  2. Stable findings described above.    < end of copied text >        Meds    MEDICATIONS  (STANDING):  atorvastatin 20 milliGRAM(s) Oral at bedtime  clopidogrel Tablet 75 milliGRAM(s) Oral daily  enoxaparin Injectable 40 milliGRAM(s) SubCutaneous daily  escitalopram 20 milliGRAM(s) Oral daily  metoprolol tartrate 25 milliGRAM(s) Oral two times a day  nicotine -  14 mG/24Hr(s) Patch 1 patch Transdermal daily  pantoprazole    Tablet 40 milliGRAM(s) Oral before breakfast  pregabalin 300 milliGRAM(s) Oral two times a day      MEDICATIONS  (PRN):  acetaminophen   Tablet .. 650 milliGRAM(s) Oral every 6 hours PRN Temp greater or equal to 38C (100.4F)  morphine  - Injectable 2 milliGRAM(s) IV Push every 4 hours PRN Severe Pain (7 - 10)  ondansetron Injectable 4 milliGRAM(s) IV Push every 6 hours PRN Nausea and/or Vomiting      Physical Exam      Neuro :  no focal deficits  Respiratory: CTA B/L  CV: RRR, S1S2, no murmurs,   Abdominal: Soft, tender to light palp,+BS,  Extremities: No edema, + peripheral pulses    ASSESSMENT      exacerbation of cyclic vomiting synd,   twitching   h/o left thyroid nodule,   cyclic vomiting syndrome,   colitis,   Reflex sympathetic dystrophy s/p spinal stimulator,   c-spine fx  frequent falls,   SLE (not on rx),   Afib (not on AC due to frequent falls),   HTN,   depression,   Hemorrhoids (EGD / colonoscopy 3-4 years ago),   diverticulosis,   Partial right thyroidectomy  Renal failure, chronic, stage 3 (moderate)        PLAN      cont npo,   cont  zofran iv q8,   pain mgmt eval  ct abd-pelv stable enlarging 7 mm left renal hypodensity, too small to characterize with certainty from 2017, no bowel obstruction noted above.  f/u urine tox  f/u cxr  f/u blood cx  leukocytosis resolved  give ativan prn   monitor for any worsening of twitching symptoms  Reasons could be chronic narcotics that patient is on   will consider neuro cons for EEG, Brain imaging if symptoms persists.  cont current meds

## 2019-05-29 LAB
ANION GAP SERPL CALC-SCNC: 10 MMOL/L — SIGNIFICANT CHANGE UP (ref 5–17)
BUN SERPL-MCNC: 14 MG/DL — SIGNIFICANT CHANGE UP (ref 7–18)
CALCIUM SERPL-MCNC: 8.6 MG/DL — SIGNIFICANT CHANGE UP (ref 8.4–10.5)
CHLORIDE SERPL-SCNC: 105 MMOL/L — SIGNIFICANT CHANGE UP (ref 96–108)
CO2 SERPL-SCNC: 26 MMOL/L — SIGNIFICANT CHANGE UP (ref 22–31)
CREAT SERPL-MCNC: 1.38 MG/DL — HIGH (ref 0.5–1.3)
GLUCOSE SERPL-MCNC: 90 MG/DL — SIGNIFICANT CHANGE UP (ref 70–99)
HCT VFR BLD CALC: 41.5 % — SIGNIFICANT CHANGE UP (ref 34.5–45)
HGB BLD-MCNC: 13.6 G/DL — SIGNIFICANT CHANGE UP (ref 11.5–15.5)
MCHC RBC-ENTMCNC: 29.6 PG — SIGNIFICANT CHANGE UP (ref 27–34)
MCHC RBC-ENTMCNC: 32.8 GM/DL — SIGNIFICANT CHANGE UP (ref 32–36)
MCV RBC AUTO: 90.2 FL — SIGNIFICANT CHANGE UP (ref 80–100)
NRBC # BLD: 0 /100 WBCS — SIGNIFICANT CHANGE UP (ref 0–0)
PLATELET # BLD AUTO: 237 K/UL — SIGNIFICANT CHANGE UP (ref 150–400)
POTASSIUM SERPL-MCNC: 3 MMOL/L — LOW (ref 3.5–5.3)
POTASSIUM SERPL-SCNC: 3 MMOL/L — LOW (ref 3.5–5.3)
RBC # BLD: 4.6 M/UL — SIGNIFICANT CHANGE UP (ref 3.8–5.2)
RBC # FLD: 14.6 % — HIGH (ref 10.3–14.5)
SODIUM SERPL-SCNC: 141 MMOL/L — SIGNIFICANT CHANGE UP (ref 135–145)
WBC # BLD: 8.93 K/UL — SIGNIFICANT CHANGE UP (ref 3.8–10.5)
WBC # FLD AUTO: 8.93 K/UL — SIGNIFICANT CHANGE UP (ref 3.8–10.5)

## 2019-05-29 RX ORDER — POTASSIUM CHLORIDE 20 MEQ
20 PACKET (EA) ORAL
Refills: 0 | Status: COMPLETED | OUTPATIENT
Start: 2019-05-29 | End: 2019-05-29

## 2019-05-29 RX ADMIN — Medication 300 MILLIGRAM(S): at 07:55

## 2019-05-29 RX ADMIN — MORPHINE SULFATE 2 MILLIGRAM(S): 50 CAPSULE, EXTENDED RELEASE ORAL at 11:20

## 2019-05-29 RX ADMIN — Medication 300 MILLIGRAM(S): at 18:34

## 2019-05-29 RX ADMIN — CLOPIDOGREL BISULFATE 75 MILLIGRAM(S): 75 TABLET, FILM COATED ORAL at 11:04

## 2019-05-29 RX ADMIN — ONDANSETRON 4 MILLIGRAM(S): 8 TABLET, FILM COATED ORAL at 01:07

## 2019-05-29 RX ADMIN — ONDANSETRON 4 MILLIGRAM(S): 8 TABLET, FILM COATED ORAL at 21:18

## 2019-05-29 RX ADMIN — PANTOPRAZOLE SODIUM 40 MILLIGRAM(S): 20 TABLET, DELAYED RELEASE ORAL at 07:11

## 2019-05-29 RX ADMIN — Medication 20 MILLIEQUIVALENT(S): at 21:18

## 2019-05-29 RX ADMIN — MORPHINE SULFATE 2 MILLIGRAM(S): 50 CAPSULE, EXTENDED RELEASE ORAL at 18:34

## 2019-05-29 RX ADMIN — Medication 20 MILLIEQUIVALENT(S): at 18:33

## 2019-05-29 RX ADMIN — MORPHINE SULFATE 2 MILLIGRAM(S): 50 CAPSULE, EXTENDED RELEASE ORAL at 07:45

## 2019-05-29 RX ADMIN — Medication 20 MILLIEQUIVALENT(S): at 22:29

## 2019-05-29 RX ADMIN — Medication 1 PATCH: at 07:02

## 2019-05-29 RX ADMIN — Medication 1 PATCH: at 20:59

## 2019-05-29 RX ADMIN — MORPHINE SULFATE 2 MILLIGRAM(S): 50 CAPSULE, EXTENDED RELEASE ORAL at 19:30

## 2019-05-29 RX ADMIN — MORPHINE SULFATE 2 MILLIGRAM(S): 50 CAPSULE, EXTENDED RELEASE ORAL at 11:04

## 2019-05-29 RX ADMIN — MORPHINE SULFATE 2 MILLIGRAM(S): 50 CAPSULE, EXTENDED RELEASE ORAL at 08:00

## 2019-05-29 RX ADMIN — Medication 1 PATCH: at 11:06

## 2019-05-29 RX ADMIN — ESCITALOPRAM OXALATE 20 MILLIGRAM(S): 10 TABLET, FILM COATED ORAL at 11:04

## 2019-05-29 RX ADMIN — Medication 1 PATCH: at 11:05

## 2019-05-29 NOTE — PROGRESS NOTE ADULT - PROBLEM SELECTOR PLAN 6
IMPROVE VTE Individual Risk Assessment          RISK                                                          Points  [  ] Previous VTE                                                3  [  ] Thrombophilia                                             2  [  ] Lower limb paralysis                                   2        (unable to hold up >15 seconds)    [  ] Current Cancer                                             2         (within 6 months)  [ x ] Immobilization > 24 hrs                              1  [  ] ICU/CCU stay > 24 hours                             1  [  ] Age > 60                                                         1    IMPROVE VTE Score: Lovenox for dvt ppx

## 2019-05-29 NOTE — PROGRESS NOTE ADULT - ASSESSMENT
Pt is a 48F, lives with sister, from home, ambulates with walker w/ PMHx of cyclic vomiting syndrome, Reflex sympathetic dystrophy s/p spinal stimulator, frequent falls, SLE (not on tx), Afib (not on AC due to frequent falls), HTN, Depression, Diverticulosis, and partial thyroidectomy on right side, who presents to the ED with diffuse abdominal pain x 4 days, and intractable N/V , diarrhea x 2 days.   In Ed, BP: 177/85 mm hg, hr: 63, Temp: 98.2 F  Cbc shows hb of 15, elevated wbc count with left shift  bmp , lipase wnl.  CT wnl.     Admitted to medicine for Intractable vomiting.

## 2019-05-29 NOTE — PROGRESS NOTE ADULT - SUBJECTIVE AND OBJECTIVE BOX
PGY1 Note discussed with supervising resident and primary attending.    Patient is a 48y old  Female who presents with a chief complaint of vomiting (28 May 2019 10:04)      INTERVAL HPI/OVERNIGHT EVENTS:    No acute events overnight. Reports vomiting and diarrhea have resolved. Nausea improving. Endorses appetite.    MEDICATIONS  (STANDING):  atorvastatin 20 milliGRAM(s) Oral at bedtime  clopidogrel Tablet 75 milliGRAM(s) Oral daily  enoxaparin Injectable 40 milliGRAM(s) SubCutaneous daily  escitalopram 20 milliGRAM(s) Oral daily  metoprolol tartrate 25 milliGRAM(s) Oral two times a day  nicotine -  14 mG/24Hr(s) Patch 1 patch Transdermal daily  pantoprazole    Tablet 40 milliGRAM(s) Oral before breakfast  pregabalin 300 milliGRAM(s) Oral two times a day    MEDICATIONS  (PRN):  acetaminophen   Tablet .. 650 milliGRAM(s) Oral every 6 hours PRN Temp greater or equal to 38C (100.4F)  morphine  - Injectable 2 milliGRAM(s) IV Push every 4 hours PRN Severe Pain (7 - 10)  ondansetron Injectable 4 milliGRAM(s) IV Push every 6 hours PRN Nausea and/or Vomiting      Allergies    aspirin (Unknown)  cortisone (Other)  latex (Unknown)  sulfa drugs (Unknown)    Intolerances      REVIEW OF SYSTEMS:  CONSTITUTIONAL: No fever, weight loss, or fatigue  RESPIRATORY: No cough, wheezing, chills or hemoptysis; No shortness of breath  CARDIOVASCULAR: No chest pain, palpitations, dizziness, or leg swelling  GASTROINTESTINAL: nausea improving  NEUROLOGICAL: No headaches, memory loss, loss of strength, numbness, or tremors  SKIN: No itching, burning, rashes, or lesions     Vital Signs Last 24 Hrs  T(C): 37.1 (29 May 2019 05:25), Max: 37.4 (28 May 2019 10:40)  T(F): 98.8 (29 May 2019 05:25), Max: 99.4 (28 May 2019 10:40)  HR: 70 (29 May 2019 05:25) (70 - 90)  BP: 107/71 (29 May 2019 05:25) (99/63 - 115/80)  BP(mean): --  RR: 17 (29 May 2019 05:25) (17 - 18)  SpO2: 100% (29 May 2019 05:25) (97% - 100%)    PHYSICAL EXAM:  GENERAL: NAD  HEAD:  Atraumatic, Normocephalic  EYES: EOMI, PERRLA, conjunctiva and sclera clear  NECK: Supple, No JVD, Normal thyroid  CHEST/LUNG: Clear to percussion bilaterally; No rales, rhonchi, wheezing, or rubs  HEART: Regular rate and rhythm; No murmurs, rubs, or gallops  ABDOMEN: Soft, Nontender, Nondistended; Bowel sounds present  NERVOUS SYSTEM:  Alert & Oriented X3, Good concentration; Motor Strength 5/5 B/L   EXTREMITIES:  2+ Peripheral Pulses, No clubbing, cyanosis, or edema    LABS:                        13.6   8.93  )-----------( 237      ( 29 May 2019 07:04 )             41.5         141  |  105  |  14  ----------------------------<  90  3.0<L>   |  26  |  1.38<H>    Ca    8.6      29 May 2019 07:04  Phos  3.4       Mg     1.8         TPro  7.1  /  Alb  3.6  /  TBili  1.0  /  DBili  x   /  AST  20  /  ALT  23  /  AlkPhos  128<H>        Urinalysis Basic - ( 28 May 2019 17:01 )    Color: Yellow / Appearance: Clear / S.010 / pH: x  Gluc: x / Ketone: Negative  / Bili: Negative / Urobili: Negative   Blood: x / Protein: 30 mg/dL / Nitrite: Negative   Leuk Esterase: Small / RBC: 0-2 /HPF / WBC 6-10 /HPF   Sq Epi: x / Non Sq Epi: Moderate /HPF / Bacteria: Moderate /HPF      CAPILLARY BLOOD GLUCOSE      RADIOLOGY & ADDITIONAL TESTS:    Imaging Personally Reviewed:  [X ] YES  [ ] NO    Consultant(s) Notes Reviewed:  [X ] YES  [ ] NO

## 2019-05-29 NOTE — PROGRESS NOTE ADULT - SUBJECTIVE AND OBJECTIVE BOX
Patient is a 48y old  Female who presents with a chief complaint of vomiting (29 May 2019 09:04)    pt seen in icu [  ], reg med floor [   ], bed [  ], chair at bedside [   ], a+o x3 [  ], lethargic [  ],  nad [  ]    skelton [  ], ngt [  ], peg [  ], et tube [  ], cent line [  ], picc line [  ]        Allergies    aspirin (Unknown)  cortisone (Other)  latex (Unknown)  sulfa drugs (Unknown)        Vitals    T(F): 98.8 (05-29-19 @ 05:25), Max: 98.8 (05-29-19 @ 05:25)  HR: 70 (05-29-19 @ 05:25) (70 - 90)  BP: 107/71 (05-29-19 @ 05:25) (99/63 - 113/79)  RR: 17 (05-29-19 @ 05:25) (17 - 18)  SpO2: 100% (05-29-19 @ 05:25) (97% - 100%)  Wt(kg): --  CAPILLARY BLOOD GLUCOSE      POCT Blood Glucose.: 107 mg/dL (27 May 2019 15:01)      Labs                          13.6   8.93  )-----------( 237      ( 29 May 2019 07:04 )             41.5       05-29    141  |  105  |  14  ----------------------------<  90  3.0<L>   |  26  |  1.38<H>    Ca    8.6      29 May 2019 07:04  Phos  3.4     05-28  Mg     1.8     05-28    TPro  7.1  /  Alb  3.6  /  TBili  1.0  /  DBili  x   /  AST  20  /  ALT  23  /  AlkPhos  128<H>  05-28                Radiology Results      Meds    MEDICATIONS  (STANDING):  atorvastatin 20 milliGRAM(s) Oral at bedtime  clopidogrel Tablet 75 milliGRAM(s) Oral daily  enoxaparin Injectable 40 milliGRAM(s) SubCutaneous daily  escitalopram 20 milliGRAM(s) Oral daily  metoprolol tartrate 25 milliGRAM(s) Oral two times a day  nicotine -  14 mG/24Hr(s) Patch 1 patch Transdermal daily  pantoprazole    Tablet 40 milliGRAM(s) Oral before breakfast  pregabalin 300 milliGRAM(s) Oral two times a day      MEDICATIONS  (PRN):  acetaminophen   Tablet .. 650 milliGRAM(s) Oral every 6 hours PRN Temp greater or equal to 38C (100.4F)  morphine  - Injectable 2 milliGRAM(s) IV Push every 4 hours PRN Severe Pain (7 - 10)  ondansetron Injectable 4 milliGRAM(s) IV Push every 6 hours PRN Nausea and/or Vomiting      Physical Exam    Neuro :  no focal deficits  Respiratory: CTA B/L  CV: RRR, S1S2, no murmurs,   Abdominal: Soft, NT, ND +BS,  Extremities: No edema, + peripheral pulses    ASSESSMENT    Non-intractable cyclical vomiting  Renal failure, chronic, stage 3 (moderate)  HTN (hypertension)  Cyclical vomiting  Atrial fibrillation  SLE (systemic lupus erythematosus)  Reflex sympathetic dystrophy  H/O partial thyroidectomy  S/P partial hysterectomy      PLAN Patient is a 48y old  Female who presents with a chief complaint of vomiting (29 May 2019 09:04)    pt seen in ed [  ], reg med floor [ x  ], bed [ x ], chair at bedside [   ], a+o x3 [ x ], lethargic [  ],  nad [ x ]    pt states feeling better      Allergies    aspirin (Unknown)  cortisone (Other)  latex (Unknown)  sulfa drugs (Unknown)        Vitals    T(F): 98.8 (05-29-19 @ 05:25), Max: 98.8 (05-29-19 @ 05:25)  HR: 70 (05-29-19 @ 05:25) (70 - 90)  BP: 107/71 (05-29-19 @ 05:25) (99/63 - 113/79)  RR: 17 (05-29-19 @ 05:25) (17 - 18)  SpO2: 100% (05-29-19 @ 05:25) (97% - 100%)  Wt(kg): --  CAPILLARY BLOOD GLUCOSE      POCT Blood Glucose.: 107 mg/dL (27 May 2019 15:01)      Labs                          13.6   8.93  )-----------( 237      ( 29 May 2019 07:04 )             41.5       05-29    141  |  105  |  14  ----------------------------<  90  3.0<L>   |  26  |  1.38<H>    Ca    8.6      29 May 2019 07:04  Phos  3.4     05-28  Mg     1.8     05-28    TPro  7.1  /  Alb  3.6  /  TBili  1.0  /  DBili  x   /  AST  20  /  ALT  23  /  AlkPhos  128<H>  05-28        Opiate, Urine: Positive (05.28.19 @ 17:01)  THC, Urine Qualitative: Positive (05.28.19 @ 17:01)              Radiology Results    < from: Xray Chest 1 View- PORTABLE-Routine (05.28.19 @ 12:13) >  IMPRESSION:   No evidence of active chest disease.        < end of copied text >        Meds    MEDICATIONS  (STANDING):  atorvastatin 20 milliGRAM(s) Oral at bedtime  clopidogrel Tablet 75 milliGRAM(s) Oral daily  enoxaparin Injectable 40 milliGRAM(s) SubCutaneous daily  escitalopram 20 milliGRAM(s) Oral daily  metoprolol tartrate 25 milliGRAM(s) Oral two times a day  nicotine -  14 mG/24Hr(s) Patch 1 patch Transdermal daily  pantoprazole    Tablet 40 milliGRAM(s) Oral before breakfast  pregabalin 300 milliGRAM(s) Oral two times a day      MEDICATIONS  (PRN):  acetaminophen   Tablet .. 650 milliGRAM(s) Oral every 6 hours PRN Temp greater or equal to 38C (100.4F)  morphine  - Injectable 2 milliGRAM(s) IV Push every 4 hours PRN Severe Pain (7 - 10)  ondansetron Injectable 4 milliGRAM(s) IV Push every 6 hours PRN Nausea and/or Vomiting      Physical Exam      Neuro :  no focal deficits  Respiratory: CTA B/L  CV: RRR, S1S2, no murmurs,   Abdominal: Soft, tender to light palp,+BS,  Extremities: No edema, + peripheral pulses    ASSESSMENT      exacerbation of cyclic vomiting synd,   twitching   h/o left thyroid nodule,   cyclic vomiting syndrome,   colitis,   Reflex sympathetic dystrophy s/p spinal stimulator,   c-spine fx  frequent falls,   SLE (not on rx),   Afib (not on AC due to frequent falls),   HTN,   depression,   Hemorrhoids (EGD / colonoscopy 3-4 years ago),   diverticulosis,   Partial right thyroidectomy  Renal failure, chronic, stage 3 (moderate)        PLAN        cont  zofran iv q8,   pain mgmt eval  ct abd-pelv stable enlarging 7 mm left renal hypodensity, too small to characterize with certainty from 2017, no bowel obstruction noted above.  urine tox positive for known thc and opiates only  cxr with no evidence of active chest disease noted above  f/u blood cx  leukocytosis resolved  twitching resolved spontaneously  pt tolerated clears so will adv to full diet  cont current meds  d/c plan for am if stable and tolerating diet

## 2019-05-29 NOTE — PROGRESS NOTE ADULT - PROBLEM SELECTOR PLAN 1
improving  CT abdomen unremarkable  c/w IV morphine for now  advancing diet to clears  c/w IVF for now - will stop when patient demonstrates ability to tolerate regular diet  monitor electrolytes

## 2019-05-29 NOTE — PROGRESS NOTE ADULT - PROBLEM SELECTOR PLAN 2
no recurrent episodes  differentials could be hypoelectrolytemia vs drug withdrawal   supportive caer

## 2019-05-30 ENCOUNTER — TRANSCRIPTION ENCOUNTER (OUTPATIENT)
Age: 49
End: 2019-05-30

## 2019-05-30 VITALS
HEART RATE: 84 BPM | DIASTOLIC BLOOD PRESSURE: 76 MMHG | OXYGEN SATURATION: 99 % | SYSTOLIC BLOOD PRESSURE: 107 MMHG | RESPIRATION RATE: 18 BRPM | TEMPERATURE: 97 F

## 2019-05-30 LAB
ANION GAP SERPL CALC-SCNC: 9 MMOL/L — SIGNIFICANT CHANGE UP (ref 5–17)
BASOPHILS # BLD AUTO: 0.04 K/UL — SIGNIFICANT CHANGE UP (ref 0–0.2)
BASOPHILS NFR BLD AUTO: 0.5 % — SIGNIFICANT CHANGE UP (ref 0–2)
BUN SERPL-MCNC: 14 MG/DL — SIGNIFICANT CHANGE UP (ref 7–18)
CALCIUM SERPL-MCNC: 8.7 MG/DL — SIGNIFICANT CHANGE UP (ref 8.4–10.5)
CHLORIDE SERPL-SCNC: 106 MMOL/L — SIGNIFICANT CHANGE UP (ref 96–108)
CO2 SERPL-SCNC: 24 MMOL/L — SIGNIFICANT CHANGE UP (ref 22–31)
CREAT SERPL-MCNC: 1.43 MG/DL — HIGH (ref 0.5–1.3)
EOSINOPHIL # BLD AUTO: 0.2 K/UL — SIGNIFICANT CHANGE UP (ref 0–0.5)
EOSINOPHIL NFR BLD AUTO: 2.7 % — SIGNIFICANT CHANGE UP (ref 0–6)
GLUCOSE SERPL-MCNC: 122 MG/DL — HIGH (ref 70–99)
HCT VFR BLD CALC: 44.5 % — SIGNIFICANT CHANGE UP (ref 34.5–45)
HGB BLD-MCNC: 14.4 G/DL — SIGNIFICANT CHANGE UP (ref 11.5–15.5)
IMM GRANULOCYTES NFR BLD AUTO: 0.4 % — SIGNIFICANT CHANGE UP (ref 0–1.5)
LYMPHOCYTES # BLD AUTO: 3.63 K/UL — HIGH (ref 1–3.3)
LYMPHOCYTES # BLD AUTO: 48.9 % — HIGH (ref 13–44)
MCHC RBC-ENTMCNC: 29 PG — SIGNIFICANT CHANGE UP (ref 27–34)
MCHC RBC-ENTMCNC: 32.4 GM/DL — SIGNIFICANT CHANGE UP (ref 32–36)
MCV RBC AUTO: 89.5 FL — SIGNIFICANT CHANGE UP (ref 80–100)
MONOCYTES # BLD AUTO: 0.67 K/UL — SIGNIFICANT CHANGE UP (ref 0–0.9)
MONOCYTES NFR BLD AUTO: 9 % — SIGNIFICANT CHANGE UP (ref 2–14)
NEUTROPHILS # BLD AUTO: 2.85 K/UL — SIGNIFICANT CHANGE UP (ref 1.8–7.4)
NEUTROPHILS NFR BLD AUTO: 38.5 % — LOW (ref 43–77)
NRBC # BLD: 0 /100 WBCS — SIGNIFICANT CHANGE UP (ref 0–0)
PLATELET # BLD AUTO: 265 K/UL — SIGNIFICANT CHANGE UP (ref 150–400)
POTASSIUM SERPL-MCNC: 3.6 MMOL/L — SIGNIFICANT CHANGE UP (ref 3.5–5.3)
POTASSIUM SERPL-SCNC: 3.6 MMOL/L — SIGNIFICANT CHANGE UP (ref 3.5–5.3)
RBC # BLD: 4.97 M/UL — SIGNIFICANT CHANGE UP (ref 3.8–5.2)
RBC # FLD: 14.6 % — HIGH (ref 10.3–14.5)
SODIUM SERPL-SCNC: 139 MMOL/L — SIGNIFICANT CHANGE UP (ref 135–145)
WBC # BLD: 7.42 K/UL — SIGNIFICANT CHANGE UP (ref 3.8–10.5)
WBC # FLD AUTO: 7.42 K/UL — SIGNIFICANT CHANGE UP (ref 3.8–10.5)

## 2019-05-30 PROCEDURE — 85027 COMPLETE CBC AUTOMATED: CPT

## 2019-05-30 PROCEDURE — 81001 URINALYSIS AUTO W/SCOPE: CPT

## 2019-05-30 PROCEDURE — 99285 EMERGENCY DEPT VISIT HI MDM: CPT | Mod: 25

## 2019-05-30 PROCEDURE — 71045 X-RAY EXAM CHEST 1 VIEW: CPT

## 2019-05-30 PROCEDURE — 84100 ASSAY OF PHOSPHORUS: CPT

## 2019-05-30 PROCEDURE — 84484 ASSAY OF TROPONIN QUANT: CPT

## 2019-05-30 PROCEDURE — 80307 DRUG TEST PRSMV CHEM ANLYZR: CPT

## 2019-05-30 PROCEDURE — 74177 CT ABD & PELVIS W/CONTRAST: CPT

## 2019-05-30 PROCEDURE — 36415 COLL VENOUS BLD VENIPUNCTURE: CPT

## 2019-05-30 PROCEDURE — 87040 BLOOD CULTURE FOR BACTERIA: CPT

## 2019-05-30 PROCEDURE — 83735 ASSAY OF MAGNESIUM: CPT

## 2019-05-30 PROCEDURE — 82962 GLUCOSE BLOOD TEST: CPT

## 2019-05-30 PROCEDURE — 80048 BASIC METABOLIC PNL TOTAL CA: CPT

## 2019-05-30 PROCEDURE — 84702 CHORIONIC GONADOTROPIN TEST: CPT

## 2019-05-30 PROCEDURE — 80053 COMPREHEN METABOLIC PANEL: CPT

## 2019-05-30 PROCEDURE — 93005 ELECTROCARDIOGRAM TRACING: CPT

## 2019-05-30 PROCEDURE — 83690 ASSAY OF LIPASE: CPT

## 2019-05-30 RX ADMIN — Medication 1 PATCH: at 11:39

## 2019-05-30 RX ADMIN — Medication 300 MILLIGRAM(S): at 06:21

## 2019-05-30 RX ADMIN — Medication 1 PATCH: at 11:37

## 2019-05-30 RX ADMIN — Medication 1 PATCH: at 09:18

## 2019-05-30 RX ADMIN — MORPHINE SULFATE 2 MILLIGRAM(S): 50 CAPSULE, EXTENDED RELEASE ORAL at 09:08

## 2019-05-30 RX ADMIN — ESCITALOPRAM OXALATE 20 MILLIGRAM(S): 10 TABLET, FILM COATED ORAL at 11:37

## 2019-05-30 RX ADMIN — PANTOPRAZOLE SODIUM 40 MILLIGRAM(S): 20 TABLET, DELAYED RELEASE ORAL at 06:17

## 2019-05-30 RX ADMIN — CLOPIDOGREL BISULFATE 75 MILLIGRAM(S): 75 TABLET, FILM COATED ORAL at 11:37

## 2019-05-30 RX ADMIN — MORPHINE SULFATE 2 MILLIGRAM(S): 50 CAPSULE, EXTENDED RELEASE ORAL at 09:30

## 2019-05-30 NOTE — PROGRESS NOTE ADULT - SUBJECTIVE AND OBJECTIVE BOX
Patient is a 48y old  Female who presents with a chief complaint of vomiting (29 May 2019 11:41)      pt seen in ed [  ], reg med floor [ x  ], bed [ x ], chair at bedside [   ], a+o x3 [ x ], lethargic [  ],  nad [ x ]    pt states feeling better      Allergies    aspirin (Unknown)  cortisone (Other)  latex (Unknown)  sulfa drugs (Unknown)        Vitals    T(F): 98.3 (05-30-19 @ 05:53), Max: 98.4 (05-29-19 @ 14:16)  HR: 83 (05-30-19 @ 09:17) (68 - 98)  BP: 103/63 (05-30-19 @ 09:17) (92/55 - 108/72)  RR: 17 (05-30-19 @ 05:53) (16 - 17)  SpO2: 94% (05-30-19 @ 06:33) (94% - 99%)  Wt(kg): --  CAPILLARY BLOOD GLUCOSE          Labs                          14.4   7.42  )-----------( 265      ( 30 May 2019 07:29 )             44.5       05-30    139  |  106  |  14  ----------------------------<  122<H>  3.6   |  24  |  1.43<H>    Ca    8.7      30 May 2019 07:29              .Blood  05-28 @ 15:05   No growth to date.  --  --          Radiology Results      Meds    MEDICATIONS  (STANDING):  atorvastatin 20 milliGRAM(s) Oral at bedtime  clopidogrel Tablet 75 milliGRAM(s) Oral daily  enoxaparin Injectable 40 milliGRAM(s) SubCutaneous daily  escitalopram 20 milliGRAM(s) Oral daily  metoprolol tartrate 25 milliGRAM(s) Oral two times a day  nicotine -  14 mG/24Hr(s) Patch 1 patch Transdermal daily  pantoprazole    Tablet 40 milliGRAM(s) Oral before breakfast  pregabalin 300 milliGRAM(s) Oral two times a day      MEDICATIONS  (PRN):  acetaminophen   Tablet .. 650 milliGRAM(s) Oral every 6 hours PRN Temp greater or equal to 38C (100.4F)  morphine  - Injectable 2 milliGRAM(s) IV Push every 4 hours PRN Severe Pain (7 - 10)  ondansetron Injectable 4 milliGRAM(s) IV Push every 6 hours PRN Nausea and/or Vomiting      Physical Exam      Neuro :  no focal deficits  Respiratory: CTA B/L  CV: RRR, S1S2, no murmurs,   Abdominal: Soft, tender to light palp,+BS,  Extremities: No edema, + peripheral pulses      ASSESSMENT    exacerbation of cyclic vomiting synd,   twitching - improved   h/o left thyroid nodule,   colitis,   Reflex sympathetic dystrophy s/p spinal stimulator,   c-spine fx  frequent falls,   SLE (not on rx),   Afib (not on AC due to frequent falls),   HTN,   depression,   Hemorrhoids (EGD / colonoscopy 3-4 years ago),   diverticulosis,   Partial right thyroidectomy  Renal failure, chronic, stage 3 (moderate)        PLAN      cont  zofran iv q8,   pain mgmt eval  ct abd-pelv stable enlarging 7 mm left renal hypodensity, too small to characterize with certainty from 2017, no bowel obstruction noted above.  urine tox positive for known thc and opiates only  cxr with no evidence of active chest disease noted above  blood cx -ve   leukocytosis resolved  twitching resolved spontaneously  pt tolerating diet   cont current meds Patient is a 48y old  Female who presents with a chief complaint of vomiting (29 May 2019 11:41)      pt seen in ed [  ], reg med floor [ x  ], bed [ x ], chair at bedside [   ], a+o x3 [ x ], lethargic [  ],  nad [ x ]    pt states feeling better      Allergies    aspirin (Unknown)  cortisone (Other)  latex (Unknown)  sulfa drugs (Unknown)        Vitals    T(F): 98.3 (05-30-19 @ 05:53), Max: 98.4 (05-29-19 @ 14:16)  HR: 83 (05-30-19 @ 09:17) (68 - 98)  BP: 103/63 (05-30-19 @ 09:17) (92/55 - 108/72)  RR: 17 (05-30-19 @ 05:53) (16 - 17)  SpO2: 94% (05-30-19 @ 06:33) (94% - 99%)  Wt(kg): --  CAPILLARY BLOOD GLUCOSE          Labs                          14.4   7.42  )-----------( 265      ( 30 May 2019 07:29 )             44.5       05-30    139  |  106  |  14  ----------------------------<  122<H>  3.6   |  24  |  1.43<H>    Ca    8.7      30 May 2019 07:29              .Blood  05-28 @ 15:05   No growth to date.  --  --          Radiology Results      Meds    MEDICATIONS  (STANDING):  atorvastatin 20 milliGRAM(s) Oral at bedtime  clopidogrel Tablet 75 milliGRAM(s) Oral daily  enoxaparin Injectable 40 milliGRAM(s) SubCutaneous daily  escitalopram 20 milliGRAM(s) Oral daily  metoprolol tartrate 25 milliGRAM(s) Oral two times a day  nicotine -  14 mG/24Hr(s) Patch 1 patch Transdermal daily  pantoprazole    Tablet 40 milliGRAM(s) Oral before breakfast  pregabalin 300 milliGRAM(s) Oral two times a day      MEDICATIONS  (PRN):  acetaminophen   Tablet .. 650 milliGRAM(s) Oral every 6 hours PRN Temp greater or equal to 38C (100.4F)  morphine  - Injectable 2 milliGRAM(s) IV Push every 4 hours PRN Severe Pain (7 - 10)  ondansetron Injectable 4 milliGRAM(s) IV Push every 6 hours PRN Nausea and/or Vomiting      Physical Exam      Neuro :  no focal deficits  Respiratory: CTA B/L  CV: RRR, S1S2, no murmurs,   Abdominal: Soft, tender to light palp,+BS,  Extremities: No edema, + peripheral pulses      ASSESSMENT    exacerbation of cyclic vomiting synd,   twitching - improved   h/o left thyroid nodule,   colitis,   Reflex sympathetic dystrophy s/p spinal stimulator,   c-spine fx  frequent falls,   SLE (not on rx),   Afib (not on AC due to frequent falls),   HTN,   depression,   Hemorrhoids (EGD / colonoscopy 3-4 years ago),   diverticulosis,   Partial right thyroidectomy  Renal failure, chronic, stage 3 (moderate)        PLAN      cont  zofran iv q8,   pain mgmt eval  ct abd-pelv stable enlarging 7 mm left renal hypodensity, too small to characterize with certainty from 2017, no bowel obstruction noted above.  urine tox positive for known thc and opiates only  cxr with no evidence of active chest disease noted above  blood cx -ve   leukocytosis resolved  twitching resolved spontaneously  pt tolerating diet   cont current meds  d/c planning

## 2019-05-30 NOTE — DISCHARGE NOTE NURSING/CASE MANAGEMENT/SOCIAL WORK - NSDCPEEMAIL_GEN_ALL_CORE
[facility] for Tobacco Control email [email or web address]

## 2019-05-30 NOTE — DISCHARGE NOTE PROVIDER - HOSPITAL COURSE
Pt is a 48F, lives with sister, from home, ambulates with walker w/ PMHx of cyclic vomiting syndrome, Reflex sympathetic dystrophy s/p spinal stimulator, frequent falls, SLE (not on tx), Afib (not on AC due to frequent falls), HTN, Depression, Diverticulosis, and partial thyroidectomy on right side, who presents to the ED with diffuse abdominal pain x 4 days, and intractable N/V , diarrhea x 2 days. Reports 10/10 pain all over abdomen, multiple episodes of NBNP vomiting, and loose watery diarrhea. Denies fever, chills, headache, blurry vision, syncope or falls.     Patient has history of cyclic vomiting from medicinal marijuana and multiple admissions for the same. Patient was admitted recently and discharged on 5/9.    Patient does have similar vomiting, however noted to have twitching of mouth, tongue and eyes. Denies use of any additional drugs.         Admitted for intractable nausea, vomiting and diarrhea. Likely related to patient's cyclic vomiting syndrome. Symptoms improved with zofran and bowel rest. Diet successfully advanced. Clear for dsicharge. Pt is a 48F, lives with sister, from home, ambulates with walker w/ PMHx of cyclic vomiting syndrome, Reflex sympathetic dystrophy s/p spinal stimulator, frequent falls, SLE (not on tx), Afib (not on AC due to frequent falls), HTN, Depression, Diverticulosis, and partial thyroidectomy on right side, who presents to the ED with diffuse abdominal pain x 4 days, and intractable N/V , diarrhea x 2 days. Reports 10/10 pain all over abdomen, multiple episodes of NBNP vomiting, and loose watery diarrhea. Denies fever, chills, headache, blurry vision, syncope or falls.     Patient has history of cyclic vomiting from medicinal marijuana and multiple admissions for the same. Patient was admitted recently and discharged on 5/9.    Patient does have similar vomiting, however noted to have twitching of mouth, tongue and eyes. Denies use of any additional drugs.         Admitted for intractable nausea, vomiting and diarrhea. Likely related to patient's cyclic vomiting syndrome. Symptoms improved with zofran and bowel rest. Diet successfully advanced. Clear for dsicharge. Discussed with attending.

## 2019-05-30 NOTE — DISCHARGE NOTE PROVIDER - CARE PROVIDER_API CALL
Stevan Doyle)  Internal Medicine  [address] [place] [address]  Phone: [phone]  Fax: [phone]  Follow Up Time:

## 2019-05-30 NOTE — DISCHARGE NOTE NURSING/CASE MANAGEMENT/SOCIAL WORK - NSDCPEWEB_GEN_ALL_CORE
[facility] for Tobacco Control website --- [email or web address] --- [email or web address]

## 2019-05-30 NOTE — DISCHARGE NOTE PROVIDER - NSDCCPCAREPLAN_GEN_ALL_CORE_FT
PRINCIPAL DISCHARGE DIAGNOSIS  Diagnosis: Cyclic vomiting syndrome  Assessment and Plan of Treatment: You presented to the hospital with symptoms of nausea, vomiting and diarrhea. Your symptoms improved with antiemetics and bowel rest. Your symptoms were possibly caused by an exacerbation of your cyclic vomiting syndrome. Follow up with your primary doctor in 1 week.      SECONDARY DISCHARGE DIAGNOSES  Diagnosis: Reflex sympathetic dystrophy  Assessment and Plan of Treatment: Continue your home pain medication regimen as you were prior to hospital admission.    Diagnosis: HLD (hyperlipidemia)  Assessment and Plan of Treatment: Continue statin medication.    Diagnosis: PAF (paroxysmal atrial fibrillation)  Assessment and Plan of Treatment: Continue lopressor and Plavix as prescribed.    Diagnosis: HTN (hypertension)  Assessment and Plan of Treatment: Continue lopressor as prescribed.

## 2019-05-30 NOTE — DISCHARGE NOTE NURSING/CASE MANAGEMENT/SOCIAL WORK - NSDCDPATPORTLINK_GEN_ALL_CORE
You can access the [facility] Patient Portal, offered by [facility], by registering with the following website: [email or web address]

## 2019-06-02 LAB
CULTURE RESULTS: SIGNIFICANT CHANGE UP
SPECIMEN SOURCE: SIGNIFICANT CHANGE UP

## 2019-06-18 NOTE — ED ADULT TRIAGE NOTE - NS ED TRIAGE HISTORIAN
Ears: no ear pain and no hearing problems.Nose: no nasal congestion and no nasal drainage.Mouth/Throat: no dysphagia, no hoarseness and no throat pain.Neck: no lumps, no pain, no stiffness and no swollen glands. Patient

## 2019-06-20 ENCOUNTER — APPOINTMENT (OUTPATIENT)
Dept: SPINE | Facility: CLINIC | Age: 49
End: 2019-06-20
Payer: MEDICARE

## 2019-06-20 VITALS
WEIGHT: 189 LBS | BODY MASS INDEX: 27.99 KG/M2 | DIASTOLIC BLOOD PRESSURE: 60 MMHG | SYSTOLIC BLOOD PRESSURE: 114 MMHG | HEIGHT: 69 IN

## 2019-06-20 DIAGNOSIS — G89.29 OTHER CHRONIC PAIN: ICD-10-CM

## 2019-06-20 DIAGNOSIS — M54.2 CERVICALGIA: ICD-10-CM

## 2019-06-20 DIAGNOSIS — M54.12 RADICULOPATHY, CERVICAL REGION: ICD-10-CM

## 2019-06-20 PROCEDURE — 99203 OFFICE O/P NEW LOW 30 MIN: CPT

## 2019-06-20 RX ORDER — OXYCODONE HYDROCHLORIDE AND ACETAMINOPHEN 10; 325 MG/1; MG/1
10-325 TABLET ORAL
Refills: 0 | Status: ACTIVE | COMMUNITY

## 2019-06-20 RX ORDER — SPIRONOLACTONE 50 MG/1
50 TABLET ORAL
Refills: 0 | Status: DISCONTINUED | COMMUNITY
End: 2019-06-20

## 2019-06-20 RX ORDER — ESCITALOPRAM OXALATE 10 MG/1
10 TABLET, FILM COATED ORAL
Refills: 0 | Status: DISCONTINUED | COMMUNITY
End: 2019-06-20

## 2019-06-20 RX ORDER — AMLODIPINE BESYLATE 5 MG/1
TABLET ORAL
Refills: 0 | Status: DISCONTINUED | COMMUNITY
End: 2019-06-20

## 2019-06-20 RX ORDER — CLOPIDOGREL 75 MG/1
75 TABLET, FILM COATED ORAL
Refills: 0 | Status: ACTIVE | COMMUNITY

## 2019-06-20 RX ORDER — PREGABALIN 300 MG/1
300 CAPSULE ORAL
Refills: 0 | Status: DISCONTINUED | COMMUNITY
End: 2019-06-20

## 2019-06-20 RX ORDER — PANTOPRAZOLE SODIUM 20 MG/1
TABLET, DELAYED RELEASE ORAL
Refills: 0 | Status: DISCONTINUED | COMMUNITY
End: 2019-06-20

## 2019-06-20 RX ORDER — METOPROLOL SUCCINATE 25 MG/1
25 TABLET, EXTENDED RELEASE ORAL
Refills: 0 | Status: ACTIVE | COMMUNITY

## 2019-06-20 RX ORDER — PREGABALIN 150 MG/1
150 CAPSULE ORAL
Refills: 0 | Status: ACTIVE | COMMUNITY

## 2019-06-20 RX ORDER — OXYCODONE HYDROCHLORIDE AND ACETAMINOPHEN 10; 325 MG/1; MG/1
10-325 TABLET ORAL
Refills: 0 | Status: DISCONTINUED | COMMUNITY
End: 2019-06-20

## 2019-06-20 RX ORDER — SPIRONOLACTONE 25 MG/1
25 TABLET ORAL
Refills: 0 | Status: ACTIVE | COMMUNITY

## 2019-06-20 RX ORDER — METOPROLOL TARTRATE 25 MG/1
25 TABLET, FILM COATED ORAL
Refills: 0 | Status: DISCONTINUED | COMMUNITY
End: 2019-06-20

## 2019-06-20 RX ORDER — MORPHINE SULFATE 60 MG
60 TABLET, EXTENDED RELEASE ORAL
Refills: 0 | Status: ACTIVE | COMMUNITY

## 2019-06-20 RX ORDER — CYCLOBENZAPRINE HCL 5 MG
5 TABLET ORAL
Refills: 0 | Status: ACTIVE | COMMUNITY

## 2019-06-20 RX ORDER — ESCITALOPRAM OXALATE 20 MG/1
20 TABLET, FILM COATED ORAL
Refills: 0 | Status: ACTIVE | COMMUNITY

## 2019-06-20 RX ORDER — PANTOPRAZOLE SODIUM 40 MG/1
40 TABLET, DELAYED RELEASE ORAL
Refills: 0 | Status: ACTIVE | COMMUNITY

## 2019-06-20 NOTE — PHYSICAL EXAM
[Person] : oriented to person [Place] : oriented to place [Time] : oriented to time [Remote Intact] : remote memory intact [Short Term Intact] : short term memory intact [Span Intact] : the attention span was normal [Concentration Intact] : normal concentrating ability [Fluency] : fluency intact [Current Events] : adequate knowledge of current events [Comprehension] : comprehension intact [Vocabulary] : adequate range of vocabulary [Cranial Nerves Optic (II)] : visual acuity intact bilaterally,  pupils equal round and reactive to light [Past History] : adequate knowledge of personal past history [Cranial Nerves Oculomotor (III)] : extraocular motion intact [Cranial Nerves Facial (VII)] : face symmetrical [Cranial Nerves Trigeminal (V)] : facial sensation intact symmetrically [Cranial Nerves Vestibulocochlear (VIII)] : hearing was intact bilaterally [Cranial Nerves Accessory (XI - Cranial And Spinal)] : head turning and shoulder shrug symmetric [Cranial Nerves Glossopharyngeal (IX)] : tongue and palate midline [No Muscle Atrophy] : normal bulk in all four extremities [Cranial Nerves Hypoglossal (XII)] : there was no tongue deviation with protrusion [Motor Tone] : muscle tone was normal in all four extremities [Motor Strength] : muscle strength was normal in all four extremities [Sensation Tactile Decrease] : light touch was intact [Tremor] : no tremor present [Past-pointing] : there was no past-pointing [2+] : Ankle jerk left 2+ [Plantar Reflex Right Only] : normal on the right [Erickson] : Erickson's sign was not demonstrated [FreeTextEntry8] : using a walker [Plantar Reflex Left Only] : normal on the left [FreeTextEntry1] : essentially no range of motion due to pain

## 2019-06-20 NOTE — REASON FOR VISIT
[New Patient Visit] : a new patient visit [Referred By: _________] : Patient was referred by JONATHAN [Family Member] : family member [FreeTextEntry1] : Patient reports severe neck pain after a fall at her home in December 2018. She reports she had a C3, C4 Fracture. She is in a soft cervical collar

## 2019-06-20 NOTE — CONSULT LETTER
[Dear  ___] : Dear  [unfilled], [Consult Letter:] : I had the pleasure of evaluating your patient, [unfilled]. [Please see my note below.] : Please see my note below. [Sincerely,] : Sincerely, [Consult Closing:] : Thank you very much for allowing me to participate in the care of this patient.  If you have any questions, please do not hesitate to contact me. [FreeTextEntry3] : Sumanth Sullivan MD\par Chief of Neurosurgery [facility]\par [facility]\par

## 2019-06-20 NOTE — HISTORY OF PRESENT ILLNESS
[> 3 months] : more  than 3 months [de-identified] : 48-year-old woman who had a fall at home 6 months ago striking her head or having her cervical spine go into extension. She was seen at [facility] where CAT scan of the brain was negative, CT of the cervical spine did not show any fracture, CT scan of the thoracic and lumbar spine did not show any fracture. She has a spinal cord stimulator that is not working nonetheless she cannot have an MRI scan. She has not had any physical therapy. She continues to have cervical pain mostly occipital without radiation. There is no numbness, tingling or weakness. She uses a walker because of bilateral lower extremity complex regional pain syndrome. [FreeTextEntry1] : neck pain

## 2019-06-20 NOTE — ASSESSMENT
[FreeTextEntry1] : Chronic neck pain 6 months after an accident. Normal neurological exam. No evidence on CT scan of fracture or subluxation. Further evaluation with an MRI scan would require removal of nonfunctional spinal cord stimulator. She does not wish to have this done. No indication for neurosurgical intervention.

## 2019-11-09 NOTE — DISCHARGE NOTE ADULT - DO YOU HAVE DIFFICULTY CLIMBING STAIRS
Take Benadryl 25 mg 2 tabs every 8 hours as needed  Follow up with primary care physician.   Patient Education     General Allergic Reactions  An allergic reaction is a set of symptoms caused by an allergen. An allergen is something that causes a person’s immune system to react. When a person comes in contact with an allergen, it causes the body to release chemicals. These include the chemical histamine. Histamine causes swelling and itching. It may affect the entire body. This is called a general allergic reaction. Often symptoms affect only 1 part of the body. This is called a local allergic reaction.  You are having an allergic reaction. Almost anything can cause one. Different people are allergic to different things. It is usually something that you ate or swallowed, came into contact with by getting or putting it on your skin or clothes, or something you breathed in the air. This can be very annoying and sometimes scary.  Most of us think of allergic reactions when we have a rash or itchy skin. Symptoms can include:  · Itching of the eyes, nose, and roof of the mouth  · Runny or stuffy nose  · Watery eyes   · Sneezing or coughing   · A blocked feeling in the ear  · Red, itchy rash called hives  · Red and purple spots  · Rash, redness, welts, blisters  · Itching, burning, stinging, pain  · Dry, flaky, cracking, scaly skin  Severe symptoms include:  · Swelling of the face, lips, or other parts of the body  · Hoarse voice  · Trouble swallowing, feeling like your throat is closing  · Trouble breathing, wheezing  · Nausea, vomiting, diarrhea, stomach cramps  · Feeling faint or lightheaded, rapid heart rate  Sometimes the cause may be obvious. But there are so many things that can cause a reaction that you may not be able to figure out. The most important things to help find your allergen are:  · Remembering when it started  · What you were doing at the time or just before that  · Any activities you were involved  in  · Any new products or contacts  Below are some common causes. But remember that almost anything can cause a reaction. You may not even be aware that you came into contact with one of these things:  · Dust, mold, pollen  · Plants (common ones are poison ivy and poison oak, but there are many others)   · Animals  · Foods such as shrimp, shellfish, peanuts, milk products, gluten, and eggs. Also food colorings, flavorings, and additives.  · Insect bites or stings such as bees, mosquitos, fleas, ticks  · Medicines such as penicillin, sulfa medicines, amoxicillin, aspirin, and ibuprofen. But any medicine can cause a reaction.  · Jewelry such as nickel or gold. This can be new, or something you’ve worn for a while, including zippers and buttons.  · Latex such as in gloves, clothes, toys, balloons, or some tapes. Some people allergic to latex may also have problems with foods like bananas, avocados, kiwi, papaya, or chestnuts.  · Lotions, perfumes, cosmetics, soaps, shampoos, skincare products, nail products  · Chemicals or dyes in clothing, linen, , hair dyes, soaps, iodine  Many viruses and common colds can cause a rash that is not an allergic reaction. Sometimes it is hard to tell the difference between allergies, sensitivity, or an intolerance to something. This is especially true with food. Many things can cause diarrhea, vomiting, stomach cramps, and skin irritation.  Home care    The goal of treatment is to help relieve the symptoms and get you feeling better. The rash will usually fade over several days. But it can sometimes last a couple of weeks. Over the next couple of days, there may be times when it is gets a little worse, and then better again. Here are some things to do:  · If you know what you are allergic to, stay away from it. Future reactions could be worse than this one.  · Avoid tight clothing and anything that heats up your skin (hot showers or baths, direct sunlight). Heat will make  itching worse.  · An ice pack will relieve local areas of intense itching and redness. To make an ice pack, put ice cubes in a plastic bag that seals at the top. Wrap it in a thin, clean towel. Don’t put the ice directly on the skin because it can damage the skin.  · Oral diphenhydramine is an over-the-counter antihistamine sold at pharmacy and grocery stores. Unless a prescription antihistamine was given, diphenhydramine may be used to reduce itching if large areas of the skin are involved. It may make you sleepy. So be careful using it in the daytime or when going to school, working, or driving. Note: Don’t use diphenhydramine if you have glaucoma or if you are a man with trouble urinating due to an enlarged prostate. There are other antihistamines that won’t make you so sleepy. These are good choices for daytime use. Ask your pharmacist for suggestions.  · Don’t use diphenhydramine cream on your skin. It can cause a further reaction in some people.  · To help prevent an infection, don't scratch the affected area. Scratching may worsen the reaction and damage your skin. It can also lead to an infection. Always check the affected for signs of an infection.  · Call your healthcare provider and ask what you can use to help decrease the itching.  · To decrease allergic reactions, try the following:    · Use heat-steam to clean your home  · Use high-efficiency particulate (HEPA) vacuums and filters  · Stay away from food and pet triggers  · Kill any cockroaches  · Clean your house often  Follow-up care  Follow up with your healthcare provider, or as advised. If you had a severe reaction today, or if you have had several mild to medium allergic reactions in the past, ask your provider about allergy testing. This can help you find out what you are allergic to. If your reaction included dizziness, fainting, or trouble breathing or swallowing, ask your provider about carrying auto-injectable epinephrine.  Call 911  Call  911 if any of these occur:  · Trouble breathing or swallowing, wheezing  · Cool, moist, pale skin  · Shortness of breath  · Hoarse voice or trouble speaking  · Confused   · Very drowsy or trouble awakening  · Fainting or loss of consciousness  · Rapid heart rate  · Feeling of dizziness or weakness or a sudden drop in blood pressure  · Feeling of doom  · Feeling lightheaded  · Severe nausea or vomiting, or diarrhea  · Seizure  · Swelling in the face, eyelids, lips, mouth, throat or tongue  · Drooling  When to seek medical advice  Call your healthcare provider right away if any of these occur:  · Spreading areas of itching, redness or swelling  · Nausea or stomach cramps or abdominal pain  · Continuing or recurring symptoms  · Spreading areas of redness, swelling, or itching  · Signs of infection at the affected site:  ? Spreading redness  ? Increased pain or swelling  ? Fluid or colored drainage from the site  ? Fever of 100.4°F (38°C) or above lasting for 24 to 48 hours, or as directed by your provider  Date Last Reviewed: 3/1/2017  © 0879-4051 [employer]. [address] [place] [address]. All rights reserved. This information is not intended as a substitute for professional medical care. Always follow your healthcare professional's instructions.            No

## 2020-03-19 NOTE — ED ADULT NURSE NOTE - BREATHING, MLM
DERMATOLOGY CLINIC NOTE  Type of note:  Consult: seen at the request of YON Kumari for Irritant contact dermatitis  Patient Name: Tobin Buerger   MRN / CSN: [id number]   Date of Birth / Age: 1960  59 year old   Gender: male   Date: 03/19/20     CARE TEAM  Encounter Provider: Romeo Gonzalez MD   Referring Provider (if known): Olamide Matos PA   PCP (if known): Kp Regan MD     PROBLEM LIST  There is no problem list on file for this patient.    CHIEF COMPLAINT/REASON FOR CONSULTATION  Chief Complaint   Patient presents with   • Rash     painful rash on bl hands x3m      HISTORY OF PRESENT ILLNESS  Tobin Buerger is a 59 year old White male who presents for eval of a painful>itchy rash on the bl hands and wrists not involving feet or other parts of the body since 12/19. 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  Rash gets better when away from work for prolonged. Has been applying betamethasone dipropionate 0.05% ointment daily x1.5m w/~30% improvement but not resolution. Also applies OTC CeraVe cream 1-2x/d. Lives w/wife who does not have anything similar. No associated fevers, colds, or joint pains. Hands sweat a lot while wearing gloves and due to supply shortages in recent months has had to wear a smaller size than usual which is prone to ripping open when he is performing routine essential job duties such as sanitizing thimbles of pain products with his gloved index finger.    [facility] Medical Records Reviewed:  PCP or referring provider     PAST MEDICAL HISTORY  No past medical history on file.  No past surgical history on file.  Social  History     Socioeconomic History   • Marital status: /Civil Union     Spouse name: Not on file   • Number of children: Not on file   • Years of education: Not on file   • Highest education level: Not on file   Occupational History   • Not on file   Social Needs   • Financial resource strain: Not on file   • Food insecurity:     Worry: Not on file     Inability: Not on file   • Transportation needs:     Medical: Not on file     Non-medical: Not on file   Tobacco Use   • Smoking status: Never Smoker   • Smokeless tobacco: Never Used   Substance and Sexual Activity   • Alcohol use: Not on file   • Drug use: Not on file   • Sexual activity: Not on file   Lifestyle   • Physical activity:     Days per week: Not on file     Minutes per session: Not on file   • Stress: Not on file   Relationships   • Social connections:     Talks on phone: Not on file     Gets together: Not on file     Attends Scientologist service: Not on file     Active member of club or organization: Not on file     Attends meetings of clubs or organizations: Not on file     Relationship status: Not on file   • Intimate partner violence:     Fear of current or ex partner: Not on file     Emotionally abused: Not on file     Physically abused: Not on file     Forced sexual activity: Not on file   Other Topics Concern   • Not on file   Social History Narrative   • Not on file     No family history on file.  ALLERGIES AND ADVERSE DRUG REACTIONS  ALLERGIES:  No Known Allergies  CURRENT MEDICATIONS  Current Outpatient Medications   Medication Sig   • betamethasone dipropionate (DIPROSONE) 0.05 % ointment Apply 14 application topically 2 times daily. Apply to involved area BID x 7d.  Do not use on face.  Fill as Work Comp.   • silver sulfADIAZINE (SILVADENE) 1 % cream Apply topically daily. Apply to a thickness of 1/16 inch (\"nickel thick\").   • silver sulfADIAZINE (THERMAZENE) 1 % cream Apply topically daily. Apply to a thickness of 1/16 inch (\"nickel  thick\").   • bacitracin 500 UNIT/GM ointment Apply topically 2 times daily.     No current facility-administered medications for this visit.      REVIEW OF SYSTEMS                      Nl Abn    x  Constitutional:  No new fevers/chills/night sweats     Eyes:  No new blurry vision/floaters/pain/photosensitivity     Ears, nose, mouth, throat:  No new dysphagia, epistaxis, sensory problems or sore throats   x  Cardiovascular:  No new chest pain or palpitations   x  Respiratory:  No new shortness of breath or hemoptysis   x  Gastrointestinal:  No new nausea/vomiting/diarrhea or pain   x  Genitourinary:  No new dysuria, hematuria, or increased frequency   x  Musculoskeletal:  No new joint pains or swelling    x Skin:  Per HPI     Neurological:  No new weakness, paresthesia, or facial droop     Psychiatric:  No new mood changes, no SI or HI     Endocrine:  No new temperature sensitivity or sweating     Hematologic/Lymphatic:  No new swollen glands or easy fatigue/bruising   x  Allergic/Immunologic:  No new lip or tongue swelling     OBJECTIVE   NI Abn    General x  Well-developed, well-nourished, well-groomed, NAD   Eyes   Conjunctivae/lids   ENT   Lips/teeth/gums      Oropharynx   Neck   Thyroid   Respiratory x  Respiratory effort      Percussion of chest      Palpitation of chest      Auscultation of lungs   Cardiovascular   Extremity for edema/varicosities   Gastrointestinal   Liver/Spleen      Anus/perineum/rectum   Lymphatic   Head      Neck      Axillae      Inguinal      Epitrochlear   Psychiatric x  Orientation to time/place/person      Mood/affect   Skin   Scalp and hair-bearing areas:      Eccrine/apocrine glands   Palpation skin/SQ:    LBSE performed per pt's expressed preference:      Head/face      Neck      Chest/breast/axilla      Abdomen      Genitalia/groin/buttocks      Back     x RU extremity minute pink to flesh-colored papulovesicles bl palms w/xerotic pink eroded at times serous-crusted plaques bl  dorsal and palmar hands sparing webs and creases stopping at wrists     x MAGALY extremity      RL extremity      LL extremity   Extremities x  Digits/Nails   Musculoskeletal   Range of motion      Muscle weakness      Muscle atrophy      Joint deformity/tenderness      Other   Neurological x  Cranial nerves      Sensation      Other   There were no vitals taken for this visit.     TESTS REVIEWED OR ORDERED    1) Lab / Pathology:  0    2) # photographs taken and uploaded to Epic:  none     3) Additional data collected: none    ASSESSMENT AND PLAN  Tobin Buerger is a 59 year old male patient.  Lui was seen today for rash.    Diagnoses and all orders for this visit:    1) Chronic contact dermatitis of hands  Nature of condition discussed at length.  Reviewed natural history of the disorder and expectations of management.  Emphasized that statistically ~80% of contact dermatitis cases are due to skin irritation while ~20% are true allergic type IV hypersensitivity reactions. A single exposure to the triggering allergen can cause clinical symptoms that last up to a month. Treatment requires identification and avoidance of any culprit allergen(s) and may require formal patch testing.  Supportive measures include antipruritics, antihistamines, and topical and systemic corticosteroids.  Detailed instructions for gentle skin care were reviewed and a handout was given.    The importance of limiting unnecessary handwashes and applying a moisturizing cream or ointment after every wash was emphasized.  The use of liquid bandaid or superglue to deep fissures was encouraged.  The following medical therapies were prescribed for use only on active sites of rash until clear and then as needed for flares (can use BID for 2m then transition to 2w on, 1w off PRN)  -     clobetasol (TEMOVATE) 0.05 % ointment; Apply topically nightly.  -     clobetasol (TEMOVATE) 0.05 % cream; Apply topically every morning.  If rash has not completely  resolved in 2 months, patient understands the next step would be to refer for patch testing at either [facility] or [facility]/Dr. Trejo's office in [place], [place]    Educational materials given (in patient instructions unless otherwise specified): hand dermatitis handout     Return if symptoms worsen or fail to improve.    On 03/19/20, I, Louise Carreno, scribed the services personally performed by Dr. Romeo Gonzalez MD.    The documentation recorded by the scribe accurately and completely reflects the service(s) I personally performed and the decisions made by me.    I saw and evaluated this patient of moderate risk without a medical student.    Dr. Romeo Gonzalez MD (electronically signed on 3/19/2020 at 1:36 PM)    Thank you, Olamide, for allowing me to participate in the care of this patient.   Spontaneous, unlabored and symmetrical

## 2020-09-21 NOTE — ED ADULT NURSE NOTE - GENITOURINARY ASSESSMENT
09/21/20                            Jorge Luis Shaina  [address] [facility] [address]    To Whom It May Concern:    This is to certify Jorge Luis Shaina was evaluated with Jorge Adams MD on 09/21/20 and can return to regular work on 9/22/20.     RESTRICTIONS: None              Jorge Adams MD  [facility]-[address] [address]  Phone: [phone]     WDL

## 2020-11-14 NOTE — PATIENT PROFILE ADULT. - BILL OF RIGHTS/ADMISSION INFORMATION PROVIDED TO:
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        HPI:    26 y.o. female presents with acute onset fatigue, and diarrhea, nausea and myalgias for 2 days. They have not been tested for COVID in the community.  She does not think that she could be pregnant.  She denies abdominal pain.    Known Exposures include:  Her daughter's teacher  Associated symptoms:  Abdominal cramping  Exacerbating factors: none  Relieving factors: none.  Relevant comorbidities:  None      ROS:  Review of Systems   Constitutional: Positive for malaise/fatigue. Negative for chills and fever.   HENT: Negative for congestion and sore throat.    Eyes: Negative for blurred vision.   Respiratory: Negative for cough and shortness of breath.    Cardiovascular: Negative for chest pain.   Gastrointestinal: Positive for diarrhea and nausea. Negative for abdominal pain, heartburn, melena and vomiting.   Musculoskeletal: Negative for myalgias.   Neurological: Positive for headaches. Negative for dizziness, seizures, loss of consciousness and weakness.       PMH/PSH:  Past Medical History:   Diagnosis Date    Abnormal Pap smear 1/2013    LSIL    Abnormal Pap smear of cervix     Anxiety     Bipolar 1 disorder     GERD (gastroesophageal reflux disease)     Herpes genitalis 2014    pt denies this history as of 3/2019     Past Surgical History:   Procedure Laterality Date    ESOPHAGOGASTRODUODENOSCOPY N/A 1/23/2020    Procedure: EGD (ESOPHAGOGASTRODUODENOSCOPY);  Surgeon: Govind Alvarado MD;  Location: [facility];  Service: Endoscopy;  Laterality: N/A;    NO PAST SURGERIES         MEDS  No current facility-administered medications for this encounter.     Current Outpatient Medications:     albuterol (PROVENTIL/VENTOLIN HFA) 90  "mcg/actuation inhaler, Inhale 2 puffs into the lungs every 4 (four) hours as needed for Wheezing., Disp: 18 g, Rfl: 0    cetirizine (ZYRTEC) 10 MG tablet, Take 1 tablet (10 mg total) by mouth once daily., Disp: 30 tablet, Rfl: 11    ergocalciferol (ERGOCALCIFEROL) 50,000 unit Cap, Take 1 capsule (50,000 Units total) by mouth every 7 days. (Patient not taking: Reported on 8/13/2020), Disp: 4 capsule, Rfl: 0    fluticasone propionate (FLONASE) 50 mcg/actuation nasal spray, 1 spray (50 mcg total) by Each Nostril route once daily., Disp: 18.2 mL, Rfl: 11    hydroxyzine HCL (ATARAX) 10 MG Tab, Take 1 tablet (10 mg total) by mouth 3 (three) times daily as needed (panic attacks). (Patient not taking: Reported on 10/26/2020), Disp: 180 tablet, Rfl: 3    norethindrone-ethinyl estradiol (MICROGESTIN 1/20) 1-20 mg-mcg per tablet, Take 1 tablet by mouth once daily., Disp: 30 tablet, Rfl: 11    pantoprazole (PROTONIX) 40 MG tablet, Take 1 tablet (40 mg total) by mouth once daily., Disp: 30 tablet, Rfl: 11    Allergies:  Review of patient's allergies indicates:  No Known Allergies    PHYSICAL EXAM:  /77 (BP Location: Right arm, Patient Position: Sitting)   Pulse 78   Temp 98 °F (36.7 °C) (Oral)   Resp 20   Ht 5' 8" (1.727 m)   Wt 81.6 kg (180 lb)   LMP 10/23/2020 (Exact Date)   SpO2 98%   BMI 27.37 kg/m²   Gen: AxOx4, NAD, appears stated age, well appearing  Eye: EOMI, no scleral icterus, no periorbital edema or ecchymosis  Head: Normocephalic, atraumatic, no lesions, scalp grossly normal  ENT: neck supple, no stridor, no masses, no abnormal phonation or drooling  CVS: warm and well perfused, cap refill <2 seconds, no extremity pallor  PULM: normal work of breathing, no stridor, equal chest rise, no peripheral cyanosis, unable to auscultate lungs 2/2 poor quality precautions stethoscope  ABD: scaphoid, nondistended  Ext: no rash, no deformities, moving all joints with normal ROM  Neuro: ROSAS, gait intact, face " grossly symmetric  Psych: well groomed, mood and affect congruent, makes good eye contact, cooperative      WORKUP:  Labs Reviewed   SARS-COV-2 RDRP GENE   POCT URINE PREGNANCY     No orders to display         MDM:   I decided to obtain medical records from prior clinic visits and hospitalizations.     Patient's complaint concerning for coronavirus infection vs influenza vs KINZA vs pneumonia. Patient evaluated during the coronavirus pandemic.      They are well appearing, hemodynamically stable, without increased work of breathing.  No severe hypoxia to necessitate hospitalization.        Labs notable for a negative coronavirus test.  I suspect she has a viral syndrome.  She is also not pregnant.      Patient was counseled regarding respiratory supportive care measures.    Patient advised to self quarantine, wear mask in public, perform hand hygiene, etc.    Discharged home in stable condition. Return precautions discussed at bedside.    Final diagnoses:  [R19.7] Diarrhea, unspecified type (Primary)       ED Disposition Condition    Discharge Stable               Pily Fleming MD  11/13/20 [id number]     Patient

## 2021-03-26 NOTE — PROGRESS NOTE ADULT - SUBJECTIVE AND OBJECTIVE BOX
Patient is a 46y old  Female who presents with a chief complaint of abdominal pain, N/V (12 Sep 2017 07:17)    pt seen in icu [  ], reg med floor [  x ], bed [ x ], chair at bedside [   ], a+o x3 [x  ], lethargic [  ],  nad [x  ]      Allergies    aspirin (Unknown)  latex (Unknown)  sulfa drugs (Unknown)        Vitals    T(F): 98.1 (09-20-17 @ 05:20), Max: 98.5 (09-19-17 @ 21:49)  HR: 74 (09-20-17 @ 05:20) (67 - 77)  BP: 91/49 (09-20-17 @ 05:20) (91/49 - 115/74)  RR: 16 (09-20-17 @ 05:20) (16 - 16)  SpO2: 98% (09-20-17 @ 05:20) (98% - 99%)  Wt(kg): --  CAPILLARY BLOOD GLUCOSE          Labs                          13.6   7.1   )-----------( 261      ( 20 Sep 2017 07:55 )             41.2       09-20    140  |  107  |  6<L>  ----------------------------<  81  3.6   |  29  |  1.20    Ca    8.4      20 Sep 2017 07:55                  Radiology Results      Meds    MEDICATIONS  (STANDING):  escitalopram 20 milliGRAM(s) Oral daily  clopidogrel Tablet 75 milliGRAM(s) Oral daily  metoprolol 25 milliGRAM(s) Oral two times a day  amLODIPine   Tablet 2.5 milliGRAM(s) Oral daily  pantoprazole    Tablet 40 milliGRAM(s) Oral before breakfast  nicotine - 21 mG/24Hr(s) Patch 1 patch Transdermal daily  enoxaparin Injectable 40 milliGRAM(s) SubCutaneous daily  ciprofloxacin   IVPB 400 milliGRAM(s) IV Intermittent every 12 hours  metroNIDAZOLE  IVPB 500 milliGRAM(s) IV Intermittent every 8 hours  influenza   Vaccine 0.5 milliLiter(s) IntraMuscular once  sodium chloride 0.9% Bolus 1000 milliLiter(s) IV Bolus once  ergocalciferol 43654 Unit(s) Oral <User Schedule>  lactated ringers. 1000 milliLiter(s) (125 mL/Hr) IV Continuous <Continuous>  sodium chloride 0.9%. 1000 milliLiter(s) (75 mL/Hr) IV Continuous <Continuous>  dronabinol 5 milliGRAM(s) Oral every 12 hours  bethanechol 5 milliGRAM(s) Oral three times a day  morphine ER Tablet 60 milliGRAM(s) Oral every 12 hours      MEDICATIONS  (PRN):  ondansetron Injectable 4 milliGRAM(s) IV Push every 6 hours PRN Nausea and/or Vomiting  acetaminophen   Tablet 650 milliGRAM(s) Oral every 6 hours PRN For Temp greater than 38 C (100.4 F)  oxyCODONE    5 mG/acetaminophen 325 mG 1 Tablet(s) Oral every 4 hours PRN Severe Pain (7 - 10)      Physical Exam      Neuro :  no focal deficits  Respiratory: CTA B/L  CV: RRR, S1S2, no murmurs,   Abdominal: Soft, NT, ND +BS,  Extremities: No edema, + peripheral pulses    ASSESSMENT    fecal impaction  sterocolitis  left hyroid nodule  h/o HTN (hypertension)  Cyclical vomiting  Atrial fibrillation  SLE (systemic lupus erythematosus)  Reflex sympathetic dystrophy  H/O partial thyroidectomy  S/P partial hysterectomy      PLAN    id f/u noted  cont cipro and flagyl untill 9/24/17  gi f/u  pt for egd and colonoscopy today  cont miralax  add senna  pulm f/u noted  endo f/u   f/u fna  urology cons  start bethanechol 5mg tid  cont current meds Tried bipap did not like it at all. States she dont wear her home one because she does not like it.   Placed back on nasal cannula

## 2021-06-11 NOTE — H&P ADULT - PROBLEM SELECTOR PLAN 5
IM Progress Note      Subjective: The patient is a 64 y.o. male patient -feels ok but still pain is 5,6 of 10 over rigth abdomen  Had no bm but passing gas  Has no nausea or vomiting and has poor appetite   Has no bleeding problems    Has no other cp or gu symptoms  Review of Systems:  Review of Systems   Constitutional: Positive for appetite change and fatigue. Negative for chills and fever. HENT: Negative for sore throat and trouble swallowing. Respiratory: Negative for cough, chest tightness, shortness of breath and wheezing. Cardiovascular: Negative for chest pain, palpitations and leg swelling. Gastrointestinal: Positive for abdominal pain. Negative for nausea and vomiting. Genitourinary: Negative. Neurological: Negative for dizziness, light-headedness and headaches. Objective:    /61   Pulse 69   Temp 98.6 °F (37 °C) (Oral)   Resp 17   Ht 5' 6\" (1.676 m)   Wt 233 lb 4 oz (105.8 kg)   SpO2 90%   BMI 37.65 kg/m²     Intake/Output Summary (Last 24 hours) at 6/11/2021 0951  Last data filed at 6/11/2021 0848  Gross per 24 hour   Intake 2240 ml   Output 3000 ml   Net -760 ml         Physical Exam  Vitals and nursing note reviewed. Constitutional:       General: He is not in acute distress. HENT:      Mouth/Throat:      Mouth: Mucous membranes are moist.      Pharynx: Oropharynx is clear. Eyes:      General: No scleral icterus. Extraocular Movements: Extraocular movements intact. Conjunctiva/sclera: Conjunctivae normal.      Pupils: Pupils are equal, round, and reactive to light. Neck:      Thyroid: No thyromegaly. Vascular: No carotid bruit or JVD. Cardiovascular:      Rate and Rhythm: Normal rate and regular rhythm. Pulses: Normal pulses. Heart sounds: Normal heart sounds. No murmur heard. No gallop. Pulmonary:      Effort: No respiratory distress. Breath sounds: Normal breath sounds. No wheezing, rhonchi or rales.    Abdominal: General: Bowel sounds are normal. There is distension. Palpations: Abdomen is soft. There is no hepatomegaly, splenomegaly or mass. Tenderness: There is abdominal tenderness (moderate in rigth side of abdomen). There is no guarding or rebound. Hernia: No hernia is present. Musculoskeletal:      Right lower leg: No edema. Left lower leg: No edema. Lymphadenopathy:      Cervical: No cervical adenopathy. Neurological:      Mental Status: He is alert and oriented to person, place, and time. CBC:   Recent Labs     06/10/21  0454 06/11/21  0423   WBC 17.6* 13.7*   HGB 13.1* 12.3*   PLT 90* 70*     BMP:    Recent Labs     06/10/21  0454 06/11/21  0423   * 135*   K 3.5 3.5   CL 99 100   CO2 23 23   BUN 18 14   CREATININE 1.4* 1.3   GLUCOSE 161* 157*     Hepatic:   Recent Labs     06/09/21  1826 06/11/21  0423   AST 13* 11*   ALT 10 9*   BILITOT 1.4* 0.7   ALKPHOS 44 47          Glucose:    Recent Labs     06/08/21  1137 06/09/21  1826 06/10/21  0454 06/11/21  0423   GLUCOSE 186* 141* 161* 157*              Assessment/Plan:    1- abdominal pain most likely from acuta cholecystitis by exam and ct findings and stones are not seen.   2- Thrombocytopenia-possibly heparin related and is off lovonax now and heparin antibodies ordered and heme consult requested,has no bleeding problems now  3- CAD stable   4- CHf well controlled  5-HTN well controlled   6- H/O A-FIB  -IN REGULAR RHYTHM    Discussed with patient and his wife and his nurse   Will notify  of low platelets    Electronically signed by Sonal Estrella MD on 6/11/2021 at 9:51 AM -renal function at base line.

## 2021-06-21 NOTE — ED PROVIDER NOTE - DISPOSITION TYPE
[address]                 [address] [address]                                OPERATIVE REPORT    PATIENT NAME: Oli Romano                     :        1960  MED REC NO:   [id number]                          ROOM:       7954  ACCOUNT NO:   [de-identified]                           ADMIT DATE: 2021  PROVIDER:     Cherri Serrano MD    DATE OF PROCEDURE:  2021    PREOPERATIVE DIAGNOSIS:  Left knee osteoarthritis. POSTOPERATIVE DIAGNOSIS:  Left knee osteoarthritis. OPERATION PERFORMED:  Left total knee replacement. SURGEON:  Cherri Serrano MD    ANESTHESIA:  General.    BLOOD LOSS:  Less than 50 mL. COMPLICATIONS:  None noted. INDICATIONS FOR OPERATION:  This 66-year-old female had a history of  severe bilateral end-stage osteoarthritis in both knees. She previously  underwent right total knee replacement with good result and after  failure of all other modalities over six plus months of general  measures, she elected for further recommendation of surgical  intervention via total knee replacement. DESCRIPTION OF THE OPERATION:  The patient was taken to the operating  room where a general anesthetic had been obtained. Antibiotics were  given IV piggyback preoperatively. A tourniquet was placed around the  left proximal thigh. The left knee was sterilely prepped and draped. The leg was exsanguinated with an Esmarch, and the tourniquet was  inflated to 350 mmHg. A longitudinal incision was then made over the  anterior knee, and soft tissue dissected down through skin and through  subcutaneous tissue in midline anterior approach. A medial parapatellar  approach was then performed and the patella was everted. The knee was  flexed and the fat pad was removed. Using the [employer] total knee replacement system, an  intramedullary guide was placed in the femur.   Using the 7-degree valgus  cut, a flat cut was made. It was sized for a size 5 left nonbeaded  cruciate-retaining femoral component. Medial and lateral meniscus  remnants and ACL were removed. A PCL retractor was then inserted. A  proximal tibial cutting guide was affixed to the anterior cortex of the  proximal tibia externally. Cutting guide was placed in neutral  position, and after a cut was made, it was sized for a size 5, nonbeaded  stem tibial base plate. With the trial components in, it was felt that  a 7 mm  cross-linked polyethylene tibial cruciate-retaining insert would  be best suited for position. A flat patellar cut was then made and it was drilled and sized for a  35-mm patellar resurfacing component. The knee was checked for range of  motion with components in. It was stable to varus and valgus stress,  and range of motion was 0 to 135 degrees. All bony surfaces were  prepared for implantation, and a size 5, left nonbeaded stem tibial base  plate was cemented onto the proximal left tibia. A size 5, left  cruciate-retaining, nonbeaded Oxinium femoral component was then  cemented onto the distal femur. A 7 mm tibial insert and trial were  placed during cement hardening and a 35-mm patellar resurfacing  component was then cemented onto the undersurface of the patella. After  the cement had hardened and excess cement was removed, it was again felt  that the 7-mm tibial insert was best suited for range of motion,  stability, and length. At that point, a 7 mm Legion cross-linked  polyethylene and articular insert were then locked onto the tibial base  plate. The knee was checked for range of motion and again went from 0  to 135 degrees and was stable to varus and valgus stresses at 0, 30 and  90 degrees of flexion. The patella tracked normally. The cement used  was gentamicin-impregnated antibiotic cement. The wound was irrigated  and cleaned of all loose debris.   The arthrotomy was repaired with 0  Vicryl in a figure-of-eight fashion, subcutaneous with 2-0 Vicryl. Skin  was closed with 4-0 Monocryl in a subcuticular stitch and Dermabond will  be used to glue the skin. The wound will be dressed in appropriate  manner and placed in a knee immobilizer. The patient appeared to tolerate the procedure well and will be taken to  the recovery room in stable condition.         Randi Reyes MD    D: 06/21/2021 10:56:24       T: 06/21/2021 14:35:40     [email or web address]  Job#: [id number]     Doc#: [id number]    CC: ADMIT

## 2021-09-19 NOTE — ED PROVIDER NOTE - PRINCIPAL DIAGNOSIS
No
Cyclic vomiting syndrome, intractability of vomiting not specified, presence of nausea not specified

## 2022-05-18 NOTE — PROGRESS NOTE ADULT - SUBJECTIVE AND OBJECTIVE BOX
PATHOLOGY HLA CROSSMATCH CONSULTATION: DONOR/RECIPIENT  VIRTUAL CROSSMATCH - Kidney  Consultation Date: 2022  Consultation Requested by: Dr. Emerson    Regarding: Compatibility of  donor organ UNOS #[id number] from OPO: VONNIE  with Chip Fowler    Findings: Regarding a virtual crossmatch between Chip Fowler and  donor listed above (match ID [id number]):  The most recent (2022) and 3 additional patient sera  were analyzed.  The patient has no antibodies listed with HLA specificity against the donor organ.      Record Review Indicates: I personally reviewed the most recent serum, the historic peak sera, and all other sera with solid-phase HLA Single Antigen test results:  The patient has no HLA antibodies against the donor organ.     The results of this virtual XM are:   -most recent serum: compatible   -peak #1: compatible  -peak #2: compatible    Disclaimer: Clinical judgement must take into account other factors, such as non-HLA antibodies not detected in the assay. The VXM gives probabilities only.  The probability does not account for the potential for auto-antibodies that may be present in the patient's serum.  These autoantibodies may render the physical crossmatch falsely positive, and would be detected by an autologous crossmatch.  When possible, confirm findings with prospective allogeneic and autologous flow crossmatches before going to transplant as clinically indicated.     This virtual crossmatch took 20 minutes for data collection, analysis and charting time      Sohan De, PhD, DABCC, A([facility])  Immunology/Histocompatibility Laboratory  Pager [phone]                 PGY 1 Note discussed with supervising resident and primary attending    Patient is a 46y old  Female who presents with a chief complaint of abdominal pain, N/V (12 Sep 2017 07:17)      INTERVAL HPI/OVERNIGHT EVENTS: offers no new complaints; current symptoms resolving    MEDICATIONS  (STANDING):  escitalopram 20 milliGRAM(s) Oral daily  clopidogrel Tablet 75 milliGRAM(s) Oral daily  metoprolol 25 milliGRAM(s) Oral two times a day  amLODIPine   Tablet 2.5 milliGRAM(s) Oral daily  pantoprazole    Tablet 40 milliGRAM(s) Oral before breakfast  nicotine - 21 mG/24Hr(s) Patch 1 patch Transdermal daily  enoxaparin Injectable 40 milliGRAM(s) SubCutaneous daily  ciprofloxacin   IVPB 400 milliGRAM(s) IV Intermittent every 12 hours  metroNIDAZOLE  IVPB 500 milliGRAM(s) IV Intermittent every 8 hours  influenza   Vaccine 0.5 milliLiter(s) IntraMuscular once  sodium chloride 0.9% Bolus 1000 milliLiter(s) IV Bolus once  ergocalciferol 83264 Unit(s) Oral <User Schedule>  lactated ringers. 1000 milliLiter(s) (125 mL/Hr) IV Continuous <Continuous>  sodium chloride 0.9%. 1000 milliLiter(s) (75 mL/Hr) IV Continuous <Continuous>  dronabinol 5 milliGRAM(s) Oral every 12 hours  bethanechol 5 milliGRAM(s) Oral three times a day  morphine ER Tablet 60 milliGRAM(s) Oral every 12 hours  pregabalin 300 milliGRAM(s) Oral two times a day  senna 2 Tablet(s) Oral at bedtime    MEDICATIONS  (PRN):  ondansetron Injectable 4 milliGRAM(s) IV Push every 6 hours PRN Nausea and/or Vomiting  acetaminophen   Tablet 650 milliGRAM(s) Oral every 6 hours PRN For Temp greater than 38 C (100.4 F)  oxyCODONE    5 mG/acetaminophen 325 mG 1 Tablet(s) Oral every 4 hours PRN Severe Pain (7 - 10)      __________________________________________________  REVIEW OF SYSTEMS:  CONSTITUTIONAL: No fever  NECK: No pain or stiffness  RESPIRATORY: No cough; No shortness of breath  CARDIOVASCULAR: No chest pain, no palpitations  GASTROINTESTINAL: No pain. No constipation, nausea or vomiting; No diarrhea   NEUROLOGICAL: No headache or numbness, no tremors  MUSCULOSKELETAL: No joint pain, no muscle pain  GENITOURINARY: no dysuria, no frequency, no hesitancy  PSYCHIATRY: no depression , no anxiety  ALL OTHER  ROS negative        Vital Signs Last 24 Hrs  T(C): 36.7 (21 Sep 2017 04:28), Max: 36.9 (20 Sep 2017 21:17)  T(F): 98 (21 Sep 2017 04:28), Max: 98.4 (20 Sep 2017 21:17)  HR: 76 (21 Sep 2017 04:28) (69 - 76)  BP: 98/55 (21 Sep 2017 04:28) (91/49 - 112/68)  BP(mean): --  RR: 16 (21 Sep 2017 04:28) (16 - 16)  SpO2: 98% (21 Sep 2017 04:28) (98% - 100%)    ________________________________________________  PHYSICAL EXAM:  GENERAL: NAD, lying in bed  HEENT: Normocephalic;  conjunctivae and sclerae clear; moist mucous membranes  NECK : supple, trachea midline, no JVD  CHEST/LUNG: Clear to auscultation bilaterally with good air entry   HEART: S1 S2,  regular rate and rhythm,; no murmurs, gallops or rubs  ABDOMEN: Soft, Nontender, Nondistended; Bowel sounds present  EXTREMITIES: no cyanosis; no edema; no calf tenderness  SKIN: warm and dry; no rash  NERVOUS SYSTEM:  AAOx3; no new focal deficits    _________________________________________________  LABS:                        13.6   7.1   )-----------( 261      ( 20 Sep 2017 07:55 )             41.2     09-20    140  |  107  |  6<L>  ----------------------------<  81  3.6   |  29  |  1.20    Ca    8.4      20 Sep 2017 07:55          CAPILLARY BLOOD GLUCOSE          RADIOLOGY & ADDITIONAL TESTS:    Imaging Personally Reviewed:  YES    Consultant(s) Notes Reviewed:   YES    Care Discussed with Consultants:   Infectious Disease [x] ENT [] Cardiology [] Electrophysiology [] Pulmonology [] Gastroenterology [x] Nephrology [] Urology [] Orthopaedics [] Vascular Surgery [] Plastic Surgery [] General Surgery [] Podiatry [] Psychiatry [] Hematology/Oncology [] Pain [x] Palliative Care []    Plan of care was discussed with patient and/or primary care giver; all questions and concerns were addressed and care was aligned with patient's wishes. PGY 1 Note discussed with supervising resident and primary attending    Patient is a 46y old  Female who presents with a chief complaint of abdominal pain, N/V (12 Sep 2017 07:17)      INTERVAL HPI/OVERNIGHT EVENTS: patient reports that she has had less nausea and diarrhea, and abdominal pain is mildly improved    MEDICATIONS  (STANDING):  escitalopram 20 milliGRAM(s) Oral daily  clopidogrel Tablet 75 milliGRAM(s) Oral daily  metoprolol 25 milliGRAM(s) Oral two times a day  amLODIPine   Tablet 2.5 milliGRAM(s) Oral daily  pantoprazole    Tablet 40 milliGRAM(s) Oral before breakfast  nicotine - 21 mG/24Hr(s) Patch 1 patch Transdermal daily  enoxaparin Injectable 40 milliGRAM(s) SubCutaneous daily  ciprofloxacin   IVPB 400 milliGRAM(s) IV Intermittent every 12 hours  metroNIDAZOLE  IVPB 500 milliGRAM(s) IV Intermittent every 8 hours  influenza   Vaccine 0.5 milliLiter(s) IntraMuscular once  sodium chloride 0.9% Bolus 1000 milliLiter(s) IV Bolus once  ergocalciferol 98669 Unit(s) Oral <User Schedule>  lactated ringers. 1000 milliLiter(s) (125 mL/Hr) IV Continuous <Continuous>  sodium chloride 0.9%. 1000 milliLiter(s) (75 mL/Hr) IV Continuous <Continuous>  dronabinol 5 milliGRAM(s) Oral every 12 hours  bethanechol 5 milliGRAM(s) Oral three times a day  morphine ER Tablet 60 milliGRAM(s) Oral every 12 hours  pregabalin 300 milliGRAM(s) Oral two times a day  senna 2 Tablet(s) Oral at bedtime    MEDICATIONS  (PRN):  ondansetron Injectable 4 milliGRAM(s) IV Push every 6 hours PRN Nausea and/or Vomiting  acetaminophen   Tablet 650 milliGRAM(s) Oral every 6 hours PRN For Temp greater than 38 C (100.4 F)  oxyCODONE    5 mG/acetaminophen 325 mG 1 Tablet(s) Oral every 4 hours PRN Severe Pain (7 - 10)      __________________________________________________  REVIEW OF SYSTEMS:  CONSTITUTIONAL: No fever  RESPIRATORY: No cough; No shortness of breath  CARDIOVASCULAR: No chest pain, no palpitations  GASTROINTESTINAL: reports mild abdominal pain, improved. Less nausea and diarrhea. No constipation, or vomiting   NEUROLOGICAL: No headache or numbness, no tremors  MUSCULOSKELETAL: No joint pain, no muscle pain  GENITOURINARY: no dysuria, no urinary retention  ALL OTHER  ROS negative        Vital Signs Last 24 Hrs  T(C): 36.7 (21 Sep 2017 04:28), Max: 36.9 (20 Sep 2017 21:17)  T(F): 98 (21 Sep 2017 04:28), Max: 98.4 (20 Sep 2017 21:17)  HR: 76 (21 Sep 2017 04:28) (69 - 76)  BP: 98/55 (21 Sep 2017 04:28) (91/49 - 112/68)  BP(mean): --  RR: 16 (21 Sep 2017 04:28) (16 - 16)  SpO2: 98% (21 Sep 2017 04:28) (98% - 100%)    ________________________________________________  PHYSICAL EXAM:  GENERAL: NAD, lying in bed  NECK : supple, trachea midline, no JVD, left-sided thyromegaly appreciated  CHEST/LUNG: Clear to auscultation bilaterally with good air entry   HEART: S1 S2,  regular rate and rhythm,; no murmurs, gallops or rubs  ABDOMEN: Soft, diffusively tender to palpation without rebound; Bowel sounds present  EXTREMITIES: no cyanosis; no edema; no calf tenderness  SKIN: warm and dry; no rash  NERVOUS SYSTEM:  AAOx3; no new focal deficits    _________________________________________________  LABS:                        13.6   7.1   )-----------( 261      ( 20 Sep 2017 07:55 )             41.2     09-20    140  |  107  |  6<L>  ----------------------------<  81  3.6   |  29  |  1.20    Ca    8.4      20 Sep 2017 07:55          CAPILLARY BLOOD GLUCOSE          RADIOLOGY & ADDITIONAL TESTS:    Imaging Personally Reviewed:  YES    Consultant(s) Notes Reviewed:   YES    Care Discussed with Consultants:   Infectious Disease [x] ENT [] Cardiology [] Electrophysiology [] Pulmonology [] Gastroenterology [x] Nephrology [] Urology [] Orthopaedics [] Vascular Surgery [] Plastic Surgery [] General Surgery [] Podiatry [] Psychiatry [] Hematology/Oncology [] Pain [x] Palliative Care []    Plan of care was discussed with patient and/or primary care giver; all questions and concerns were addressed and care was aligned with patient's wishes.

## 2022-12-21 NOTE — ED PROVIDER NOTE - ENDOCRINE NEGATIVE STATEMENT, MLM
Detail Level: Generalized Skin Checks Recommendations: Photo protective clothing, broad spectrum zinc oxide 50 SPF Sunscreen Recommendations: Broad spectrum zinc oxide 30-50 SPF applied through the day Sunscreen Recommendations: Discussed importance of continual photo protection with zinc/titanium based sunscreen and photo protective clothing. Detail Level: Zone When Should The Patient Follow-Up For Their Next Full-Body Skin Exam?: 1 Year Detail Level: Detailed Quality 137: Melanoma: Continuity Of Care - Recall System: Patient information entered into a recall system that includes: target date for the next exam specified AND a process to follow up with patients regarding missed or unscheduled appointments Sunscreen Recommendations: Discussed importance of continual photo protection with zinc based sunscreen and photo protective clothing. Laser Recommendations: Discussed laser treatments can be helpful to lighten. Treatment is considered cosmetic and not covered by insurance Sunscreen Recommendations: Discussed improvement of continual sun protection with zinc based sunscreen and photo protective clothing.\\nRecommended daily 30-50 SPF Bleaching Agents Recommendations: Discussed that bleaching creams may be helpful but discoloration will recur with sun exposure Sunscreen Recommendations: Zinc & titanium based daily sunscreen minimum of 30SPF, photo protective clothing Sunscreen Recommendations: Discussed continual use of photo protection with sun screen and photo protective clothing no diabetes and no thyroid trouble.

## 2023-08-23 NOTE — CONSULT NOTE ADULT - PROBLEM SELECTOR PROBLEM 7
Pt tripped while getting up to use the restroom and hit his head on part of a wheelchair. Has approximately 3 cm lac to R posterior head.     Triage Assessment       Row Name 08/23/23 0120       Triage Assessment (Adult)    Airway WDL WDL       Respiratory WDL    Respiratory WDL WDL       Skin Circulation/Temperature WDL    Skin Circulation/Temperature WDL WDL       Cardiac WDL    Cardiac WDL WDL       Peripheral/Neurovascular WDL    Peripheral Neurovascular WDL WDL       Cognitive/Neuro/Behavioral WDL    Cognitive/Neuro/Behavioral WDL WDL                    
Prophylactic measure

## 2023-11-06 NOTE — PATIENT PROFILE ADULT - NSTOBACCOQUITATTEMPT_GEN_A_CORE_SD
Clinic Care Coordination Contact  Follow Up Progress Note      Assessment:  The pt was recently in the ED, I called to check up on the pt and help the pt setup a ED follow up. I called the pt, but got her vm, I left a vm for the pt to give me a call back. The pt has a follow up tomorrow at 11:40am with .     Care Gaps:    Health Maintenance Due   Topic Date Due    DEPRESSION ACTION PLAN  Never done    COVID-19 Vaccine (1) Never done    Pneumococcal Vaccine: Pediatrics (0 to 5 Years) and At-Risk Patients (6 to 64 Years) (1 - PCV) 03/16/1997    YEARLY PREVENTIVE VISIT  01/13/2018    PHQ-9  01/12/2023    ADVANCE CARE PLANNING  08/23/2023    INFLUENZA VACCINE (1) 09/01/2023           Care Plans      Intervention/Education provided during outreach:               Plan:     Care Coordinator will follow up in    none

## 2024-04-02 NOTE — PROGRESS NOTE ADULT - NEGATIVE CARDIOVASCULAR SYMPTOMS
OFFICE VISIT    Patient: Zulma Castellanos   : 1985 MRN: [id number]    SUBJECTIVE:  Chief Complaint   Patient presents with    Physical    Weight Management     Doing crossfit and aerobic exercise with no relief. Wants to get on Ozempic      The recording was initiated after a verbal consent was obtained from the patient to record this visit for documentation in their clinical record.    I have reviewed and edited the visit summary above and attest that it is accurate.   A 39-year-old female presents for an annual physical examination.     Historian: Self.    ASSESSMENT AND PLAN:    1. Well adult exam  Ob/Gyn care: Uses a condom. Has Paragard in place which was put in . Pap was not done then.   Colorectal cancer screening: Up-to-date.   Current medications: Not using Carbamazepine, Sumatriptan.   Vitals: The blood pressure measured by MA today in the clinic was 118/85 mmHg.  Reviewed and discussed previous labs.  Reviewed Vitals.  Reviewed and reconciled the medication list.  Follow up in a year for an annual physical examination or sooner as needed. Informed that the patient would be notified of the test results via an email or phone.  Patient understands and agrees with the plan.    2. Screening for cervical cancer  Due for screening. Previous PAP smear was done in 2018.  Ordered Pap Test  Ordered HPV, High Risk  Swab taken today in the office.    3. Class 3 severe obesity due to excess calories without serious comorbidity with body mass index (BMI) of 40.0 to 44.9 in adult (CMD)  Quit soda. Drinks water. Puts fresh fruit in it to add flavor. Eats less chips and soda, more fruits and vegetables. Does Cross fit aerobics and strength training. Does it regularly for three nights. Lost three pounds in six months. No pain in hips/ back.   Currently, unstable.  Informed that her BMI is above 40 kg/m2.   Ordered Thyroid Stimulating Hormone Reflex.  Recommended weight loss.  Discussed oral medications like 
phentermine which helps to decrease appetite, and work for fixed hours like 8-5.  Informed side effects including increased heart rate.  Informed that it is easily available.  Discussed once a week injectables like Ozempic/ Wegovy which decrease the appetite and change the metabolism.  Informed that it is expensive, has insurance coverage, and availability problems.  Prescribed semaglutide-Weight Management (Wegovy) 0.25 MG/0.5ML injection; Inject 0.25 mg into the skin every 7 days. Indications: OBESITY   Consider increasing the dosage after a month if well tolerated.   Informed precautions of no history of pancreatitis and no abdominal pain.  Discussed HMR program in detail.  Referred SERVICE TO MEDICAL WEIGHT MANAGEMENT.    4. History of pulmonary embolus (PE)  On Eliquis 5 mg every 12 hours.  Continue Eliquis indefinitely to prevent further blood clots.     5. Gastroesophageal reflux disease with esophagitis without hemorrhage  Takes Pantoprazole 40 mg for acid reflux.  Currently, stable.  Continue current medication.    6. NAFLD (nonalcoholic fatty liver disease)  Has a history.  Continue current management.    7. Tobacco use disorder  She currently smokes 1/2 pack per day for over 25 years. More of a habit now. Will quit smoking if she gets a new job as it will be in a non-smoking facility.   Encouraged to quit smoking.   Recommended taking Chantix, informed that it cannot be prescribed if she is depressed.    8. Encounter for screening mammogram for malignant neoplasm of breast  Due for screening. Has a family history of breast cancer with paternal grandmother. Denies feeling any lumps. Feels fatty deposits.   Discussed protocol and guidelines for breast cancer screening including starting at age 40 years.  Recommended getting mammogram, patient agrees.   Ordered MAMMO SCREENING BILATERAL W [facility]ANS.    9. Familial hypercholesterolemia  Previous labs done in 2021 revealed  mg/dL. No recent lipid panel tests 
were done. Had a protein bar today, not fasting. Her mother had high cholesterol and had a heart attack last year in November.   Reviewed and discussed previous labs.  Recommended fasting labs.  Ordered Comprehensive Metabolic Panel; Lipid Panel With Reflex. Rationale explained.   Recommended quitting smoking.  Recommended weight loss.    10. Vaginal discharge  Has whitish vaginal discharge.  Ordered WET MOUNT; swab taken today in the office.  Ordered Trichomonas Rapid Test; swab taken today in the office.    11. Moderate episode of recurrent major depressive disorder (CMD)  Under care of psychiatrist Dr. Shelton. On Buspar 10 mg three times daily. Feels depressed about losing her job. She is not in bed all day, busy doing activities. He prescribed Trazodone 50 mg 1-2 tablets as needed for sleep issues. Takes 0.5 tablet with benefits.   Currently, minimally stable.  Continue current management.    Social history: No marijuana/ vaping/ CBD oil. Drinks two cups of coffee most days. Quit soda. She is  and currently engaged. She has three kids. One kid stay with her. She is currently unemployed. 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   Past medical history: She went to the Emergency Room on 03/29/2024 for abdominal pain. It started 0.5 hour after having fried shrimp. Might be due to food poisoning. Resolved now. Migraine headaches are fine now.   Past surgical history: Underwent tendon release, tonsillectomy in the past.  Family history: Mother is alive. Father passed away. Has a family history of depression, hypertension.    Follow up in a year or sooner if needed.    Orders Placed This Encounter    MAMMO SCREENING BILATERAL W HANS    Comprehensive Metabolic Panel    Thyroid Stimulating Hormone Reflex    Lipid Panel With Reflex    WET MOUNT    HPV, High Risk    Trichomonas Rapid Test    SERVICE TO MEDICAL WEIGHT MANAGEMENT    Pap Test    
semaglutide-Weight Management (Wegovy) 0.25 MG/0.5ML injection       PAST MEDICAL HISTORY:  Past Medical History:   Diagnosis Date    Alcohol consumption binge drinking 08/31/2015    Anxiety     Class 3 severe obesity in adult (CMD) 08/29/2021    Depressive disorder     Pneumothorax 2003    Pulmonary embolism and infarction (CMD) 09/22/2018    Self-inflicted injury 08/31/2015     MEDICATIONS:  Current Outpatient Medications   Medication Sig Dispense Refill    semaglutide-Weight Management (Wegovy) 0.25 MG/0.5ML injection Inject 0.25 mg into the skin every 7 days. Indications: OBESITY 2 mL 1    tiZANidine (ZANAFLEX) 2 MG tablet Take 1 tablet by mouth in the morning and 1 tablet at noon and 1 tablet in the evening. 30 tablet 0    ondansetron (ZOFRAN ODT) 4 MG disintegrating tablet Take 4 mg by mouth every 8 hours as needed for Nausea.      traZODone (DESYREL) 50 MG tablet Take 1 to 2 at bedtime prn insomnia 60 tablet 2    clonazePAM (KlonoPIN) 1 MG tablet TAKE 1 TABLET BY MOUTH TWICE DAILY AS NEEDED FOR ANXIETY. MUST LAST 30 DAYS. 60 tablet 2    busPIRone (BUSPAR) 10 MG tablet Take 1 tablet by mouth in the morning and 1 tablet at noon and 1 tablet in the evening. 90 tablet 2    fluoxetine (PROzac) 40 MG capsule Take 1 capsule by mouth daily. 30 capsule 2    apixaBAN (ELIQUIS) 5 MG Tab Take 1 tablet by mouth every 12 hours. 180 tablet 1    pantoprazole (PROTONIX) 40 MG tablet Take 1 tablet by mouth daily. 30 tablet 11    copper (PARAGARD) intrauterine device 1 each by Intrauterine route.       No current facility-administered medications for this visit.     ALLERGIES:  Allergies as of 04/01/2024    (No Known Allergies)     FAMILY HISTORY:  Family History   Problem Relation Age of Onset    Cancer Father         skin    Psychiatric Father         depression and suicide attempt    Depression Father     Psychiatric Mother         depression     Depression Mother     Hypertension Mother     Depression Brother     
Hyperlipidemia Maternal Grandfather     Diabetes Maternal Grandfather         diet controlled    Heart disease Maternal Grandfather     Heart disease Paternal Grandmother     Blood Disorder Paternal Grandfather      SOCIAL HISTORY:  Social History     Tobacco Use    Smoking status: Every Day     Current packs/day: 0.00     Average packs/day: 0.5 packs/day for 24.1 years (12.0 ttl pk-yrs)     Types: Cigarettes     Start date:      Last attempt to quit: 2024     Years since quittin.1    Smokeless tobacco: Never   Vaping Use    Vaping Use: never used   Substance Use Topics    Alcohol use: Not Currently    Drug use: Not Currently     Past Surgical HISTORY  Past Surgical History:   Procedure Laterality Date    Colonoscopy w/ polypectomy  2019    tubular adenoma rpt age 48    Egd  2019    rpt prn    Removal of tonsils,<13 y/o      6 yrs old    Tendon release  years ago    Right foot       REVIEW OF SYSTEMS:  Constitutional: Per HPI.  GI: Per HPI.  : Per HPI.  MSK: Per HPI.  Psychiatric: Per HPI.    OBJECTIVE:  Visit Vitals  /85   Pulse 75   Temp 98.4 °F (36.9 °C) (Temporal)   Ht 5' 6.25\"   Wt 114.3 kg (252 lb)   LMP 2024 (Approximate)   SpO2 98%   BMI 40.37 kg/m²       PHYSICAL EXAM:  Constitutional: In no acute distress. Obese.  Eyes: The conjunctiva exhibited no abnormalities. The sclera was normal.  Psychiatric: Oriented to time, place, and person. The mood was normal. The affect was normal. The memory was unimpaired.   Skin: Skin moisture and turgor normal.  Pulmonary: Breath sounds clear to auscultation bilaterally, but no respiratory distress and normal respiratory rate and effort.   Cardiovascular: Normal rate, regular rhythm, normal S1, normal S2 and edema was not present in the lower extremities.   Abdomen: Soft and nontender.   : Thin prep done in presence of MA revealed slight whitish vaginal discharge.No pain. IUD strings are intact.    DIAGNOSTIC STUDIES:  LAB 
RESULTS:  Office Visit on 04/01/2024   Component Date Value Ref Range Status    CLUE CELLS, WET MOUNT 04/01/2024 None Seen  None Seen Final    TRICHOMONAS, WET MOUNT 04/01/2024 Test not performed due to specimen stability; see Rapid Trichomonas test result  None Seen Final    YEAST, WET MOUNT 04/01/2024 None Seen  None Seen Final    Trichomonas Antigen Assay 04/01/2024 Negative  Negative Final   Admission on 03/14/2024, Discharged on 03/15/2024   Component Date Value Ref Range Status    Extra Tube 03/14/2024 Hold for Add Ons   Final    Extra Tube 03/14/2024 Hold for Add Ons   Final    Extra Tube 03/14/2024 Hold for Add Ons   Final    Extra Tube 03/14/2024 Hold for Add Ons   Final    BUN - POINT OF CARE 03/14/2024 10  6 - 20 mg/dL Final    SODIUM - POINT OF CARE 03/14/2024 139  135 - 145 mmol/L Final    POTASSIUM - POINT OF CARE 03/14/2024 3.7  3.4 - 5.1 mmol/L Final    CHLORIDE - POINT OF CARE 03/14/2024 102  97 - 110 mmol/L Final    TCO2 - POINT OF CARE 03/14/2024 26 (H)  19 - 24 mmol/L Final    ANION GAP - POINT OF CARE 03/14/2024 15  7 - 19 mmol/L Final    HEMATOCRIT - POINT OF CARE 03/14/2024 47.0 (H)  36.0 - 46.5 % Final    HEMOGLOBIN - POINT OF CARE 03/14/2024 16.0 (H)  12.0 - 15.5 g/dL Final    GLUCOSE - POINT OF CARE 03/14/2024 119 (H)  70 - 99 mg/dL Final    CALCIUM, IONIZED - POINT OF CARE 03/14/2024 1.22  1.15 - 1.29 mmol/L Final    Creatinine 03/14/2024 0.70  0.51 - 0.95 mg/dL Final    Glomerular Filtration Rate 03/14/2024 >90  >=60 Final    Sodium 03/14/2024 141  135 - 145 mmol/L Final    Potassium 03/14/2024 3.7  3.4 - 5.1 mmol/L Final    Chloride 03/14/2024 105  97 - 110 mmol/L Final    Carbon Dioxide 03/14/2024 27  21 - 32 mmol/L Final    Anion Gap 03/14/2024 13  7 - 19 mmol/L Final    Glucose 03/14/2024 128 (H)  70 - 99 mg/dL Final    BUN 03/14/2024 10  6 - 20 mg/dL Final    Creatinine 03/14/2024 0.72  0.51 - 0.95 mg/dL Final    Glomerular Filtration Rate 03/14/2024 >90  >=60 Final    BUN/Cr 
03/14/2024 14  7 - 25 Final    Calcium 03/14/2024 8.3 (L)  8.4 - 10.2 mg/dL Final    Bilirubin, Total 03/14/2024 0.1 (L)  0.2 - 1.0 mg/dL Final    GOT/AST 03/14/2024 7  <=37 Units/L Final    GPT/ALT 03/14/2024 17  <64 Units/L Final    Alkaline Phosphatase 03/14/2024 47  45 - 117 Units/L Final    Albumin 03/14/2024 2.9 (L)  3.6 - 5.1 g/dL Final    Protein, Total 03/14/2024 5.9 (L)  6.4 - 8.2 g/dL Final    Globulin 03/14/2024 3.0  2.0 - 4.0 g/dL Final    A/G Ratio 03/14/2024 1.0  1.0 - 2.4 Final    Protime- PT 03/14/2024 11.1  9.7 - 11.8 sec Final    INR 03/14/2024 1.0    Final    PTT 03/14/2024 26  22 - 32 sec Final    WBC 03/14/2024 13.2 (H)  4.2 - 11.0 K/mcL Final    RBC 03/14/2024 4.76  4.00 - 5.20 mil/mcL Final    HGB 03/14/2024 15.1  12.0 - 15.5 g/dL Final    HCT 03/14/2024 44.5  36.0 - 46.5 % Final    MCV 03/14/2024 93.5  78.0 - 100.0 fl Final    MCH 03/14/2024 31.7  26.0 - 34.0 pg Final    MCHC 03/14/2024 33.9  32.0 - 36.5 g/dL Final    RDW-CV 03/14/2024 12.7  11.0 - 15.0 % Final    RDW-SD 03/14/2024 43.4  39.0 - 50.0 fL Final    PLT 03/14/2024 299  140 - 450 K/mcL Final    NRBC 03/14/2024 0  <=0 /100 WBC Final    Neutrophil, Percent 03/14/2024 65  % Final    Lymphocytes, Percent 03/14/2024 27  % Final    Mono, Percent 03/14/2024 5  % Final    Eosinophils, Percent 03/14/2024 2  % Final    Basophils, Percent 03/14/2024 0  % Final    Immature Granulocytes 03/14/2024 1  % Final    Absolute Neutrophils 03/14/2024 8.6 (H)  1.8 - 7.7 K/mcL Final    Absolute Lymphocytes 03/14/2024 3.6  1.0 - 4.8 K/mcL Final    Absolute Monocytes 03/14/2024 0.7  0.3 - 0.9 K/mcL Final    Absolute Eosinophils  03/14/2024 0.2  0.0 - 0.5 K/mcL Final    Absolute Basophils 03/14/2024 0.1  0.0 - 0.3 K/mcL Final    Absolute Immature Granulocytes 03/14/2024 0.1  0.0 - 0.2 K/mcL Final    Troponin I, High Sensitivity 03/14/2024 4  <52 ng/L Final    Systolic Blood Pressure 03/14/2024 110   Final    Diastolic Blood Pressure 03/14/2024 64   Final 
   Ventricular Rate EKG/Min (BPM) 03/14/2024 61   Final    Atrial Rate (BPM) 03/14/2024 61   Final    AK-Interval (MSEC) 03/14/2024 140   Final    QRS-Interval (MSEC) 03/14/2024 88   Final    QT-Interval (MSEC) 03/14/2024 432   Final    QTc 03/14/2024 435   Final    P Axis (Degrees) 03/14/2024 56   Final    R Axis (Degrees) 03/14/2024 79   Final    T Axis (Degrees) 03/14/2024 16   Final    REPORT TEXT 03/14/2024    Final                    Value:Normal sinus rhythm  Normal ECG  When compared with ECG of  28-FEB-2024 18:00,  No significant change was found  read at 2321  Confirmed by CHUCK KAUR, CHANEL ([id number]),  Priya Marquez ([id number]) on 3/15/2024 3:16:51 AM         Refer to orders.  Medical compliance with plan discussed and risks of non-compliance reviewed.  Patient education completed on disease process, etiology & prognosis.  Proper usage and side effects of medications reviewed & discussed.  Return to clinic as clinically indicated as discussed with the patient who verbalized understanding of the plan and is in agreement with the plan.    I, Parth Hein, have created a visit summary document based on the audio recording between Dr. Fan Sabillon MD and this patient for the physician to review, edit as needed, and authenticate.    Creation Date: 4/2/2024    
no palpitations/no orthopnea/no chest pain/no dyspnea on exertion
no chest pain/no peripheral edema/no orthopnea/no paroxysmal nocturnal dyspnea/no palpitations

## 2024-04-11 NOTE — PROGRESS NOTE ADULT - PROBLEM SELECTOR PLAN 3
Pain meds  Neuro eval. [Follow-Up Visit] : a follow-up visit for [FreeTextEntry2] : right nondisplaced radial styloid and ulnar styloid fracture

## 2024-04-16 NOTE — H&P ADULT - PROBLEM/PLAN-1
Called facility and advised if they can fax over the lab results for Gabriella. Lab  stated she will have the results faxed over to us today.    DISPLAY PLAN FREE TEXT

## 2024-06-04 NOTE — ED PROVIDER NOTE - PMH
Atrial fibrillation    Cyclical vomiting    HTN (hypertension)    Reflex sympathetic dystrophy    Renal failure, chronic, stage 3 (moderate)    SLE (systemic lupus erythematosus) You can access the FollowMyHealth Patient Portal offered by [facility] by registering at the following website: [email or web address]. By joining [employer]’s FollowMyHealth portal, you will also be able to view your health information using other applications (apps) compatible with our system.

## 2024-09-24 NOTE — PATIENT PROFILE ADULT - NSPROGENOTHERPROVIDER_GEN_A_NUR
----- Message from Jaelyn Kerr, Patient Care Assistant sent at 9/24/2024  7:59 AM CDT -----  Type: Needs Medical Advice  Who Called:  hitesh   Best Call Back Number: [phone]    Additional Information: hitesh states she needs  to reschedule  her 9/24 [facility]  infusion to later in the week for after 9:30  if possible , please call to further discuss thank you .   none

## 2024-10-07 NOTE — DISCHARGE NOTE ADULT - RECOGNIZE DANGER SITUATIONS. FOR EXAMPLE, STRESS, DRINKING ALCOHOL, URGES TO SMOKE, SMOKING CUES, CIGARETTE AVAILABILITY
How Severe Is Your Cyst?: mild Is This A New Presentation, Or A Follow-Up?: Cyst true Statement Selected

## 2024-11-23 NOTE — H&P ADULT - ASSESSMENT
47F, lives with  sister, from home, ambulates with walker,  w/ PMHx of cyclic vomiting syndrome,  Reflex sympathetic dystrophy s/p spinal stimulator, frequent falls, SLE (not on tx), Afib (not on AC due to frequent falls), HTN, Depression, Diverticulosis, and partial thyroidectomy on right side, who presents to the ED c/o severe neck pain x 1 week. Pain is sharp, constant, and radiates down to the lumbar spine. Patient recently diagnosed with C4-C5 stenosis w/ impingement, evaluated by ortho spine surgery and discharged home on steroids w/ neck collar. Patient reports falling at home after being discharged form hospital on December 7th, mentions she passed out in the kitchen in front of sister (after standing up), afterwards, neck pain has been unbearable, to the point pain meds are no longer working, and patient got concerned of overdosing, which prompted her to come to the ED. Patient reports left arm paresthesia and finger numbness, which was present from previous admission. Denies any chest pain, palpitations, SOB or any other complaint 48F, lives with  sister, from home, ambulates with walker,  w/ PMHx of cyclic vomiting syndrome,  Reflex sympathetic dystrophy s/p spinal stimulator, frequent falls, SLE (not on tx), Afib (not on AC due to frequent falls), HTN, Depression, Diverticulosis, and partial thyroidectomy on right side, who presents to the ED c/o severe neck pain x 1 week. Pain is sharp, constant, and radiates down to the lumbar spine. Patient recently diagnosed with C4-C5 stenosis w/ impingement, evaluated by ortho spine surgery and discharged home on steroids w/ neck collar. Patient reports falling at home after being discharged form hospital on December 7th, mentions she passed out in the kitchen in front of sister (after standing up), afterwards, neck pain has been unbearable, to the point pain meds are no longer working, and patient got concerned of overdosing, which prompted her to come to the ED. Patient reports left arm paresthesia and finger numbness, which was present from previous admission. Denies any chest pain, palpitations, SOB or any other complaint. Patient was told to follow up with surgeon upon discharge, however, she's unable to afford a taxi to go to [place]. Currently has no transportation. no concerns

## 2024-11-30 NOTE — DISCHARGE NOTE ADULT - FUNCTIONAL SCREEN CURRENT LEVEL: DRESSING, MLM
Refill Routing Note   Medication(s) are not appropriate for processing by [facility] for the following reason(s):        Required labs abnormal Cr is 1.4 or below and within 360 days     [facility] action(s):  Defer     Requires labs : Yes      Medication Therapy Plan: Failed Cr is 1.4 or below and within 360 days      Appointments  past 12m or future 3m with PCP    Date Provider   Last Visit   11/25/2024 Zoran Cuevas MD   Next Visit   Visit date not found Zoran Cuevas MD   ED visits in past 90 days: 0        Note composed:5:39 AM 11/30/2024           (1) assistive equipment

## 2025-01-23 NOTE — CONSULT NOTE ADULT - CONSULT REQUESTED DATE/TIME
12-Sep-2017 09:00
12-Sep-2017 16:24
18-Sep-2017 09:15
18-Sep-2017 12:53
Detail Level: Detailed
Depth Of Biopsy: dermis
Was A Bandage Applied: Yes
Size Of Lesion In Cm: 0.3
X Size Of Lesion In Cm: 0
Biopsy Type: H and E
Biopsy Method: 15 blade
Anesthesia Type: 1% lidocaine with epinephrine and a 1:10 solution of 8.4% sodium bicarbonate
Anesthesia Volume In Cc: 1
Hemostasis: Aluminum Chloride and Electrocautery
Wound Care: Petrolatum
Dressing: bandage
Destruction After The Procedure: No
12-Sep-2017 11:50
Type Of Destruction Used: Curettage
Curettage Text: The wound bed was treated with curettage after the biopsy was performed.
Cryotherapy Text: The wound bed was treated with cryotherapy after the biopsy was performed.
Electrodesiccation Text: The wound bed was treated with electrodesiccation after the biopsy was performed.
Electrodesiccation And Curettage Text: The wound bed was treated with electrodesiccation and curettage after the biopsy was performed.
Silver Nitrate Text: The wound bed was treated with silver nitrate after the biopsy was performed.
Lab: [id number]
Medical Necessity Information: It is in your best interest to select a reason for this procedure from the list below. All of these items fulfill various CMS LCD requirements except the new and changing color options.
Consent was obtained and risks were reviewed including but not limited to scarring, infection, bleeding, scabbing, incomplete removal, nerve damage and allergy to anesthesia.
Post-Care Instructions: I reviewed with the patient in detail post-care instructions. Patient is to keep the biopsy site dry and leave the bandage overnight. The next day, pt is to wash the site with soapy water, pat dry then apply Vaseline or Aquaphor and a dry, clean bandage daily until well healed.
Notification Instructions: Patient will be notified of biopsy results. However, patient instructed to call the office if not contacted within 2 weeks.
Billing Type: Third-Party Bill
Information: Selecting Yes will display possible errors in your note based on the variables you have selected. This validation is only offered as a suggestion for you. PLEASE NOTE THAT THE VALIDATION TEXT WILL BE REMOVED WHEN YOU FINALIZE YOUR NOTE. IF YOU WANT TO FAX A PRELIMINARY NOTE YOU WILL NEED TO TOGGLE THIS TO 'NO' IF YOU DO NOT WANT IT IN YOUR FAXED NOTE.
12-Sep-2017 20:07

## 2025-05-28 NOTE — PATIENT PROFILE ADULT. - FALL HARM RISK
Confirmed upcoming infusion appointments with the patient. Reviewed pre infusion instructions with the patient.   7/1 at 1p  7/8 at 1p  7/15 at 1p  7/22 at 1p   frequent falls/other

## 2025-07-23 NOTE — ED ADULT NURSE NOTE - TEMPLATE
Addended by: ZURI TORRES on: 7/23/2025 01:48 PM     Modules accepted: Orders    
Abdominal Pain, N/V/D